# Patient Record
Sex: MALE | Race: WHITE | NOT HISPANIC OR LATINO | Employment: OTHER | URBAN - METROPOLITAN AREA
[De-identification: names, ages, dates, MRNs, and addresses within clinical notes are randomized per-mention and may not be internally consistent; named-entity substitution may affect disease eponyms.]

---

## 2017-01-16 RX ORDER — ZALEPLON 10 MG/1
CAPSULE ORAL
Qty: 45 CAPSULE | Refills: 2 | Status: SHIPPED | OUTPATIENT
Start: 2017-01-16 | End: 2017-03-20 | Stop reason: SDUPTHER

## 2017-01-23 ENCOUNTER — OFFICE VISIT (OUTPATIENT)
Dept: OPTOMETRY | Facility: CLINIC | Age: 67
End: 2017-01-23
Payer: MEDICARE

## 2017-01-23 DIAGNOSIS — H10.503 BLEPHAROCONJUNCTIVITIS OF BOTH EYES, UNSPECIFIED BLEPHAROCONJUNCTIVITIS TYPE: Primary | ICD-10-CM

## 2017-01-23 PROCEDURE — 99213 OFFICE O/P EST LOW 20 MIN: CPT | Mod: PBBFAC,PO | Performed by: OPTOMETRIST

## 2017-01-23 PROCEDURE — 92012 INTRM OPH EXAM EST PATIENT: CPT | Mod: S$PBB,,, | Performed by: OPTOMETRIST

## 2017-01-23 PROCEDURE — 99999 PR PBB SHADOW E&M-EST. PATIENT-LVL III: CPT | Mod: PBBFAC,,, | Performed by: OPTOMETRIST

## 2017-01-23 RX ORDER — NEOMYCIN SULFATE, POLYMYXIN B SULFATE AND DEXAMETHASONE 3.5; 10000; 1 MG/ML; [USP'U]/ML; MG/ML
1 SUSPENSION/ DROPS OPHTHALMIC 3 TIMES DAILY
Qty: 5 ML | Refills: 1 | Status: SHIPPED | OUTPATIENT
Start: 2017-01-23 | End: 2017-01-30

## 2017-01-23 RX ORDER — DOXYCYCLINE 50 MG/1
50 CAPSULE ORAL DAILY
Qty: 30 CAPSULE | Refills: 1 | Status: SHIPPED | OUTPATIENT
Start: 2017-01-23 | End: 2017-03-09

## 2017-01-23 NOTE — PROGRESS NOTES
"HPI     Conjunctivitis    Additional comments: reoccuring conjunctivitis symptoms --- OU red,   irritated, burning, tearing // symptoms improve when on Maxitrol but come   back after d/c gtt --- 3 episodes in 2 months           Blurred Vision    Additional comments: only due to tearing, discharge           Comments   ELIER  11/16  Used topical and oral abx in November with relief  Has had transient episodes of bleph, but "never fully resolved"  Currently better than last week  Last used maxitrol gtts 3 days ago         Last edited by JUAN Becker, OD on 1/23/2017 11:25 AM. (History)            Assessment /Plan     For exam results, see Encounter Report.    Blepharoconjunctivitis of both eyes, unspecified blepharoconjunctivitis type  -     doxycycline (MONODOX) 50 MG Cap; Take 1 capsule (50 mg total) by mouth once daily.  Dispense: 30 capsule; Refill: 1  -     neomycin-polymyxin-dexamethasone (MAXITROL) 3.5mg/mL-10,000 unit/mL-0.1 % DrpS; Place 1 drop into both eyes 3 (three) times daily.  Dispense: 5 mL; Refill: 1      1. Continue lid hygiene, gave sheet  Scrubs and warm compress qhs  ATs daily tid+ canelo with near work  Oral doxy 4-6 weeks, then trial off meds  Discussed chronic nature of condition and possible need for continue oral doxy    Call/ message in 4 weeks with report, then will arrange f/u                 "

## 2017-01-23 NOTE — MR AVS SNAPSHOT
[facility]  [address] [facility] [address] [place] [address]  Phone: [phone]  Fax: [phone]                  Vince Chadwick III   2017 11:00 AM   Office Visit    Description:  Male : 1950   Provider:  JUAN Becker, OD   Department:  [facility]           Reason for Visit     Conjunctivitis     Itchy Eye     Blurred Vision           Diagnoses this Visit        Comments    Blepharoconjunctivitis of both eyes, unspecified blepharoconjunctivitis type    -  Primary            To Do List           Future Appointments        Provider Department Dept Phone    2/3/2017 8:15 AM LAB, [place] [facility] [phone]      Goals (5 Years of Data)     None      Follow-Up and Disposition     Return if symptoms worsen or fail to improve.       These Medications        Disp Refills Start End    doxycycline (MONODOX) 50 MG Cap 30 capsule 1 2017 3/9/2017    Take 1 capsule (50 mg total) by mouth once daily. - Oral    Ph[facility]: [id number] [place] - [address] [facility] Ph #: [phone]       neomycin-polymyxin-dexamethasone (MAXITROL) 3.5mg/mL-10,000 unit/mL-0.1 % DrpS 5 mL 1 2017    Place 1 drop into both eyes 3 (three) times daily. - Both Eyes    Ph[facility]: [id number] - [place] - [address] [facility] Ph #: [phone]         [facility]     [facility] -  Assistance  Registered nurses in the [facility] provide clinical advisement, health education, appointment booking, and other advisory services.  Call for this free service at [phone].             Medications           Message regarding Medications     Verify the changes and/or additions to your medication regime listed below are the same as discussed with your clinician today.  If any of these changes or additions are incorrect, please notify your healthcare provider.        START taking these NEW medications         Refills    doxycycline (MONODOX) 50 MG Cap 1    Sig: Take 1 capsule (50 mg total) by mouth once daily.    Class: Normal    Route: Oral    neomycin-polymyxin-dexamethasone (MAXITROL) 3.5mg/mL-10,000 unit/mL-0.1 % DrpS 1    Sig: Place 1 drop into both eyes 3 (three) times daily.    Class: Normal    Route: Both Eyes           Verify that the below list of medications is an accurate representation of the medications you are currently taking.  If none reported, the list may be blank. If incorrect, please contact your healthcare provider. Carry this list with you in case of emergency.           Current Medications     atorvastatin (LIPITOR) 10 MG tablet TAKE ONE TABLET BY MOUTH IN THE EVENING    azelastine (ASTELIN) 137 mcg (0.1 %) nasal spray USE 2 SPRAYS IN EACH NOSTRIL EACH NOSTRIL TWICE DAILY    benazepril (LOTENSIN) 20 MG tablet TAKE ONE TABLET BY MOUTH DAILY    dicyclomine (BENTYL) 20 mg tablet TAKE 1 TABLET BEFORE MEALS AND AT BEDTIME OR UP TO EVERY 4 TO 6 HOURS AS NEEDED FOR BLOATING/COLON SPASMS    doxycycline (MONODOX) 50 MG Cap Take 1 capsule (50 mg total) by mouth once daily.    escitalopram oxalate (LEXAPRO) 20 MG tablet Take 1 tablet (20 mg total) by mouth once daily.    levocetirizine (XYZAL) 5 MG tablet Take 1 tablet (5 mg total) by mouth every evening.    neomycin-polymyxin-dexamethasone (MAXITROL) 3.5mg/mL-10,000 unit/mL-0.1 % DrpS Place 1 drop into both eyes 3 (three) times daily.    tamsulosin (FLOMAX) 0.4 mg Cp24 Take 2 capsules (0.8 mg total) by mouth once daily.    zaleplon (SONATA) 10 MG capsule TAKE 1 TO 2 CAPSULES BY MOUTH AT BEDTIME AS NEEDED           Clinical Reference Information           Allergies as of 1/23/2017     No Known Drug Allergies      Immunizations Administered on Date of Encounter - 1/23/2017     None      Instructions    BLEPHARITIS & TREATMENT   Definition  Blepharitis is an inflammation of the eyelid margin, which causes itchy, irritated, and often red eyes. One common  cause is staphylococcus, skin bacteria, which can thrive in these conditions and can possibly cause eye damage such as corneal scarring.   Occurrence  Blepharitis is a very common problem seen particularly in people with a tendency toward oily skin, dandruff, or dry eyes. Though most frequently seen later in life, blepharitis can begin in childhood.  It can also be associated with hormonal changes (puberty, menopause), or changes in medications.  It is also common in patients with Rosacea.  Symptoms  Lid Crusting - The lids are often reddened, somewhat swollen and scaly appearing at the base of the lashes. These scales, as they become coarser, form crusts that cause the lids to stick together. This is especially prominent upon waking.   Itching - The inflammation of the lids will cause itching and irritation.   Tearing/ Light Sensitivity -The eyes may tear more frequently and may also be sensitive to bright light.  Treatment  Treatment is aimed at lowering the number of bacteria along the eyelid margin and decreasing the scales by lid hygiene and in some cases antibiotic/steroid medications. The goal is to control this problem and prevent it from becoming a more serious condition. Treatment is focused on keeping the condition under control by routine daily lid hygiene. Unfortunately, if the treatment is stopped the symptoms often recur.    1. Upon waking, place a clean, warm, wet, washcloth over your closed eyelids (upper and lower) for several minutes.  This may be accomplished by allowing warm shower water to run with eyes closed.  2. Place a small amount of diluted (1/4 strength) Baby Shampoo or a commercial lid cleaning solution on a cotton swab, washcloth, or your clean fingertip. Gently pull down on your lower lid and use a horizontal back and forth motion to scrub the edge of your lid at the base of the eyelashes. Do not scrub the inside of the lid or the skin on the outside. Close the eye and use the cotton  "swab to scrub along the base of the upper lid lashes. Rinse the shampoo away with warm water.   3. Additionally your doctor may ask that you use an antibiotic/steroid drop or ointment for a few weeks. Use as directed.   Perform this procedure 2 times a day for the first 7 days and then cut back to once a day. (Or per your doctors recommendation.) You may need to continue lid scrubs on a daily basis, though some find they can successfully control their blepharitis symptoms with scrubs done 2 to 3 times a week.    Medication--use as directed if medication is prescribed    ________________________________________________________________________    DRY EYES:  Use Over The Counter artificial tears as needed for dry eye symptoms.  Some common brands include:  Systane, Optive, and Refresh.  These drops can be used as frequently as desired, but may be most helpful use during long periods of concentrated work.  For example, reading / working at the computer.  Avoid drops that "get redness out", as these contain medication that may further irritate the eyes.    ALLERGY EYES / SYMPTOMS:    Over the counter medications include--Zaditor and Alaway  Use as directed 1-2 drops daily for symptoms of itching / watering eyes.  These drops will not help for dry eye or exposure symptoms.           "

## 2017-01-23 NOTE — PATIENT INSTRUCTIONS
BLEPHARITIS & TREATMENT   Definition  Blepharitis is an inflammation of the eyelid margin, which causes itchy, irritated, and often red eyes. One common cause is staphylococcus, skin bacteria, which can thrive in these conditions and can possibly cause eye damage such as corneal scarring.   Occurrence  Blepharitis is a very common problem seen particularly in people with a tendency toward oily skin, dandruff, or dry eyes. Though most frequently seen later in life, blepharitis can begin in childhood.  It can also be associated with hormonal changes (puberty, menopause), or changes in medications.  It is also common in patients with Rosacea.  Symptoms  Lid Crusting - The lids are often reddened, somewhat swollen and scaly appearing at the base of the lashes. These scales, as they become coarser, form crusts that cause the lids to stick together. This is especially prominent upon waking.   Itching - The inflammation of the lids will cause itching and irritation.   Tearing/ Light Sensitivity -The eyes may tear more frequently and may also be sensitive to bright light.  Treatment  Treatment is aimed at lowering the number of bacteria along the eyelid margin and decreasing the scales by lid hygiene and in some cases antibiotic/steroid medications. The goal is to control this problem and prevent it from becoming a more serious condition. Treatment is focused on keeping the condition under control by routine daily lid hygiene. Unfortunately, if the treatment is stopped the symptoms often recur.    1. Upon waking, place a clean, warm, wet, washcloth over your closed eyelids (upper and lower) for several minutes.  This may be accomplished by allowing warm shower water to run with eyes closed.  2. Place a small amount of diluted (1/4 strength) Baby Shampoo or a commercial lid cleaning solution on a cotton swab, washcloth, or your clean fingertip. Gently pull down on your lower lid and use a horizontal back and forth motion to  "scrub the edge of your lid at the base of the eyelashes. Do not scrub the inside of the lid or the skin on the outside. Close the eye and use the cotton swab to scrub along the base of the upper lid lashes. Rinse the shampoo away with warm water.   3. Additionally your doctor may ask that you use an antibiotic/steroid drop or ointment for a few weeks. Use as directed.   Perform this procedure 2 times a day for the first 7 days and then cut back to once a day. (Or per your doctors recommendation.) You may need to continue lid scrubs on a daily basis, though some find they can successfully control their blepharitis symptoms with scrubs done 2 to 3 times a week.    Medication--use as directed if medication is prescribed    ________________________________________________________________________    DRY EYES:  Use Over The Counter artificial tears as needed for dry eye symptoms.  Some common brands include:  Systane, Optive, and Refresh.  These drops can be used as frequently as desired, but may be most helpful use during long periods of concentrated work.  For example, reading / working at the computer.  Avoid drops that "get redness out", as these contain medication that may further irritate the eyes.    ALLERGY EYES / SYMPTOMS:    Over the counter medications include--Zaditor and Alaway  Use as directed 1-2 drops daily for symptoms of itching / watering eyes.  These drops will not help for dry eye or exposure symptoms.      "

## 2017-02-03 ENCOUNTER — LAB VISIT (OUTPATIENT)
Dept: LAB | Facility: HOSPITAL | Age: 67
End: 2017-02-03
Attending: FAMILY MEDICINE
Payer: MEDICARE

## 2017-02-03 DIAGNOSIS — I10 ESSENTIAL HYPERTENSION: ICD-10-CM

## 2017-02-03 DIAGNOSIS — E78.5 HYPERLIPIDEMIA, UNSPECIFIED HYPERLIPIDEMIA TYPE: ICD-10-CM

## 2017-02-03 LAB
ALBUMIN SERPL BCP-MCNC: 4 G/DL
ALP SERPL-CCNC: 68 U/L
ALT SERPL W/O P-5'-P-CCNC: 11 U/L
ANION GAP SERPL CALC-SCNC: 6 MMOL/L
AST SERPL-CCNC: 25 U/L
BILIRUB SERPL-MCNC: 0.9 MG/DL
BUN SERPL-MCNC: 20 MG/DL
CALCIUM SERPL-MCNC: 9.3 MG/DL
CHLORIDE SERPL-SCNC: 104 MMOL/L
CHOLEST/HDLC SERPL: 2.7 {RATIO}
CO2 SERPL-SCNC: 29 MMOL/L
CREAT SERPL-MCNC: 1.1 MG/DL
EST. GFR  (AFRICAN AMERICAN): >60 ML/MIN/1.73 M^2
EST. GFR  (NON AFRICAN AMERICAN): >60 ML/MIN/1.73 M^2
GLUCOSE SERPL-MCNC: 98 MG/DL
HDL/CHOLESTEROL RATIO: 36.8 %
HDLC SERPL-MCNC: 125 MG/DL
HDLC SERPL-MCNC: 46 MG/DL
LDLC SERPL CALC-MCNC: 62.6 MG/DL
NONHDLC SERPL-MCNC: 79 MG/DL
POTASSIUM SERPL-SCNC: 4.5 MMOL/L
PROT SERPL-MCNC: 7 G/DL
SODIUM SERPL-SCNC: 139 MMOL/L
TRIGL SERPL-MCNC: 82 MG/DL
TSH SERPL DL<=0.005 MIU/L-ACNC: 1.58 UIU/ML

## 2017-02-03 PROCEDURE — 36415 COLL VENOUS BLD VENIPUNCTURE: CPT | Mod: PO

## 2017-02-03 PROCEDURE — 80053 COMPREHEN METABOLIC PANEL: CPT

## 2017-02-03 PROCEDURE — 80061 LIPID PANEL: CPT

## 2017-02-03 PROCEDURE — 84443 ASSAY THYROID STIM HORMONE: CPT

## 2017-02-08 ENCOUNTER — PATIENT MESSAGE (OUTPATIENT)
Dept: FAMILY MEDICINE | Facility: CLINIC | Age: 67
End: 2017-02-08

## 2017-02-27 ENCOUNTER — PATIENT MESSAGE (OUTPATIENT)
Dept: FAMILY MEDICINE | Facility: CLINIC | Age: 67
End: 2017-02-27

## 2017-03-13 ENCOUNTER — PATIENT MESSAGE (OUTPATIENT)
Dept: FAMILY MEDICINE | Facility: CLINIC | Age: 67
End: 2017-03-13

## 2017-03-14 ENCOUNTER — TELEPHONE (OUTPATIENT)
Dept: FAMILY MEDICINE | Facility: CLINIC | Age: 67
End: 2017-03-14

## 2017-03-21 RX ORDER — ZALEPLON 10 MG/1
CAPSULE ORAL
Qty: 45 CAPSULE | Refills: 1 | Status: SHIPPED | OUTPATIENT
Start: 2017-03-21 | End: 2017-04-30 | Stop reason: SDUPTHER

## 2017-04-07 ENCOUNTER — TELEPHONE (OUTPATIENT)
Dept: FAMILY MEDICINE | Facility: CLINIC | Age: 67
End: 2017-04-07

## 2017-04-07 NOTE — TELEPHONE ENCOUNTER
----- Message from Reyes Rosen sent at 4/7/2017  9:58 AM CDT -----  Contact: nannette  Needs prior authorization for . [facility] sent information to office on March 16 and did not hear back from office.   Rx Zaleplon (SONATA) 10 MG capsule 45      [id number] - [place] [place] - [address]  [address] [place] [address]  Phone: [phone] Fax: [phone]    Please call back [phone]  Thank you!

## 2017-04-13 ENCOUNTER — TELEPHONE (OUTPATIENT)
Dept: GASTROENTEROLOGY | Facility: CLINIC | Age: 67
End: 2017-04-13

## 2017-04-13 NOTE — TELEPHONE ENCOUNTER
"Spoke to pt   States left lower abdominal pain and tenderness.  "Typical diverticulitis pain"  No diarrhea or fever yet   An 8 on pain scale  "

## 2017-04-13 NOTE — TELEPHONE ENCOUNTER
----- Message from Hue Thorpe sent at 4/13/2017  8:12 AM CDT -----  Patient is requesting a call back, regarding his divertulitis, contact patient at [phone] or [phone].      Thank you

## 2017-04-17 ENCOUNTER — TELEPHONE (OUTPATIENT)
Dept: GASTROENTEROLOGY | Facility: CLINIC | Age: 67
End: 2017-04-17

## 2017-04-17 NOTE — TELEPHONE ENCOUNTER
----- Message from Betsy Hahn sent at 4/13/2017  2:19 PM CDT -----  Contact: self  Patient [phone] is calling to check the status of have a prescription called to his pharmacy today/please advise as he will not have a car at all later today/

## 2017-04-24 RX ORDER — AZELASTINE 1 MG/ML
SPRAY, METERED NASAL
Qty: 30 ML | Refills: 2 | Status: SHIPPED | OUTPATIENT
Start: 2017-04-24 | End: 2018-05-15 | Stop reason: SDUPTHER

## 2017-05-02 ENCOUNTER — PATIENT MESSAGE (OUTPATIENT)
Dept: FAMILY MEDICINE | Facility: CLINIC | Age: 67
End: 2017-05-02

## 2017-05-03 RX ORDER — ZALEPLON 10 MG/1
CAPSULE ORAL
Qty: 45 CAPSULE | Refills: 0 | Status: SHIPPED | OUTPATIENT
Start: 2017-05-03 | End: 2017-05-05 | Stop reason: SDUPTHER

## 2017-05-03 NOTE — TELEPHONE ENCOUNTER
The medication was filled on March 21 for 45 tablets and an additional refill. He should not need a refill now and he should check with his pharmacy see below    zaleplon (SONATA) 10 MG capsule 45 capsule 1 3/21/2017  No   Sig: TAKE 1 TO 2 CAPSULES BY MOUTH AT BEDTIME AS NEEDED   Class: Normal   Order: [id number]   Date/Time Signed: 3/21/2017  2:52 PM       E-Prescribing Status: Receipt confirmed by pharmacy (3/21/2017  2:52 PM CDT)

## 2017-05-05 RX ORDER — ZALEPLON 10 MG/1
CAPSULE ORAL
Qty: 45 CAPSULE | Refills: 0 | Status: SHIPPED | OUTPATIENT
Start: 2017-05-05 | End: 2017-05-25 | Stop reason: SDUPTHER

## 2017-05-05 RX ORDER — ESCITALOPRAM OXALATE 20 MG/1
TABLET ORAL
Qty: 90 TABLET | Refills: 0 | Status: SHIPPED | OUTPATIENT
Start: 2017-05-05 | End: 2017-08-10 | Stop reason: SDUPTHER

## 2017-05-20 RX ORDER — BENAZEPRIL HYDROCHLORIDE 20 MG/1
TABLET ORAL
Qty: 30 TABLET | Refills: 3 | Status: SHIPPED | OUTPATIENT
Start: 2017-05-20 | End: 2017-09-17 | Stop reason: SDUPTHER

## 2017-05-20 RX ORDER — ATORVASTATIN CALCIUM 10 MG/1
TABLET, FILM COATED ORAL
Qty: 30 TABLET | Refills: 3 | Status: SHIPPED | OUTPATIENT
Start: 2017-05-20 | End: 2017-09-17 | Stop reason: SDUPTHER

## 2017-05-25 RX ORDER — ZALEPLON 10 MG/1
CAPSULE ORAL
Qty: 45 CAPSULE | Refills: 0 | Status: SHIPPED | OUTPATIENT
Start: 2017-05-25 | End: 2017-06-15 | Stop reason: SDUPTHER

## 2017-06-15 RX ORDER — ZALEPLON 10 MG/1
CAPSULE ORAL
Qty: 45 CAPSULE | Refills: 0 | Status: SHIPPED | OUTPATIENT
Start: 2017-06-15 | End: 2017-07-10 | Stop reason: SDUPTHER

## 2017-06-20 ENCOUNTER — OFFICE VISIT (OUTPATIENT)
Dept: FAMILY MEDICINE | Facility: CLINIC | Age: 67
End: 2017-06-20
Payer: MEDICARE

## 2017-06-20 VITALS
HEART RATE: 89 BPM | WEIGHT: 190.94 LBS | RESPIRATION RATE: 16 BRPM | BODY MASS INDEX: 28.94 KG/M2 | SYSTOLIC BLOOD PRESSURE: 112 MMHG | DIASTOLIC BLOOD PRESSURE: 80 MMHG | HEIGHT: 68 IN

## 2017-06-20 DIAGNOSIS — I10 ESSENTIAL HYPERTENSION: Primary | ICD-10-CM

## 2017-06-20 DIAGNOSIS — F51.04 CHRONIC INSOMNIA: ICD-10-CM

## 2017-06-20 DIAGNOSIS — E78.5 HYPERLIPIDEMIA, UNSPECIFIED HYPERLIPIDEMIA TYPE: ICD-10-CM

## 2017-06-20 PROCEDURE — 1126F AMNT PAIN NOTED NONE PRSNT: CPT | Mod: ,,, | Performed by: FAMILY MEDICINE

## 2017-06-20 PROCEDURE — 1159F MED LIST DOCD IN RCRD: CPT | Mod: ,,, | Performed by: FAMILY MEDICINE

## 2017-06-20 PROCEDURE — 99999 PR PBB SHADOW E&M-EST. PATIENT-LVL III: CPT | Mod: PBBFAC,,, | Performed by: FAMILY MEDICINE

## 2017-06-20 PROCEDURE — 99213 OFFICE O/P EST LOW 20 MIN: CPT | Mod: PBBFAC,PO | Performed by: FAMILY MEDICINE

## 2017-06-20 PROCEDURE — 99213 OFFICE O/P EST LOW 20 MIN: CPT | Mod: S$PBB,,, | Performed by: FAMILY MEDICINE

## 2017-06-20 NOTE — PROGRESS NOTES
Subjective:     THIS DOCUMENT WAS MADE IN PART WITH [employer] DICTATION SOFTWARE.  OCCASIONALLY THIS SOFTWARE WILL MISINTERPRET WORDS OR PHRASES.     Patient ID: Vince Chadwick III is a 67 y.o. male.    Chief Complaint: ADD (follow up )    HPI       Essential hypertension  Chronic condition, stable and very well controlled    Hyperlipidemia, unspecified hyperlipidemia type  Stable and very well controlled on Lipitor, no side effects    Chronic insomnia  Persistent, recent stress has affected, Sonota generally works well, occasionally needs ten gm  Recent episode of diverticulitis, was using more sonata during this episode  Was treated by GI    Active Ambulatory Problems     Diagnosis Date Noted    BPH (benign prostatic hypertrophy) 09/05/2012    Anxiety 09/05/2012    Hypertension     Hyperlipidemia     Lumbar disc disease     ADD (attention deficit disorder) 05/03/2013    Insomnia 04/16/2015    Lumbar stenosis 05/31/2016    DDD (degenerative disc disease), lumbar 05/31/2016    Lumbar radicular pain 05/31/2016    Lumbar facet arthropathy 05/31/2016    Lumbar radiculopathy 06/07/2016     Resolved Ambulatory Problems     Diagnosis Date Noted    No Resolved Ambulatory Problems     Past Medical History:   Diagnosis Date    ALLERGIC RHINITIS     Arthritis     Cataract     Diverticulitis     Hyperlipidemia     Hypertension     Lumbar disc disease          Review of Systems   Constitutional: Positive for unexpected weight change (after diverticulitis).   Respiratory: Negative for shortness of breath.    Cardiovascular: Negative for chest pain and leg swelling.   Endocrine: Negative.    Genitourinary: Negative.    Psychiatric/Behavioral: Positive for sleep disturbance. Negative for suicidal ideas.        Stress, no depression of SI       Objective:      Physical Exam   Constitutional: He is oriented to person, place, and time. He appears well-developed and well-nourished. No distress.   HENT:   Head:  Normocephalic and atraumatic.   Eyes: No scleral icterus.   Neck: Normal range of motion. Neck supple.   Cardiovascular: Normal rate, regular rhythm and normal heart sounds.    No murmur heard.  Pulmonary/Chest: Effort normal and breath sounds normal. No respiratory distress.   Neurological: He is alert and oriented to person, place, and time.   Skin: Skin is warm and dry. He is not diaphoretic.   Psychiatric: He has a normal mood and affect. His behavior is normal.   Vitals reviewed.      Assessment:       1. Essential hypertension    2. Hyperlipidemia, unspecified hyperlipidemia type    3. Chronic insomnia        Plan:       Vince was seen today for add.    Diagnoses and all orders for this visit:    Essential hypertension  Stable and well controlled, continue lotension  Hyperlipidemia, unspecified hyperlipidemia type  Stable continue   Chronic insomnia  Up and down, continue sonata QHS, occasionally repeat if waking in middle of night and not going anywhere in am.

## 2017-07-05 ENCOUNTER — TELEPHONE (OUTPATIENT)
Dept: GASTROENTEROLOGY | Facility: CLINIC | Age: 67
End: 2017-07-05

## 2017-07-05 DIAGNOSIS — R10.9 ABDOMINAL CRAMPING: Primary | ICD-10-CM

## 2017-07-05 RX ORDER — CIPROFLOXACIN 500 MG/1
500 TABLET ORAL 2 TIMES DAILY
Qty: 20 TABLET | Refills: 0 | Status: SHIPPED | OUTPATIENT
Start: 2017-07-05 | End: 2017-07-19 | Stop reason: ALTCHOICE

## 2017-07-05 RX ORDER — METRONIDAZOLE 500 MG/1
500 TABLET ORAL 3 TIMES DAILY
Qty: 30 TABLET | Refills: 0 | Status: SHIPPED | OUTPATIENT
Start: 2017-07-05 | End: 2017-07-19 | Stop reason: ALTCHOICE

## 2017-07-05 NOTE — TELEPHONE ENCOUNTER
----- Message from Jesus Givens sent at 7/5/2017  4:51 PM CDT -----  Contact: pt  Pt is requesting a refill on [facility]   Call Back#[phone]  Thanks      [id number] - [place] - [address]  [address]  [place] [address]  Phone: [phone] Fax: [phone]

## 2017-07-05 NOTE — TELEPHONE ENCOUNTER
----- Message from Reyes Rosen sent at 7/5/2017  4:10 PM CDT -----  Contact: Vince  Patient states he feels his diverticulitis is coming back. Please call back [phone] [phone] (home)   Thanks!

## 2017-07-06 RX ORDER — DICYCLOMINE HYDROCHLORIDE 20 MG/1
20 TABLET ORAL EVERY 6 HOURS PRN
Qty: 100 TABLET | Refills: 0 | Status: SHIPPED | OUTPATIENT
Start: 2017-07-06 | End: 2017-08-15 | Stop reason: SDUPTHER

## 2017-07-10 RX ORDER — ZALEPLON 10 MG/1
CAPSULE ORAL
Qty: 45 CAPSULE | Refills: 2 | Status: SHIPPED | OUTPATIENT
Start: 2017-07-10 | End: 2017-10-16 | Stop reason: SDUPTHER

## 2017-07-10 NOTE — TELEPHONE ENCOUNTER
----- Message from Olga Lidia Flores sent at 7/10/2017  9:36 AM CDT -----  zaleplon (SONATA) 10 MG capsule refill    [phone]    [id number] - [place] - [address]  [address]  [place] [address]  Phone: [phone] Fax: [phone]

## 2017-07-14 ENCOUNTER — TELEPHONE (OUTPATIENT)
Dept: GASTROENTEROLOGY | Facility: CLINIC | Age: 67
End: 2017-07-14

## 2017-07-14 NOTE — TELEPHONE ENCOUNTER
Pt does have an appt with NP on Wed July 19 for symptoms. Pt is requesting rx for nausea. Are you ok with sending in zofran or should pt wait for office visit on Wednesday. Please advise. Thank you.

## 2017-07-14 NOTE — TELEPHONE ENCOUNTER
----- Message from Jeremy Connell sent at 7/14/2017 12:47 PM CDT -----  Contact: same  Patient called in and requested a message be sent regarding getting a Rx called in for nausea.        [id number] - [place] - [address] [address] [facility]  [address]  [place] [address]  Phone: [phone] Fax: [phone]    Patient call back number is [phone]

## 2017-07-16 RX ORDER — ONDANSETRON 4 MG/1
4 TABLET, FILM COATED ORAL EVERY 6 HOURS PRN
Qty: 15 TABLET | Refills: 2 | Status: SHIPPED | OUTPATIENT
Start: 2017-07-16 | End: 2018-10-16

## 2017-07-19 ENCOUNTER — OFFICE VISIT (OUTPATIENT)
Dept: GASTROENTEROLOGY | Facility: CLINIC | Age: 67
End: 2017-07-19
Payer: MEDICARE

## 2017-07-19 ENCOUNTER — LAB VISIT (OUTPATIENT)
Dept: LAB | Facility: HOSPITAL | Age: 67
End: 2017-07-19
Attending: NURSE PRACTITIONER
Payer: MEDICARE

## 2017-07-19 VITALS
RESPIRATION RATE: 18 BRPM | HEIGHT: 68 IN | HEART RATE: 96 BPM | SYSTOLIC BLOOD PRESSURE: 118 MMHG | DIASTOLIC BLOOD PRESSURE: 78 MMHG | BODY MASS INDEX: 29.52 KG/M2 | WEIGHT: 194.81 LBS

## 2017-07-19 DIAGNOSIS — Z87.19 HISTORY OF DIVERTICULOSIS: ICD-10-CM

## 2017-07-19 DIAGNOSIS — R10.32 LLQ ABDOMINAL PAIN: ICD-10-CM

## 2017-07-19 DIAGNOSIS — R10.32 LLQ ABDOMINAL PAIN: Primary | ICD-10-CM

## 2017-07-19 LAB
ALBUMIN SERPL BCP-MCNC: 3.8 G/DL
ALP SERPL-CCNC: 71 U/L
ALT SERPL W/O P-5'-P-CCNC: 19 U/L
ANION GAP SERPL CALC-SCNC: 5 MMOL/L
AST SERPL-CCNC: 32 U/L
BASOPHILS # BLD AUTO: 0.04 K/UL
BASOPHILS NFR BLD: 0.6 %
BILIRUB SERPL-MCNC: 0.6 MG/DL
BUN SERPL-MCNC: 13 MG/DL
CALCIUM SERPL-MCNC: 9.5 MG/DL
CHLORIDE SERPL-SCNC: 107 MMOL/L
CO2 SERPL-SCNC: 30 MMOL/L
CREAT SERPL-MCNC: 1.2 MG/DL
DIFFERENTIAL METHOD: NORMAL
EOSINOPHIL # BLD AUTO: 0.2 K/UL
EOSINOPHIL NFR BLD: 2.1 %
ERYTHROCYTE [DISTWIDTH] IN BLOOD BY AUTOMATED COUNT: 14.2 %
EST. GFR  (AFRICAN AMERICAN): >60 ML/MIN/1.73 M^2
EST. GFR  (NON AFRICAN AMERICAN): >60 ML/MIN/1.73 M^2
GLUCOSE SERPL-MCNC: 90 MG/DL
HCT VFR BLD AUTO: 46.8 %
HGB BLD-MCNC: 15.3 G/DL
LYMPHOCYTES # BLD AUTO: 1.5 K/UL
LYMPHOCYTES NFR BLD: 20.7 %
MCH RBC QN AUTO: 28.2 PG
MCHC RBC AUTO-ENTMCNC: 32.7 G/DL
MCV RBC AUTO: 86 FL
MONOCYTES # BLD AUTO: 0.6 K/UL
MONOCYTES NFR BLD: 8.6 %
NEUTROPHILS # BLD AUTO: 4.8 K/UL
NEUTROPHILS NFR BLD: 67.6 %
PLATELET # BLD AUTO: 240 K/UL
PMV BLD AUTO: 11.5 FL
POTASSIUM SERPL-SCNC: 3.8 MMOL/L
PROT SERPL-MCNC: 7.1 G/DL
RBC # BLD AUTO: 5.42 M/UL
SODIUM SERPL-SCNC: 142 MMOL/L
WBC # BLD AUTO: 7.11 K/UL

## 2017-07-19 PROCEDURE — 80053 COMPREHEN METABOLIC PANEL: CPT

## 2017-07-19 PROCEDURE — 99999 PR PBB SHADOW E&M-EST. PATIENT-LVL IV: CPT | Mod: PBBFAC,,, | Performed by: NURSE PRACTITIONER

## 2017-07-19 PROCEDURE — 1159F MED LIST DOCD IN RCRD: CPT | Mod: ,,, | Performed by: NURSE PRACTITIONER

## 2017-07-19 PROCEDURE — 1126F AMNT PAIN NOTED NONE PRSNT: CPT | Mod: ,,, | Performed by: NURSE PRACTITIONER

## 2017-07-19 PROCEDURE — 99214 OFFICE O/P EST MOD 30 MIN: CPT | Mod: S$PBB,,, | Performed by: NURSE PRACTITIONER

## 2017-07-19 PROCEDURE — 36415 COLL VENOUS BLD VENIPUNCTURE: CPT | Mod: PO

## 2017-07-19 PROCEDURE — 85025 COMPLETE CBC W/AUTO DIFF WBC: CPT

## 2017-07-19 NOTE — PATIENT INSTRUCTIONS
Understanding Diverticulosis and Diverticulitis     Pouches or diverticula usually occur in the lower part of the colon called the sigmoid.     The colon (large intestine) is the last part of the digestive tract. It absorbs water from stool and changes it from a liquid to a solid. In certain cases, small pouches called diverticula can form in the colon wall. This condition is called diverticulosis. The pouches can become infected. If this happens, it becomes a more serious problem called diverticulitis. These problems can be painful. But they can be managed.  Managing your condition  Diet changes or medicines may be prescribed.   If you have diverticulosis  Recommendations include:  · Diet changes are often enough to control symptoms. The main changes are adding fiber (roughage) and drinking more water. Fiber absorbs water as it travels through your colon. This helps your stool stay soft and move smoothly. Water helps this process.  · If needed, you may be told to take over-the-counter stool softeners.  · To help relieve pain, antispasmodic medicines may be prescribed.  · Watch for changes in your bowel movements. Tell the healthcare provider if you notice any changes.  · Begin an exercise program. Ask your healthcare provider how to get started.  · Get plenty of rest and sleep.   If you have diverticulitis  Treatment depends on how bad your symptoms are.  · For mild symptoms. You may be put on a liquid diet for a short time. Antibiotics are usually prescribed. If these two steps relieve your symptoms, you may then be prescribed a high-fiber diet. If you still have symptoms, your healthcare provider will discuss more treatment choices with you.  · For severe symptoms. You may need to be admitted to the hospital. There, you can be given IV antibiotics and fluids. You will also be put on a low-fiber or liquid diet. Although not common, surgery is needed in some people with severe symptoms.  [place] to colon health      Diverticulitis occurs when the pouches become infected or inflamed.     Help keep your colon healthy with a diet that includes plenty of high-fiber fruits, vegetables, and whole grains. Drink plenty of liquids like water and juice. Maintain a healthy lifestyle including regular exercise, stress management, and adequate rest and sleep.   Date Last Reviewed: 7/1/2016  © 6079-0626 [employer], [employer]. [address] [place] [address], [place] [address]. All rights reserved. This information is not intended as a substitute for professional medical care. Always follow your healthcare professional's instructions.

## 2017-07-19 NOTE — PROGRESS NOTES
Subjective:       Patient ID: Vince Chadwick III is a 67 y.o. male Body mass index is 29.63 kg/m².    Chief Complaint: Follow-up (diverticulitis)    This patient is new to me.  Established patient of Dr. Novak (last in 2011).    Abdominal Pain   This is a recurrent problem. Episode onset: history of diverticulitis; pain recurred 7/5/17, prior episode was 6 months ago and before that was a couple of years ago. The onset quality is sudden. The problem occurs 2 to 4 times per day. Duration: lasts for a few minutes. The problem has been rapidly improving. The pain is located in the LLQ. The pain is at a severity of 0/10 (currently). The abdominal pain does not radiate. Associated symptoms include belching, flatus and nausea (relates to antibiotic; has not picked up zofran yet). Pertinent negatives include no anorexia, constipation, diarrhea, dysuria, fever, frequency, hematochezia, melena, vomiting or weight loss. Associated symptoms comments: Bowel movements of soft pasty stool 3-4 times- denies change in bowel movements; had heartburn 3 times since pain present- relieved with zantac OTC. Nothing aggravates the pain. He has tried antibiotics (cipro 500 mg bid and flagyl 500 mg tid x 14 days- completed 8 days of it and stopped due to nausea; avoids nuts/seeds) for the symptoms. The treatment provided significant relief. His past medical history is significant for abdominal surgery and irritable bowel syndrome. There is no history of Crohn's disease, GERD, pancreatitis, PUD or ulcerative colitis.     Review of Systems   Constitutional: Negative for appetite change, chills, fatigue, fever, unexpected weight change and weight loss.   HENT: Negative for sore throat and trouble swallowing.    Respiratory: Negative for cough, choking and shortness of breath.    Cardiovascular: Negative for chest pain.   Gastrointestinal: Positive for abdominal pain, flatus and nausea (relates to antibiotic; has not picked up zofran yet).  Negative for anal bleeding, anorexia, blood in stool, constipation, diarrhea, hematochezia, melena, rectal pain and vomiting.   Genitourinary: Negative for difficulty urinating, dysuria, flank pain, frequency and urgency.   Neurological: Negative for weakness.       Past Medical History:   Diagnosis Date    ALLERGIC RHINITIS     Arthritis     Cataract     OU    Colon polyp     Diverticulitis     Diverticulosis     Hyperlipidemia     Hypertension     Irritable bowel syndrome     Lumbar disc disease      Past Surgical History:   Procedure Laterality Date    APPENDECTOMY      COLONOSCOPY  06/02/2011    [facility].    One 1 mm polyp in the sigmoid colon.  FRAGMENT OF NONNEOPLASTIC COLONIC MUCOSA.  Sigmoid diverticulosis.  Spasms c/w IBS.  repeat in 7-8 years    COLONOSCOPY  09/12/2006    Dr. Novak, in legacy    FOOT SURGERY      x 2    LUMBAR EPIDURAL INJECTION      PILONIDAL CYST DRAINAGE      s/p chalazion removal      OD    TONSILLECTOMY       Family History   Problem Relation Age of Onset    Glaucoma Father     Cataracts Father     Macular degeneration Father     Colon polyps Father     Glaucoma Paternal Uncle     Glaucoma Paternal Uncle     Cataracts Mother     Colon cancer Neg Hx     Crohn's disease Neg Hx     Ulcerative colitis Neg Hx      Wt Readings from Last 10 Encounters:   07/19/17 88.4 kg (194 lb 13.5 oz)   06/20/17 86.6 kg (190 lb 14.7 oz)   12/02/16 90.7 kg (199 lb 15.3 oz)   11/16/16 91 kg (200 lb 9.9 oz)   10/21/16 90.8 kg (200 lb 2.8 oz)   10/05/16 89.9 kg (198 lb 3.1 oz)   08/25/16 88.5 kg (195 lb)   08/11/16 91.2 kg (201 lb 1 oz)   08/05/16 88.5 kg (195 lb 1.7 oz)   07/19/16 90.9 kg (200 lb 6.4 oz)     Lab Results   Component Value Date    WBC 11.13 06/23/2016    HGB 15.5 06/23/2016    HCT 45.8 06/23/2016    MCV 85 06/23/2016     06/23/2016     CMP  Sodium   Date Value Ref Range Status   02/03/2017 139 136 - 145 mmol/L Final     Potassium   Date Value Ref Range Status    02/03/2017 4.5 3.5 - 5.1 mmol/L Final     Chloride   Date Value Ref Range Status   02/03/2017 104 95 - 110 mmol/L Final     CO2   Date Value Ref Range Status   02/03/2017 29 23 - 29 mmol/L Final     Glucose   Date Value Ref Range Status   02/03/2017 98 70 - 110 mg/dL Final     BUN, Bld   Date Value Ref Range Status   02/03/2017 20 8 - 23 mg/dL Final     Creatinine   Date Value Ref Range Status   02/03/2017 1.1 0.5 - 1.4 mg/dL Final     Calcium   Date Value Ref Range Status   02/03/2017 9.3 8.7 - 10.5 mg/dL Final     Total Protein   Date Value Ref Range Status   02/03/2017 7.0 6.0 - 8.4 g/dL Final     Albumin   Date Value Ref Range Status   02/03/2017 4.0 3.5 - 5.2 g/dL Final     Total Bilirubin   Date Value Ref Range Status   02/03/2017 0.9 0.1 - 1.0 mg/dL Final     Comment:     For infants and newborns, interpretation of results should be based  on gestational age, weight and in agreement with clinical  observations.  Premature Infant recommended reference ranges:  Up to 24 hours.............<8.0 mg/dL  Up to 48 hours............<12.0 mg/dL  3-5 days..................<15.0 mg/dL  6-29 days.................<15.0 mg/dL       Alkaline Phosphatase   Date Value Ref Range Status   02/03/2017 68 55 - 135 U/L Final     AST   Date Value Ref Range Status   02/03/2017 25 10 - 40 U/L Final     ALT   Date Value Ref Range Status   02/03/2017 11 10 - 44 U/L Final     Anion Gap   Date Value Ref Range Status   02/03/2017 6 (L) 8 - 16 mmol/L Final     eGFR if    Date Value Ref Range Status   02/03/2017 >60.0 >60 mL/min/1.73 m^2 Final     eGFR if non    Date Value Ref Range Status   02/03/2017 >60.0 >60 mL/min/1.73 m^2 Final     Comment:     Calculation used to obtain the estimated glomerular filtration  rate (eGFR) is the CKD-EPI equation. Since race is unknown   in our information system, the eGFR values for   -American and Non--American patients are given   for each creatinine  result.       Lab Results   Component Value Date    TSH 1.578 02/03/2017     Reviewed prior medical records including endoscopy history (see surgical history).    Objective:      Physical Exam   Constitutional: He is oriented to person, place, and time. He appears well-developed and well-nourished. No distress.   HENT:   Mouth/Throat: Oropharynx is clear and moist and mucous membranes are normal. No oral lesions. No oropharyngeal exudate.   Eyes: Conjunctivae are normal. Pupils are equal, round, and reactive to light. No scleral icterus.   Neck: Neck supple. No tracheal deviation present.   Cardiovascular: Normal rate.    Pulmonary/Chest: Effort normal and breath sounds normal. No respiratory distress. He has no wheezes.   Abdominal: Soft. Normal appearance and bowel sounds are normal. He exhibits no distension, no abdominal bruit and no mass. There is no hepatosplenomegaly. There is tenderness (mild) in the left lower quadrant. There is no rigidity, no rebound, no guarding, no tenderness at McBurney's point and negative Cherry's sign. No hernia.   Lymphadenopathy:     He has no cervical adenopathy.   Neurological: He is alert and oriented to person, place, and time.   Skin: Skin is warm and dry. No rash noted. He is not diaphoretic. No erythema. No pallor.   Non-jaundiced   Psychiatric: He has a normal mood and affect. His behavior is normal.   Nursing note and vitals reviewed.      Assessment:       1. LLQ abdominal pain    2. History of diverticulosis        Plan:       LLQ abdominal pain  -     CBC auto differential; Future; Expected date: 07/19/2017  -     Comprehensive metabolic panel; Future; Expected date: 07/19/2017  -     CT Abdomen Pelvis With Contrast; Future; Expected date: 07/19/2017  - informed patient that zofran was sent into his pharmacy; recommend to complete antibiotics as directed; if unable to tolerate it we can switch to a different antibiotic(s); patient verbalized understanding and agreed  with management plan    History of diverticulosis  -     CBC auto differential; Future; Expected date: 07/19/2017  -     Comprehensive metabolic panel; Future; Expected date: 07/19/2017  -     CT Abdomen Pelvis With Contrast; Future; Expected date: 07/19/2017  - discussed about possible colonoscopy pending results of testing and if symptoms persist  - discussed the diagnosis of diverticulosis and diverticulitis, advised to continue a low fiber diet for the next 4 weeks and then slowly increase fiber in diet. Advised a low fiber diet is recommended for diverticulitis (for about 4 weeks), but to prevent diverticulitis, high fiber diet is recommended.  -Recommended high fiber diet after episode has completely resolved. Recommended daily exercise, adequate water intake (six 8-oz glasses of water daily), and high fiber diet. OTC fiber supplements are recommended if diet does not reach daily fiber goal (25 grams daily), such as Metamucil, Citrucel, or FiberCon (take as directed, separate from other oral medications by >2 hours).  - Advised to avoid/minimize popcorn, corn, seeds, and nuts.  - discussed with patient that if episodes of diverticulitis recur frequently, may need to consult general surgery    Return in about 1 month (around 8/19/2017), or if symptoms worsen or fail to improve.      If no improvement in symptoms or symptoms worsen, call/follow-up at clinic or go to ER.

## 2017-07-20 ENCOUNTER — OFFICE VISIT (OUTPATIENT)
Dept: URGENT CARE | Facility: CLINIC | Age: 67
End: 2017-07-20
Payer: MEDICARE

## 2017-07-20 VITALS
SYSTOLIC BLOOD PRESSURE: 135 MMHG | HEART RATE: 82 BPM | RESPIRATION RATE: 17 BRPM | WEIGHT: 189 LBS | DIASTOLIC BLOOD PRESSURE: 80 MMHG | OXYGEN SATURATION: 97 % | HEIGHT: 68 IN | BODY MASS INDEX: 28.64 KG/M2 | TEMPERATURE: 99 F

## 2017-07-20 DIAGNOSIS — S01.01XA LACERATION OF SCALP WITHOUT FOREIGN BODY, INITIAL ENCOUNTER: Primary | ICD-10-CM

## 2017-07-20 PROCEDURE — 99214 OFFICE O/P EST MOD 30 MIN: CPT | Mod: S$GLB,,, | Performed by: FAMILY MEDICINE

## 2017-07-20 PROCEDURE — 1159F MED LIST DOCD IN RCRD: CPT | Mod: S$GLB,,, | Performed by: FAMILY MEDICINE

## 2017-07-20 NOTE — PROGRESS NOTES
"Subjective:       Patient ID: Vince Chadwick III is a 67 y.o. male.    Vitals:  height is 5' 8" (1.727 m) and weight is 85.7 kg (189 lb). His oral temperature is 98.5 °F (36.9 °C). His blood pressure is 135/80 and his pulse is 82. His respiration is 17 and oxygen saturation is 97%.     Chief Complaint: Laceration (30 minutes ago)    Laceration    The incident occurred less than 1 hour ago. The laceration is located on the face. The pain is mild. He reports no foreign bodies present. His tetanus status is UTD.     Review of Systems   Constitution: Negative for weakness and malaise/fatigue.   HENT: Negative for nosebleeds.    Cardiovascular: Negative for chest pain and syncope.   Respiratory: Negative for shortness of breath.    Musculoskeletal: Negative for back pain, joint pain and neck pain.   Gastrointestinal: Negative for abdominal pain.   Genitourinary: Negative for hematuria.   Neurological: Negative for dizziness and numbness.       Objective:      Physical Exam   Constitutional: Vital signs are normal. He appears well-developed.   Neurological: He is alert. He has normal strength. No cranial nerve deficit.   Skin:            Assessment:       1. Laceration of scalp without foreign body, initial encounter        Plan:         Laceration of scalp without foreign body, initial encounter  -     LACERATION REPAIR      Discussed wound care with uriel calero was utd   "

## 2017-07-21 NOTE — PROCEDURES
Laceration Repair  Date/Time: 7/21/2017 10:02 AM  Performed by: SHEY VALDEZ  Authorized by: SHEY VALDEZ   Body area: head/neck  Location details: forehead  Laceration length: 1 cm  Foreign bodies: no foreign bodies  Tendon involvement: none  Nerve involvement: none  Vascular damage: no  Patient sedated: no  Preparation: Patient was prepped and draped in the usual sterile fashion.  Amount of cleaning: standard  Debridement: none  Skin closure: Steri-Strips  Approximation: close  Approximation difficulty: simple  Dressing: 4x4 sterile gauze  Patient tolerance: Patient tolerated the procedure well with no immediate complications

## 2017-07-26 ENCOUNTER — TELEPHONE (OUTPATIENT)
Dept: FAMILY MEDICINE | Facility: CLINIC | Age: 67
End: 2017-07-26

## 2017-07-26 ENCOUNTER — HOSPITAL ENCOUNTER (OUTPATIENT)
Dept: RADIOLOGY | Facility: HOSPITAL | Age: 67
Discharge: HOME | End: 2017-07-26
Attending: NURSE PRACTITIONER
Payer: MEDICARE

## 2017-07-26 DIAGNOSIS — Z87.19 HISTORY OF DIVERTICULOSIS: ICD-10-CM

## 2017-07-26 DIAGNOSIS — Z12.5 SPECIAL SCREENING EXAMINATION FOR NEOPLASM OF PROSTATE: Primary | ICD-10-CM

## 2017-07-26 DIAGNOSIS — R10.32 LLQ ABDOMINAL PAIN: ICD-10-CM

## 2017-07-26 PROCEDURE — 74177 CT ABD & PELVIS W/CONTRAST: CPT | Mod: TC,PO

## 2017-07-26 PROCEDURE — 74177 CT ABD & PELVIS W/CONTRAST: CPT | Mod: 26,,, | Performed by: RADIOLOGY

## 2017-07-26 PROCEDURE — 25500020 PHARM REV CODE 255: Mod: PO | Performed by: NURSE PRACTITIONER

## 2017-07-26 RX ADMIN — IOHEXOL 30 ML: 350 INJECTION, SOLUTION INTRAVENOUS at 09:07

## 2017-07-26 RX ADMIN — IOHEXOL 100 ML: 350 INJECTION, SOLUTION INTRAVENOUS at 09:07

## 2017-07-26 NOTE — TELEPHONE ENCOUNTER
----- Message from Gisel Romero sent at 7/26/2017  7:31 AM CDT -----  Patient states PSA LAB test need had lab on 7/26 need order in.

## 2017-07-27 ENCOUNTER — LAB VISIT (OUTPATIENT)
Dept: LAB | Facility: HOSPITAL | Age: 67
End: 2017-07-27
Attending: FAMILY MEDICINE
Payer: MEDICARE

## 2017-07-27 ENCOUNTER — TELEPHONE (OUTPATIENT)
Dept: GASTROENTEROLOGY | Facility: CLINIC | Age: 67
End: 2017-07-27

## 2017-07-27 ENCOUNTER — PATIENT MESSAGE (OUTPATIENT)
Dept: FAMILY MEDICINE | Facility: CLINIC | Age: 67
End: 2017-07-27

## 2017-07-27 DIAGNOSIS — Z12.5 SPECIAL SCREENING EXAMINATION FOR NEOPLASM OF PROSTATE: ICD-10-CM

## 2017-07-27 LAB — COMPLEXED PSA SERPL-MCNC: 2.2 NG/ML

## 2017-07-27 PROCEDURE — 36415 COLL VENOUS BLD VENIPUNCTURE: CPT | Mod: PO

## 2017-07-27 PROCEDURE — 84153 ASSAY OF PSA TOTAL: CPT

## 2017-07-27 NOTE — TELEPHONE ENCOUNTER
Please call to inform & review the results with the patient- ct scan showed diverticulosis without signs of diverticulitis (no signs of infection/inflammation). Continue with previous recommendations.  Large amount of stool in colon which can suggest constipation. Recommend high fiber diet/OTC fiber supplement daily and increase water intake to 3-4 16 oz bottles of water daily.  Other findings showed left lung with a nodule which is smaller than previous imaging from 2011 which likely indicates benign etiology; calcified granuloma in liver noted (seen on previous imaging from 2011)- recommend follow-up with PCP for continued evaluation and management of these findings.  Enlarged prostate noted and possible kidney cyst (cyst are typically benign) noted. Recommend follow-up with PCP/urology for continued evaluation and management of these findings.  Small umbilical hernia noted- typically we can monitor these; if it becomes painful or if it does not reduce patient needs to see a general surgeon or go to the ER.  Other findings are unremarkable    Thanks,  Martha TAYLOR-C

## 2017-07-31 NOTE — TELEPHONE ENCOUNTER
Pt informed of results and recommendations. Verbalized understanding. Pt will speak with urologist concerning kidney cyst.

## 2017-08-10 RX ORDER — ESCITALOPRAM OXALATE 20 MG/1
TABLET ORAL
Qty: 90 TABLET | Refills: 0 | Status: SHIPPED | OUTPATIENT
Start: 2017-08-10 | End: 2017-11-06 | Stop reason: SDUPTHER

## 2017-08-15 DIAGNOSIS — R10.9 ABDOMINAL CRAMPING: ICD-10-CM

## 2017-08-15 RX ORDER — DICYCLOMINE HYDROCHLORIDE 20 MG/1
TABLET ORAL
Qty: 100 TABLET | Refills: 0 | Status: SHIPPED | OUTPATIENT
Start: 2017-08-15 | End: 2017-09-25 | Stop reason: SDUPTHER

## 2017-08-19 DIAGNOSIS — N40.1 BENIGN NON-NODULAR PROSTATIC HYPERPLASIA WITH LOWER URINARY TRACT SYMPTOMS: ICD-10-CM

## 2017-08-21 RX ORDER — TAMSULOSIN HYDROCHLORIDE 0.4 MG/1
CAPSULE ORAL
Qty: 60 CAPSULE | Refills: 10 | Status: SHIPPED | OUTPATIENT
Start: 2017-08-21 | End: 2018-07-16 | Stop reason: SDUPTHER

## 2017-09-18 RX ORDER — BENAZEPRIL HYDROCHLORIDE 20 MG/1
TABLET ORAL
Qty: 30 TABLET | Refills: 5 | Status: SHIPPED | OUTPATIENT
Start: 2017-09-18 | End: 2018-03-19 | Stop reason: SDUPTHER

## 2017-09-18 RX ORDER — ATORVASTATIN CALCIUM 10 MG/1
TABLET, FILM COATED ORAL
Qty: 30 TABLET | Refills: 5 | Status: SHIPPED | OUTPATIENT
Start: 2017-09-18 | End: 2018-03-19 | Stop reason: SDUPTHER

## 2017-09-18 NOTE — PROGRESS NOTES
Refill Authorization Note     is requesting a refill authorization.    Brief assessment and rational for refill: APPROVE; prr  Name of medication: BENAZEPRIL 20 MG TAB / atorvastatin 10 mg   How patient will take medication: t1t po daily   Amount/Quantity of medication ordered: 90d           Refills Authorized: Yes  If authorized number of refills: 1        Medication Therapy Plan: BP controlled.  Will approve for 6 mo  Comments:   Lab Results   Component Value Date    CREATININE 1.2 07/19/2017    BUN 13 07/19/2017     07/19/2017    K 3.8 07/19/2017     07/19/2017    CO2 30 (H) 07/19/2017      BP Readings from Last 3 Encounters:   07/20/17 135/80   07/19/17 118/78   06/20/17 112/80      Lab Results   Component Value Date    CHOL 125 02/03/2017    CHOL 134 04/10/2015    CHOL 146 05/16/2014     Lab Results   Component Value Date    HDL 46 02/03/2017    HDL 54 04/10/2015    HDL 55 05/16/2014     Lab Results   Component Value Date    LDLCALC 62.6 (L) 02/03/2017    LDLCALC 67.4 04/10/2015    LDLCALC 78.4 05/16/2014     Lab Results   Component Value Date    TRIG 82 02/03/2017    TRIG 63 04/10/2015    TRIG 63 05/16/2014     Lab Results   Component Value Date    CHOLHDL 36.8 02/03/2017    CHOLHDL 40.3 04/10/2015    CHOLHDL 37.7 05/16/2014

## 2017-09-25 DIAGNOSIS — R10.9 ABDOMINAL CRAMPING: ICD-10-CM

## 2017-09-25 RX ORDER — DICYCLOMINE HYDROCHLORIDE 20 MG/1
20 TABLET ORAL
Qty: 100 TABLET | Refills: 8 | Status: SHIPPED | OUTPATIENT
Start: 2017-09-25 | End: 2018-06-26 | Stop reason: SDUPTHER

## 2017-10-06 ENCOUNTER — PATIENT OUTREACH (OUTPATIENT)
Dept: ADMINISTRATIVE | Facility: HOSPITAL | Age: 67
End: 2017-10-06

## 2017-10-16 RX ORDER — ZALEPLON 10 MG/1
CAPSULE ORAL
Qty: 45 CAPSULE | Refills: 1 | Status: SHIPPED | OUTPATIENT
Start: 2017-10-16 | End: 2017-12-26 | Stop reason: SDUPTHER

## 2017-10-20 ENCOUNTER — OFFICE VISIT (OUTPATIENT)
Dept: FAMILY MEDICINE | Facility: CLINIC | Age: 67
End: 2017-10-20
Payer: MEDICARE

## 2017-10-20 VITALS
BODY MASS INDEX: 29.88 KG/M2 | HEART RATE: 81 BPM | SYSTOLIC BLOOD PRESSURE: 126 MMHG | OXYGEN SATURATION: 98 % | HEIGHT: 68 IN | DIASTOLIC BLOOD PRESSURE: 70 MMHG | TEMPERATURE: 99 F | WEIGHT: 197.19 LBS

## 2017-10-20 DIAGNOSIS — F51.04 CHRONIC INSOMNIA: ICD-10-CM

## 2017-10-20 DIAGNOSIS — E78.5 HYPERLIPIDEMIA, UNSPECIFIED HYPERLIPIDEMIA TYPE: ICD-10-CM

## 2017-10-20 DIAGNOSIS — I10 ESSENTIAL HYPERTENSION: Primary | ICD-10-CM

## 2017-10-20 PROCEDURE — G0008 ADMIN INFLUENZA VIRUS VAC: HCPCS | Mod: PBBFAC,PN

## 2017-10-20 PROCEDURE — 99214 OFFICE O/P EST MOD 30 MIN: CPT | Mod: S$PBB,,, | Performed by: FAMILY MEDICINE

## 2017-10-20 PROCEDURE — 99999 PR PBB SHADOW E&M-EST. PATIENT-LVL III: CPT | Mod: PBBFAC,,, | Performed by: FAMILY MEDICINE

## 2017-10-20 PROCEDURE — 99213 OFFICE O/P EST LOW 20 MIN: CPT | Mod: PBBFAC,PN | Performed by: FAMILY MEDICINE

## 2017-10-20 NOTE — PROGRESS NOTES
Subjective:     THIS DOCUMENT WAS MADE IN PART WITH [employer] DICTATION SOFTWARE.  OCCASIONALLY THIS SOFTWARE WILL MISINTERPRET WORDS OR PHRASES.     Patient ID: Vince Chadwick III is a 67 y.o. male.    Chief Complaint: Follow-up    HPI     HTN, chronic stable and well controlled    HL, chronic, remains stable on lipitor, has been well controlled with Lipitor, no side effects    Chronic insomnia, stable on sonata, QHS    Request flu shot    Doing well , would like to reduce lexapro    Active Ambulatory Problems     Diagnosis Date Noted    BPH (benign prostatic hypertrophy) 09/05/2012    Anxiety 09/05/2012    Hypertension     Hyperlipidemia     Lumbar disc disease     ADD (attention deficit disorder) 05/03/2013    Insomnia 04/16/2015    Lumbar stenosis 05/31/2016    DDD (degenerative disc disease), lumbar 05/31/2016    Lumbar radicular pain 05/31/2016    Lumbar facet arthropathy 05/31/2016    Lumbar radiculopathy 06/07/2016     Resolved Ambulatory Problems     Diagnosis Date Noted    No Resolved Ambulatory Problems     Past Medical History:   Diagnosis Date    ALLERGIC RHINITIS     Arthritis     Cataract     Colon polyp     Diverticulitis     Diverticulosis     Hyperlipidemia     Hypertension     Irritable bowel syndrome     Lumbar disc disease          Review of Systems   Constitutional: Negative for fatigue, fever and unexpected weight change.   HENT: Negative for congestion, sinus pressure, trouble swallowing and voice change.    Respiratory: Negative for cough, chest tightness and shortness of breath.    Cardiovascular: Negative for chest pain, palpitations and leg swelling.   Gastrointestinal: Negative for abdominal pain, blood in stool, constipation, diarrhea, nausea and vomiting.   Genitourinary: Negative for dysuria, frequency and hematuria.   Musculoskeletal: Negative for arthralgias, myalgias and neck pain.   Skin: Negative for rash and wound.   Neurological: Negative for dizziness,  syncope and light-headedness.   Psychiatric/Behavioral: Positive for decreased concentration. Negative for dysphoric mood. The patient is nervous/anxious.        Objective:      Physical Exam   Constitutional: He is oriented to person, place, and time. He appears well-developed and well-nourished. No distress.   HENT:   Head: Normocephalic and atraumatic.   Eyes: No scleral icterus.   Neck: Normal range of motion. Neck supple.   Cardiovascular: Normal rate, regular rhythm and normal heart sounds.    No murmur heard.  Pulmonary/Chest: Effort normal and breath sounds normal. No respiratory distress. He has no wheezes. He has no rales.   Abdominal: Soft. Bowel sounds are normal. He exhibits no distension. There is no tenderness. There is no guarding.   Neurological: He is alert and oriented to person, place, and time.   Skin: Skin is warm and dry. He is not diaphoretic.   Psychiatric: He has a normal mood and affect. His behavior is normal.   Vitals reviewed.      Assessment:       1. Essential hypertension    2. Hyperlipidemia, unspecified hyperlipidemia type    3. Chronic insomnia        Plan:       Vince was seen today for follow-up.    Diagnoses and all orders for this visit:    Essential hypertension  -     Comprehensive metabolic panel; Future    Hyperlipidemia, unspecified hyperlipidemia type  -     Lipid panel; Future    Chronic insomnia  Stable on sonata         Alternate 20 mg and 10 mg of Lexapro, call in a few weeks  Rx for Prevnar

## 2017-11-06 NOTE — TELEPHONE ENCOUNTER
Dr. Burleson pt     LOV 10/20/2017    Follow up scheduled for 01/24/2018    Pt sent a message saying he tried cutting back to 15 mg by taking 20mg one day and 10mg the next.  That did not go well. He  would like to stay at 20mg per day for the time being.  Thanks.

## 2017-11-07 RX ORDER — ESCITALOPRAM OXALATE 20 MG/1
20 TABLET ORAL DAILY
Qty: 90 TABLET | Refills: 0 | Status: SHIPPED | OUTPATIENT
Start: 2017-11-07 | End: 2020-01-02

## 2017-11-07 RX ORDER — ESCITALOPRAM OXALATE 20 MG/1
TABLET ORAL
Qty: 90 TABLET | Refills: 1 | Status: SHIPPED | OUTPATIENT
Start: 2017-11-07 | End: 2018-10-08 | Stop reason: SDUPTHER

## 2017-11-07 NOTE — PROGRESS NOTES
Refill Authorization Note     is requesting a refill authorization.    Brief assessment and rational for refill: APPROVE: prr  Name of medication: ESCITALOPRAM 20 MG TAB  How patient will take medication: t1t po daily   Amount/Quantity of medication ordered: 90d   Medication reconciliation completed: No        Refills Authorized: Yes  If authorized number of refills: 1        Medication Therapy Plan: Recently seen.  Commented as improving on lexapro.  Will que for 6 mo   Comments: =

## 2017-12-26 RX ORDER — ZALEPLON 10 MG/1
CAPSULE ORAL
Qty: 45 CAPSULE | Refills: 1 | Status: SHIPPED | OUTPATIENT
Start: 2017-12-26 | End: 2018-03-13 | Stop reason: SDUPTHER

## 2018-01-11 ENCOUNTER — OFFICE VISIT (OUTPATIENT)
Dept: FAMILY MEDICINE | Facility: CLINIC | Age: 68
End: 2018-01-11
Payer: MEDICARE

## 2018-01-11 VITALS
WEIGHT: 203.38 LBS | TEMPERATURE: 99 F | SYSTOLIC BLOOD PRESSURE: 118 MMHG | HEIGHT: 68 IN | OXYGEN SATURATION: 96 % | HEART RATE: 85 BPM | BODY MASS INDEX: 30.82 KG/M2 | DIASTOLIC BLOOD PRESSURE: 74 MMHG

## 2018-01-11 DIAGNOSIS — R68.89 FLU-LIKE SYMPTOMS: ICD-10-CM

## 2018-01-11 DIAGNOSIS — H65.02 ACUTE SEROUS OTITIS MEDIA OF LEFT EAR, RECURRENCE NOT SPECIFIED: Primary | ICD-10-CM

## 2018-01-11 PROCEDURE — 99999 PR PBB SHADOW E&M-EST. PATIENT-LVL III: CPT | Mod: PBBFAC,,, | Performed by: FAMILY MEDICINE

## 2018-01-11 PROCEDURE — 99213 OFFICE O/P EST LOW 20 MIN: CPT | Mod: S$PBB,,, | Performed by: FAMILY MEDICINE

## 2018-01-11 PROCEDURE — 99213 OFFICE O/P EST LOW 20 MIN: CPT | Mod: PBBFAC,PN | Performed by: FAMILY MEDICINE

## 2018-01-11 RX ORDER — BENZONATATE 100 MG/1
200 CAPSULE ORAL 3 TIMES DAILY PRN
Qty: 40 CAPSULE | Refills: 1 | Status: SHIPPED | OUTPATIENT
Start: 2018-01-11 | End: 2018-10-08

## 2018-01-11 NOTE — PROGRESS NOTES
"Subjective:     THIS DOCUMENT WAS MADE IN PART WITH [employer] DICTATION SOFTWARE. OCCASIONALLY THIS SOFTWARE MAY MISINTERPRET WORDS OR PHRASES.     Patient ID: Vince Chadwick III is a 67 y.o. male.    Chief Complaint: Cough (pt states has had a persistent cough since end of December and became worse yesterday, ear infection, sinus issues)    Cough   This is a new problem. The current episode started 1 to 4 weeks ago. The problem has been waxing and waning. The cough is productive of sputum. Associated symptoms include chills, ear congestion, ear pain, a fever (subjective "my temp is usually 97"), headaches, nasal congestion, postnasal drip and a sore throat. Pertinent negatives include no shortness of breath or wheezing. The symptoms are aggravated by lying down and cold air. He has tried OTC cough suppressant (mucinex, excedrin) for the symptoms. The treatment provided mild relief.      Has been on Amoxil since yesterday for tooth infection      Review of Systems   Constitutional: Positive for chills and fever (subjective "my temp is usually 97").   HENT: Positive for ear pain, postnasal drip and sore throat.    Respiratory: Positive for cough. Negative for shortness of breath and wheezing.    Neurological: Positive for headaches.       Objective:      Physical Exam   Constitutional: He is oriented to person, place, and time. He appears well-developed and well-nourished. No distress.   HENT:   Head: Normocephalic and atraumatic.   Right Ear: Tympanic membrane, external ear and ear canal normal.   Left Ear: External ear and ear canal normal.   Nose: Mucosal edema and rhinorrhea present.   Mouth/Throat: No oropharyngeal exudate, posterior oropharyngeal edema, posterior oropharyngeal erythema or tonsillar abscesses.   Left TM abnormal light reflux, not red,   Eyes: Conjunctivae are normal. Pupils are equal, round, and reactive to light. Right eye exhibits no discharge. Left eye exhibits no discharge. No scleral " icterus.   Neck: Normal range of motion. Neck supple. No tracheal deviation present. No thyromegaly present.   Cardiovascular: Normal rate, regular rhythm and normal heart sounds.    No murmur heard.  Pulmonary/Chest: Effort normal and breath sounds normal. No respiratory distress. He has no wheezes. He has no rales.   Lymphadenopathy:     He has no cervical adenopathy.   Neurological: He is alert and oriented to person, place, and time.   Skin: Skin is warm and dry. No rash noted. He is not diaphoretic.   Psychiatric: He has a normal mood and affect. His behavior is normal.   Vitals reviewed.      Assessment:       1. Acute serous otitis media of left ear, recurrence not specified        Plan:       Vince was seen today for cough.    Diagnoses and all orders for this visit:    Acute serous otitis media of left ear, recurrence not specified   probably serous otitis media, though he is already on an antibiotic for the dental infection so he should complete the amoxicillin.    Flu-like symptoms  -     benzonatate (TESSALON) 100 MG capsule; Take 2 capsules (200 mg total) by mouth 3 (three) times daily as needed for Cough.    already on amoxil, continue

## 2018-01-22 ENCOUNTER — LAB VISIT (OUTPATIENT)
Dept: LAB | Facility: HOSPITAL | Age: 68
End: 2018-01-22
Attending: FAMILY MEDICINE
Payer: MEDICARE

## 2018-01-22 DIAGNOSIS — I10 ESSENTIAL HYPERTENSION: ICD-10-CM

## 2018-01-22 DIAGNOSIS — E78.5 HYPERLIPIDEMIA, UNSPECIFIED HYPERLIPIDEMIA TYPE: ICD-10-CM

## 2018-01-22 LAB
ALBUMIN SERPL BCP-MCNC: 3.7 G/DL
ALP SERPL-CCNC: 80 U/L
ALT SERPL W/O P-5'-P-CCNC: 8 U/L
ANION GAP SERPL CALC-SCNC: 7 MMOL/L
AST SERPL-CCNC: 21 U/L
BILIRUB SERPL-MCNC: 0.7 MG/DL
BUN SERPL-MCNC: 18 MG/DL
CALCIUM SERPL-MCNC: 9.4 MG/DL
CHLORIDE SERPL-SCNC: 104 MMOL/L
CHOLEST SERPL-MCNC: 136 MG/DL
CHOLEST/HDLC SERPL: 2.8 {RATIO}
CO2 SERPL-SCNC: 29 MMOL/L
CREAT SERPL-MCNC: 1.1 MG/DL
EST. GFR  (AFRICAN AMERICAN): >60 ML/MIN/1.73 M^2
EST. GFR  (NON AFRICAN AMERICAN): >60 ML/MIN/1.73 M^2
GLUCOSE SERPL-MCNC: 98 MG/DL
HDLC SERPL-MCNC: 49 MG/DL
HDLC SERPL: 36 %
LDLC SERPL CALC-MCNC: 67.8 MG/DL
NONHDLC SERPL-MCNC: 87 MG/DL
POTASSIUM SERPL-SCNC: 4.2 MMOL/L
PROT SERPL-MCNC: 7.3 G/DL
SODIUM SERPL-SCNC: 140 MMOL/L
TRIGL SERPL-MCNC: 96 MG/DL

## 2018-01-22 PROCEDURE — 80053 COMPREHEN METABOLIC PANEL: CPT

## 2018-01-22 PROCEDURE — 80061 LIPID PANEL: CPT

## 2018-01-22 PROCEDURE — 36415 COLL VENOUS BLD VENIPUNCTURE: CPT | Mod: PN

## 2018-01-24 ENCOUNTER — HOSPITAL ENCOUNTER (OUTPATIENT)
Dept: RADIOLOGY | Facility: HOSPITAL | Age: 68
Discharge: HOME | End: 2018-01-24
Attending: FAMILY MEDICINE
Payer: MEDICARE

## 2018-01-24 ENCOUNTER — OFFICE VISIT (OUTPATIENT)
Dept: FAMILY MEDICINE | Facility: CLINIC | Age: 68
End: 2018-01-24
Payer: MEDICARE

## 2018-01-24 VITALS
BODY MASS INDEX: 30.74 KG/M2 | OXYGEN SATURATION: 98 % | WEIGHT: 202.81 LBS | DIASTOLIC BLOOD PRESSURE: 76 MMHG | SYSTOLIC BLOOD PRESSURE: 120 MMHG | TEMPERATURE: 99 F | HEART RATE: 78 BPM | HEIGHT: 68 IN

## 2018-01-24 DIAGNOSIS — R05.9 COUGH: ICD-10-CM

## 2018-01-24 DIAGNOSIS — I10 ESSENTIAL HYPERTENSION: ICD-10-CM

## 2018-01-24 DIAGNOSIS — E78.5 HYPERLIPIDEMIA, UNSPECIFIED HYPERLIPIDEMIA TYPE: ICD-10-CM

## 2018-01-24 DIAGNOSIS — R05.9 COUGH: Primary | ICD-10-CM

## 2018-01-24 PROCEDURE — 99214 OFFICE O/P EST MOD 30 MIN: CPT | Mod: S$PBB,,, | Performed by: FAMILY MEDICINE

## 2018-01-24 PROCEDURE — 71046 X-RAY EXAM CHEST 2 VIEWS: CPT | Mod: TC,PN

## 2018-01-24 PROCEDURE — 71046 X-RAY EXAM CHEST 2 VIEWS: CPT | Mod: 26,,, | Performed by: RADIOLOGY

## 2018-01-24 PROCEDURE — 99999 PR PBB SHADOW E&M-EST. PATIENT-LVL III: CPT | Mod: PBBFAC,,, | Performed by: FAMILY MEDICINE

## 2018-01-24 PROCEDURE — 99213 OFFICE O/P EST LOW 20 MIN: CPT | Mod: PBBFAC,PN | Performed by: FAMILY MEDICINE

## 2018-01-24 RX ORDER — METHYLPREDNISOLONE 4 MG/1
TABLET ORAL
Qty: 1 PACKAGE | Refills: 0 | Status: SHIPPED | OUTPATIENT
Start: 2018-01-24 | End: 2018-10-08 | Stop reason: ALTCHOICE

## 2018-01-24 NOTE — PROGRESS NOTES
Subjective:     THIS DOCUMENT WAS MADE IN PART WITH [employer] SOFTWARE. OCCASIONALLY THIS SOFTWARE MAY MISINTERPRET WORDS OR PHRASES.     Patient ID: Vince Chadwick III is a 67 y.o. male.    Chief Complaint: Follow-up (pt states he has not improved much, still has cough)    HPI    Follow-up chronic conditions but also cough not resolved.     he was seen a few weeks ago, having respiratory symptoms, was already on an antibiotic, placed on test along with us to help with the copy still is a nagging persistent dry cough. If you were to children shortness of breath or wheezing.     Hypertension, this is stable and well-controlled.    Hyperlipidemia mild increase but overall satisfactory with current medications.    Active Ambulatory Problems     Diagnosis Date Noted    BPH (benign prostatic hypertrophy) 09/05/2012    Anxiety 09/05/2012    Hypertension     Hyperlipidemia     Lumbar disc disease     ADD (attention deficit disorder) 05/03/2013    Insomnia 04/16/2015    Lumbar stenosis 05/31/2016    DDD (degenerative disc disease), lumbar 05/31/2016    Lumbar radicular pain 05/31/2016    Lumbar facet arthropathy 05/31/2016    Lumbar radiculopathy 06/07/2016     Resolved Ambulatory Problems     Diagnosis Date Noted    No Resolved Ambulatory Problems     Past Medical History:   Diagnosis Date    ALLERGIC RHINITIS     Arthritis     Cataract     Colon polyp     Diverticulitis     Diverticulosis     Hyperlipidemia     Hypertension     Irritable bowel syndrome     Lumbar disc disease            Review of Systems   Constitutional: Negative for fatigue, fever and unexpected weight change.   HENT: Positive for congestion. Negative for sinus pressure, trouble swallowing and voice change.    Eyes: Negative for pain and visual disturbance.   Respiratory: Positive for cough. Negative for chest tightness and shortness of breath.    Cardiovascular: Negative for chest pain, palpitations and leg swelling.    Gastrointestinal: Negative for abdominal pain, blood in stool, constipation, diarrhea, nausea and vomiting.   Genitourinary: Negative for dysuria, frequency and hematuria.   Musculoskeletal: Negative for arthralgias, myalgias and neck pain.   Skin: Negative for rash and wound.   Neurological: Negative for dizziness, syncope and light-headedness.   Psychiatric/Behavioral: Negative.        Objective:      Physical Exam   Constitutional: He is oriented to person, place, and time. He appears well-developed and well-nourished. No distress.   HENT:   Head: Normocephalic and atraumatic.   Right Ear: Tympanic membrane, external ear and ear canal normal.   Left Ear: Tympanic membrane, external ear and ear canal normal.   Nose: Mucosal edema present.   Mouth/Throat: No oropharyngeal exudate, posterior oropharyngeal edema, posterior oropharyngeal erythema or tonsillar abscesses.   Eyes: Conjunctivae are normal. Pupils are equal, round, and reactive to light. Right eye exhibits no discharge. Left eye exhibits no discharge. No scleral icterus.   Neck: Normal range of motion. Neck supple. No tracheal deviation present. No thyromegaly present.   Cardiovascular: Normal rate, regular rhythm and normal heart sounds.    No murmur heard.  Pulmonary/Chest: Effort normal and breath sounds normal. No respiratory distress. He has no wheezes. He has no rales.   Lymphadenopathy:     He has no cervical adenopathy.   Neurological: He is alert and oriented to person, place, and time.   Skin: Skin is warm and dry. No rash noted. He is not diaphoretic.   Psychiatric: He has a normal mood and affect. His behavior is normal.   Vitals reviewed.      Assessment:       1. Cough    2. Essential hypertension    3. Hyperlipidemia, unspecified hyperlipidemia type        Plan:       Vince was seen today for follow-up.    Diagnoses and all orders for this visit:    Cough  -     X-Ray Chest PA And Lateral; Future   X-ray was normal, no pneumonia. Medrol  Dosepak  Essential hypertension   stable controlled  Hyperlipidemia, unspecified hyperlipidemia type   mild increase but satisfactory. Continue healthy diet and increase exercise  Other orders  -     methylPREDNISolone (MEDROL DOSEPACK) 4 mg tablet; use as directed

## 2018-02-01 ENCOUNTER — PATIENT MESSAGE (OUTPATIENT)
Dept: FAMILY MEDICINE | Facility: CLINIC | Age: 68
End: 2018-02-01

## 2018-02-01 DIAGNOSIS — J01.90 SUBACUTE SINUSITIS, UNSPECIFIED LOCATION: Primary | ICD-10-CM

## 2018-02-01 RX ORDER — AZITHROMYCIN 250 MG/1
TABLET, FILM COATED ORAL
Qty: 6 TABLET | Refills: 0 | Status: SHIPPED | OUTPATIENT
Start: 2018-02-01 | End: 2018-02-06

## 2018-02-01 NOTE — TELEPHONE ENCOUNTER
Pt states he is having symptoms of URI that seem to be worse.  Please see my chart message and advise.     LOV:1/24/18

## 2018-02-02 ENCOUNTER — TELEPHONE (OUTPATIENT)
Dept: FAMILY MEDICINE | Facility: CLINIC | Age: 68
End: 2018-02-02

## 2018-02-02 NOTE — TELEPHONE ENCOUNTER
Spoke with [facility] and they stated they have received Rx and it is ready for pt to .     Spoke with pt and advised that Rx is ready for  at [facility].

## 2018-02-26 ENCOUNTER — PATIENT MESSAGE (OUTPATIENT)
Dept: FAMILY MEDICINE | Facility: CLINIC | Age: 68
End: 2018-02-26

## 2018-03-13 RX ORDER — ZALEPLON 10 MG/1
CAPSULE ORAL
Qty: 45 CAPSULE | Refills: 0 | Status: SHIPPED | OUTPATIENT
Start: 2018-03-13 | End: 2018-04-19 | Stop reason: SDUPTHER

## 2018-03-19 RX ORDER — BENAZEPRIL HYDROCHLORIDE 20 MG/1
20 TABLET ORAL DAILY
Qty: 30 TABLET | Refills: 5 | Status: SHIPPED | OUTPATIENT
Start: 2018-03-19 | End: 2018-08-10 | Stop reason: SDUPTHER

## 2018-03-19 RX ORDER — ATORVASTATIN CALCIUM 10 MG/1
TABLET, FILM COATED ORAL
Qty: 30 TABLET | Refills: 5 | Status: SHIPPED | OUTPATIENT
Start: 2018-03-19 | End: 2018-09-12 | Stop reason: SDUPTHER

## 2018-04-19 ENCOUNTER — PATIENT MESSAGE (OUTPATIENT)
Dept: FAMILY MEDICINE | Facility: CLINIC | Age: 68
End: 2018-04-19

## 2018-04-19 RX ORDER — ZALEPLON 10 MG/1
CAPSULE ORAL
Qty: 45 CAPSULE | Refills: 0 | Status: SHIPPED | OUTPATIENT
Start: 2018-04-19 | End: 2018-05-29 | Stop reason: SDUPTHER

## 2018-05-15 RX ORDER — AZELASTINE 1 MG/ML
SPRAY, METERED NASAL
Qty: 30 ML | Refills: 2 | Status: SHIPPED | OUTPATIENT
Start: 2018-05-15 | End: 2018-11-19 | Stop reason: SDUPTHER

## 2018-05-29 ENCOUNTER — PATIENT MESSAGE (OUTPATIENT)
Dept: FAMILY MEDICINE | Facility: CLINIC | Age: 68
End: 2018-05-29

## 2018-05-29 RX ORDER — ZALEPLON 10 MG/1
CAPSULE ORAL
Qty: 45 CAPSULE | Refills: 0 | Status: SHIPPED | OUTPATIENT
Start: 2018-05-29 | End: 2018-07-06 | Stop reason: SDUPTHER

## 2018-05-30 ENCOUNTER — PATIENT MESSAGE (OUTPATIENT)
Dept: FAMILY MEDICINE | Facility: CLINIC | Age: 68
End: 2018-05-30

## 2018-05-30 RX ORDER — ZALEPLON 10 MG/1
CAPSULE ORAL
Qty: 45 CAPSULE | Refills: 0 | Status: CANCELLED | OUTPATIENT
Start: 2018-05-30

## 2018-05-30 NOTE — TELEPHONE ENCOUNTER
I filled this yesterday:    zaleplon (SONATA) 10 MG capsule 45 capsule 0 5/29/2018  No   Sig: TAKE 1 TO 2 CAPSULES BY MOUTH AT BEDTIME AS NEEDED   Class: Normal   Order: [id number]   Date/Time Signed: 5/29/2018 16:31       E-Prescribing Status: Receipt confirmed by pharmacy (5/29/2018  4:31 PM CDT)

## 2018-06-26 DIAGNOSIS — R10.9 ABDOMINAL CRAMPING: ICD-10-CM

## 2018-06-26 RX ORDER — DICYCLOMINE HYDROCHLORIDE 20 MG/1
20 TABLET ORAL
Qty: 100 TABLET | Refills: 8 | Status: SHIPPED | OUTPATIENT
Start: 2018-06-26 | End: 2019-01-04

## 2018-07-06 RX ORDER — ZALEPLON 10 MG/1
CAPSULE ORAL
Qty: 45 CAPSULE | Refills: 0 | Status: SHIPPED | OUTPATIENT
Start: 2018-07-06 | End: 2018-08-14 | Stop reason: SDUPTHER

## 2018-07-06 RX ORDER — ZALEPLON 10 MG/1
CAPSULE ORAL
Qty: 45 CAPSULE | Refills: 0 | Status: SHIPPED | OUTPATIENT
Start: 2018-07-06 | End: 2018-11-02 | Stop reason: SDUPTHER

## 2018-07-16 DIAGNOSIS — N40.1 BENIGN NON-NODULAR PROSTATIC HYPERPLASIA WITH LOWER URINARY TRACT SYMPTOMS: ICD-10-CM

## 2018-07-16 RX ORDER — TAMSULOSIN HYDROCHLORIDE 0.4 MG/1
0.4 CAPSULE ORAL 2 TIMES DAILY
Qty: 60 CAPSULE | Refills: 10 | Status: SHIPPED | OUTPATIENT
Start: 2018-07-16 | End: 2019-06-14 | Stop reason: SDUPTHER

## 2018-07-16 NOTE — TELEPHONE ENCOUNTER
Patient is asking for a refill on Tamsulosin 0.4 mg # 60 with refills please. He only has 2 pills left.

## 2018-08-06 RX ORDER — ESCITALOPRAM OXALATE 20 MG/1
TABLET ORAL
Qty: 90 TABLET | Refills: 1 | Status: SHIPPED | OUTPATIENT
Start: 2018-08-06 | End: 2019-01-27 | Stop reason: SDUPTHER

## 2018-08-10 RX ORDER — BENAZEPRIL HYDROCHLORIDE 20 MG/1
20 TABLET ORAL DAILY
Qty: 30 TABLET | Refills: 2 | Status: SHIPPED | OUTPATIENT
Start: 2018-08-10 | End: 2018-11-07 | Stop reason: SDUPTHER

## 2018-08-10 NOTE — PROGRESS NOTES
Refill Authorization Note     is requesting a refill authorization.    Brief assessment and rationale for refill: APPROVE; prr  Amount/Quantity of medication ordered: 90d        Refills Authorized: Yes  If authorized number of refills: 1        Medication-related problems identified: Therapeutic duplication  Medication Therapy Plan: Labs WNL; BP controlled; duplicate meds on chart (remove both of Dr. Weston morocho/ leta without pharmacy note attached); approve 6 more to get to next annual  Name and strength of medication: BENAZEPRIL HCL 20 MG TABLET  How patient will take medication: t1t qd  Medication reconciliation completed: No  Comments:     BP Readings from Last 3 Encounters:   01/24/18 120/76   01/11/18 118/74   10/20/17 126/70     Lab Results   Component Value Date    CREATININE 1.1 01/22/2018    BUN 18 01/22/2018     01/22/2018    K 4.2 01/22/2018     01/22/2018    CO2 29 01/22/2018

## 2018-08-14 RX ORDER — ZALEPLON 10 MG/1
CAPSULE ORAL
Qty: 45 CAPSULE | Refills: 0 | Status: SHIPPED | OUTPATIENT
Start: 2018-08-14 | End: 2018-09-25 | Stop reason: SDUPTHER

## 2018-09-12 RX ORDER — ATORVASTATIN CALCIUM 10 MG/1
TABLET, FILM COATED ORAL
Qty: 30 TABLET | Refills: 5 | Status: SHIPPED | OUTPATIENT
Start: 2018-09-12 | End: 2019-03-08 | Stop reason: SDUPTHER

## 2018-09-25 ENCOUNTER — PATIENT MESSAGE (OUTPATIENT)
Dept: FAMILY MEDICINE | Facility: CLINIC | Age: 68
End: 2018-09-25

## 2018-09-25 RX ORDER — ZALEPLON 10 MG/1
CAPSULE ORAL
Qty: 45 CAPSULE | Refills: 0 | Status: SHIPPED | OUTPATIENT
Start: 2018-09-25 | End: 2018-10-08 | Stop reason: SDUPTHER

## 2018-10-08 ENCOUNTER — TELEPHONE (OUTPATIENT)
Dept: FAMILY MEDICINE | Facility: CLINIC | Age: 68
End: 2018-10-08

## 2018-10-08 ENCOUNTER — OFFICE VISIT (OUTPATIENT)
Dept: FAMILY MEDICINE | Facility: CLINIC | Age: 68
End: 2018-10-08
Payer: MEDICARE

## 2018-10-08 ENCOUNTER — LAB VISIT (OUTPATIENT)
Dept: LAB | Facility: HOSPITAL | Age: 68
End: 2018-10-08
Attending: FAMILY MEDICINE
Payer: MEDICARE

## 2018-10-08 VITALS
SYSTOLIC BLOOD PRESSURE: 110 MMHG | HEART RATE: 88 BPM | TEMPERATURE: 99 F | BODY MASS INDEX: 30.64 KG/M2 | WEIGHT: 202.19 LBS | HEIGHT: 68 IN | DIASTOLIC BLOOD PRESSURE: 68 MMHG

## 2018-10-08 DIAGNOSIS — K57.92 ACUTE DIVERTICULITIS: Primary | ICD-10-CM

## 2018-10-08 DIAGNOSIS — R10.32 LLQ PAIN: ICD-10-CM

## 2018-10-08 DIAGNOSIS — K57.92 ACUTE DIVERTICULITIS: ICD-10-CM

## 2018-10-08 LAB
ANION GAP SERPL CALC-SCNC: 9 MMOL/L
BASOPHILS # BLD AUTO: 0.08 K/UL
BASOPHILS NFR BLD: 0.8 %
BUN SERPL-MCNC: 16 MG/DL
CALCIUM SERPL-MCNC: 9.6 MG/DL
CHLORIDE SERPL-SCNC: 105 MMOL/L
CO2 SERPL-SCNC: 25 MMOL/L
CREAT SERPL-MCNC: 1 MG/DL
DIFFERENTIAL METHOD: ABNORMAL
EOSINOPHIL # BLD AUTO: 0.1 K/UL
EOSINOPHIL NFR BLD: 1.2 %
ERYTHROCYTE [DISTWIDTH] IN BLOOD BY AUTOMATED COUNT: 13.7 %
EST. GFR  (AFRICAN AMERICAN): >60 ML/MIN/1.73 M^2
EST. GFR  (NON AFRICAN AMERICAN): >60 ML/MIN/1.73 M^2
GLUCOSE SERPL-MCNC: 103 MG/DL
HCT VFR BLD AUTO: 47.1 %
HGB BLD-MCNC: 14.8 G/DL
IMM GRANULOCYTES # BLD AUTO: 0.04 K/UL
IMM GRANULOCYTES NFR BLD AUTO: 0.4 %
LYMPHOCYTES # BLD AUTO: 1.6 K/UL
LYMPHOCYTES NFR BLD: 16.3 %
MCH RBC QN AUTO: 28.1 PG
MCHC RBC AUTO-ENTMCNC: 31.4 G/DL
MCV RBC AUTO: 89 FL
MONOCYTES # BLD AUTO: 0.9 K/UL
MONOCYTES NFR BLD: 8.5 %
NEUTROPHILS # BLD AUTO: 7.3 K/UL
NEUTROPHILS NFR BLD: 72.8 %
NRBC BLD-RTO: 0 /100 WBC
PLATELET # BLD AUTO: 246 K/UL
PMV BLD AUTO: 12.2 FL
POTASSIUM SERPL-SCNC: 4.2 MMOL/L
RBC # BLD AUTO: 5.27 M/UL
SODIUM SERPL-SCNC: 139 MMOL/L
WBC # BLD AUTO: 9.98 K/UL

## 2018-10-08 PROCEDURE — 80048 BASIC METABOLIC PNL TOTAL CA: CPT

## 2018-10-08 PROCEDURE — 99999 PR PBB SHADOW E&M-EST. PATIENT-LVL III: CPT | Mod: PBBFAC,,, | Performed by: FAMILY MEDICINE

## 2018-10-08 PROCEDURE — 99214 OFFICE O/P EST MOD 30 MIN: CPT | Mod: S$PBB,,, | Performed by: FAMILY MEDICINE

## 2018-10-08 PROCEDURE — 99213 OFFICE O/P EST LOW 20 MIN: CPT | Mod: PBBFAC,PN | Performed by: FAMILY MEDICINE

## 2018-10-08 PROCEDURE — 36415 COLL VENOUS BLD VENIPUNCTURE: CPT | Mod: PN

## 2018-10-08 PROCEDURE — 85025 COMPLETE CBC W/AUTO DIFF WBC: CPT

## 2018-10-08 RX ORDER — METRONIDAZOLE 500 MG/1
500 TABLET ORAL 3 TIMES DAILY
Qty: 30 TABLET | Refills: 0 | Status: SHIPPED | OUTPATIENT
Start: 2018-10-08 | End: 2018-10-31

## 2018-10-08 RX ORDER — CIPROFLOXACIN 500 MG/1
500 TABLET ORAL 2 TIMES DAILY
Qty: 20 TABLET | Refills: 0 | Status: SHIPPED | OUTPATIENT
Start: 2018-10-08 | End: 2018-10-31

## 2018-10-08 NOTE — TELEPHONE ENCOUNTER
----- Message from Betsy Hahn sent at 10/8/2018  7:26 AM CDT -----  Contact: self  Patient [phone] (home) or [phone] (cell) is having a flareup with his diverticulitis and is asking to see Dr Burleson today 10 08 18/please call

## 2018-10-08 NOTE — PROGRESS NOTES
THIS DOCUMENT WAS MADE IN PART WITH VOICE RECOGNITION SOFTWARE.  OCCASIONALLY THIS SOFTWARE WILL MISINTERPRET WORDS OR PHRASES.      Vince Chadwick III  1950    Vince was seen today for abdominal pain and nausea.    Diagnoses and all orders for this visit:    Acute diverticulitis  -     ciprofloxacin HCl (CIPRO) 500 MG tablet; Take 1 tablet (500 mg total) by mouth 2 (two) times daily.  -     metroNIDAZOLE (FLAGYL) 500 MG tablet; Take 1 tablet (500 mg total) by mouth 3 (three) times daily.  -     CT Abdomen Pelvis W Wo Contrast; Future  -     CBC auto differential; Future  -     Basic metabolic panel; Future   if acute diverticulitis confirmed will need to follow back with gastroenterology. Multiple episodes may warrant surgical evaluation. Patient was advised that if symptoms worsen despite oral antibiotics intravenous antibiotics may be necessary. So if worsening symptoms he must go to the hospital.   Begin oral antibiotics immediately pending CT scan should be scheduled within the next day or two. Again if symptoms worsening then he needs immediate emergency evaluation and emergency to scan which can only be done in the ER. Clinic is not an emergency room. I could not provide a stat CT at the time of the visit.    LLQ pain  -     CT Abdomen Pelvis W Wo Contrast; Future        Subjective     Chief Complaint   Patient presents with    Abdominal Pain     sharp LLQ abd pain since last night    Nausea       Abdominal Pain   This is a recurrent problem. The current episode started yesterday. The onset quality is gradual. The problem occurs constantly. The most recent episode lasted 2 days. The problem has been gradually worsening. The pain is located in the LLQ. The pain is at a severity of 8/10. The pain is severe. The quality of the pain is sharp. The abdominal pain does not radiate. Associated symptoms include constipation and nausea. Pertinent negatives include no anorexia, arthralgias, belching, diarrhea,  dysuria, fever, flatus, frequency, headaches, hematochezia, hematuria, melena, myalgias, vomiting or weight loss. The pain is aggravated by movement. The pain is relieved by nothing. He has tried acetaminophen for the symptoms. The treatment provided mild relief. His past medical history is significant for abdominal surgery and irritable bowel syndrome.   Nausea   Associated symptoms include abdominal pain and nausea. Pertinent negatives include no anorexia, arthralgias, fever, headaches, myalgias or vomiting.         HPI elements addressed above in the assessment and plan including problems, diagnosis, stability/instability,  improving/worsening, and chronicity will not be duplicated in this section. Any important additional HPI topics will be discussed here if needed.    Active Ambulatory Problems     Diagnosis Date Noted    BPH (benign prostatic hypertrophy) 09/05/2012    Anxiety 09/05/2012    Hypertension     Hyperlipidemia     Lumbar disc disease     ADD (attention deficit disorder) 05/03/2013    Insomnia 04/16/2015    Lumbar stenosis 05/31/2016    DDD (degenerative disc disease), lumbar 05/31/2016    Lumbar radicular pain 05/31/2016    Lumbar facet arthropathy 05/31/2016    Lumbar radiculopathy 06/07/2016     Resolved Ambulatory Problems     Diagnosis Date Noted    No Resolved Ambulatory Problems     Past Medical History:   Diagnosis Date    ALLERGIC RHINITIS     Arthritis     Cataract     Colon polyp     Diverticulitis     Diverticulosis     Hyperlipidemia     Hypertension     Irritable bowel syndrome     Lumbar disc disease          Review of Systems   Constitutional: Negative for fever and weight loss.   Gastrointestinal: Positive for abdominal pain, constipation and nausea. Negative for anorexia, diarrhea, flatus, hematochezia, melena and vomiting.   Genitourinary: Negative for dysuria, frequency and hematuria.   Musculoskeletal: Negative for arthralgias and myalgias.   Neurological:  "Negative for headaches.       Objective     Physical Exam   Constitutional: He is oriented to person, place, and time. He appears well-developed and well-nourished. No distress.   HENT:   Head: Normocephalic and atraumatic.   Right Ear: Tympanic membrane, external ear and ear canal normal.   Left Ear: Tympanic membrane, external ear and ear canal normal.   Nose: Nose normal.   Mouth/Throat: Oropharynx is clear and moist.   Eyes: Conjunctivae and EOM are normal. Pupils are equal, round, and reactive to light. Right eye exhibits no discharge. Left eye exhibits no discharge. No scleral icterus.   Neck: Normal range of motion. Neck supple. No thyromegaly present.   Cardiovascular: Normal rate, regular rhythm, normal heart sounds and intact distal pulses. Exam reveals no gallop and no friction rub.   No murmur heard.  Pulmonary/Chest: Effort normal and breath sounds normal. No stridor. No respiratory distress. He has no wheezes. He has no rales. He exhibits no tenderness.   Abdominal:   Abdomen soft and flat. Bowel sounds present but somewhat hypoactive. Tenderness in the left lower quadrant. There is some guarding but no rebound.   Lymphadenopathy:     He has no cervical adenopathy.   Neurological: He is alert and oriented to person, place, and time. He has normal reflexes.   Skin: No rash noted. He is not diaphoretic.   Psychiatric: He has a normal mood and affect. His behavior is normal. Judgment and thought content normal.   Vitals reviewed.    Vitals:    10/08/18 1325   BP: 110/68   Pulse: 88   Temp: 99.3 °F (37.4 °C)   Weight: 91.7 kg (202 lb 2.6 oz)   Height: 5' 8" (1.727 m)       MOST RECENT LABS IN OUR ELECTRONIC MEDICAL RECORD:     Results for orders placed or performed in visit on 01/22/18   Comprehensive metabolic panel   Result Value Ref Range    Sodium 140 136 - 145 mmol/L    Potassium 4.2 3.5 - 5.1 mmol/L    Chloride 104 95 - 110 mmol/L    CO2 29 23 - 29 mmol/L    Glucose 98 70 - 110 mg/dL    BUN, Bld 18 8 " - 23 mg/dL    Creatinine 1.1 0.5 - 1.4 mg/dL    Calcium 9.4 8.7 - 10.5 mg/dL    Total Protein 7.3 6.0 - 8.4 g/dL    Albumin 3.7 3.5 - 5.2 g/dL    Total Bilirubin 0.7 0.1 - 1.0 mg/dL    Alkaline Phosphatase 80 55 - 135 U/L    AST 21 10 - 40 U/L    ALT 8 (L) 10 - 44 U/L    Anion Gap 7 (L) 8 - 16 mmol/L    eGFR if African American >60.0 >60 mL/min/1.73 m^2    eGFR if non African American >60.0 >60 mL/min/1.73 m^2   Lipid panel   Result Value Ref Range    Cholesterol 136 120 - 199 mg/dL    Triglycerides 96 30 - 150 mg/dL    HDL 49 40 - 75 mg/dL    LDL Cholesterol 67.8 63.0 - 159.0 mg/dL    HDL/Chol Ratio 36.0 20.0 - 50.0 %    Total Cholesterol/HDL Ratio 2.8 2.0 - 5.0    Non-HDL Cholesterol 87 mg/dL

## 2018-10-09 ENCOUNTER — TELEPHONE (OUTPATIENT)
Dept: FAMILY MEDICINE | Facility: CLINIC | Age: 68
End: 2018-10-09

## 2018-10-09 RX ORDER — PROMETHAZINE HYDROCHLORIDE 25 MG/1
25 TABLET ORAL EVERY 6 HOURS PRN
Qty: 30 TABLET | Refills: 1 | Status: SHIPPED | OUTPATIENT
Start: 2018-10-09 | End: 2019-01-04

## 2018-10-09 NOTE — TELEPHONE ENCOUNTER
----- Message from Nancy Kauffman sent at 10/9/2018 11:23 AM CDT -----  Type: Needs Medical Advice    Who Called:  Self   Symptoms (please be specific):  NA How long has patient had these symptoms:  NA Pharmacy name and phone #:  [facility] [address] Best Call Back Number: [phone]  Additional Information: Patient states recent prescribed medication causing the patient to be nauseated.Patient asking for new rx for nausea.

## 2018-10-09 NOTE — TELEPHONE ENCOUNTER
Pt started taking rx's yesterday and has felt nauseas and the same thing today. He is taking them with food and stated it didn't help.     Pt requesting phenergan to be sent to [facility]     Please advise

## 2018-10-09 NOTE — TELEPHONE ENCOUNTER
----- Message from Brittnee Portillo sent at 10/9/2018  3:23 PM CDT -----  [facility]   [facility] about medication metroNIDAZOLE (FLAGYL) 500 MG tablet for patient       Please call back [phone]

## 2018-10-09 NOTE — TELEPHONE ENCOUNTER
Just DELORES  Spoke with [facility] Pharmacist. Flagyl 500 mg is on backorder. Pharmacy will fill Flagyl 250 mg 2 tablets  By mouth tid.

## 2018-10-10 ENCOUNTER — HOSPITAL ENCOUNTER (OUTPATIENT)
Dept: RADIOLOGY | Facility: HOSPITAL | Age: 68
Discharge: HOME | End: 2018-10-10
Attending: FAMILY MEDICINE
Payer: MEDICARE

## 2018-10-10 DIAGNOSIS — R10.32 LLQ PAIN: ICD-10-CM

## 2018-10-10 DIAGNOSIS — K57.92 ACUTE DIVERTICULITIS: ICD-10-CM

## 2018-10-10 PROCEDURE — 74178 CT ABD&PLV WO CNTR FLWD CNTR: CPT | Mod: TC,PO

## 2018-10-10 PROCEDURE — 25500020 PHARM REV CODE 255: Mod: PO | Performed by: FAMILY MEDICINE

## 2018-10-10 PROCEDURE — 74178 CT ABD&PLV WO CNTR FLWD CNTR: CPT | Mod: 26,,, | Performed by: RADIOLOGY

## 2018-10-10 RX ADMIN — IOHEXOL 30 ML: 350 INJECTION, SOLUTION INTRAVENOUS at 10:10

## 2018-10-10 RX ADMIN — IOHEXOL 100 ML: 350 INJECTION, SOLUTION INTRAVENOUS at 10:10

## 2018-10-11 ENCOUNTER — TELEPHONE (OUTPATIENT)
Dept: FAMILY MEDICINE | Facility: CLINIC | Age: 68
End: 2018-10-11

## 2018-10-16 ENCOUNTER — TELEPHONE (OUTPATIENT)
Dept: FAMILY MEDICINE | Facility: CLINIC | Age: 68
End: 2018-10-16

## 2018-10-16 ENCOUNTER — OFFICE VISIT (OUTPATIENT)
Dept: FAMILY MEDICINE | Facility: CLINIC | Age: 68
End: 2018-10-16
Payer: MEDICARE

## 2018-10-16 VITALS
BODY MASS INDEX: 30.87 KG/M2 | SYSTOLIC BLOOD PRESSURE: 110 MMHG | HEIGHT: 68 IN | HEART RATE: 94 BPM | DIASTOLIC BLOOD PRESSURE: 72 MMHG | RESPIRATION RATE: 16 BRPM | TEMPERATURE: 98 F | WEIGHT: 203.69 LBS

## 2018-10-16 DIAGNOSIS — K57.92 ACUTE DIVERTICULITIS: Primary | ICD-10-CM

## 2018-10-16 PROCEDURE — 99214 OFFICE O/P EST MOD 30 MIN: CPT | Mod: S$PBB,,, | Performed by: FAMILY MEDICINE

## 2018-10-16 PROCEDURE — 99999 PR PBB SHADOW E&M-EST. PATIENT-LVL IV: CPT | Mod: PBBFAC,,, | Performed by: FAMILY MEDICINE

## 2018-10-16 PROCEDURE — 99214 OFFICE O/P EST MOD 30 MIN: CPT | Mod: PBBFAC,PN | Performed by: FAMILY MEDICINE

## 2018-10-16 NOTE — PROGRESS NOTES
THIS DOCUMENT WAS MADE IN PART WITH [employer]ICE RECOGNITION SOFTWARE.  OCCASIONALLY THIS SOFTWARE WILL MISINTERPRET WORDS OR PHRASES.      Vince Chadwick III  1950    Vince was seen today for acute diverticulitis.    Diagnoses and all orders for this visit:    Acute diverticulitis  -     Ambulatory referral to Gastroenterology  Multiple episodes.  We discussed the possibility of considering a partial colon resection.  Of course this is a significant decision and he was not prepared to consider this just yet.  So will place a referral back to Gastroenterology for re-evaluation and discuss options.  He will complete current antibiotics and let me know if symptoms worsen or return.  Appointment duration greater than 30 min., more than 50% face-to-face counseling  Most is today's visit was spent counseling regarding this condition.  We also individually reviewed his CT scan and images as well as previous episodes.    Subjective     Chief Complaint   Patient presents with    acute diverticulitis     follow up        HPI  A follow-up acute diverticulitis.  68-year-old male with multiple sigmoid colon diverticulitis.  At least 2-3 confirmed episodes but multiple other suspected episodes that were treated empirically by his gastroenterologist.  Seen about a week ago by me for sudden onset left lower quadrant pain.  Patient was placed on Cipro Flagyl and CT scan confirmed diverticulitis.  Patient is improving.  Patient has minimal left lower quadrant pain.  He is having some irregularity and nausea from the antibiotics but tolerating them better now.  He has had no fever chills no vomiting.  No blood in the stool.    HPI elements addressed above in the assessment and plan including problems, diagnosis, stability/instability,  improving/worsening, and chronicity will not be duplicated in this section. Any important additional HPI topics will be discussed here if needed.    Active Ambulatory Problems     Diagnosis Date  Noted    BPH (benign prostatic hypertrophy) 09/05/2012    Anxiety 09/05/2012    Hypertension     Hyperlipidemia     Lumbar disc disease     ADD (attention deficit disorder) 05/03/2013    Insomnia 04/16/2015    Lumbar stenosis 05/31/2016    DDD (degenerative disc disease), lumbar 05/31/2016    Lumbar radicular pain 05/31/2016    Lumbar facet arthropathy 05/31/2016    Lumbar radiculopathy 06/07/2016     Resolved Ambulatory Problems     Diagnosis Date Noted    No Resolved Ambulatory Problems     Past Medical History:   Diagnosis Date    ALLERGIC RHINITIS     Arthritis     Cataract     Colon polyp     Diverticulitis     Diverticulosis     Hyperlipidemia     Hypertension     Irritable bowel syndrome     Lumbar disc disease          Review of Systems   Respiratory: Negative for shortness of breath.    Cardiovascular: Negative for chest pain.   Gastrointestinal: Positive for abdominal pain, diarrhea and nausea. Negative for abdominal distention, blood in stool, constipation and vomiting.   Genitourinary: Negative.        Objective     Physical Exam   Constitutional: He is oriented to person, place, and time. He appears well-developed and well-nourished. No distress.   HENT:   Head: Normocephalic and atraumatic.   Eyes: Conjunctivae are normal. No scleral icterus.   Pulmonary/Chest: Effort normal. No respiratory distress.   Abdominal:   Very mild left lower quadrant abdominal pain.  Improved.  No guarding no rebound.  Bowel sounds present, slightly hypoactive.  Reducible umbilical hernia.  Mild diastasis recti   Neurological: He is alert and oriented to person, place, and time. Coordination normal.   Skin: He is not diaphoretic.   Psychiatric: He has a normal mood and affect. His behavior is normal.     Vitals:    10/16/18 0906   BP: 110/72   BP Location: Left arm   Patient Position: Sitting   BP Method: Medium (Manual)   Pulse: 94   Resp: 16   Temp: 98.1 °F (36.7 °C)   TempSrc: Oral   Weight: 92.4 kg  "(203 lb 11.3 oz)   Height: 5' 8" (1.727 m)       MOST RECENT LABS IN OUR ELECTRONIC MEDICAL RECORD:     Results for orders placed or performed in visit on 10/08/18   CBC auto differential   Result Value Ref Range    WBC 9.98 3.90 - 12.70 K/uL    RBC 5.27 4.60 - 6.20 M/uL    Hemoglobin 14.8 14.0 - 18.0 g/dL    Hematocrit 47.1 40.0 - 54.0 %    MCV 89 82 - 98 fL    MCH 28.1 27.0 - 31.0 pg    MCHC 31.4 (L) 32.0 - 36.0 g/dL    RDW 13.7 11.5 - 14.5 %    Platelets 246 150 - 350 K/uL    MPV 12.2 9.2 - 12.9 fL    Immature Granulocytes 0.4 0.0 - 0.5 %    Gran # (ANC) 7.3 1.8 - 7.7 K/uL    Immature Grans (Abs) 0.04 0.00 - 0.04 K/uL    Lymph # 1.6 1.0 - 4.8 K/uL    Mono # 0.9 0.3 - 1.0 K/uL    Eos # 0.1 0.0 - 0.5 K/uL    Baso # 0.08 0.00 - 0.20 K/uL    nRBC 0 0 /100 WBC    Gran% 72.8 38.0 - 73.0 %    Lymph% 16.3 (L) 18.0 - 48.0 %    Mono% 8.5 4.0 - 15.0 %    Eosinophil% 1.2 0.0 - 8.0 %    Basophil% 0.8 0.0 - 1.9 %    Differential Method Automated    Basic metabolic panel   Result Value Ref Range    Sodium 139 136 - 145 mmol/L    Potassium 4.2 3.5 - 5.1 mmol/L    Chloride 105 95 - 110 mmol/L    CO2 25 23 - 29 mmol/L    Glucose 103 70 - 110 mg/dL    BUN, Bld 16 8 - 23 mg/dL    Creatinine 1.0 0.5 - 1.4 mg/dL    Calcium 9.6 8.7 - 10.5 mg/dL    Anion Gap 9 8 - 16 mmol/L    eGFR if African American >60.0 >60 mL/min/1.73 m^2    eGFR if non African American >60.0 >60 mL/min/1.73 m^2         "

## 2018-10-16 NOTE — PROGRESS NOTES
" THIS DOCUMENT WAS MADE IN PART WITH VOICE RECOGNITION SOFTWARE.  OCCASIONALLY THIS SOFTWARE WILL MISINTERPRET WORDS OR PHRASES.      Vince Chadwick III  1950    Vince was seen today for acute diverticulitis.    Diagnoses and all orders for this visit:    Acute diverticulitis  -     Ambulatory referral to Gastroenterology        Subjective     Chief Complaint   Patient presents with    acute diverticulitis     follow up        HPI      HPI elements addressed above in the assessment and plan including problems, diagnosis, stability/instability,  improving/worsening, and chronicity will not be duplicated in this section. Any important additional HPI topics will be discussed here if needed.    Active Ambulatory Problems     Diagnosis Date Noted    BPH (benign prostatic hypertrophy) 09/05/2012    Anxiety 09/05/2012    Hypertension     Hyperlipidemia     Lumbar disc disease     ADD (attention deficit disorder) 05/03/2013    Insomnia 04/16/2015    Lumbar stenosis 05/31/2016    DDD (degenerative disc disease), lumbar 05/31/2016    Lumbar radicular pain 05/31/2016    Lumbar facet arthropathy 05/31/2016    Lumbar radiculopathy 06/07/2016     Resolved Ambulatory Problems     Diagnosis Date Noted    No Resolved Ambulatory Problems     Past Medical History:   Diagnosis Date    ALLERGIC RHINITIS     Arthritis     Cataract     Colon polyp     Diverticulitis     Diverticulosis     Hyperlipidemia     Hypertension     Irritable bowel syndrome     Lumbar disc disease          Review of Systems    Objective     Physical Exam  Vitals:    10/16/18 0906   BP: 110/72   BP Location: Left arm   Patient Position: Sitting   BP Method: Medium (Manual)   Pulse: 94   Resp: 16   Temp: 98.1 °F (36.7 °C)   TempSrc: Oral   Weight: 92.4 kg (203 lb 11.3 oz)   Height: 5' 8" (1.727 m)       MOST RECENT LABS IN OUR ELECTRONIC MEDICAL RECORD:     Results for orders placed or performed in visit on 10/08/18   CBC auto " differential   Result Value Ref Range    WBC 9.98 3.90 - 12.70 K/uL    RBC 5.27 4.60 - 6.20 M/uL    Hemoglobin 14.8 14.0 - 18.0 g/dL    Hematocrit 47.1 40.0 - 54.0 %    MCV 89 82 - 98 fL    MCH 28.1 27.0 - 31.0 pg    MCHC 31.4 (L) 32.0 - 36.0 g/dL    RDW 13.7 11.5 - 14.5 %    Platelets 246 150 - 350 K/uL    MPV 12.2 9.2 - 12.9 fL    Immature Granulocytes 0.4 0.0 - 0.5 %    Gran # (ANC) 7.3 1.8 - 7.7 K/uL    Immature Grans (Abs) 0.04 0.00 - 0.04 K/uL    Lymph # 1.6 1.0 - 4.8 K/uL    Mono # 0.9 0.3 - 1.0 K/uL    Eos # 0.1 0.0 - 0.5 K/uL    Baso # 0.08 0.00 - 0.20 K/uL    nRBC 0 0 /100 WBC    Gran% 72.8 38.0 - 73.0 %    Lymph% 16.3 (L) 18.0 - 48.0 %    Mono% 8.5 4.0 - 15.0 %    Eosinophil% 1.2 0.0 - 8.0 %    Basophil% 0.8 0.0 - 1.9 %    Differential Method Automated    Basic metabolic panel   Result Value Ref Range    Sodium 139 136 - 145 mmol/L    Potassium 4.2 3.5 - 5.1 mmol/L    Chloride 105 95 - 110 mmol/L    CO2 25 23 - 29 mmol/L    Glucose 103 70 - 110 mg/dL    BUN, Bld 16 8 - 23 mg/dL    Creatinine 1.0 0.5 - 1.4 mg/dL    Calcium 9.6 8.7 - 10.5 mg/dL    Anion Gap 9 8 - 16 mmol/L    eGFR if African American >60.0 >60 mL/min/1.73 m^2    eGFR if non African American >60.0 >60 mL/min/1.73 m^2

## 2018-10-18 ENCOUNTER — TELEPHONE (OUTPATIENT)
Dept: FAMILY MEDICINE | Facility: CLINIC | Age: 68
End: 2018-10-18

## 2018-10-18 NOTE — TELEPHONE ENCOUNTER
----- Message from Samuel Bettencourt sent at 10/18/2018  3:39 PM CDT -----  Contact: Pt  Name of Who is Calling:       What is the request in detail: Patient is calling requesting to speak with the nurse in regards to a bloated stomach, abdominal pains, and diarrhea       Can the clinic reply by [facility]:       What Number to Call Back if not in [facility]: [phone] or [phone] ([place])

## 2018-10-18 NOTE — TELEPHONE ENCOUNTER
Spoke to patient and advised to go to Urgent Care Clinic, if patient persists or worsens, as patient has recently been on antibiotics for diverticulitis. Patient verbalized understanding.

## 2018-10-31 ENCOUNTER — OFFICE VISIT (OUTPATIENT)
Dept: GASTROENTEROLOGY | Facility: CLINIC | Age: 68
End: 2018-10-31
Payer: MEDICARE

## 2018-10-31 VITALS
DIASTOLIC BLOOD PRESSURE: 76 MMHG | WEIGHT: 207.88 LBS | BODY MASS INDEX: 31.5 KG/M2 | HEIGHT: 68 IN | HEART RATE: 91 BPM | SYSTOLIC BLOOD PRESSURE: 123 MMHG

## 2018-10-31 DIAGNOSIS — Z87.19 HISTORY OF DIVERTICULITIS: Primary | ICD-10-CM

## 2018-10-31 DIAGNOSIS — Z12.11 SCREENING FOR COLON CANCER: ICD-10-CM

## 2018-10-31 DIAGNOSIS — R12 HEARTBURN: ICD-10-CM

## 2018-10-31 PROCEDURE — 99999 PR PBB SHADOW E&M-EST. PATIENT-LVL IV: CPT | Mod: PBBFAC,,, | Performed by: NURSE PRACTITIONER

## 2018-10-31 PROCEDURE — 99214 OFFICE O/P EST MOD 30 MIN: CPT | Mod: PBBFAC,PO | Performed by: NURSE PRACTITIONER

## 2018-10-31 PROCEDURE — 99214 OFFICE O/P EST MOD 30 MIN: CPT | Mod: S$PBB,,, | Performed by: NURSE PRACTITIONER

## 2018-10-31 NOTE — PATIENT INSTRUCTIONS
Diverticulitis    Some people get pouches along the wall of the colon as they get older. The pouches, called diverticuli, usually cause no symptoms. If the pouches become blocked, you can get an infection. This infection is called diverticulitis. It causes pain in your lower abdomen and fever. If not treated, it can become a serious condition, causing an abscess to form inside the pouch. The abscess may block the intestinal tract even or rupture, spreading infection throughout the abdomen.  When treatment is started early, oral antibiotics alone may be enough to cure diverticulitis. This method is tried first. But, if you don't improve or if your condition gets worse while using oral antibiotics, you may need to be admitted to the hospital for IV antibiotics. Severe cases may require surgery.  Home care  The following guidelines will help you care for yourself at home:  · During the acute illness, rest and follow your healthcare provider's instructions about diet. Sometimes you will need to follow a clear liquid diet to rest your bowel. Once your symptoms are better, you may be told to follow a low-fiber diet for some time. Include foods like:  ¨ Flake cereal, mashed potatoes, pancakes, waffles, pasta, white bread, rice, applesauce, bananas, eggs, fish, poultry, tofu, and cooked soft vegetables  · Take antibiotics exactly as instructed. Don't miss any doses or stop taking the medication, even if you feel better.  · Monitor your temperature and tell your healthcare provider if you have rising temperatures.  Preventing future attacks  Once you have an episode of diverticulitis, you are at risk for having it again. After you have recovered from this episode, you may be able to lower your risk by eating a high-fiber diet (20 gm/day to 35 gm/day of fiber). This cleans out the colon pouches that already exist and may prevent new ones from forming. Foods high in fiber include fresh fruits and edible peelings, raw or  lightly cooked vegetables, whole grain cereals and breads, dried beans and peas, and bran.  Other steps that can help prevent future attacks include:  · Take your medicines, such as antibiotics, as your healthcare provider says.  · Drink 6 to 8 glasses of water every day, unless told otherwise.  · Use a heating pad or hot water bottle to help abdominal cramping or pain.  · Begin an exercise program. Ask your healthcare provider how to get started. You can benefit from simple activities such as walking or gardening.  · Treat diarrhea with a bland diet. Start with liquids only; then slowly add fiber over time.  · Watch for changes in your bowel movements (constipation to diarrhea). Avoid constipation by eating a high fiber diet and taking a stool softener if needed.  · Get plenty of rest and sleep.  Follow-up care  Follow up with your healthcare provider as advised or sooner if you are not getting better in the next 2 days.  When to seek medical advice  Call your healthcare provider right away if any of these occur:  · Fever of 100.4°F (38°C) or higher, or as directed by your healthcare provider  · Repeated vomiting or swelling of the abdomen  · Weakness, dizziness, light-headedness  · Pain in your abdomen that gets worse, severe, or spreads to your back  · Pain that moves to the right lower abdomen  · Rectal bleeding (stools that are red, black or maroon color)  · Unexpected vaginal bleeding  Date Last Reviewed: 9/1/2016  © 0322-4721 [employer]. [address] [place] [address], [place] [address]. All rights reserved. This information is not intended as a substitute for professional medical care. Always follow your healthcare professional's instructions.

## 2018-10-31 NOTE — LETTER
November 5, 2018      Karan Burleson MD  [address] [facility] [address] [place] [address]           [facility]  [address] [facility] [address] [place] [address]  Phone: [phone]          Patient: Vince Chadwick III   MR Number: [id number]   YOB: 1950   Date of Visit: 10/31/2018       Dear Dr. Karan Burleson:    Thank you for referring Vince Chadwick to me for evaluation. Attached you will find relevant portions of my assessment and plan of care.    If you have questions, please do not hesitate to call me. I look forward to following Vince Chadwick along with you.    Sincerely,    Martha Plunkett, [facility]    Enclosure  CC:  No Recipients    If you would like to receive this communication electronically, please contact [email or web address] or [phone] to request more information on [employer] access.    For providers and/or their staff who would like to refer a patient to [facility], please contact us through our one-stop-shop provider referral line, [facility], at [phone].    If you feel you have received this communication in error or would no longer like to receive these types of communications, please e-mail [email or web address]

## 2018-10-31 NOTE — PROGRESS NOTES
Subjective:       Patient ID: Vince Chadwick III is a 68 y.o. male Body mass index is 31.61 kg/m².    Chief Complaint: Follow-up (diverticulitis)    Established patient of Dr. Novak & myself.    Abdominal Pain   This is a recurrent problem. Episode onset: history of diverticulitis; pain recurred ~10/5/18, prior episode was 7/2017, 1/2017, and before that was a couple of years ago. The onset quality is sudden. The problem occurs intermittently. The problem has been rapidly improving. The pain is located in the LLQ. The pain is at a severity of 0/10 (currently). Quality: tender to touch currently. The abdominal pain does not radiate. Associated symptoms include belching, flatus and nausea (mild; typically relates to antibiotic; phenergan PRN). Pertinent negatives include no anorexia, constipation, diarrhea (had some when on antibiotic; has resolved), dysuria, fever, frequency, hematochezia, melena, vomiting or weight loss. Associated symptoms comments: Bowel movements once a day; reports change in bowel movements- typically had 2-3 times a day; had heartburn a few times over the past few days; typically occurs with flare-up of diverticulitis- relieved with zantac OTC. Nothing aggravates the pain. He has tried antibiotics (minimizes nuts & seeds (avoids popcorn but eats strawberries; bentyl prn;PAST: cipro 500 mg bid & flagyl 500 mg, had taken recently augmentin for a few days for dental work, switched to doxycycline since he had problems w/diarrhea & then started w/LLQ pain) for the symptoms. The treatment provided significant relief. His past medical history is significant for abdominal surgery and irritable bowel syndrome. There is no history of Crohn's disease, GERD, pancreatitis, PUD or ulcerative colitis.     Review of Systems   Constitutional: Negative for appetite change, chills, fatigue, fever, unexpected weight change and weight loss.   HENT: Negative for sore throat and trouble swallowing.     Respiratory: Negative for cough, choking and shortness of breath.    Cardiovascular: Negative for chest pain.   Gastrointestinal: Positive for abdominal pain, flatus and nausea (mild; typically relates to antibiotic; phenergan PRN). Negative for anal bleeding, anorexia, blood in stool, constipation, diarrhea (had some when on antibiotic; has resolved), hematochezia, melena, rectal pain and vomiting.   Genitourinary: Negative for difficulty urinating, dysuria, flank pain, frequency and urgency.   Neurological: Negative for weakness.       Past Medical History:   Diagnosis Date    ALLERGIC RHINITIS     Arthritis     Cataract     OU    Colon polyp     Diverticulitis     Diverticulosis     Hyperlipidemia     Hypertension     Irritable bowel syndrome     Lumbar disc disease      Past Surgical History:   Procedure Laterality Date    APPENDECTOMY      COLONOSCOPY  06/02/2011    [facility].    One 1 mm polyp in the sigmoid colon.  FRAGMENT OF NONNEOPLASTIC COLONIC MUCOSA.  Sigmoid diverticulosis.  Spasms c/w IBS.  repeat in 7-8 years    COLONOSCOPY  09/12/2006    Dr. Novak, in legacy    SHEILA-TRANSFORAMINAL L3 and L4 Left 9/7/2016    [facility] at [facility]    FOOT SURGERY      x 2    INJECTION-STEROID-EPIDURAL-LUMBAR N/A 6/7/2016    [facility] at [facility]    INJECTION-STEROID-EPIDURAL-LUMBAR L3/4 to Left N/A 7/14/2016    [facility] at [facility] OR    LUMBAR EPIDURAL INJECTION      PILONIDAL CYST DRAINAGE      s/p chalazion removal      OD    TONSILLECTOMY       Family History   Problem Relation Age of Onset    Glaucoma Father     Cataracts Father     Macular degeneration Father     Colon polyps Father     Glaucoma Paternal Uncle     Glaucoma Paternal Uncle     Cataracts Mother     Colon cancer Neg Hx     Crohn's disease Neg Hx     Ulcerative colitis Neg Hx      Wt Readings from Last 10 Encounters:   10/31/18 94.3 kg (207 lb 14.3  oz)   10/16/18 92.4 kg (203 lb 11.3 oz)   10/08/18 91.7 kg (202 lb 2.6 oz)   01/24/18 92 kg (202 lb 13.2 oz)   01/11/18 92.3 kg (203 lb 6 oz)   10/20/17 89.4 kg (197 lb 3.2 oz)   07/20/17 85.7 kg (189 lb)   07/19/17 88.4 kg (194 lb 13.5 oz)   06/20/17 86.6 kg (190 lb 14.7 oz)   12/02/16 90.7 kg (199 lb 15.3 oz)     Lab Results   Component Value Date    WBC 9.98 10/08/2018    HGB 14.8 10/08/2018    HCT 47.1 10/08/2018    MCV 89 10/08/2018     10/08/2018     CMP  Sodium   Date Value Ref Range Status   10/08/2018 139 136 - 145 mmol/L Final     Potassium   Date Value Ref Range Status   10/08/2018 4.2 3.5 - 5.1 mmol/L Final     Chloride   Date Value Ref Range Status   10/08/2018 105 95 - 110 mmol/L Final     CO2   Date Value Ref Range Status   10/08/2018 25 23 - 29 mmol/L Final     Glucose   Date Value Ref Range Status   10/08/2018 103 70 - 110 mg/dL Final     BUN, Bld   Date Value Ref Range Status   10/08/2018 16 8 - 23 mg/dL Final     Creatinine   Date Value Ref Range Status   10/08/2018 1.0 0.5 - 1.4 mg/dL Final     Calcium   Date Value Ref Range Status   10/08/2018 9.6 8.7 - 10.5 mg/dL Final     Total Protein   Date Value Ref Range Status   01/22/2018 7.3 6.0 - 8.4 g/dL Final     Albumin   Date Value Ref Range Status   01/22/2018 3.7 3.5 - 5.2 g/dL Final     Total Bilirubin   Date Value Ref Range Status   01/22/2018 0.7 0.1 - 1.0 mg/dL Final     Comment:     For infants and newborns, interpretation of results should be based  on gestational age, weight and in agreement with clinical  observations.  Premature Infant recommended reference ranges:  Up to 24 hours.............<8.0 mg/dL  Up to 48 hours............<12.0 mg/dL  3-5 days..................<15.0 mg/dL  6-29 days.................<15.0 mg/dL       Alkaline Phosphatase   Date Value Ref Range Status   01/22/2018 80 55 - 135 U/L Final     AST   Date Value Ref Range Status   01/22/2018 21 10 - 40 U/L Final     ALT   Date Value Ref Range Status   01/22/2018 8  (L) 10 - 44 U/L Final     Anion Gap   Date Value Ref Range Status   10/08/2018 9 8 - 16 mmol/L Final     eGFR if    Date Value Ref Range Status   10/08/2018 >60.0 >60 mL/min/1.73 m^2 Final     eGFR if non    Date Value Ref Range Status   10/08/2018 >60.0 >60 mL/min/1.73 m^2 Final     Comment:     Calculation used to obtain the estimated glomerular filtration  rate (eGFR) is the CKD-EPI equation.        Lab Results   Component Value Date    TSH 1.578 02/03/2017     Reviewed prior medical records including 10/10/18 and 7/26/17 ct abdomen pelvis & endoscopy history (see surgical history).    Objective:      Physical Exam   Constitutional: He is oriented to person, place, and time. He appears well-developed and well-nourished. No distress.   HENT:   Mouth/Throat: Oropharynx is clear and moist and mucous membranes are normal. No oral lesions. No oropharyngeal exudate.   Eyes: Conjunctivae are normal. Pupils are equal, round, and reactive to light. No scleral icterus.   Cardiovascular: Normal rate.   Pulmonary/Chest: Effort normal and breath sounds normal. No respiratory distress. He has no wheezes.   Abdominal: Soft. Normal appearance and bowel sounds are normal. He exhibits no distension, no abdominal bruit and no mass. There is no hepatosplenomegaly. There is no tenderness. There is no rigidity, no rebound, no guarding, no tenderness at McBurney's point and negative Cherry's sign. No hernia.   Neurological: He is alert and oriented to person, place, and time.   Skin: Skin is warm and dry. No rash noted. He is not diaphoretic. No erythema. No pallor.   Non-jaundiced   Psychiatric: He has a normal mood and affect. His behavior is normal.   Nursing note and vitals reviewed.      Assessment:       1. History of diverticulitis    2. Screening for colon cancer    3. Heartburn        Plan:       History of diverticulitis  - schedule Colonoscopy TO BE DONE ON/AFTER 11/7/18, discussed procedure  with the patient, patient verbalized understanding  -     Ambulatory referral to General Surgery  - discussed the diagnosis of diverticulosis and diverticulitis, advised to continue a low fiber diet for the next 4 weeks and then slowly increase fiber in diet. Advised a low fiber diet is recommended for diverticulitis (for about 4 weeks), but to prevent diverticulitis, high fiber diet is recommended.  -Recommended high fiber diet after episode has completely resolved. Recommended daily exercise, adequate water intake (six 8-oz glasses of water daily), and high fiber diet. OTC fiber supplements are recommended if diet does not reach daily fiber goal (25 grams daily), such as Metamucil, Citrucel, or FiberCon (take as directed, separate from other oral medications by >2 hours).  - Advised to avoid/minimize popcorn, corn, seeds, and nuts.    Screening for colon cancer  - schedule Colonoscopy TO BE DONE ON/AFTER 11/7/18, discussed procedure with the patient, patient verbalized understanding    Heartburn  - CONTINUE ZANTAC OTC AS DIRECTED PRN  -discussed about the different types of medications used to treat reflux and how to use them, antacids can be used PRN for breakthrough heartburn symptoms by reducing stomach acid that is already produced, H2 blockers (zantac) work by limiting the amount acid production, & PPI's work to block acid production and are taken daily, patient verbalized understanding but patient declined starting a daily acid-reducing medication  -Educated patient on lifestyle modifications to help control/reduce reflux/abdominal pain including: avoid large meals, avoid eating within 2-3 hours of bedtime (avoid late night eating & lying down soon after eating), elevate head of bed if nocturnal symptoms are present, smoking cessation (if current smoker), & weight loss (if overweight).   -Educated to avoid known foods which trigger reflux symptoms & to minimize/avoid high-fat foods, chocolate, caffeine,  citrus, alcohol, & tomato products.  -Advised to avoid/limit use of NSAID's, since they can cause GI upset, bleeding, and/or ulcers. If needed, take with food.     Follow-up in about 1 month (around 11/30/2018), or if symptoms worsen or fail to improve.      If no improvement in symptoms or symptoms worsen, call/follow-up at clinic or go to ER.

## 2018-11-02 ENCOUNTER — TELEPHONE (OUTPATIENT)
Dept: GASTROENTEROLOGY | Facility: CLINIC | Age: 68
End: 2018-11-02

## 2018-11-02 RX ORDER — ZALEPLON 10 MG/1
10 CAPSULE ORAL NIGHTLY
Qty: 45 CAPSULE | Refills: 1 | Status: SHIPPED | OUTPATIENT
Start: 2018-11-02 | End: 2019-01-28 | Stop reason: SDUPTHER

## 2018-11-02 NOTE — TELEPHONE ENCOUNTER
----- Message from Jean Leon sent at 11/2/2018  1:21 PM CDT -----  Type: Needs Medical Advice    Who Called:  patient  Best Call Back Number: [phone] (home)   Additional Information: Patient needs to talk to office about rescheduling procedure

## 2018-11-07 RX ORDER — BENAZEPRIL HYDROCHLORIDE 20 MG/1
TABLET ORAL
Qty: 30 TABLET | Refills: 5 | Status: SHIPPED | OUTPATIENT
Start: 2018-11-07 | End: 2019-05-05 | Stop reason: SDUPTHER

## 2018-11-19 RX ORDER — AZELASTINE 1 MG/ML
SPRAY, METERED NASAL
Qty: 30 ML | Refills: 2 | Status: SHIPPED | OUTPATIENT
Start: 2018-11-19 | End: 2019-10-12 | Stop reason: SDUPTHER

## 2018-12-14 ENCOUNTER — OFFICE VISIT (OUTPATIENT)
Dept: FAMILY MEDICINE | Facility: CLINIC | Age: 68
End: 2018-12-14
Payer: MEDICARE

## 2018-12-14 VITALS
OXYGEN SATURATION: 96 % | WEIGHT: 209.31 LBS | HEIGHT: 68 IN | HEART RATE: 97 BPM | TEMPERATURE: 98 F | DIASTOLIC BLOOD PRESSURE: 64 MMHG | SYSTOLIC BLOOD PRESSURE: 102 MMHG | BODY MASS INDEX: 31.72 KG/M2

## 2018-12-14 DIAGNOSIS — B96.89 ACUTE BACTERIAL BRONCHITIS: Primary | ICD-10-CM

## 2018-12-14 DIAGNOSIS — J98.01 ACUTE BRONCHOSPASM: ICD-10-CM

## 2018-12-14 DIAGNOSIS — I10 ESSENTIAL HYPERTENSION: ICD-10-CM

## 2018-12-14 DIAGNOSIS — J01.90 ACUTE BACTERIAL SINUSITIS: ICD-10-CM

## 2018-12-14 DIAGNOSIS — J02.8 ACUTE BACTERIAL PHARYNGITIS: ICD-10-CM

## 2018-12-14 DIAGNOSIS — J20.8 ACUTE BACTERIAL BRONCHITIS: Primary | ICD-10-CM

## 2018-12-14 DIAGNOSIS — B96.89 ACUTE BACTERIAL SINUSITIS: ICD-10-CM

## 2018-12-14 DIAGNOSIS — B96.89 ACUTE BACTERIAL PHARYNGITIS: ICD-10-CM

## 2018-12-14 LAB
CTP QC/QA: YES
S PYO RRNA THROAT QL PROBE: NEGATIVE

## 2018-12-14 PROCEDURE — 99214 OFFICE O/P EST MOD 30 MIN: CPT | Mod: PBBFAC,PN,25 | Performed by: INTERNAL MEDICINE

## 2018-12-14 PROCEDURE — 87880 STREP A ASSAY W/OPTIC: CPT | Mod: PBBFAC,PN | Performed by: INTERNAL MEDICINE

## 2018-12-14 PROCEDURE — 99214 OFFICE O/P EST MOD 30 MIN: CPT | Mod: S$PBB,,, | Performed by: INTERNAL MEDICINE

## 2018-12-14 PROCEDURE — 94640 AIRWAY INHALATION TREATMENT: CPT | Mod: PBBFAC,PN

## 2018-12-14 PROCEDURE — 99999 PR PBB SHADOW E&M-EST. PATIENT-LVL IV: CPT | Mod: PBBFAC,,, | Performed by: INTERNAL MEDICINE

## 2018-12-14 RX ORDER — DOXYCYCLINE 100 MG/1
100 CAPSULE ORAL EVERY 12 HOURS
Qty: 20 CAPSULE | Refills: 0 | Status: SHIPPED | OUTPATIENT
Start: 2018-12-14 | End: 2019-01-04

## 2018-12-14 RX ORDER — ALBUTEROL SULFATE 0.83 MG/ML
2.5 SOLUTION RESPIRATORY (INHALATION)
Status: COMPLETED | OUTPATIENT
Start: 2018-12-14 | End: 2018-12-14

## 2018-12-14 RX ORDER — ALBUTEROL SULFATE 90 UG/1
2 AEROSOL, METERED RESPIRATORY (INHALATION) EVERY 4 HOURS PRN
Qty: 8 G | Refills: 1 | Status: SHIPPED | OUTPATIENT
Start: 2018-12-14 | End: 2019-08-27

## 2018-12-14 RX ORDER — METHYLPREDNISOLONE 4 MG/1
TABLET ORAL
Qty: 1 PACKAGE | Refills: 0 | Status: SHIPPED | OUTPATIENT
Start: 2018-12-14 | End: 2019-01-04

## 2018-12-14 RX ORDER — HYDROCODONE POLISTIREX AND CHLORPHENIRAMINE POLISTIREX 10; 8 MG/5ML; MG/5ML
5 SUSPENSION, EXTENDED RELEASE ORAL EVERY 12 HOURS PRN
Qty: 115 ML | Refills: 0 | Status: SHIPPED | OUTPATIENT
Start: 2018-12-14 | End: 2019-01-04

## 2018-12-14 RX ADMIN — ALBUTEROL SULFATE 2.5 MG: 2.5 SOLUTION RESPIRATORY (INHALATION) at 01:12

## 2018-12-14 NOTE — PATIENT INSTRUCTIONS
Acute bacterial bronchitis; use mucinex D for congestion; delsym for cough along w his tessalon perles as needed.               tussionex 5 cc every 12 hrs prn severe cough.  -     albuterol nebulizer solution 2.5 mg x1 in office.  -     POCT Rapid Strep A  -     albuterol (PROAIR HFA) 90 mcg/actuation inhaler; Inhale 2 puffs into the lungs every 4 (four) hours as needed for Wheezing or Shortness of Breath. Rescue  Dispense: 8 g; Refill: 1  -     methylPREDNISolone (MEDROL DOSEPACK) 4 mg tablet; Wean as directed; use over 6 day period.  Dispense: 1 Package; Refill: 0    Acute bronchospasm  -     albuterol nebulizer solution 2.5 mg x1 in office.  -     albuterol (PROAIR HFA) 90 mcg/actuation inhaler; Inhale 2 puffs into the lungs every 4 (four) hours as needed for Wheezing or Shortness of Breath. Rescue  Dispense: 8 g; Refill: 1  -     methylPREDNISolone (MEDROL DOSEPACK) 4 mg tablet; Wean as directed; use over 6 day period.  Dispense: 1 Package; Refill: 0    Acute bacterial sinusitis; claritin and flonase for congestion. Simply saline for irrigation as well.   -     POCT Rapid Strep A  -     methylPREDNISolone (MEDROL DOSEPACK) 4 mg tablet; Wean as directed; use over 6 day period.  Dispense: 1 Package; Refill: 0    Acute bacterial pharyngitis; chloraseptic spray for sore throat as needed. Warm salt water gargle as needed.   -     POCT Rapid Strep A  -     methylPREDNISolone (MEDROL DOSEPACK) 4 mg tablet; Wean as directed; use over 6 day period.  Dispense: 1 Package; Refill: 0    Essential hypertension. Maintain < 2 Gm Na a day diet, and monitor BP at home; keep a log. Cont benazepril for BP; has been on for 6-8 years.    Other orders  -     doxycycline (VIBRAMYCIN) 100 MG Cap; Take 1 capsule (100 mg total) by mouth every 12 (twelve) hours.  Dispense: 20 capsule; Refill: 0  -     hydrocodone-chlorpheniramine (TUSSIONEX) 10-8 mg/5 mL suspension; Take 5 mLs by mouth every 12 (twelve) hours as needed for Cough. May  sedate.  Dispense: 115 mL; Refill: 0

## 2018-12-14 NOTE — PROGRESS NOTES
Subjective:       Patient ID: Vince Chadwick III is a 68 y.o. male.    Chief Complaint: Cough (Excessive coughing onset 2 weeks) and Nasal Congestion    HPI  Seeing pt today for Dr Burleson his PCP.  The patient has reportedly been having excessive coughing the last 2 weeks.  He is on an ACE-inhibitor/benazepril but has been on this for years and has not given him a problem in the past with regards to coughing.  He is a nonsmoker.  Clear phlegm is been for 2 weeks of note with the cough.  He has no associated fever but has had chills intermittently for the last 2 weeks along with facial congestion and postnasal drip.  He has no sore throat except that which is produced from the cough.  HA's are bilateral frontal in location, and intermittent for 2 weeks.  He has no myalgias but reports has been fatigued also but he reportedly has been doing a lot.  His daughter works with autistic kids of note. And a granddaughter in the 6th grade.  Patient has also been having audible wheezing at night at times. He has no complaints of chest pain but does get some dyspnea on exertion with strenuous exertion.  However he notes that he has gained 20 lb weight over the last year though.   Albuterol nebulizer treatment given in the office allow him to take a deeper breath and markedly reduced his coughing spells as well; a fairly marked improvement in his breathing was noted.   Of note 10/8 patient was treated with Cipro and Flagyl for diverticulitis with good results.    Review of Systems   Constitutional: Positive for chills and fatigue. Negative for appetite change, fever and unexpected weight change.   HENT: Positive for congestion, postnasal drip, rhinorrhea and sinus pressure. Negative for sore throat.         Hx of  seasonal allergies,    Eyes: Negative for discharge and itching.   Respiratory: Positive for cough and wheezing. Negative for chest tightness and shortness of breath.    Cardiovascular: Negative for chest  "pain, palpitations and leg swelling.   Gastrointestinal: Negative for abdominal pain, blood in stool, constipation, diarrhea, nausea and vomiting.        Heartburn earlier has cleared w zantac x2.   Endocrine: Negative for polydipsia, polyphagia and polyuria.   Genitourinary: Negative for dysuria and hematuria.   Musculoskeletal: Negative for arthralgias and myalgias.   Skin: Negative for rash.   Allergic/Immunologic: Negative for environmental allergies and food allergies.   Neurological: Positive for headaches. Negative for tremors and seizures.   Hematological: Negative for adenopathy. Does not bruise/bleed easily.   Psychiatric/Behavioral:        Denies anxiety or depression.       Objective:      Vitals:    12/14/18 1300   BP: 102/64   Pulse: 97   Temp: 98.4 °F (36.9 °C)   SpO2: 96%   Weight: 95 kg (209 lb 5.2 oz)   Height: 5' 8" (1.727 m)  Comment: Per Pt     Body mass index is 31.83 kg/m².    Physical Exam   Constitutional: He is oriented to person, place, and time. He appears well-developed and well-nourished.   HENT:   Head: Normocephalic and atraumatic.   TM's pink. NM swollen, inflamed w clear to yel-white mucus; no sinus tenderness to palp. Throat inflamed and post pharynx.    Eyes: EOM are normal.   Neck: Normal range of motion. Neck supple. No thyromegaly present.   Cardiovascular: Normal rate, regular rhythm and normal heart sounds. Exam reveals no gallop.   No murmur heard.  Pulmonary/Chest: Effort normal and breath sounds normal. No respiratory distress. He has no wheezes. He has no rales.   Decreased lizbeth BS's; post-tussive wheeze noted.    Abdominal: Soft. Bowel sounds are normal. He exhibits no distension. There is no tenderness. There is no rebound and no guarding.   Musculoskeletal: Normal range of motion. He exhibits no edema.   Lymphadenopathy:     He has no cervical adenopathy.   Neurological: He is alert and oriented to person, place, and time.   Moves all 4 extremities fine.   Skin: No rash " noted.   Psychiatric: He has a normal mood and affect. His behavior is normal. Thought content normal.   Vitals reviewed.      Assessment:       1. Acute bacterial bronchitis    2. Acute bronchospasm    3. Acute bacterial sinusitis    4. Acute bacterial pharyngitis    5. Essential hypertension        Plan:       Acute bacterial bronchitis; use mucinex D for congestion; delsym for cough along w his tessalon perles as needed.  Will check a chest x-ray AP and lateral to rule out pneumonia               tussionex 5 cc every 12 hrs prn severe cough.  -     albuterol nebulizer solution 2.5 mg x1 in office.  -     POCT Rapid Strep A  -     albuterol (PROAIR HFA) 90 mcg/actuation inhaler; Inhale 2 puffs into the lungs every 4 (four) hours as needed for Wheezing or Shortness of Breath. Rescue  Dispense: 8 g; Refill: 1  -     methylPREDNISolone (MEDROL DOSEPACK) 4 mg tablet; Wean as directed; use over 6 day period.  Dispense: 1 Package; Refill: 0  -     doxycycline (VIBRAMYCIN) 100 MG Cap; Take 1 capsule (100 mg total) by mouth every 12 (twelve) hours.  Dispense: 20 capsule; Refill: 0  -     hydrocodone-chlorpheniramine (TUSSIONEX) 10-8 mg/5 mL suspension; Take 5 mLs by mouth every 12 (twelve) hours as needed for Cough. May sedate.  Dispense: 115 mL; Refill: 0    Acute bronchospasm  -     albuterol nebulizer solution 2.5 mg x1 in office.  -     albuterol (PROAIR HFA) 90 mcg/actuation inhaler; Inhale 2 puffs into the lungs every 4 (four) hours as needed for Wheezing or Shortness of Breath. Rescue  Dispense: 8 g; Refill: 1  -     methylPREDNISolone (MEDROL DOSEPACK) 4 mg tablet; Wean as directed; use over 6 day period.  Dispense: 1 Package; Refill: 0    Acute bacterial sinusitis; claritin and flonase for congestion as needed. Simply saline for irrigation as well.   -     POCT Rapid Strep A  -     methylPREDNISolone (MEDROL DOSEPACK) 4 mg tablet; Wean as directed; use over 6 day period.  Dispense: 1 Package; Refill: 0    Acute  bacterial pharyngitis; chloraseptic spray for sore throat as needed. Warm salt water gargle as needed.   -     POCT Rapid Strep A  -     methylPREDNISolone (MEDROL DOSEPACK) 4 mg tablet; Wean as directed; use over 6 day period.  Dispense: 1 Package; Refill: 0    Essential hypertension. Maintain < 2 Gm Na a day diet, and monitor BP at home; keep a log. Cont benazepril for BP; has been on for 6-8 years.    Other orders  -     doxycycline (VIBRAMYCIN) 100 MG Cap; Take 1 capsule (100 mg total) by mouth every 12 (twelve) hours.  Dispense: 20 capsule; Refill: 0  -     hydrocodone-chlorpheniramine (TUSSIONEX) 10-8 mg/5 mL suspension; Take 5 mLs by mouth every 12 (twelve) hours as needed for Cough. May sedate.  Dispense: 115 mL; Refill: 0

## 2019-01-04 ENCOUNTER — OFFICE VISIT (OUTPATIENT)
Dept: FAMILY MEDICINE | Facility: CLINIC | Age: 69
End: 2019-01-04
Payer: MEDICARE

## 2019-01-04 VITALS
WEIGHT: 205.38 LBS | RESPIRATION RATE: 16 BRPM | HEIGHT: 68 IN | SYSTOLIC BLOOD PRESSURE: 122 MMHG | HEART RATE: 76 BPM | BODY MASS INDEX: 31.13 KG/M2 | DIASTOLIC BLOOD PRESSURE: 82 MMHG

## 2019-01-04 DIAGNOSIS — B96.89 ACUTE BACTERIAL SINUSITIS: ICD-10-CM

## 2019-01-04 DIAGNOSIS — B96.89 ACUTE BACTERIAL BRONCHITIS: Primary | ICD-10-CM

## 2019-01-04 DIAGNOSIS — E78.5 HYPERLIPIDEMIA, UNSPECIFIED HYPERLIPIDEMIA TYPE: ICD-10-CM

## 2019-01-04 DIAGNOSIS — J98.01 ACUTE BRONCHOSPASM: ICD-10-CM

## 2019-01-04 DIAGNOSIS — F41.9 ANXIETY: ICD-10-CM

## 2019-01-04 DIAGNOSIS — I10 ESSENTIAL HYPERTENSION: ICD-10-CM

## 2019-01-04 DIAGNOSIS — J02.8 ACUTE BACTERIAL PHARYNGITIS: ICD-10-CM

## 2019-01-04 DIAGNOSIS — J20.8 ACUTE BACTERIAL BRONCHITIS: Primary | ICD-10-CM

## 2019-01-04 DIAGNOSIS — J01.90 ACUTE BACTERIAL SINUSITIS: ICD-10-CM

## 2019-01-04 DIAGNOSIS — B96.89 ACUTE BACTERIAL PHARYNGITIS: ICD-10-CM

## 2019-01-04 PROCEDURE — 99213 OFFICE O/P EST LOW 20 MIN: CPT | Mod: PBBFAC,PN | Performed by: INTERNAL MEDICINE

## 2019-01-04 PROCEDURE — 99999 PR PBB SHADOW E&M-EST. PATIENT-LVL III: CPT | Mod: PBBFAC,,, | Performed by: INTERNAL MEDICINE

## 2019-01-04 PROCEDURE — 99999 PR PBB SHADOW E&M-EST. PATIENT-LVL III: ICD-10-PCS | Mod: PBBFAC,,, | Performed by: INTERNAL MEDICINE

## 2019-01-04 PROCEDURE — 99213 OFFICE O/P EST LOW 20 MIN: CPT | Mod: S$PBB,,, | Performed by: INTERNAL MEDICINE

## 2019-01-04 PROCEDURE — 99213 PR OFFICE/OUTPT VISIT, EST, LEVL III, 20-29 MIN: ICD-10-PCS | Mod: S$PBB,,, | Performed by: INTERNAL MEDICINE

## 2019-01-04 NOTE — PATIENT INSTRUCTIONS
Acute bacterial bronchitis; resolved w doxycycline course and steroid 6 day pack; mucinex DM and tussionex as well.    Acute bronchospasm; no longer needing his rescue albuterol for some time now. Breathing a lot better.    Acute bacterial sinusitis; cleared up fine w earlier management;     Acute bacterial pharyngitis; has resolved as well.    Essential hypertension. Maintain < 2 Gm Na a day diet, and monitor BP at home; keep a log. On benazepril  -     Lipid panel; Future; Expected date: 01/04/2019  -     Comprehensive metabolic panel; Future; Expected date: 01/04/2019  -     CBC auto differential; Future; Expected date: 01/04/2019  -     TSH; Future; Expected date: 01/04/2019  -     Magnesium; Future; Expected date: 01/04/2019  -     Urinalysis; Future; Expected date: 01/04/2019    Hyperlipidemia, unspecified hyperlipidemia type. Maintain low fat high fiber diet, exercise regularly. Tolerates atorvastatin fine.   -     Lipid panel; Future; Expected date: 01/04/2019  -     Comprehensive metabolic panel; Future; Expected date: 01/04/2019  -     TSH; Future; Expected date: 01/04/2019    Anxiety; exercise w stress reduction; cont lexapro; really helping him. Stress reduction

## 2019-01-04 NOTE — PROGRESS NOTES
"Subjective:       Patient ID: Vince Chadwick III is a 68 y.o. male.    Chief Complaint: Follow-up (Acute bacterial bronchitis )    HPI  Cleared up nicely from his acute bronchitis; no more colored phlegm or cough remaining. No sore throat or sinus drip. No fever or chills. Needed albuterol puffer for about 3 days. Steroid dose pack really helped as well. Mucinex DM and tussionex helped as well..   Anxiety; lexapro really helps. Limits his caffeine.  HTN: BP runs 110's-120's low/70's; here 122/82 manually.   Seasoanl allergies; xyzal and astelin w flareups helps him a lot.  HLP: on low fat high fiber diet; takes atrovastatin fine.    Review of Systems   Constitutional: Negative for appetite change and unexpected weight change.   HENT: Negative for congestion, postnasal drip, rhinorrhea and sinus pressure.          seasonal allergies,    Eyes: Negative for discharge and itching.   Respiratory: Negative for cough, chest tightness, shortness of breath and wheezing.    Cardiovascular: Negative for chest pain, palpitations and leg swelling.   Gastrointestinal: Negative for abdominal pain, blood in stool, constipation, diarrhea, nausea and vomiting.   Endocrine: Negative for polydipsia, polyphagia and polyuria.   Genitourinary: Negative for dysuria and hematuria.   Musculoskeletal: Negative for arthralgias and myalgias.   Skin: Negative for rash.   Allergic/Immunologic: Negative for environmental allergies and food allergies.   Neurological: Negative for tremors, seizures and headaches.   Hematological: Negative for adenopathy. Does not bruise/bleed easily.   Psychiatric/Behavioral:        Denies anxiety or depression.       Objective:      Vitals:    01/04/19 1418   BP: 122/82   BP Location: Right arm   Patient Position: Sitting   BP Method: X-Large (Manual)   Pulse: 76   Resp: 16   Weight: 93.2 kg (205 lb 5.7 oz)   Height: 5' 8" (1.727 m)     Body mass index is 31.22 kg/m².    Physical Exam   Constitutional: He is " oriented to person, place, and time. He appears well-developed and well-nourished.   HENT:   Head: Normocephalic and atraumatic.   TM's pink; Nm swollen, slightly inflamed; w clear mucus. Throat pink.   Eyes: EOM are normal.   Neck: Normal range of motion. Neck supple. No thyromegaly present.   Cardiovascular: Normal rate, regular rhythm and normal heart sounds. Exam reveals no gallop.   No murmur heard.  Pulmonary/Chest: Effort normal and breath sounds normal. No respiratory distress. He has no wheezes. He has no rales.   Abdominal: Soft. Bowel sounds are normal. He exhibits no distension. There is no tenderness. There is no rebound and no guarding.   Musculoskeletal: Normal range of motion. He exhibits no edema.   Lymphadenopathy:     He has no cervical adenopathy.   Neurological: He is alert and oriented to person, place, and time.   Moves all 4 extremities fine.   Skin: No rash noted.   Psychiatric: He has a normal mood and affect. His behavior is normal. Thought content normal.   Vitals reviewed.      Assessment:       1. Acute bacterial bronchitis    2. Acute bronchospasm    3. Acute bacterial sinusitis    4. Acute bacterial pharyngitis    5. Essential hypertension    6. Hyperlipidemia, unspecified hyperlipidemia type    7. Anxiety        Plan:       Acute bacterial bronchitis; resolved w doxycycline course and steroid 6 day pack; mucinex DM and tussionex as well.    Acute bronchospasm; no longer needing his rescue albuterol for some time now. Breathing a lot better.    Acute bacterial sinusitis; cleared up fine w earlier management;     Acute bacterial pharyngitis; has resolved as well.    Essential hypertension. Maintain < 2 Gm Na a day diet, and monitor BP at home; keep a log. On benazepril  -     Lipid panel; Future; Expected date: 01/04/2019  -     Comprehensive metabolic panel; Future; Expected date: 01/04/2019  -     CBC auto differential; Future; Expected date: 01/04/2019  -     TSH; Future; Expected  date: 01/04/2019  -     Magnesium; Future; Expected date: 01/04/2019  -     Urinalysis; Future; Expected date: 01/04/2019    Hyperlipidemia, unspecified hyperlipidemia type. Maintain low fat high fiber diet, exercise regularly. Tolerates atorvastatin fine.   -     Lipid panel; Future; Expected date: 01/04/2019  -     Comprehensive metabolic panel; Future; Expected date: 01/04/2019  -     TSH; Future; Expected date: 01/04/2019    Anxiety; exercise w stress reduction; cont lexapro; really helping him. Stress reduction

## 2019-01-27 RX ORDER — ESCITALOPRAM OXALATE 20 MG/1
TABLET ORAL
Qty: 90 TABLET | Refills: 1 | Status: SHIPPED | OUTPATIENT
Start: 2019-01-27 | End: 2019-07-25 | Stop reason: SDUPTHER

## 2019-01-28 RX ORDER — ZALEPLON 10 MG/1
10 CAPSULE ORAL NIGHTLY
Qty: 45 CAPSULE | Refills: 0 | Status: SHIPPED | OUTPATIENT
Start: 2019-01-28 | End: 2019-03-12 | Stop reason: SDUPTHER

## 2019-03-08 RX ORDER — ATORVASTATIN CALCIUM 10 MG/1
TABLET, FILM COATED ORAL
Qty: 30 TABLET | Refills: 5 | Status: SHIPPED | OUTPATIENT
Start: 2019-03-08 | End: 2019-07-04 | Stop reason: SDUPTHER

## 2019-03-12 ENCOUNTER — PATIENT MESSAGE (OUTPATIENT)
Dept: FAMILY MEDICINE | Facility: CLINIC | Age: 69
End: 2019-03-12

## 2019-03-12 RX ORDER — ZALEPLON 10 MG/1
10 CAPSULE ORAL NIGHTLY
Qty: 45 CAPSULE | Refills: 0 | Status: SHIPPED | OUTPATIENT
Start: 2019-03-12 | End: 2019-04-25 | Stop reason: SDUPTHER

## 2019-03-12 NOTE — TELEPHONE ENCOUNTER
Pt Of Karan Burleson MD    Last seen on: 10/16/18 (Sarah Beth)    1/4/19    (Shadi)  Next appt: none  Last refill: 1/28/2019    Allergies:   Review of patient's allergies indicates:   Allergen Reactions    No known drug allergies      Pharmacy:   [facility]/pharm[facility] #[id number] - [place] - [address]  [place] [address]  Phone: [phone] Fax: [phone]    Please review! Thank you!

## 2019-03-13 ENCOUNTER — PATIENT MESSAGE (OUTPATIENT)
Dept: FAMILY MEDICINE | Facility: CLINIC | Age: 69
End: 2019-03-13

## 2019-03-13 ENCOUNTER — TELEPHONE (OUTPATIENT)
Dept: FAMILY MEDICINE | Facility: CLINIC | Age: 69
End: 2019-03-13

## 2019-03-13 NOTE — TELEPHONE ENCOUNTER
Approvedtoday  Your PA request has been approved. Additional information will be provided in the approval communication.

## 2019-04-25 RX ORDER — ZALEPLON 10 MG/1
10 CAPSULE ORAL NIGHTLY
Qty: 45 CAPSULE | Refills: 0 | Status: SHIPPED | OUTPATIENT
Start: 2019-04-25 | End: 2019-06-06 | Stop reason: SDUPTHER

## 2019-05-06 RX ORDER — BENAZEPRIL HYDROCHLORIDE 20 MG/1
TABLET ORAL
Qty: 30 TABLET | Refills: 1 | Status: SHIPPED | OUTPATIENT
Start: 2019-05-06 | End: 2019-07-02 | Stop reason: SDUPTHER

## 2019-05-09 RX ORDER — DICYCLOMINE HYDROCHLORIDE 20 MG/1
TABLET ORAL
Qty: 240 TABLET | Refills: 5 | Status: SHIPPED | OUTPATIENT
Start: 2019-05-09 | End: 2020-06-30

## 2019-06-01 DIAGNOSIS — N40.1 BENIGN NON-NODULAR PROSTATIC HYPERPLASIA WITH LOWER URINARY TRACT SYMPTOMS: ICD-10-CM

## 2019-06-06 RX ORDER — ZALEPLON 10 MG/1
10 CAPSULE ORAL NIGHTLY
Qty: 45 CAPSULE | Refills: 0 | Status: SHIPPED | OUTPATIENT
Start: 2019-06-06 | End: 2019-07-17 | Stop reason: SDUPTHER

## 2019-06-13 NOTE — TELEPHONE ENCOUNTER
Spoke to pt.  LLQ pain started 1 hour ago.  But he has been feeling feverish for a few days.  Abdomin tender to touch today.  No blood or mucus in stools.  Please advise   Surgical Defect Width In Cm (Optional): 1.8

## 2019-06-14 DIAGNOSIS — N40.1 BENIGN NON-NODULAR PROSTATIC HYPERPLASIA WITH LOWER URINARY TRACT SYMPTOMS: ICD-10-CM

## 2019-06-14 RX ORDER — HYDROCODONE BITARTRATE AND ACETAMINOPHEN 5; 325 MG/1; MG/1
TABLET ORAL
Refills: 0 | COMMUNITY
Start: 2019-06-11 | End: 2019-08-27

## 2019-06-14 RX ORDER — AMOXICILLIN 500 MG/1
CAPSULE ORAL
Refills: 0 | COMMUNITY
Start: 2019-06-11 | End: 2019-08-27

## 2019-06-17 RX ORDER — TAMSULOSIN HYDROCHLORIDE 0.4 MG/1
0.4 CAPSULE ORAL 2 TIMES DAILY
Qty: 60 CAPSULE | Refills: 10 | Status: SHIPPED | OUTPATIENT
Start: 2019-06-17 | End: 2020-06-15

## 2019-06-25 RX ORDER — TAMSULOSIN HYDROCHLORIDE 0.4 MG/1
CAPSULE ORAL
Qty: 60 CAPSULE | Refills: 10 | Status: SHIPPED | OUTPATIENT
Start: 2019-06-25 | End: 2019-08-27 | Stop reason: SDUPTHER

## 2019-07-02 RX ORDER — BENAZEPRIL HYDROCHLORIDE 20 MG/1
TABLET ORAL
Qty: 30 TABLET | Refills: 1 | Status: SHIPPED | OUTPATIENT
Start: 2019-07-02 | End: 2019-08-27 | Stop reason: SDUPTHER

## 2019-07-04 RX ORDER — ATORVASTATIN CALCIUM 10 MG/1
TABLET, FILM COATED ORAL
Qty: 30 TABLET | Refills: 5 | Status: SHIPPED | OUTPATIENT
Start: 2019-07-04 | End: 2019-12-17 | Stop reason: SDUPTHER

## 2019-07-18 RX ORDER — ZALEPLON 10 MG/1
10 CAPSULE ORAL NIGHTLY
Qty: 45 CAPSULE | Refills: 0 | Status: SHIPPED | OUTPATIENT
Start: 2019-07-18 | End: 2019-08-27 | Stop reason: SDUPTHER

## 2019-07-25 RX ORDER — ESCITALOPRAM OXALATE 20 MG/1
TABLET ORAL
Qty: 90 TABLET | Refills: 0 | Status: SHIPPED | OUTPATIENT
Start: 2019-07-25 | End: 2019-08-27 | Stop reason: SDUPTHER

## 2019-08-13 ENCOUNTER — PATIENT OUTREACH (OUTPATIENT)
Dept: ADMINISTRATIVE | Facility: HOSPITAL | Age: 69
End: 2019-08-13

## 2019-08-14 ENCOUNTER — PATIENT MESSAGE (OUTPATIENT)
Dept: ADMINISTRATIVE | Facility: HOSPITAL | Age: 69
End: 2019-08-14

## 2019-08-19 ENCOUNTER — PATIENT MESSAGE (OUTPATIENT)
Dept: FAMILY MEDICINE | Facility: CLINIC | Age: 69
End: 2019-08-19

## 2019-08-19 DIAGNOSIS — Z12.5 PROSTATE CANCER SCREENING: Primary | ICD-10-CM

## 2019-08-19 DIAGNOSIS — Z12.5 SPECIAL SCREENING EXAMINATION FOR NEOPLASM OF PROSTATE: ICD-10-CM

## 2019-08-20 ENCOUNTER — LAB VISIT (OUTPATIENT)
Dept: LAB | Facility: HOSPITAL | Age: 69
End: 2019-08-20
Attending: INTERNAL MEDICINE
Payer: MEDICARE

## 2019-08-20 DIAGNOSIS — Z12.5 SPECIAL SCREENING EXAMINATION FOR NEOPLASM OF PROSTATE: ICD-10-CM

## 2019-08-20 DIAGNOSIS — Z12.5 PROSTATE CANCER SCREENING: ICD-10-CM

## 2019-08-20 DIAGNOSIS — I10 ESSENTIAL HYPERTENSION: ICD-10-CM

## 2019-08-20 DIAGNOSIS — E78.5 HYPERLIPIDEMIA, UNSPECIFIED HYPERLIPIDEMIA TYPE: ICD-10-CM

## 2019-08-20 LAB
BASOPHILS # BLD AUTO: 0.09 K/UL (ref 0–0.2)
BASOPHILS NFR BLD: 1.3 % (ref 0–1.9)
DIFFERENTIAL METHOD: ABNORMAL
EOSINOPHIL # BLD AUTO: 0.2 K/UL (ref 0–0.5)
EOSINOPHIL NFR BLD: 2.7 % (ref 0–8)
ERYTHROCYTE [DISTWIDTH] IN BLOOD BY AUTOMATED COUNT: 13.7 % (ref 11.5–14.5)
HCT VFR BLD AUTO: 47.2 % (ref 40–54)
HGB BLD-MCNC: 15 G/DL (ref 14–18)
IMM GRANULOCYTES # BLD AUTO: 0.02 K/UL (ref 0–0.04)
IMM GRANULOCYTES NFR BLD AUTO: 0.3 % (ref 0–0.5)
LYMPHOCYTES # BLD AUTO: 1.9 K/UL (ref 1–4.8)
LYMPHOCYTES NFR BLD: 29 % (ref 18–48)
MCH RBC QN AUTO: 28.4 PG (ref 27–31)
MCHC RBC AUTO-ENTMCNC: 31.8 G/DL (ref 32–36)
MCV RBC AUTO: 89 FL (ref 82–98)
MONOCYTES # BLD AUTO: 0.6 K/UL (ref 0.3–1)
MONOCYTES NFR BLD: 9 % (ref 4–15)
NEUTROPHILS # BLD AUTO: 3.9 K/UL (ref 1.8–7.7)
NEUTROPHILS NFR BLD: 57.7 % (ref 38–73)
NRBC BLD-RTO: 0 /100 WBC
PLATELET # BLD AUTO: 227 K/UL (ref 150–350)
PMV BLD AUTO: 12.5 FL (ref 9.2–12.9)
RBC # BLD AUTO: 5.29 M/UL (ref 4.6–6.2)
WBC # BLD AUTO: 6.68 K/UL (ref 3.9–12.7)

## 2019-08-20 PROCEDURE — 84443 ASSAY THYROID STIM HORMONE: CPT

## 2019-08-20 PROCEDURE — 83735 ASSAY OF MAGNESIUM: CPT

## 2019-08-20 PROCEDURE — 80053 COMPREHEN METABOLIC PANEL: CPT

## 2019-08-20 PROCEDURE — 36415 COLL VENOUS BLD VENIPUNCTURE: CPT | Mod: PN

## 2019-08-20 PROCEDURE — 80061 LIPID PANEL: CPT

## 2019-08-20 PROCEDURE — 84153 ASSAY OF PSA TOTAL: CPT

## 2019-08-20 PROCEDURE — 85025 COMPLETE CBC W/AUTO DIFF WBC: CPT

## 2019-08-21 LAB
ALBUMIN SERPL BCP-MCNC: 4 G/DL (ref 3.5–5.2)
ALP SERPL-CCNC: 94 U/L (ref 55–135)
ALT SERPL W/O P-5'-P-CCNC: 7 U/L (ref 10–44)
ANION GAP SERPL CALC-SCNC: 13 MMOL/L (ref 8–16)
AST SERPL-CCNC: 18 U/L (ref 10–40)
BILIRUB SERPL-MCNC: 0.5 MG/DL (ref 0.1–1)
BUN SERPL-MCNC: 17 MG/DL (ref 8–23)
CALCIUM SERPL-MCNC: 9.6 MG/DL (ref 8.7–10.5)
CHLORIDE SERPL-SCNC: 103 MMOL/L (ref 95–110)
CHOLEST SERPL-MCNC: 152 MG/DL (ref 120–199)
CHOLEST/HDLC SERPL: 2.8 {RATIO} (ref 2–5)
CO2 SERPL-SCNC: 24 MMOL/L (ref 23–29)
COMPLEXED PSA SERPL-MCNC: 1.9 NG/ML (ref 0–4)
CREAT SERPL-MCNC: 1.1 MG/DL (ref 0.5–1.4)
EST. GFR  (AFRICAN AMERICAN): >60 ML/MIN/1.73 M^2
EST. GFR  (NON AFRICAN AMERICAN): >60 ML/MIN/1.73 M^2
GLUCOSE SERPL-MCNC: 90 MG/DL (ref 70–110)
HDLC SERPL-MCNC: 54 MG/DL (ref 40–75)
HDLC SERPL: 35.5 % (ref 20–50)
LDLC SERPL CALC-MCNC: 76.2 MG/DL (ref 63–159)
MAGNESIUM SERPL-MCNC: 2.1 MG/DL (ref 1.6–2.6)
NONHDLC SERPL-MCNC: 98 MG/DL
POTASSIUM SERPL-SCNC: 4.2 MMOL/L (ref 3.5–5.1)
PROT SERPL-MCNC: 7.1 G/DL (ref 6–8.4)
SODIUM SERPL-SCNC: 140 MMOL/L (ref 136–145)
TRIGL SERPL-MCNC: 109 MG/DL (ref 30–150)
TSH SERPL DL<=0.005 MIU/L-ACNC: 2.8 UIU/ML (ref 0.4–4)

## 2019-08-27 ENCOUNTER — OFFICE VISIT (OUTPATIENT)
Dept: FAMILY MEDICINE | Facility: CLINIC | Age: 69
End: 2019-08-27
Payer: MEDICARE

## 2019-08-27 VITALS
OXYGEN SATURATION: 98 % | SYSTOLIC BLOOD PRESSURE: 110 MMHG | TEMPERATURE: 98 F | HEIGHT: 68 IN | DIASTOLIC BLOOD PRESSURE: 62 MMHG | HEART RATE: 89 BPM | WEIGHT: 205 LBS | BODY MASS INDEX: 31.07 KG/M2

## 2019-08-27 DIAGNOSIS — I10 ESSENTIAL HYPERTENSION: Primary | ICD-10-CM

## 2019-08-27 DIAGNOSIS — G47.00 INSOMNIA, UNSPECIFIED TYPE: ICD-10-CM

## 2019-08-27 DIAGNOSIS — Z12.11 COLON CANCER SCREENING: ICD-10-CM

## 2019-08-27 DIAGNOSIS — E78.5 HYPERLIPIDEMIA, UNSPECIFIED HYPERLIPIDEMIA TYPE: ICD-10-CM

## 2019-08-27 PROCEDURE — 99214 OFFICE O/P EST MOD 30 MIN: CPT | Mod: S$PBB,,, | Performed by: FAMILY MEDICINE

## 2019-08-27 PROCEDURE — 99999 PR PBB SHADOW E&M-EST. PATIENT-LVL III: ICD-10-PCS | Mod: PBBFAC,,, | Performed by: FAMILY MEDICINE

## 2019-08-27 PROCEDURE — 99214 PR OFFICE/OUTPT VISIT, EST, LEVL IV, 30-39 MIN: ICD-10-PCS | Mod: S$PBB,,, | Performed by: FAMILY MEDICINE

## 2019-08-27 PROCEDURE — 99999 PR PBB SHADOW E&M-EST. PATIENT-LVL III: CPT | Mod: PBBFAC,,, | Performed by: FAMILY MEDICINE

## 2019-08-27 PROCEDURE — 99213 OFFICE O/P EST LOW 20 MIN: CPT | Mod: PBBFAC,PN | Performed by: FAMILY MEDICINE

## 2019-08-27 RX ORDER — BENAZEPRIL HYDROCHLORIDE 20 MG/1
TABLET ORAL
Qty: 30 TABLET | Refills: 1 | Status: SHIPPED | OUTPATIENT
Start: 2019-08-27 | End: 2019-10-31 | Stop reason: SDUPTHER

## 2019-08-27 RX ORDER — ZALEPLON 10 MG/1
10 CAPSULE ORAL NIGHTLY
Qty: 90 CAPSULE | Refills: 1 | Status: SHIPPED | OUTPATIENT
Start: 2019-08-27 | End: 2020-02-03

## 2019-08-27 NOTE — PROGRESS NOTES
THIS DOCUMENT WAS MADE IN PART WITH VOICE RECOGNITION SOFTWARE.  OCCASIONALLY THIS SOFTWARE WILL MISINTERPRET WORDS OR PHRASES.      Vince Chadwick III  1950    Vince was seen today for follow-up.    Diagnoses and all orders for this visit:    Essential hypertension  This chronic condition, condition has been reviewed and discussed, it is currently stable.    Hyperlipidemia, unspecified hyperlipidemia type  Mild worsening, getting back on track now with wife doing better    Insomnia, unspecified type  Worsening as wife has been ill, but improving    Colon cancer screening  -     Case request GI: COLONOSCOPY        Subjective     Chief Complaint   Patient presents with    Follow-up       HPI     all pertinent HPI elements addressed above.    HPI elements addressed above in the assessment and plan including problems, diagnosis, stability/instability,  improving/worsening, and chronicity will not be duplicated in this section. Any important additional HPI topics will be discussed here if needed.    Active Ambulatory Problems     Diagnosis Date Noted    BPH (benign prostatic hypertrophy) 09/05/2012    Anxiety 09/05/2012    Hypertension     Hyperlipidemia     Lumbar disc disease     ADD (attention deficit disorder) 05/03/2013    Insomnia 04/16/2015    Lumbar stenosis 05/31/2016    DDD (degenerative disc disease), lumbar 05/31/2016    Lumbar radicular pain 05/31/2016    Lumbar facet arthropathy 05/31/2016    Lumbar radiculopathy 06/07/2016     Resolved Ambulatory Problems     Diagnosis Date Noted    No Resolved Ambulatory Problems     Past Medical History:   Diagnosis Date    ALLERGIC RHINITIS     Arthritis     Cataract     Colon polyp     Diverticulitis     Diverticulosis     Hyperlipidemia     Hypertension     Irritable bowel syndrome     Lumbar disc disease          Review of Systems   HENT: Negative.    Respiratory: Negative.    Cardiovascular: Negative.    Gastrointestinal: Negative.   "  Musculoskeletal: Negative.    Psychiatric/Behavioral:        Recent exacerbation of insomnia, but improving now that his wife is doing better, the sonata is still working       Objective     Physical Exam   Constitutional: He is oriented to person, place, and time. He appears well-developed and well-nourished. No distress.   HENT:   Head: Normocephalic and atraumatic.   Eyes: Conjunctivae are normal. No scleral icterus.   Pulmonary/Chest: Effort normal. No respiratory distress.   Neurological: He is alert and oriented to person, place, and time. Coordination normal.   Skin: He is not diaphoretic.   Psychiatric: He has a normal mood and affect. His behavior is normal.     Vitals:    08/27/19 1433   BP: 110/62   BP Location: Right arm   Patient Position: Sitting   BP Method: Medium (Manual)   Pulse: 89   Temp: 98.2 °F (36.8 °C)   TempSrc: Oral   SpO2: 98%   Weight: 93 kg (205 lb 0.4 oz)   Height: 5' 8" (1.727 m)       MOST RECENT LABS IN OUR ELECTRONIC MEDICAL RECORD:     Results for orders placed or performed in visit on 08/20/19   Urinalysis   Result Value Ref Range    Specimen UA Urine, Unspecified     Color, UA Yellow Yellow, Straw, Cori    Appearance, UA Clear Clear    pH, UA 5.0 5.0 - 8.0    Specific Gravity, UA 1.015 1.005 - 1.030    Protein, UA Negative Negative    Glucose, UA Negative Negative    Ketones, UA Negative Negative    Bilirubin (UA) Negative Negative    Occult Blood UA Negative Negative    Nitrite, UA Negative Negative    Leukocytes, UA Negative Negative         "

## 2019-08-28 RX ORDER — ZALEPLON 5 MG/1
10 CAPSULE ORAL NIGHTLY PRN
Qty: 60 CAPSULE | Refills: 0 | Status: SHIPPED | OUTPATIENT
Start: 2019-08-28 | End: 2019-09-27

## 2019-09-06 ENCOUNTER — IMMUNIZATION (OUTPATIENT)
Dept: PHARMACY | Facility: CLINIC | Age: 69
End: 2019-09-06

## 2019-09-06 ENCOUNTER — IMMUNIZATION (OUTPATIENT)
Dept: PHARMACY | Facility: CLINIC | Age: 69
End: 2019-09-06
Payer: MEDICARE

## 2019-09-18 ENCOUNTER — OFFICE VISIT (OUTPATIENT)
Dept: OPTOMETRY | Facility: CLINIC | Age: 69
End: 2019-09-18
Payer: MEDICARE

## 2019-09-18 DIAGNOSIS — H40.013 OAG (OPEN ANGLE GLAUCOMA) SUSPECT, LOW RISK, BILATERAL: ICD-10-CM

## 2019-09-18 DIAGNOSIS — H52.03 HYPEROPIA WITH ASTIGMATISM AND PRESBYOPIA, BILATERAL: ICD-10-CM

## 2019-09-18 DIAGNOSIS — H52.4 HYPEROPIA WITH ASTIGMATISM AND PRESBYOPIA, BILATERAL: ICD-10-CM

## 2019-09-18 DIAGNOSIS — H25.13 NUCLEAR SCLEROSIS, BILATERAL: Primary | ICD-10-CM

## 2019-09-18 DIAGNOSIS — H40.033 ANATOMICAL NARROW ANGLE OF BOTH EYES: ICD-10-CM

## 2019-09-18 DIAGNOSIS — H43.393 VITREOUS FLOATERS, BILATERAL: ICD-10-CM

## 2019-09-18 DIAGNOSIS — Z83.511 FAMILY HISTORY OF GLAUCOMA IN SISTER: ICD-10-CM

## 2019-09-18 DIAGNOSIS — H52.203 HYPEROPIA WITH ASTIGMATISM AND PRESBYOPIA, BILATERAL: ICD-10-CM

## 2019-09-18 PROCEDURE — 92014 PR EYE EXAM, EST PATIENT,COMPREHESV: ICD-10-PCS | Mod: S$PBB,,, | Performed by: OPTOMETRIST

## 2019-09-18 PROCEDURE — 99213 OFFICE O/P EST LOW 20 MIN: CPT | Mod: PBBFAC,PO | Performed by: OPTOMETRIST

## 2019-09-18 PROCEDURE — 92015 DETERMINE REFRACTIVE STATE: CPT | Mod: ,,, | Performed by: OPTOMETRIST

## 2019-09-18 PROCEDURE — 92015 PR REFRACTION: ICD-10-PCS | Mod: ,,, | Performed by: OPTOMETRIST

## 2019-09-18 PROCEDURE — 92014 COMPRE OPH EXAM EST PT 1/>: CPT | Mod: S$PBB,,, | Performed by: OPTOMETRIST

## 2019-09-18 PROCEDURE — 99999 PR PBB SHADOW E&M-EST. PATIENT-LVL III: ICD-10-PCS | Mod: PBBFAC,,, | Performed by: OPTOMETRIST

## 2019-09-18 PROCEDURE — 99999 PR PBB SHADOW E&M-EST. PATIENT-LVL III: CPT | Mod: PBBFAC,,, | Performed by: OPTOMETRIST

## 2019-09-18 NOTE — PROGRESS NOTES
HPI     Routine eye exam-dle-2017    Pt complains of blurred vision at near. Trouble reading. Needing updated   glasses rx. Family hx of glaucoma. Denies any eye pain. Complains of   floaters OU and some occasional flashes.     Last edited by Christine Wolfe on 9/18/2019 10:50 AM. (History)        ROS     Positive for: Eyes    Negative for: Constitutional, Gastrointestinal, Neurological, Skin,   Genitourinary, Musculoskeletal, HENT, Endocrine, Cardiovascular,   Respiratory, Psychiatric, Allergic/Imm, Heme/Lymph    Last edited by JUAN Becker, OD on 9/18/2019 11:01 AM. (History)        Assessment /Plan     For exam results, see Encounter Report.    Nuclear sclerosis, bilateral    Vitreous floaters, bilateral    OAG (open angle glaucoma) suspect, low risk, bilateral  -     OCT, Optic Nerve - OU - Both Eyes; Future  -     Wheat Visual Field - OU - Extended - Both Eyes; Future    Anatomical narrow angle of both eyes    Hyperopia with astigmatism and presbyopia, bilateral    Family history of glaucoma in sister      1. Vis sig OU, not ready for consult   2. RD precautions given  3,4.  Moderate / large c/d  IOP mid / upper teens without tx  Angles shallow 2+  Previous fields 2010 (last treated) were without progression  Reschedule for new baseline fields / OCT  Recheck gonio and CCT at next visit    5. Updated specs rx, gave copy, fill prn  6. Noted w/ recent dx and treatment    Discussed and educated patient on current findings /plan.  RTC glaucoma w/u, then pending results 1 year, prn if any changes / issues    Zapien reviewed from 10/11/19  PSD  1.88 <5% wnl few missed nasally  1.95 <5% onl with few missed inf    G 68 onl w def G/NS/TI  G 81 bord w thin T  Will repeat in year    High suspect for OAG  RTC to recheck IOP / gonio and CCT in March 2020

## 2019-10-11 ENCOUNTER — CLINICAL SUPPORT (OUTPATIENT)
Dept: OPHTHALMOLOGY | Facility: CLINIC | Age: 69
End: 2019-10-11
Payer: MEDICARE

## 2019-10-11 DIAGNOSIS — H40.013 OAG (OPEN ANGLE GLAUCOMA) SUSPECT, LOW RISK, BILATERAL: ICD-10-CM

## 2019-10-11 PROCEDURE — 92083 HUMPHREY VISUAL FIELD - OU - BOTH EYES: ICD-10-PCS | Mod: 26,S$PBB,, | Performed by: OPTOMETRIST

## 2019-10-11 PROCEDURE — 92133 POSTERIOR SEGMENT OCT OPTIC NERVE(OCULAR COHERENCE TOMOGRAPHY) - OU - BOTH EYES: ICD-10-PCS | Mod: 26,S$PBB,, | Performed by: OPTOMETRIST

## 2019-10-11 PROCEDURE — 92133 CPTRZD OPH DX IMG PST SGM ON: CPT | Mod: 26,S$PBB,, | Performed by: OPTOMETRIST

## 2019-10-11 PROCEDURE — 92083 EXTENDED VISUAL FIELD XM: CPT | Mod: 26,S$PBB,, | Performed by: OPTOMETRIST

## 2019-10-11 PROCEDURE — 92133 CPTRZD OPH DX IMG PST SGM ON: CPT | Mod: PBBFAC,PO

## 2019-10-11 PROCEDURE — 92083 EXTENDED VISUAL FIELD XM: CPT | Mod: PBBFAC,PO

## 2019-10-12 RX ORDER — AZELASTINE 1 MG/ML
SPRAY, METERED NASAL
Qty: 30 ML | Refills: 2 | Status: SHIPPED | OUTPATIENT
Start: 2019-10-12 | End: 2020-02-18

## 2019-10-23 RX ORDER — ESCITALOPRAM OXALATE 20 MG/1
TABLET ORAL
Qty: 90 TABLET | Refills: 0 | Status: SHIPPED | OUTPATIENT
Start: 2019-10-23 | End: 2020-02-10 | Stop reason: SDUPTHER

## 2019-10-31 RX ORDER — BENAZEPRIL HYDROCHLORIDE 20 MG/1
TABLET ORAL
Qty: 30 TABLET | Refills: 1 | Status: SHIPPED | OUTPATIENT
Start: 2019-10-31 | End: 2019-12-12 | Stop reason: SDUPTHER

## 2019-11-11 ENCOUNTER — TELEPHONE (OUTPATIENT)
Dept: GASTROENTEROLOGY | Facility: CLINIC | Age: 69
End: 2019-11-11

## 2019-11-11 NOTE — TELEPHONE ENCOUNTER
----- Message from Mayela Blackwell sent at 11/11/2019  9:41 AM CST -----    Pt is calling to  Book  A  Colonoscopy // please call  [phone]

## 2019-11-11 NOTE — TELEPHONE ENCOUNTER
Scheduled colonoscopy, mailed confirmation and instructions to address on file. Pt verbalized understanding.

## 2019-12-12 RX ORDER — BENAZEPRIL HYDROCHLORIDE 20 MG/1
TABLET ORAL
Qty: 30 TABLET | Refills: 1 | Status: SHIPPED | OUTPATIENT
Start: 2019-12-12 | End: 2020-02-15

## 2019-12-17 RX ORDER — ATORVASTATIN CALCIUM 10 MG/1
TABLET, FILM COATED ORAL
Qty: 90 TABLET | Refills: 1 | Status: SHIPPED | OUTPATIENT
Start: 2019-12-17 | End: 2020-06-17

## 2020-01-02 ENCOUNTER — LAB VISIT (OUTPATIENT)
Dept: LAB | Facility: HOSPITAL | Age: 70
End: 2020-01-02
Attending: NURSE PRACTITIONER
Payer: MEDICARE

## 2020-01-02 ENCOUNTER — OFFICE VISIT (OUTPATIENT)
Dept: FAMILY MEDICINE | Facility: CLINIC | Age: 70
End: 2020-01-02
Payer: MEDICARE

## 2020-01-02 VITALS
HEART RATE: 86 BPM | OXYGEN SATURATION: 97 % | DIASTOLIC BLOOD PRESSURE: 60 MMHG | TEMPERATURE: 98 F | SYSTOLIC BLOOD PRESSURE: 98 MMHG | WEIGHT: 196.88 LBS | HEIGHT: 68 IN | BODY MASS INDEX: 29.84 KG/M2

## 2020-01-02 DIAGNOSIS — E86.0 DEHYDRATION: ICD-10-CM

## 2020-01-02 DIAGNOSIS — R19.5 DARK STOOLS: ICD-10-CM

## 2020-01-02 DIAGNOSIS — K52.9 CHRONIC DIARRHEA: ICD-10-CM

## 2020-01-02 DIAGNOSIS — K52.9 CHRONIC DIARRHEA: Primary | ICD-10-CM

## 2020-01-02 LAB
ANION GAP SERPL CALC-SCNC: 8 MMOL/L (ref 8–16)
BASOPHILS # BLD AUTO: 0.08 K/UL (ref 0–0.2)
BASOPHILS NFR BLD: 0.9 % (ref 0–1.9)
BUN SERPL-MCNC: 28 MG/DL (ref 8–23)
CALCIUM SERPL-MCNC: 9.4 MG/DL (ref 8.7–10.5)
CHLORIDE SERPL-SCNC: 105 MMOL/L (ref 95–110)
CO2 SERPL-SCNC: 27 MMOL/L (ref 23–29)
CREAT SERPL-MCNC: 1.1 MG/DL (ref 0.5–1.4)
DIFFERENTIAL METHOD: ABNORMAL
EOSINOPHIL # BLD AUTO: 0.2 K/UL (ref 0–0.5)
EOSINOPHIL NFR BLD: 1.8 % (ref 0–8)
ERYTHROCYTE [DISTWIDTH] IN BLOOD BY AUTOMATED COUNT: 13.9 % (ref 11.5–14.5)
EST. GFR  (AFRICAN AMERICAN): >60 ML/MIN/1.73 M^2
EST. GFR  (NON AFRICAN AMERICAN): >60 ML/MIN/1.73 M^2
GLUCOSE SERPL-MCNC: 91 MG/DL (ref 70–110)
HCT VFR BLD AUTO: 41 % (ref 40–54)
HGB BLD-MCNC: 12.8 G/DL (ref 14–18)
IMM GRANULOCYTES # BLD AUTO: 0.02 K/UL (ref 0–0.04)
IMM GRANULOCYTES NFR BLD AUTO: 0.2 % (ref 0–0.5)
LYMPHOCYTES # BLD AUTO: 2 K/UL (ref 1–4.8)
LYMPHOCYTES NFR BLD: 24.1 % (ref 18–48)
MCH RBC QN AUTO: 28.6 PG (ref 27–31)
MCHC RBC AUTO-ENTMCNC: 31.2 G/DL (ref 32–36)
MCV RBC AUTO: 92 FL (ref 82–98)
MONOCYTES # BLD AUTO: 0.6 K/UL (ref 0.3–1)
MONOCYTES NFR BLD: 7.6 % (ref 4–15)
NEUTROPHILS # BLD AUTO: 5.5 K/UL (ref 1.8–7.7)
NEUTROPHILS NFR BLD: 65.4 % (ref 38–73)
NRBC BLD-RTO: 0 /100 WBC
PLATELET # BLD AUTO: 245 K/UL (ref 150–350)
PMV BLD AUTO: 12.5 FL (ref 9.2–12.9)
POTASSIUM SERPL-SCNC: 4.1 MMOL/L (ref 3.5–5.1)
RBC # BLD AUTO: 4.47 M/UL (ref 4.6–6.2)
SODIUM SERPL-SCNC: 140 MMOL/L (ref 136–145)
WBC # BLD AUTO: 8.47 K/UL (ref 3.9–12.7)

## 2020-01-02 PROCEDURE — 85025 COMPLETE CBC W/AUTO DIFF WBC: CPT

## 2020-01-02 PROCEDURE — 99999 PR PBB SHADOW E&M-EST. PATIENT-LVL V: ICD-10-PCS | Mod: PBBFAC,,, | Performed by: NURSE PRACTITIONER

## 2020-01-02 PROCEDURE — 80048 BASIC METABOLIC PNL TOTAL CA: CPT

## 2020-01-02 PROCEDURE — 99214 PR OFFICE/OUTPT VISIT, EST, LEVL IV, 30-39 MIN: ICD-10-PCS | Mod: S$PBB,,, | Performed by: NURSE PRACTITIONER

## 2020-01-02 PROCEDURE — 1159F MED LIST DOCD IN RCRD: CPT | Mod: ,,, | Performed by: NURSE PRACTITIONER

## 2020-01-02 PROCEDURE — 1126F PR PAIN SEVERITY QUANTIFIED, NO PAIN PRESENT: ICD-10-PCS | Mod: ,,, | Performed by: NURSE PRACTITIONER

## 2020-01-02 PROCEDURE — 36415 COLL VENOUS BLD VENIPUNCTURE: CPT | Mod: PN

## 2020-01-02 PROCEDURE — 99214 OFFICE O/P EST MOD 30 MIN: CPT | Mod: S$PBB,,, | Performed by: NURSE PRACTITIONER

## 2020-01-02 PROCEDURE — 99215 OFFICE O/P EST HI 40 MIN: CPT | Mod: PBBFAC,PN | Performed by: NURSE PRACTITIONER

## 2020-01-02 PROCEDURE — 99999 PR PBB SHADOW E&M-EST. PATIENT-LVL V: CPT | Mod: PBBFAC,,, | Performed by: NURSE PRACTITIONER

## 2020-01-02 PROCEDURE — 1159F PR MEDICATION LIST DOCUMENTED IN MEDICAL RECORD: ICD-10-PCS | Mod: ,,, | Performed by: NURSE PRACTITIONER

## 2020-01-02 PROCEDURE — 1126F AMNT PAIN NOTED NONE PRSNT: CPT | Mod: ,,, | Performed by: NURSE PRACTITIONER

## 2020-01-02 NOTE — PROGRESS NOTES
This dictation has been generated using Modal Fluency Dictation some phonetic errors may occur. Please contact author for clarification if needed.     Problem List Items Addressed This Visit     None      Visit Diagnoses     Chronic diarrhea    -  Primary    Relevant Orders    CBC auto differential    Basic metabolic panel    Ambulatory referral to Gastroenterology    Dehydration        Relevant Orders    CBC auto differential    Basic metabolic panel    Dark stools        Relevant Orders    CBC auto differential    Basic metabolic panel    Ambulatory referral to Gastroenterology          Orders Placed This Encounter    CBC auto differential    Basic metabolic panel    Ambulatory referral to Gastroenterology     Chronic diarrhea ongoing over the last year needs to follow up with Gastroenterology.  With recent stool color change may need EGD plus colonoscopy.  Currently dehydrated.  Blood pressure low hold blood pressure until systolic reading above 110 then resume.  Check labs CBC BMP.  I will review and address accordingly.  Dark stools check CBC rule out anemia.    Follow up if symptoms worsen or fail to improve.    ________________________________________________________________  ________________________________________________________________      Chief Complaint   Patient presents with    Dizziness    Abdominal Cramping    Nausea    Diarrhea     History of present illness  This 69 y.o. presents today for complaint of diarrhea not feeling well and dizzy.  Patient notes he started feeling bad on Sunday.  He notes cramping and diarrhea.  Symptoms persisted until Wednesday.  The diarrhea issue has been going on since last year.  He had been delaying evaluation as his wife had back surgery in required assistance.  Patient notes that with this episode of diarrhea he became dizzy.  He is unsure about any spinning sensation and felt more off balance.  He does note some dark stools but no true melena.  He has  been using Imodium over the last year to control diarrhea symptoms.  Does have history of IBS and diverticulitis.  Currently denies any fever chills.  He does note fatigue.  Has seen GI in the past Dr. Novak years ago and more recently in November 2018 Martha Plunkett NP.   He has a colonoscopy scheduled for later in the month and may need EGD also.  Review of systems  No fever chills malaise body aches  Increased thirst noted.  Denies classic spinning sensation of vertigo.  Without abdominal pain. No blood or mucus in stool.  Past medical and social history reviewed.  Patient new to me.  Wife is known to me.  Patient follows in the clinic.    Past Medical History:   Diagnosis Date    ALLERGIC RHINITIS     Arthritis     Cataract     OU    Colon polyp     Diverticulitis     Diverticulosis     Hyperlipidemia     Hypertension     Irritable bowel syndrome     Lumbar disc disease        Past Surgical History:   Procedure Laterality Date    APPENDECTOMY      COLONOSCOPY  06/02/2011    [facility].    One 1 mm polyp in the sigmoid colon.  FRAGMENT OF NONNEOPLASTIC COLONIC MUCOSA.  Sigmoid diverticulosis.  Spasms c/w IBS.  repeat in 7-8 years    COLONOSCOPY  09/12/2006    Dr. Novak, in [place]    FOOT SURGERY      x 2    LUMBAR EPIDURAL INJECTION      PILONIDAL CYST DRAINAGE      s/p chalazion removal      OD    TONSILLECTOMY         Family History   Problem Relation Age of Onset    Glaucoma Father     Cataracts Father     Macular degeneration Father     Colon polyps Father     Glaucoma Paternal Uncle     Glaucoma Paternal Uncle     Cataracts Mother     Glaucoma Sister     Colon cancer Neg Hx     Crohn's disease Neg Hx     Ulcerative colitis Neg Hx        Social History     Socioeconomic History    Marital status:      Spouse name: Not on file    Number of children: Not on file    Years of education: Not on file    Highest education level: Not on file   Occupational History    Not on file    Social Needs    Financial resource strain: Not on file    Food insecurity:     Worry: Not on file     Inability: Not on file    Transportation needs:     Medical: Not on file     Non-medical: Not on file   Tobacco Use    Smoking status: Never Smoker    Smokeless tobacco: Never Used   Substance and Sexual Activity    Alcohol use: No     Alcohol/week: 0.0 standard drinks    Drug use: No    Sexual activity: Yes   Lifestyle    Physical activity:     Days per week: Not on file     Minutes per session: Not on file    Stress: Not on file   Relationships    Social connections:     Talks on phone: Not on file     Gets together: Not on file     Attends [employer] service: Not on file     Active member of club or organization: Not on file     Attends meetings of clubs or organizations: Not on file     Relationship status: Not on file   Other Topics Concern    Not on file   Social History Narrative    Not on file       Current Outpatient Medications   Medication Sig Dispense Refill    atorvastatin (LIPITOR) 10 MG tablet TAKE 1 TABLET BY MOUTH EVERY DAY IN THE EVENING 90 tablet 1    azelastine (ASTELIN) 137 mcg (0.1 %) nasal spray USE 2 SPRAYS IN EACH NOSTRIL TWICE A DAY 30 mL 2    benazepril (LOTENSIN) 20 MG tablet TAKE 1 TABLET BY MOUTH EVERY DAY 30 tablet 1    dicyclomine (BENTYL) 20 mg tablet TAKE 1 TABLET BY MOUTH BEFORE MEALS & AT BEDTIME AS NEEDED 240 tablet 5    escitalopram oxalate (LEXAPRO) 20 MG tablet TAKE 1 TABLET BY MOUTH EVERY DAY 90 tablet 0    tamsulosin (FLOMAX) 0.4 mg Cap Take 1 capsule (0.4 mg total) by mouth 2 (two) times daily. 60 capsule 10    zaleplon (SONATA) 10 MG capsule Take 1 capsule (10 mg total) by mouth nightly. 90 capsule 1     No current facility-administered medications for this visit.        Review of patient's allergies indicates:   Allergen Reactions    No known drug allergies        Physical examination  Vitals Reviewed  Gen. Well-dressed well-nourished   Skin warm dry  and intact.  No rashes noted.  HEENT.     Nares patent bilateral.  Pharynx is unremarkable.  No maxillary or frontal sinus tenderness when percussed.    Neck is supple without adenopathy  Chest.  Respirations are even unlabored.  Lungs are clear to auscultation.  Cardiac regular rate and rhythm.  No chest wall adenopathy noted.  Abdomen is soft and not distended.  Bowel sounds are present.  No tenderness during palpation of the abdomen.  No Hepatosplenomegaly noted with percussion.  No hernia noted.    Neuro. Awake alert oriented x4.  Normal judgment and cognition noted.  Extremities no clubbing cyanosis or edema noted.     Call or return to clinic prn if these symptoms worsen or fail to improve as anticipated.

## 2020-01-02 NOTE — PATIENT INSTRUCTIONS
Follow up GI Martha Plunkett NP for GI issues and discuss EGD.   Hold Blood pressure med a couple days if necessary. Resume when at 110 systolic(top Number).

## 2020-01-07 ENCOUNTER — OFFICE VISIT (OUTPATIENT)
Dept: GASTROENTEROLOGY | Facility: CLINIC | Age: 70
End: 2020-01-07
Payer: MEDICARE

## 2020-01-07 VITALS
HEIGHT: 68 IN | HEART RATE: 79 BPM | BODY MASS INDEX: 31.34 KG/M2 | SYSTOLIC BLOOD PRESSURE: 117 MMHG | DIASTOLIC BLOOD PRESSURE: 68 MMHG | WEIGHT: 206.81 LBS

## 2020-01-07 DIAGNOSIS — Z87.898 HISTORY OF SHORTNESS OF BREATH: ICD-10-CM

## 2020-01-07 DIAGNOSIS — R10.9 ABDOMINAL CRAMPING: Primary | ICD-10-CM

## 2020-01-07 DIAGNOSIS — R11.0 NAUSEA: ICD-10-CM

## 2020-01-07 DIAGNOSIS — R19.7 INTERMITTENT DIARRHEA: ICD-10-CM

## 2020-01-07 DIAGNOSIS — Z87.19 HISTORY OF DIVERTICULOSIS: ICD-10-CM

## 2020-01-07 DIAGNOSIS — K92.1 BLACK STOOL: ICD-10-CM

## 2020-01-07 DIAGNOSIS — R10.30 LOWER ABDOMINAL PAIN: ICD-10-CM

## 2020-01-07 DIAGNOSIS — R42 DIZZINESS: ICD-10-CM

## 2020-01-07 DIAGNOSIS — R53.83 FATIGUE, UNSPECIFIED TYPE: ICD-10-CM

## 2020-01-07 PROCEDURE — 1125F AMNT PAIN NOTED PAIN PRSNT: CPT | Mod: ,,, | Performed by: NURSE PRACTITIONER

## 2020-01-07 PROCEDURE — 1159F PR MEDICATION LIST DOCUMENTED IN MEDICAL RECORD: ICD-10-PCS | Mod: ,,, | Performed by: NURSE PRACTITIONER

## 2020-01-07 PROCEDURE — 99214 PR OFFICE/OUTPT VISIT, EST, LEVL IV, 30-39 MIN: ICD-10-PCS | Mod: S$PBB,,, | Performed by: NURSE PRACTITIONER

## 2020-01-07 PROCEDURE — 99214 OFFICE O/P EST MOD 30 MIN: CPT | Mod: S$PBB,,, | Performed by: NURSE PRACTITIONER

## 2020-01-07 PROCEDURE — 99999 PR PBB SHADOW E&M-EST. PATIENT-LVL IV: ICD-10-PCS | Mod: PBBFAC,,, | Performed by: NURSE PRACTITIONER

## 2020-01-07 PROCEDURE — 99214 OFFICE O/P EST MOD 30 MIN: CPT | Mod: PBBFAC,PO | Performed by: NURSE PRACTITIONER

## 2020-01-07 PROCEDURE — 99999 PR PBB SHADOW E&M-EST. PATIENT-LVL IV: CPT | Mod: PBBFAC,,, | Performed by: NURSE PRACTITIONER

## 2020-01-07 PROCEDURE — 1159F MED LIST DOCD IN RCRD: CPT | Mod: ,,, | Performed by: NURSE PRACTITIONER

## 2020-01-07 PROCEDURE — 1125F PR PAIN SEVERITY QUANTIFIED, PAIN PRESENT: ICD-10-PCS | Mod: ,,, | Performed by: NURSE PRACTITIONER

## 2020-01-07 RX ORDER — LOPERAMIDE HYDROCHLORIDE 2 MG/1
2 CAPSULE ORAL 4 TIMES DAILY PRN
COMMUNITY
End: 2020-07-24

## 2020-01-07 NOTE — PROGRESS NOTES
Subjective:       Patient ID: Vince Chadwick III is a 69 y.o. male Body mass index is 31.44 kg/m².    Chief Complaint: Abdominal Pain    Established patient of Dr. Novak & myself.    Abdominal Pain   This is a recurrent problem. Episode onset: history of diverticulitis- prior episode was 7/2017, 1/2017, and 10/5/18; abdominal cramping, nausea, and diarrhea recurred ~12/2019 (reports different pain than diverticulitis) The problem occurs intermittently (about once a week). Duration: 1-2 days. The problem has been waxing and waning. Pain location: lower abdomen. The pain is at a severity of 2/10 (currently). The quality of the pain is cramping. The abdominal pain does not radiate. Associated symptoms include belching, diarrhea (intermittent, occurs with abdominal pain, lasts about 8 hours; resolves with imodium; then no bowel movement for 4-5 days, then diarrhea recurs), flatus, melena (black stool recently; denies pepto or iron use) and nausea (occurs when abdominal pain occurs; PAST TREATMENT: phenergan). Pertinent negatives include no anorexia, constipation, dysuria, fever, frequency, hematochezia, vomiting or weight loss. Associated symptoms comments: Recent antibiotic for dental work done in 12/2019; wife had one episode of diarrhea; denies recent hospitalization, foreign travel, or suspect food intake. Nothing aggravates the pain. Treatments tried: bentyl 20 mg qid prn- taking 1-2/day, imodium prn helps; PAST: cipro & flagyl, had taken recently augmentin for a few days for dental work, switched to doxycycline since he had problems w/diarrhea, metamucil- stopped a year ago. Prior diagnostic workup includes CT scan. His past medical history is significant for abdominal surgery, GERD (occasional; once over the past month) and irritable bowel syndrome. There is no history of Crohn's disease, pancreatitis, PUD or ulcerative colitis.     Review of Systems   Constitutional: Positive for fatigue. Negative for  appetite change, chills, fever and weight loss.        Reports he takes Excedrin 1-2 times a day   HENT: Negative for sore throat and trouble swallowing.    Respiratory: Positive for shortness of breath (occasional; denies currently). Negative for cough and choking.    Cardiovascular: Negative for chest pain.   Gastrointestinal: Positive for abdominal pain, diarrhea (intermittent, occurs with abdominal pain, lasts about 8 hours; resolves with imodium; then no bowel movement for 4-5 days, then diarrhea recurs), flatus, melena (black stool recently; denies pepto or iron use) and nausea (occurs when abdominal pain occurs; PAST TREATMENT: phenergan). Negative for anal bleeding, anorexia, blood in stool, constipation, hematochezia, rectal pain and vomiting.   Genitourinary: Negative for difficulty urinating, dysuria, flank pain, frequency and urgency.   Neurological: Positive for dizziness (occasional with leg spasms; reports seeing PCP team for this; reports lower blood pressues with DBP in the 60's; PCP team told him to hold BP medication at this time). Negative for weakness.       Past Medical History:   Diagnosis Date    ALLERGIC RHINITIS     Arthritis     Cataract     OU    Colon polyp     Diverticulitis     Diverticulosis     Hyperlipidemia     Hypertension     Irritable bowel syndrome     Lumbar disc disease      Past Surgical History:   Procedure Laterality Date    APPENDECTOMY      COLONOSCOPY  06/02/2011    [facility].    One 1 mm polyp in the sigmoid colon.  FRAGMENT OF NONNEOPLASTIC COLONIC MUCOSA.  Sigmoid diverticulosis.  Spasms c/w IBS.  repeat in 7-8 years    COLONOSCOPY  09/12/2006    Dr. Novak, in [place]    FOOT SURGERY      x 2    LUMBAR EPIDURAL INJECTION      PILONIDAL CYST DRAINAGE      s/p chalazion removal      OD    TONSILLECTOMY       Family History   Problem Relation Age of Onset    Glaucoma Father     Cataracts Father     Macular degeneration Father     Colon polyps Father      Glaucoma Paternal Uncle     Glaucoma Paternal Uncle     Cataracts Mother     Glaucoma Sister     Colon cancer Neg Hx     Crohn's disease Neg Hx     Ulcerative colitis Neg Hx      Wt Readings from Last 10 Encounters:   01/07/20 93.8 kg (206 lb 12.7 oz)   01/02/20 89.3 kg (196 lb 13.9 oz)   08/27/19 93 kg (205 lb 0.4 oz)   01/04/19 93.2 kg (205 lb 5.7 oz)   12/14/18 95 kg (209 lb 5.2 oz)   10/31/18 94.3 kg (207 lb 14.3 oz)   10/16/18 92.4 kg (203 lb 11.3 oz)   10/08/18 91.7 kg (202 lb 2.6 oz)   01/24/18 92 kg (202 lb 13.2 oz)   01/11/18 92.3 kg (203 lb 6 oz)     Lab Results   Component Value Date    WBC 8.47 01/02/2020    HGB 12.8 (L) 01/02/2020    HCT 41.0 01/02/2020    MCV 92 01/02/2020     01/02/2020     CMP  Sodium   Date Value Ref Range Status   01/02/2020 140 136 - 145 mmol/L Final     Potassium   Date Value Ref Range Status   01/02/2020 4.1 3.5 - 5.1 mmol/L Final     Chloride   Date Value Ref Range Status   01/02/2020 105 95 - 110 mmol/L Final     CO2   Date Value Ref Range Status   01/02/2020 27 23 - 29 mmol/L Final     Glucose   Date Value Ref Range Status   01/02/2020 91 70 - 110 mg/dL Final     BUN, Bld   Date Value Ref Range Status   01/02/2020 28 (H) 8 - 23 mg/dL Final     Creatinine   Date Value Ref Range Status   01/02/2020 1.1 0.5 - 1.4 mg/dL Final     Calcium   Date Value Ref Range Status   01/02/2020 9.4 8.7 - 10.5 mg/dL Final     Total Protein   Date Value Ref Range Status   08/20/2019 7.1 6.0 - 8.4 g/dL Final     Albumin   Date Value Ref Range Status   08/20/2019 4.0 3.5 - 5.2 g/dL Final     Total Bilirubin   Date Value Ref Range Status   08/20/2019 0.5 0.1 - 1.0 mg/dL Final     Comment:     For infants and newborns, interpretation of results should be based  on gestational age, weight and in agreement with clinical  observations.  Premature Infant recommended reference ranges:  Up to 24 hours.............<8.0 mg/dL  Up to 48 hours............<12.0 mg/dL  3-5  days..................<15.0 mg/dL  6-29 days.................<15.0 mg/dL       Alkaline Phosphatase   Date Value Ref Range Status   08/20/2019 94 55 - 135 U/L Final     AST   Date Value Ref Range Status   08/20/2019 18 10 - 40 U/L Final     ALT   Date Value Ref Range Status   08/20/2019 7 (L) 10 - 44 U/L Final     Anion Gap   Date Value Ref Range Status   01/02/2020 8 8 - 16 mmol/L Final     eGFR if    Date Value Ref Range Status   01/02/2020 >60.0 >60 mL/min/1.73 m^2 Final     eGFR if non    Date Value Ref Range Status   01/02/2020 >60.0 >60 mL/min/1.73 m^2 Final     Comment:     Calculation used to obtain the estimated glomerular filtration  rate (eGFR) is the CKD-EPI equation.        Lab Results   Component Value Date    TSH 2.797 08/20/2019     Reviewed prior medical records including 10/10/18 ct abdomen pelvis & endoscopy history (see surgical history).    Objective:      Physical Exam   Constitutional: He is oriented to person, place, and time. He appears well-developed and well-nourished. No distress.   HENT:   Mouth/Throat: Oropharynx is clear and moist and mucous membranes are normal. No oral lesions. No oropharyngeal exudate.   Eyes: Pupils are equal, round, and reactive to light. Conjunctivae are normal. No scleral icterus.   Pulmonary/Chest: Effort normal and breath sounds normal. No respiratory distress. He has no wheezes.   Abdominal: Soft. Normal appearance and bowel sounds are normal. He exhibits no distension, no abdominal bruit and no mass. There is no tenderness. There is no rigidity, no rebound, no guarding, no tenderness at McBurney's point and negative Cherry's sign.   Neurological: He is alert and oriented to person, place, and time.   Skin: Skin is warm and dry. No rash noted. He is not diaphoretic. No erythema. No pallor.   Non-jaundiced   Psychiatric: He has a normal mood and affect. His behavior is normal. Judgment and thought content normal.   Nursing note  and vitals reviewed.      Assessment:       1. Abdominal cramping    2. History of diverticulosis    3. Lower abdominal pain    4. Nausea    5. Intermittent diarrhea    6. Black stool    7. Dizziness    8. Fatigue, unspecified type    9. History of shortness of breath        Plan:       Abdominal cramping & Lower abdominal pain  -     CT Abdomen Pelvis With Contrast; Future; Expected date: 01/07/2020  - CONTINUE BENTYL 20 MG QID PRN AS DIRECTED  - continue with colonoscopy as scheduled for 1/24/2020    History of diverticulosis  -     CT Abdomen Pelvis With Contrast; Future; Expected date: 01/07/2020  - continue with colonoscopy as scheduled for 1/24/2020  - discussed the diagnosis of diverticulosis and diverticulitis, & to prevent diverticulitis, high fiber diet is recommended.  - Recommended daily exercise, adequate water intake (six 8-oz glasses of water daily), and high fiber diet. OTC fiber supplements are recommended if diet does not reach daily fiber goal (25 grams daily), such as Metamucil, Citrucel, or FiberCon (take as directed, separate from other oral medications by >2 hours).    Nausea  -     CT Abdomen Pelvis With Contrast; Future; Expected date: 01/07/2020  - Possible EGD pending results of testing and if symptoms persist    Intermittent diarrhea  -     CT Abdomen Pelvis With Contrast; Future; Expected date: 01/07/2020  -     Stool Exam-Ova,Cysts,Parasites; Future; Expected date: 01/07/2020  -     Fecal fat, qualitative; Future; Expected date: 01/07/2020  -     Giardia / Cryptosporidum, EIA; Future; Expected date: 01/07/2020  -     Occult blood x 1, stool; Future; Expected date: 01/07/2020  -     pH, stool; Future; Expected date: 01/07/2020  -     Rotavirus antigen, stool; Future; Expected date: 01/07/2020  -     WBC, Stool; Future; Expected date: 01/07/2020  -     Stool culture; Future; Expected date: 01/07/2020  -     Clostridium difficile EIA; Future; Expected date: 01/07/2020  -     Adenovirus  Molecular Detection, PCR, Non-Blood Stool; Future; Expected date: 01/07/2020  - recommended OTC probiotic, such as Florastor or Culturelle, as directed  - avoid lactose  - continue with colonoscopy as scheduled for 1/24/2020  - informed patient can take imodium as directed PRN but advised to take only sparingly, patient verbalized understanding    Black stool  -     Occult blood x 1, stool; Future; Expected date: 01/07/2020  - continue with colonoscopy as scheduled for 1/24/2020  - avoid/minimize use of NSAIDs- since they can cause GI upset, bleeding and/or ulcers. If NSAID must be taken, recommend take with food.  - Possible EGD pending results of testing and if symptoms persist    Dizziness, Fatigue, unspecified type, & History of shortness of breath  Recommend follow-up with Primary Care Provider for continued evaluation and management.    Follow up in about 2 weeks (around 1/21/2020), or if symptoms worsen or fail to improve.      If no improvement in symptoms or symptoms worsen, call/follow-up at clinic or go to ER.

## 2020-01-07 NOTE — PATIENT INSTRUCTIONS
Abdominal Pain    Abdominal pain is pain in the stomach or belly area. Everyone has this pain from time to time. In many cases it goes away on its own. But abdominal pain can sometimes be due to a serious problem, such as appendicitis. So its important to know when to seek help.  Causes of abdominal pain  There are many possible causes of abdominal pain. Common causes in adults include:  · Constipation, diarrhea, or gas  · Stomach acid flowing back up into the esophagus (acid reflux or heartburn)  · Severe acid reflux, called GERD (gastroesophageal reflux disease)  · A sore in the lining of the stomach or small intestine (peptic ulcer)  · Inflammation of the gallbladder, liver, or pancreas  · Gallstones or kidney stones  · Appendicitis   · Intestinal blockage   · An internal organ pushing through a muscle or other tissue (hernia)  · Urinary tract infections  · In women, menstrual cramps, fibroids, or endometriosis  · Inflammation or infection of the intestines  Diagnosing the cause of abdominal pain  Your healthcare provider will do a physical exam help find the cause of your pain. If needed, tests will be ordered. Belly pain has many possible causes. So it can be hard to find the reason for your pain. Giving details about your pain can help. Tell your provider where and when you feel the pain, and what makes it better or worse. Also let your provider know if you have other symptoms such as:  · Fever  · Tiredness  · Upset stomach (nausea)  · Vomiting  · Changes in bathroom habits  Treating abdominal pain  Some causes of pain need emergency medical treatment right away. These include appendicitis or a bowel blockage. Other problems can be treated with rest, fluids, or medicines. Your healthcare provider can give you specific instructions for treatment or self-care based on what is causing your pain.  If you have vomiting or diarrhea, sip water or other clear fluids. When you are ready to eat solid foods again,  start with small amounts of easy-to-digest, low-fat foods. These include apple sauce, toast, or crackers.   When to seek medical care  Call 911 or go to the hospital right away if you:  · Cant pass stool and are vomiting  · Are vomiting blood or have bloody diarrhea or black, tarry diarrhea  · Have chest, neck, or shoulder pain  · Feel like you might pass out  · Have pain in your shoulder blades with nausea  · Have sudden, severe belly pain  · Have new, severe pain unlike any you have felt before  · Have a belly that is rigid, hard, and tender to touch  Call your healthcare provider if you have:  · Pain for more than 5 days  · Bloating for more than 2 days  · Diarrhea for more than 5 days  · A fever of 100.4°F (38.0°C) or higher, or as directed by your provider  · Pain that gets worse  · Weight loss for no reason  · Continued lack of appetite  · Blood in your stool  How to prevent abdominal pain  Here are some tips to help prevent abdominal pain:  · Eat smaller amounts of food at one time.  · Avoid greasy, fried, or other high-fat foods.  · Avoid foods that give you gas.  · Exercise regularly.  · Drink plenty of fluids.  To help prevent GERD symptoms:  · Quit smoking.  · Reduce alcohol and certain foods that increase stomach acid.  · Avoid aspirin and over-the-counter pain and fever medicines (NSAIDS or nonsteroidal anti-inflammatory drugs), if possible  · Lose extra weight.  · Finish eating at least 2 hours before you go to bed or lie down.  · Raise the head of your bed.  Date Last Reviewed: 7/1/2016  © 7348-7098 [employer]. [address] [place] [address], [place] [address]. All rights reserved. This information is not intended as a substitute for professional medical care. Always follow your healthcare professional's instructions.          Uncertain Causes of Diarrhea (Adult)    Diarrhea is when stools are loose and watery. This can be caused by:  · Viral infections  · Bacterial infections  · Food  poisoning  · Parasites  · Irritable bowel syndrome (IBS)  · Inflammatory bowel diseases such as ulcerative colitis, Crohn's disease, and celiac disease  · Food intolerance, such as to lactose, the sugar found in milk and milk products  · Reaction to medicines like antibiotics, laxatives, cancer drugs, and antacids  Along with diarrhea, you may also have:  · Abdominal pain and cramping  · Nausea and vomiting  · Loss of bowel control  · Fever and chills  · Bloody stools  In some cases, antibiotics may help to treat diarrhea. You may have a stool sample test. This is done to see what is causing your diarrhea, and if antibiotics will help treat it. The results of a stool sample test may take up to 2 days. The healthcare provider may not give you antibiotics until he or she has the stool test results.  Diarrhea can cause dehydration. This is the loss of too much water and other fluids from the body. When this occurs, body fluid must be replaced. This can be done with oral rehydration solutions. Oral rehydration solutions are available at drugstores and grocery stores without a prescription.  Home care  Follow all instructions given by your healthcare provider. Rest at home for the next 24 hours, or until you feel better. Avoid caffeine, tobacco, and alcohol. These can make diarrhea, cramping, and pain worse.  If taking medicines:  · Dont take over-the-counter diarrhea or nausea medicines unless your healthcare provider tells you to.  · You may use acetaminophen or NSAID medicines like ibuprofen or naproxen to reduce pain and fever. Dont use these if you have chronic liver or kidney disease, or ever had a stomach ulcer or gastrointestinal bleeding. Don't use NSAID medicines if you are already taking one for another condition (like arthritis) or are on daily aspirin therapy (such as for heart disease or after a stroke). Talk with your healthcare provider first.  · If antibiotics were prescribed, be sure you take them  until they are finished. Dont stop taking them even when you feel better. Antibiotics must be taken as a full course.  To prevent the spread of illness:  · Remember that washing with soap and water and using alcohol-based  is the best way to prevent the spread of infection.  · Clean the toilet after each use.  · Wash your hands before eating.  · Wash your hands before and after preparing food. Keep in mind that people with diarrhea or vomiting should not prepare food for others.  · Wash your hands after using cutting boards, countertops, and knives that have been in contact with raw foods.  · Wash and then peel fruits and vegetables.  · Keep uncooked meats away from cooked and ready-to-eat foods.  · Use a food thermometer when cooking. Cook poultry to at least 165°F (74°C). Cook ground meat (beef, veal, pork, lamb) to at least 160°F (71°C). Cook fresh beef, veal, lamb, and pork to at least 145°F (63°C).  · Dont eat raw or undercooked eggs (poached or lyly side up), poultry, meat, or unpasteurized milk and juices.  Food and drinks  The main goal while treating vomiting or diarrhea is to prevent dehydration. This is done by taking small amounts of liquids often.  · Keep in mind that liquids are more important than food right now.  · Drink only small amounts of liquids at a time.  · Dont force yourself to eat, especially if you are having cramping, vomiting, or diarrhea. Dont eat large amounts at a time, even if you are hungry.  · If you eat, avoid fatty, greasy, spicy, or fried foods.  · Dont eat dairy foods or drink milk if you have diarrhea. These can make diarrhea worse.  During the first 24 hours you can try:  · Oral rehydration solutions. Do not use sports drinks. They have too much sugar and not enough electrolytes.  · Soft drinks without caffeine  · Ginger ale  · Water (plain or flavored)  · Decaf tea or coffee  · Clear broth, consommé, or bouillon  · Gelatin, popsicles, or frozen fruit juice  bars  The second 24 hours, if you are feeling better, you can add:  · Hot cereal, plain toast, bread, rolls, or crackers  · Plain noodles, rice, mashed potatoes, chicken noodle soup, or rice soup  · Unsweetened canned fruit (no pineapple)  · Bananas  As you recover:  · Limit fat intake to less than 15 grams per day. Dont eat margarine, butter, oils, mayonnaise, sauces, gravies, fried foods, peanut butter, meat, poultry, or fish.  · Limit fiber. Dont eat raw or cooked vegetables, fresh fruits except bananas, or bran cereals.  · Limit caffeine and chocolate.  · Limit dairy.  · Dont use spices or seasonings except salt.  · Go back to your normal diet over time, as you feel better and your symptoms improve.  · If the symptoms come back, go back to a simple diet or clear liquids.  Follow-up care  Follow up with your healthcare provider, or as advised. If a stool sample was taken or cultures were done, call the healthcare provider for the results as instructed.  Call 911  Call 911 if you have any of these symptoms:  · Trouble breathing  · Confusion  · Extreme drowsiness or trouble walking  · Loss of consciousness  · Rapid heart rate  · Chest pain  · Stiff neck  · Seizure  When to seek medical advice  Call your healthcare provider right away if any of these occur:  · Abdominal pain that gets worse  · Constant lower right abdominal pain  · Continued vomiting and inability to keep liquids down  · Diarrhea more than 5 times a day  · Blood in vomit or stool  · Dark urine or no urine for 8 hours, dry mouth and tongue, tiredness, weakness, or dizziness  · Drowsiness  · New rash  · You dont get better in 2 to 3 days  · Fever of 100.4°F (38°C) or higher that doesnt get lower with medicine  Date Last Reviewed: 1/3/2016  © 0771-5548 [employer]. [address] [place] [address], [place] [address]. All rights reserved. This information is not intended as a substitute for professional medical care. Always follow your  healthcare professional's instructions.

## 2020-01-09 ENCOUNTER — HOSPITAL ENCOUNTER (OUTPATIENT)
Dept: RADIOLOGY | Facility: HOSPITAL | Age: 70
Discharge: HOME | End: 2020-01-09
Attending: NURSE PRACTITIONER
Payer: MEDICARE

## 2020-01-09 DIAGNOSIS — R10.9 ABDOMINAL CRAMPING: ICD-10-CM

## 2020-01-09 DIAGNOSIS — R11.0 NAUSEA: ICD-10-CM

## 2020-01-09 DIAGNOSIS — R19.7 INTERMITTENT DIARRHEA: ICD-10-CM

## 2020-01-09 DIAGNOSIS — Z87.19 HISTORY OF DIVERTICULOSIS: ICD-10-CM

## 2020-01-09 DIAGNOSIS — R10.30 LOWER ABDOMINAL PAIN: ICD-10-CM

## 2020-01-09 PROCEDURE — 25500020 PHARM REV CODE 255: Mod: PO | Performed by: NURSE PRACTITIONER

## 2020-01-09 PROCEDURE — 74177 CT ABD & PELVIS W/CONTRAST: CPT | Mod: 26,,, | Performed by: RADIOLOGY

## 2020-01-09 PROCEDURE — 74177 CT ABDOMEN PELVIS WITH CONTRAST: ICD-10-PCS | Mod: 26,,, | Performed by: RADIOLOGY

## 2020-01-09 PROCEDURE — 74177 CT ABD & PELVIS W/CONTRAST: CPT | Mod: TC,PO

## 2020-01-09 RX ADMIN — IOHEXOL 100 ML: 350 INJECTION, SOLUTION INTRAVENOUS at 11:01

## 2020-01-09 RX ADMIN — IOHEXOL 1000 ML: 12 SOLUTION ORAL at 11:01

## 2020-01-10 ENCOUNTER — TELEPHONE (OUTPATIENT)
Dept: GASTROENTEROLOGY | Facility: CLINIC | Age: 70
End: 2020-01-10

## 2020-01-10 DIAGNOSIS — N40.0 PROSTATE ENLARGEMENT: ICD-10-CM

## 2020-01-10 DIAGNOSIS — R93.89 ABNORMAL FINDING ON CT SCAN: Primary | ICD-10-CM

## 2020-01-10 DIAGNOSIS — N43.3 HYDROCELE IN ADULT: ICD-10-CM

## 2020-01-10 NOTE — TELEPHONE ENCOUNTER
Please call to inform & review the results with the patient- radiology report of the ct abdomen pelvis showed no acute findings. Prior diverticulitis has resolved- no active diverticulitis noted on current scan. Recommend high fiber diet (20-30 grams of fiber daily)/OTC fiber supplements daily as directed.  It showed a calcified granuloma in the liver (calcified indicates it is old and granuloma is an area of inflammatory cells- typically from prior infection/inflammation).  Otherwise findings are unremarkable and/or unchanged; including Chronic prostate enlargement & Right scrotal hydrocele: Recommend follow-up with urology for continued evaluation and management of these findings.    Continue with previous recommendations, including colonoscopy as scheduled for 1/24/2020. If no improvement in symptoms or symptoms worsen, call/follow-up at clinic or go to ER.  Please release results to patient's mychart once you have discussed results and recommendations with patient.  Thanks,

## 2020-01-13 ENCOUNTER — LAB VISIT (OUTPATIENT)
Dept: LAB | Facility: HOSPITAL | Age: 70
End: 2020-01-13
Attending: NURSE PRACTITIONER
Payer: MEDICARE

## 2020-01-13 DIAGNOSIS — K92.1 BLACK STOOL: ICD-10-CM

## 2020-01-13 DIAGNOSIS — R19.7 INTERMITTENT DIARRHEA: ICD-10-CM

## 2020-01-13 LAB
C DIFF GDH STL QL: NEGATIVE
C DIFF TOX A+B STL QL IA: NEGATIVE
OB PNL STL: POSITIVE
WBC #/AREA STL HPF: NORMAL /[HPF]

## 2020-01-13 PROCEDURE — 89055 LEUKOCYTE ASSESSMENT FECAL: CPT

## 2020-01-13 PROCEDURE — 87324 CLOSTRIDIUM AG IA: CPT

## 2020-01-13 PROCEDURE — 87045 FECES CULTURE AEROBIC BACT: CPT

## 2020-01-13 PROCEDURE — 82272 OCCULT BLD FECES 1-3 TESTS: CPT

## 2020-01-13 PROCEDURE — 87425 ROTAVIRUS AG IA: CPT

## 2020-01-13 PROCEDURE — 87449 NOS EACH ORGANISM AG IA: CPT

## 2020-01-13 PROCEDURE — 87046 STOOL CULTR AEROBIC BACT EA: CPT | Mod: 59

## 2020-01-13 PROCEDURE — 87329 GIARDIA AG IA: CPT

## 2020-01-13 PROCEDURE — 83986 ASSAY PH BODY FLUID NOS: CPT

## 2020-01-13 PROCEDURE — 87209 SMEAR COMPLEX STAIN: CPT

## 2020-01-13 PROCEDURE — 87427 SHIGA-LIKE TOXIN AG IA: CPT

## 2020-01-13 PROCEDURE — 89125 SPECIMEN FAT STAIN: CPT

## 2020-01-14 LAB
CRYPTOSP AG STL QL IA: NEGATIVE
E COLI SXT1 STL QL IA: NEGATIVE
E COLI SXT2 STL QL IA: NEGATIVE
FAT STL SUDAN IV STN: NORMAL
G LAMBLIA AG STL QL IA: NEGATIVE
O+P STL TRI STN: NORMAL
PH STL: NORMAL [PH]
RV AG STL QL IA.RAPID: NEGATIVE

## 2020-01-16 ENCOUNTER — TELEPHONE (OUTPATIENT)
Dept: GASTROENTEROLOGY | Facility: CLINIC | Age: 70
End: 2020-01-16

## 2020-01-16 LAB — BACTERIA STL CULT: NORMAL

## 2020-01-16 NOTE — TELEPHONE ENCOUNTER
Please call to inform & review the results with the patient- stool studies showed acidic stool & occult positive for blood; otherwise, negative/normal results. Acidic stool can indicate difficulty digesting certain dietary items. Recommend to avoid lactose products.  Continue with previous recommendations, including colonoscopy as scheduled and recommend scheduling EGD to further evaluate these findings.    If no improvement in symptoms or symptoms worsen, call/follow-up at clinic or go to ER.  Please release results to patient's mychart once you have discussed results and recommendations with patient.  Thanks,

## 2020-01-20 ENCOUNTER — OFFICE VISIT (OUTPATIENT)
Dept: FAMILY MEDICINE | Facility: CLINIC | Age: 70
End: 2020-01-20
Payer: MEDICARE

## 2020-01-20 VITALS
HEIGHT: 68 IN | SYSTOLIC BLOOD PRESSURE: 112 MMHG | BODY MASS INDEX: 30.29 KG/M2 | TEMPERATURE: 98 F | WEIGHT: 199.88 LBS | HEART RATE: 77 BPM | OXYGEN SATURATION: 98 % | DIASTOLIC BLOOD PRESSURE: 70 MMHG

## 2020-01-20 DIAGNOSIS — J01.90 ACUTE NON-RECURRENT SINUSITIS, UNSPECIFIED LOCATION: Primary | ICD-10-CM

## 2020-01-20 PROCEDURE — 1126F PR PAIN SEVERITY QUANTIFIED, NO PAIN PRESENT: ICD-10-PCS | Mod: ,,, | Performed by: FAMILY MEDICINE

## 2020-01-20 PROCEDURE — 99214 PR OFFICE/OUTPT VISIT, EST, LEVL IV, 30-39 MIN: ICD-10-PCS | Mod: S$PBB,,, | Performed by: FAMILY MEDICINE

## 2020-01-20 PROCEDURE — 99214 OFFICE O/P EST MOD 30 MIN: CPT | Mod: S$PBB,,, | Performed by: FAMILY MEDICINE

## 2020-01-20 PROCEDURE — 99999 PR PBB SHADOW E&M-EST. PATIENT-LVL III: ICD-10-PCS | Mod: PBBFAC,,, | Performed by: FAMILY MEDICINE

## 2020-01-20 PROCEDURE — 1159F PR MEDICATION LIST DOCUMENTED IN MEDICAL RECORD: ICD-10-PCS | Mod: ,,, | Performed by: FAMILY MEDICINE

## 2020-01-20 PROCEDURE — 1126F AMNT PAIN NOTED NONE PRSNT: CPT | Mod: ,,, | Performed by: FAMILY MEDICINE

## 2020-01-20 PROCEDURE — 99213 OFFICE O/P EST LOW 20 MIN: CPT | Mod: PBBFAC,PN | Performed by: FAMILY MEDICINE

## 2020-01-20 PROCEDURE — 1159F MED LIST DOCD IN RCRD: CPT | Mod: ,,, | Performed by: FAMILY MEDICINE

## 2020-01-20 PROCEDURE — 99999 PR PBB SHADOW E&M-EST. PATIENT-LVL III: CPT | Mod: PBBFAC,,, | Performed by: FAMILY MEDICINE

## 2020-01-20 RX ORDER — IPRATROPIUM BROMIDE 21 UG/1
2 SPRAY, METERED NASAL 3 TIMES DAILY
Qty: 30 ML | Refills: 1 | Status: SHIPPED | OUTPATIENT
Start: 2020-01-20 | End: 2020-01-28

## 2020-01-20 RX ORDER — BENZONATATE 200 MG/1
200 CAPSULE ORAL 3 TIMES DAILY PRN
Qty: 30 CAPSULE | Refills: 2 | Status: SHIPPED | OUTPATIENT
Start: 2020-01-20 | End: 2020-01-28

## 2020-01-20 RX ORDER — DOXYCYCLINE 100 MG/1
100 CAPSULE ORAL EVERY 12 HOURS
Qty: 14 CAPSULE | Refills: 0 | Status: SHIPPED | OUTPATIENT
Start: 2020-01-20 | End: 2020-02-03

## 2020-01-20 NOTE — PROGRESS NOTES
THIS DOCUMENT WAS MADE IN PART WITH VOICE RECOGNITION SOFTWARE.  OCCASIONALLY THIS SOFTWARE WILL MISINTERPRET WORDS OR PHRASES.    Assessment and Plan:    1. Acute non-recurrent sinusitis, unspecified location  - doxycycline (MONODOX) 100 MG capsule; Take 1 capsule (100 mg total) by mouth every 12 (twelve) hours.  Dispense: 14 capsule; Refill: 0  - benzonatate (TESSALON) 200 MG capsule; Take 1 capsule (200 mg total) by mouth 3 (three) times daily as needed.  Dispense: 30 capsule; Refill: 2  - ipratropium (ATROVENT) 0.03 % nasal spray; 2 sprays by Nasal route 3 (three) times daily.  Dispense: 30 mL; Refill: 1        ______________________________________________________________________  Subjective:    Chief Complaint:  Chief Complaint   Patient presents with    Cough     10 days         HPI:  Vince is a 69 y.o. year old with past medical history of allergic rhinitis    Cough  Duration 10 days  No history of smoking  Denies fever   Had some wheezing and some SOB   Has upcoming appt on Friday for C scope.   Has been using mucinex D and Excedrin         Past Medical History:  Past Medical History:   Diagnosis Date    ALLERGIC RHINITIS     Arthritis     Cataract     OU    Colon polyp     Diverticulitis     Diverticulosis     Hyperlipidemia     Hypertension     Irritable bowel syndrome     Lumbar disc disease        Past Surgical History:  Past Surgical History:   Procedure Laterality Date    APPENDECTOMY      COLONOSCOPY  06/02/2011    [facility].    One 1 mm polyp in the sigmoid colon.  FRAGMENT OF NONNEOPLASTIC COLONIC MUCOSA.  Sigmoid diverticulosis.  Spasms c/w IBS.  repeat in 7-8 years    COLONOSCOPY  09/12/2006    Dr. Novak, [facility]    FOOT SURGERY      x 2    LUMBAR EPIDURAL INJECTION      PILONIDAL CYST DRAINAGE      s/p chalazion removal      OD    TONSILLECTOMY         Family History:  Family History   Problem Relation Age of Onset    Glaucoma Father     Cataracts Father     Macular  degeneration Father     Colon polyps Father     Glaucoma Paternal Uncle     Glaucoma Paternal Uncle     Cataracts Mother     Glaucoma Sister     Colon cancer Neg Hx     Crohn's disease Neg Hx     Ulcerative colitis Neg Hx        Social History:  Social History     Socioeconomic History    Marital status:      Spouse name: Not on file    Number of children: Not on file    Years of education: Not on file    Highest education level: Not on file   Occupational History    Not on file   Social Needs    Financial resource strain: Not on file    Food insecurity:     Worry: Not on file     Inability: Not on file    Transportation needs:     Medical: Not on file     Non-medical: Not on file   Tobacco Use    Smoking status: Never Smoker    Smokeless tobacco: Never Used   Substance and Sexual Activity    Alcohol use: No     Alcohol/week: 0.0 standard drinks    Drug use: No    Sexual activity: Yes   Lifestyle    Physical activity:     Days per week: Not on file     Minutes per session: Not on file    Stress: Not on file   Relationships    Social connections:     Talks on phone: Not on file     Gets together: Not on file     Attends [employer]oration[employer] service: Not on file     Active member of club or organization: Not on file     Attends meetings of clubs or organizations: Not on file     Relationship status: Not on file   Other Topics Concern    Not on file   Social History Narrative    Not on file       Medications:  Current Outpatient Medications on File Prior to Visit   Medication Sig Dispense Refill    aspirin-acetaminophen-caffeine 250-250-65 mg (EXCEDRIN MIGRAINE) 250-250-65 mg per tablet Take 1 tablet by mouth 2 (two) times daily as needed for Pain.      atorvastatin (LIPITOR) 10 MG tablet TAKE 1 TABLET BY MOUTH EVERY DAY IN THE EVENING 90 tablet 1    benazepril (LOTENSIN) 20 MG tablet TAKE 1 TABLET BY MOUTH EVERY DAY 30 tablet 1    dicyclomine (BENTYL) 20 mg tablet TAKE 1 TABLET BY MOUTH  "BEFORE MEALS & AT BEDTIME AS NEEDED 240 tablet 5    escitalopram oxalate (LEXAPRO) 20 MG tablet TAKE 1 TABLET BY MOUTH EVERY DAY 90 tablet 0    tamsulosin (FLOMAX) 0.4 mg Cap Take 1 capsule (0.4 mg total) by mouth 2 (two) times daily. 60 capsule 10    zaleplon (SONATA) 10 MG capsule Take 1 capsule (10 mg total) by mouth nightly. 90 capsule 1    azelastine (ASTELIN) 137 mcg (0.1 %) nasal spray USE 2 SPRAYS IN EACH NOSTRIL TWICE A DAY (Patient not taking: Reported on 1/7/2020) 30 mL 2    loperamide (IMODIUM) 2 mg capsule Take 2 mg by mouth 4 (four) times daily as needed for Diarrhea.       No current facility-administered medications on file prior to visit.        Allergies:  No known drug allergies    Immunizations:  Immunization History   Administered Date(s) Administered    Influenza - High Dose - PF (65 years and older) 11/30/2015, 10/05/2016, 10/20/2017, 09/06/2019    Pneumococcal Conjugate - 13 Valent 09/06/2019    Pneumococcal Polysaccharide - 23 Valent 11/30/2015    Tdap 09/22/2015       Review of Systems:  Review of Systems   Constitutional: Positive for fatigue. Negative for fever.   HENT: Positive for congestion, postnasal drip and rhinorrhea.    Respiratory: Positive for cough and wheezing.    All other systems reviewed and are negative.      Objective:    Vitals:  Vitals:    01/20/20 0703   Weight: 90.6 kg (199 lb 13.6 oz)   Height: 5' 8" (1.727 m)   PainSc: 0-No pain       Physical Exam   Constitutional: No distress.   HENT:   Head: Normocephalic and atraumatic.   Nose: Mucosal edema and rhinorrhea present.   Mouth/Throat:       Eyes: Pupils are equal, round, and reactive to light. EOM are normal.   Neck: Neck supple.   Cardiovascular: Normal rate and regular rhythm. Exam reveals no friction rub.   No murmur heard.  Pulmonary/Chest: Effort normal and breath sounds normal.   Abdominal: Soft. Bowel sounds are normal. He exhibits no distension. There is no tenderness.   Skin: Skin is warm and dry. " No rash noted.   Psychiatric: He has a normal mood and affect. His behavior is normal.       Data:  No previous labs, imaging, or notes available.        Jose Carroll MD  Family Medicine

## 2020-01-22 ENCOUNTER — TELEPHONE (OUTPATIENT)
Dept: GASTROENTEROLOGY | Facility: CLINIC | Age: 70
End: 2020-01-22

## 2020-01-22 NOTE — TELEPHONE ENCOUNTER
R/s colonoscopy, mailed confirmation and instructions to address on file. Pt verbalized understanding.

## 2020-01-22 NOTE — TELEPHONE ENCOUNTER
----- Message from Coty Shukla sent at 1/22/2020  8:57 AM CST -----  Contact: Patient  Type: Needs Medical Advice    Who Called:  Patient  Best Call Back Number:[phone]  Additional Information: Patient is needing to speak with someone in regards to him being sick and if it's ok to still have his procedure 1/24/20.Please call back and advise.

## 2020-01-28 ENCOUNTER — OFFICE VISIT (OUTPATIENT)
Dept: FAMILY MEDICINE | Facility: CLINIC | Age: 70
End: 2020-01-28
Payer: MEDICARE

## 2020-01-28 ENCOUNTER — HOSPITAL ENCOUNTER (OUTPATIENT)
Dept: RADIOLOGY | Facility: HOSPITAL | Age: 70
Discharge: HOME | End: 2020-01-28
Attending: FAMILY MEDICINE
Payer: MEDICARE

## 2020-01-28 VITALS
SYSTOLIC BLOOD PRESSURE: 124 MMHG | BODY MASS INDEX: 30.54 KG/M2 | WEIGHT: 201.5 LBS | TEMPERATURE: 98 F | HEART RATE: 82 BPM | OXYGEN SATURATION: 98 % | DIASTOLIC BLOOD PRESSURE: 68 MMHG | HEIGHT: 68 IN

## 2020-01-28 DIAGNOSIS — R05.9 COUGH: ICD-10-CM

## 2020-01-28 DIAGNOSIS — R05.9 COUGH: Primary | ICD-10-CM

## 2020-01-28 PROCEDURE — 1159F MED LIST DOCD IN RCRD: CPT | Mod: ,,, | Performed by: FAMILY MEDICINE

## 2020-01-28 PROCEDURE — 94640 AIRWAY INHALATION TREATMENT: CPT | Mod: PBBFAC,PN

## 2020-01-28 PROCEDURE — 99999 PR PBB SHADOW E&M-EST. PATIENT-LVL III: ICD-10-PCS | Mod: PBBFAC,,, | Performed by: FAMILY MEDICINE

## 2020-01-28 PROCEDURE — 99999 PR PBB SHADOW E&M-EST. PATIENT-LVL III: CPT | Mod: PBBFAC,,, | Performed by: FAMILY MEDICINE

## 2020-01-28 PROCEDURE — 1126F PR PAIN SEVERITY QUANTIFIED, NO PAIN PRESENT: ICD-10-PCS | Mod: ,,, | Performed by: FAMILY MEDICINE

## 2020-01-28 PROCEDURE — 71046 X-RAY EXAM CHEST 2 VIEWS: CPT | Mod: TC,PN

## 2020-01-28 PROCEDURE — 1159F PR MEDICATION LIST DOCUMENTED IN MEDICAL RECORD: ICD-10-PCS | Mod: ,,, | Performed by: FAMILY MEDICINE

## 2020-01-28 PROCEDURE — 99214 OFFICE O/P EST MOD 30 MIN: CPT | Mod: S$PBB,,, | Performed by: FAMILY MEDICINE

## 2020-01-28 PROCEDURE — 71046 XR CHEST PA AND LATERAL: ICD-10-PCS | Mod: 26,,, | Performed by: RADIOLOGY

## 2020-01-28 PROCEDURE — 71046 X-RAY EXAM CHEST 2 VIEWS: CPT | Mod: 26,,, | Performed by: RADIOLOGY

## 2020-01-28 PROCEDURE — 99213 OFFICE O/P EST LOW 20 MIN: CPT | Mod: PBBFAC,PN,25 | Performed by: FAMILY MEDICINE

## 2020-01-28 PROCEDURE — 99214 PR OFFICE/OUTPT VISIT, EST, LEVL IV, 30-39 MIN: ICD-10-PCS | Mod: S$PBB,,, | Performed by: FAMILY MEDICINE

## 2020-01-28 PROCEDURE — 1126F AMNT PAIN NOTED NONE PRSNT: CPT | Mod: ,,, | Performed by: FAMILY MEDICINE

## 2020-01-28 RX ORDER — ALBUTEROL SULFATE 90 UG/1
2 AEROSOL, METERED RESPIRATORY (INHALATION) EVERY 6 HOURS PRN
Qty: 18 G | Refills: 2 | Status: SHIPPED | OUTPATIENT
Start: 2020-01-28 | End: 2020-05-12

## 2020-01-28 RX ORDER — IPRATROPIUM BROMIDE AND ALBUTEROL SULFATE 2.5; .5 MG/3ML; MG/3ML
3 SOLUTION RESPIRATORY (INHALATION)
Status: COMPLETED | OUTPATIENT
Start: 2020-01-28 | End: 2020-01-28

## 2020-01-28 RX ORDER — PROMETHAZINE HYDROCHLORIDE AND CODEINE PHOSPHATE 6.25; 1 MG/5ML; MG/5ML
5 SOLUTION ORAL EVERY 8 HOURS PRN
Qty: 240 ML | Refills: 0 | Status: SHIPPED | OUTPATIENT
Start: 2020-01-28 | End: 2020-02-03 | Stop reason: SDUPTHER

## 2020-01-28 RX ORDER — METHYLPREDNISOLONE 4 MG/1
TABLET ORAL
Qty: 1 PACKAGE | Refills: 0 | Status: SHIPPED | OUTPATIENT
Start: 2020-01-28 | End: 2020-02-03 | Stop reason: ALTCHOICE

## 2020-01-28 RX ADMIN — IPRATROPIUM BROMIDE AND ALBUTEROL SULFATE 3 ML: .5; 2.5 SOLUTION RESPIRATORY (INHALATION) at 09:01

## 2020-01-28 NOTE — PROGRESS NOTES
THIS DOCUMENT WAS MADE IN PART WITH VOICE RECOGNITION SOFTWARE.  OCCASIONALLY THIS SOFTWARE WILL MISINTERPRET WORDS OR PHRASES.    Assessment and Plan:    1. Cough  - X-Ray Chest PA And Lateral; Future  - methylPREDNISolone (MEDROL DOSEPACK) 4 mg tablet; use as directed  Dispense: 1 Package; Refill: 0  - albuterol-ipratropium 2.5 mg-0.5 mg/3 mL nebulizer solution 3 mL  - promethazine-codeine 6.25-10 mg/5 ml (PHENERGAN WITH CODEINE) 6.25-10 mg/5 mL syrup; Take 5 mLs by mouth every 8 (eight) hours as needed for Cough.  Dispense: 240 mL; Refill: 0        ______________________________________________________________________  Subjective:    Chief Complaint:  Chief Complaint   Patient presents with    Cough        HPI:  Vince is a 69 y.o. year old     Upper respiratory complaint/cough  Treated with doxycycline, benzonatate, atrovent  Symptoms improved initially.   Had some SOB.       Past Medical History:  Past Medical History:   Diagnosis Date    ALLERGIC RHINITIS     Arthritis     Cataract     OU    Colon polyp     Diverticulitis     Diverticulosis     Hyperlipidemia     Hypertension     Irritable bowel syndrome     Lumbar disc disease        Past Surgical History:  Past Surgical History:   Procedure Laterality Date    APPENDECTOMY      COLONOSCOPY  06/02/2011    [facility].    One 1 mm polyp in the sigmoid colon.  FRAGMENT OF NONNEOPLASTIC COLONIC MUCOSA.  Sigmoid diverticulosis.  Spasms c/w IBS.  repeat in 7-8 years    COLONOSCOPY  09/12/2006    Dr. [place]ak[place]    FOOT SURGERY      x 2    LUMBAR EPIDURAL INJECTION      PILONIDAL CYST DRAINAGE      s/p chalazion removal      OD    TONSILLECTOMY         Family History:  Family History   Problem Relation Age of Onset    Glaucoma Father     Cataracts Father     Macular degeneration Father     Colon polyps Father     Glaucoma Paternal Uncle     Glaucoma Paternal Uncle     Cataracts Mother     Glaucoma Sister     Colon cancer Neg Hx     Crohn's  disease Neg Hx     Ulcerative colitis Neg Hx        Social History:  Social History     Socioeconomic History    Marital status:      Spouse name: Not on file    Number of children: Not on file    Years of education: Not on file    Highest education level: Not on file   Occupational History    Not on file   Social Needs    Financial resource strain: Not on file    Food insecurity:     Worry: Not on file     Inability: Not on file    Transportation needs:     Medical: Not on file     Non-medical: Not on file   Tobacco Use    Smoking status: Never Smoker    Smokeless tobacco: Never Used   Substance and Sexual Activity    Alcohol use: No     Alcohol/week: 0.0 standard drinks    Drug use: No    Sexual activity: Yes   Lifestyle    Physical activity:     Days per week: Not on file     Minutes per session: Not on file    Stress: Not on file   Relationships    Social connections:     Talks on phone: Not on file     Gets together: Not on file     Attends Yazidi service: Not on file     Active member of club or organization: Not on file     Attends meetings of clubs or organizations: Not on file     Relationship status: Not on file   Other Topics Concern    Not on file   Social History Narrative    Not on file       Medications:  Current Outpatient Medications on File Prior to Visit   Medication Sig Dispense Refill    aspirin-acetaminophen-caffeine 250-250-65 mg (EXCEDRIN MIGRAINE) 250-250-65 mg per tablet Take 1 tablet by mouth 2 (two) times daily as needed for Pain.      atorvastatin (LIPITOR) 10 MG tablet TAKE 1 TABLET BY MOUTH EVERY DAY IN THE EVENING 90 tablet 1    azelastine (ASTELIN) 137 mcg (0.1 %) nasal spray USE 2 SPRAYS IN EACH NOSTRIL TWICE A DAY 30 mL 2    benazepril (LOTENSIN) 20 MG tablet TAKE 1 TABLET BY MOUTH EVERY DAY 30 tablet 1    dicyclomine (BENTYL) 20 mg tablet TAKE 1 TABLET BY MOUTH BEFORE MEALS & AT BEDTIME AS NEEDED 240 tablet 5    doxycycline (MONODOX) 100 MG  "capsule Take 1 capsule (100 mg total) by mouth every 12 (twelve) hours. 14 capsule 0    escitalopram oxalate (LEXAPRO) 20 MG tablet TAKE 1 TABLET BY MOUTH EVERY DAY 90 tablet 0    loperamide (IMODIUM) 2 mg capsule Take 2 mg by mouth 4 (four) times daily as needed for Diarrhea.      tamsulosin (FLOMAX) 0.4 mg Cap Take 1 capsule (0.4 mg total) by mouth 2 (two) times daily. 60 capsule 10    zaleplon (SONATA) 10 MG capsule Take 1 capsule (10 mg total) by mouth nightly. 90 capsule 1    [DISCONTINUED] benzonatate (TESSALON) 200 MG capsule Take 1 capsule (200 mg total) by mouth 3 (three) times daily as needed. 30 capsule 2    [DISCONTINUED] ipratropium (ATROVENT) 0.03 % nasal spray 2 sprays by Nasal route 3 (three) times daily. 30 mL 1     No current facility-administered medications on file prior to visit.        Allergies:  No known drug allergies    Immunizations:  Immunization History   Administered Date(s) Administered    Influenza - High Dose - PF (65 years and older) 11/30/2015, 10/05/2016, 10/20/2017, 09/06/2019    Pneumococcal Conjugate - 13 Valent 09/06/2019    Pneumococcal Polysaccharide - 23 Valent 11/30/2015    Tdap 09/22/2015       Review of Systems:  Review of Systems   Respiratory: Positive for cough.    All other systems reviewed and are negative.      Objective:    Vitals:  Vitals:    01/28/20 0857   BP: 124/68   Pulse: 82   Temp: 97.6 °F (36.4 °C)   TempSrc: Oral   SpO2: 98%   Weight: 91.4 kg (201 lb 8 oz)   Height: 5' 8" (1.727 m)   PainSc: 0-No pain       Physical Exam   Constitutional: He appears well-developed.   HENT:   Head: Normocephalic.   Nose: Mucosal edema and rhinorrhea present.   Mouth/Throat: Posterior oropharyngeal erythema present. No oropharyngeal exudate.   Eyes: EOM are normal.   Neck: Normal range of motion. Neck supple.   Cardiovascular: Normal rate and regular rhythm.   Pulmonary/Chest: Effort normal and breath sounds normal.   Skin: Skin is warm. No rash noted. "       Data:  No previous labs, imaging, or notes available.        Jose Carroll MD  [facility]

## 2020-01-31 ENCOUNTER — TELEPHONE (OUTPATIENT)
Dept: FAMILY MEDICINE | Facility: CLINIC | Age: 70
End: 2020-01-31

## 2020-01-31 NOTE — TELEPHONE ENCOUNTER
----- Message from Clay Teresa sent at 1/31/2020  1:53 PM CST -----  Contact: self   Placed call to pod, patient miss call from your office please call back at [phone] ([email or web address])     Case number [id number]

## 2020-02-03 ENCOUNTER — OFFICE VISIT (OUTPATIENT)
Dept: FAMILY MEDICINE | Facility: CLINIC | Age: 70
End: 2020-02-03
Payer: MEDICARE

## 2020-02-03 VITALS
BODY MASS INDEX: 30.54 KG/M2 | TEMPERATURE: 98 F | WEIGHT: 201.5 LBS | DIASTOLIC BLOOD PRESSURE: 56 MMHG | SYSTOLIC BLOOD PRESSURE: 102 MMHG | HEIGHT: 68 IN | HEART RATE: 96 BPM | RESPIRATION RATE: 18 BRPM

## 2020-02-03 DIAGNOSIS — R05.9 COUGH: ICD-10-CM

## 2020-02-03 DIAGNOSIS — J40 BRONCHITIS: Primary | ICD-10-CM

## 2020-02-03 PROCEDURE — 99214 OFFICE O/P EST MOD 30 MIN: CPT | Mod: S$PBB,,, | Performed by: FAMILY MEDICINE

## 2020-02-03 PROCEDURE — 99213 OFFICE O/P EST LOW 20 MIN: CPT | Mod: PBBFAC,PN,25 | Performed by: FAMILY MEDICINE

## 2020-02-03 PROCEDURE — 94640 AIRWAY INHALATION TREATMENT: CPT | Mod: PBBFAC,PN

## 2020-02-03 PROCEDURE — 99999 PR PBB SHADOW E&M-EST. PATIENT-LVL III: ICD-10-PCS | Mod: PBBFAC,,, | Performed by: FAMILY MEDICINE

## 2020-02-03 PROCEDURE — 99214 PR OFFICE/OUTPT VISIT, EST, LEVL IV, 30-39 MIN: ICD-10-PCS | Mod: S$PBB,,, | Performed by: FAMILY MEDICINE

## 2020-02-03 PROCEDURE — 96372 THER/PROPH/DIAG INJ SC/IM: CPT | Mod: PBBFAC,59,PN

## 2020-02-03 PROCEDURE — 99999 PR PBB SHADOW E&M-EST. PATIENT-LVL III: CPT | Mod: PBBFAC,,, | Performed by: FAMILY MEDICINE

## 2020-02-03 RX ORDER — BETAMETHASONE SODIUM PHOSPHATE AND BETAMETHASONE ACETATE 3; 3 MG/ML; MG/ML
6 INJECTION, SUSPENSION INTRA-ARTICULAR; INTRALESIONAL; INTRAMUSCULAR; SOFT TISSUE
Status: COMPLETED | OUTPATIENT
Start: 2020-02-03 | End: 2020-02-03

## 2020-02-03 RX ORDER — ALBUTEROL SULFATE 1.25 MG/3ML
1.25 SOLUTION RESPIRATORY (INHALATION)
Status: DISCONTINUED | OUTPATIENT
Start: 2020-02-03 | End: 2020-02-03

## 2020-02-03 RX ORDER — IPRATROPIUM BROMIDE AND ALBUTEROL SULFATE 2.5; .5 MG/3ML; MG/3ML
3 SOLUTION RESPIRATORY (INHALATION)
Status: COMPLETED | OUTPATIENT
Start: 2020-02-03 | End: 2020-02-03

## 2020-02-03 RX ORDER — PROMETHAZINE HYDROCHLORIDE AND CODEINE PHOSPHATE 6.25; 1 MG/5ML; MG/5ML
5 SOLUTION ORAL EVERY 8 HOURS PRN
Qty: 240 ML | Refills: 0 | Status: SHIPPED | OUTPATIENT
Start: 2020-02-03 | End: 2020-02-07 | Stop reason: SDUPTHER

## 2020-02-03 RX ORDER — ZALEPLON 10 MG/1
10 CAPSULE ORAL NIGHTLY
Qty: 90 CAPSULE | Refills: 1 | Status: SHIPPED | OUTPATIENT
Start: 2020-02-03 | End: 2020-07-24 | Stop reason: SDUPTHER

## 2020-02-03 RX ADMIN — IPRATROPIUM BROMIDE AND ALBUTEROL SULFATE 3 ML: 2.5; .5 SOLUTION RESPIRATORY (INHALATION) at 11:02

## 2020-02-03 RX ADMIN — BETAMETHASONE ACETATE AND BETAMETHASONE SODIUM PHOSPHATE 6 MG: 3; 3 INJECTION, SUSPENSION INTRA-ARTICULAR; INTRALESIONAL; INTRAMUSCULAR; SOFT TISSUE at 11:02

## 2020-02-03 NOTE — PROGRESS NOTES
THIS DOCUMENT WAS MADE IN PART WITH VOICE RECOGNITION SOFTWARE.  OCCASIONALLY THIS SOFTWARE WILL MISINTERPRET WORDS OR PHRASES.      Vince Chadwick [facility]  1950    Vince was seen today for follow-up.    Diagnoses and all orders for this visit:    Bronchitis  -     Discontinue: albuterol nebulizer solution 1.25 mg  -     betamethasone acetate-betamethasone sodium phosphate injection 6 mg  -     albuterol-ipratropium 2.5 mg-0.5 mg/3 mL nebulizer solution 3 mL  Prescription for a nebulizer and DuoNeb to use at home for the next 1-2 weeks.  No additional antibiotics at this time.  I did review the x-ray personally.  Lungs are clear although there is some flattening of the diaphragms suggesting obstructive signs.    Cough  -     promethazine-codeine 6.25-10 mg/5 ml (PHENERGAN WITH CODEINE) 6.25-10 mg/5 mL syrup; Take 5 mLs by mouth every 8 (eight) hours as needed for Cough.    Other orders  -     zaleplon (SONATA) 10 MG capsule; Take 1 capsule (10 mg total) by mouth nightly.     He will not resume the some not until the cough is better and he is off the cough syrup.  He will let me know if he is not improving later this week, and if any worsening will notify us right away.    Subjective     Chief Complaint   Patient presents with    Follow-up     hypertension        Cough   This is a recurrent problem. The current episode started 1 to 4 weeks ago. The problem has been gradually worsening. The problem occurs every few minutes. The cough is non-productive and productive of sputum. Associated symptoms include nasal congestion, postnasal drip, shortness of breath and wheezing. Nothing aggravates the symptoms. He has tried a beta-agonist inhaler (codeine cough syrup, Medrol pack and doxycycline perhaps mild help) for the symptoms. His past medical history is significant for pneumonia. There is no history of asthma, bronchiectasis, bronchitis, COPD, emphysema or environmental allergies.         HPI elements addressed  above in the assessment and plan including problems, diagnosis, stability/instability,  improving/worsening, and chronicity will not be duplicated in this section. Any important additional HPI topics will be discussed here if needed.    Active Ambulatory Problems     Diagnosis Date Noted    BPH (benign prostatic hypertrophy) 09/05/2012    Anxiety 09/05/2012    Hypertension     Hyperlipidemia     Lumbar disc disease     ADD (attention deficit disorder) 05/03/2013    Insomnia 04/16/2015    Lumbar stenosis 05/31/2016    DDD (degenerative disc disease), lumbar 05/31/2016    Lumbar radicular pain 05/31/2016    Lumbar facet arthropathy 05/31/2016    Lumbar radiculopathy 06/07/2016     Resolved Ambulatory Problems     Diagnosis Date Noted    No Resolved Ambulatory Problems     Past Medical History:   Diagnosis Date    ALLERGIC RHINITIS     Arthritis     Cataract     Colon polyp     Diverticulitis     Diverticulosis     Irritable bowel syndrome          Review of Systems   Constitutional: Positive for activity change. Negative for unexpected weight change.   HENT: Positive for congestion and postnasal drip.    Respiratory: Positive for cough, chest tightness, shortness of breath and wheezing.    Gastrointestinal:        Recent GI symptoms have improved, had to postpone colonoscopy because of respiratory infection this has been rescheduled for March   Genitourinary: Negative.    Allergic/Immunologic: Negative for environmental allergies.   Psychiatric/Behavioral: Positive for sleep disturbance.       Objective     Physical Exam   Constitutional: He is oriented to person, place, and time. He appears well-developed and well-nourished. No distress.   HENT:   Head: Normocephalic and atraumatic. Head is without right periorbital erythema and without left periorbital erythema.   Right Ear: Tympanic membrane, external ear and ear canal normal.   Left Ear: Tympanic membrane, external ear and ear canal normal.  "  Nose: Mucosal edema present. No epistaxis.   Mouth/Throat: Oropharynx is clear and moist. No oropharyngeal exudate, posterior oropharyngeal edema, posterior oropharyngeal erythema or tonsillar abscesses.   Eyes: Pupils are equal, round, and reactive to light. Conjunctivae and EOM are normal. Right eye exhibits no discharge. Left eye exhibits no discharge. No scleral icterus.   Neck: Normal range of motion. Neck supple. No thyromegaly present.   Cardiovascular: Normal rate, regular rhythm and normal heart sounds.   No murmur heard.  Pulmonary/Chest: Effort normal. No respiratory distress.   There is mild wheezing and diminished breath sounds bilaterally, still sounds very tight   Lymphadenopathy:     He has no cervical adenopathy.   Neurological: He is alert and oriented to person, place, and time.   Skin: No rash noted. He is not diaphoretic.   Psychiatric: He has a normal mood and affect. His behavior is normal.   Vitals reviewed.    Vitals:    02/03/20 1111   BP: (!) 102/56   BP Location: Left arm   Patient Position: Sitting   BP Method: Medium (Manual)   Pulse: 96   Resp: 18   Temp: 97.8 °F (36.6 °C)   TempSrc: Oral   Weight: 91.4 kg (201 lb 8 oz)   Height: 5' 8" (1.727 m)       MOST RECENT LABS IN OUR ELECTRONIC MEDICAL RECORD:     Results for orders placed or performed in visit on 01/13/20   Stool culture   Result Value Ref Range    Stool Culture       No Salmonella,Shigella,Vibrio,Campylobacter,Yersinia isolated.   Clostridium difficile EIA   Result Value Ref Range    C. diff Antigen Negative Negative    C difficile Toxins A+B, EIA Negative Negative   E. coli 0157 antigen   Result Value Ref Range    Shiga Toxin 1 E.coli Negative     Shiga Toxin 2 E.coli Negative    Stool Exam-Ova,Cysts,Parasites   Result Value Ref Range    Stool Exam-Ova,Cysts,Parasites No ova or parasites seen    Fecal fat, qualitative   Result Value Ref Range    Fat Stain, Stool Normal Fats    Giardia / Cryptosporidum, EIA   Result Value " Ref Range    Giardia Antigen - EIA Negative Negative    Cryptosporidium Antigen Negative Negative   Occult blood x 1, stool   Result Value Ref Range    Occult Blood Positive (A) Negative   pH, stool   Result Value Ref Range    pH, Stool Acidic    Rotavirus antigen, stool   Result Value Ref Range    Rotavirus Negative Negative   WBC, Stool   Result Value Ref Range    Stool WBC No neutrophils seen No neutrophils seen

## 2020-02-06 ENCOUNTER — TELEPHONE (OUTPATIENT)
Dept: FAMILY MEDICINE | Facility: CLINIC | Age: 70
End: 2020-02-06

## 2020-02-06 NOTE — TELEPHONE ENCOUNTER
If the nebulized medication is causing dizziness, he could try doing just half dose, not completing the full thing and do this every 6 hr or so.    If he feels that he is doing worse than I am concerned I would like to check him again.  My morning tomorrow is full but you can schedule him in the late afternoon and he can try to come in shortly after lunch.

## 2020-02-06 NOTE — TELEPHONE ENCOUNTER
LOV 2/3/20. Patient states that nebulizer and medication 2x daily because it is making him dizzy. States that he is feeling worse. C/O cough, congestion and denies fever. Please advise. Asking if he needs to be rechecked or something called in.

## 2020-02-06 NOTE — TELEPHONE ENCOUNTER
----- Message from Lore Kirkland sent at 2/6/2020  1:49 PM CST -----  Type: Needs Medical Advice    Who Called:  Patient  Symptoms (please be specific):  Bronchitis is getting worse, nebulizer med is making him feel worse  How long has patient had these symptoms:  Over a week  Pharmacy name and phone #:    [id number] - [place] - [address]  [place] [address]  Phone: [phone] Fax: [phone]    Best Call Back Number: [phone]  Additional Information: Please call to advise

## 2020-02-07 ENCOUNTER — HOSPITAL ENCOUNTER (OUTPATIENT)
Dept: RADIOLOGY | Facility: HOSPITAL | Age: 70
Discharge: HOME | End: 2020-02-07
Attending: FAMILY MEDICINE
Payer: MEDICARE

## 2020-02-07 ENCOUNTER — OFFICE VISIT (OUTPATIENT)
Dept: FAMILY MEDICINE | Facility: CLINIC | Age: 70
End: 2020-02-07
Payer: MEDICARE

## 2020-02-07 VITALS
OXYGEN SATURATION: 98 % | RESPIRATION RATE: 18 BRPM | TEMPERATURE: 99 F | WEIGHT: 200.38 LBS | HEIGHT: 68 IN | SYSTOLIC BLOOD PRESSURE: 120 MMHG | DIASTOLIC BLOOD PRESSURE: 68 MMHG | HEART RATE: 102 BPM | BODY MASS INDEX: 30.37 KG/M2

## 2020-02-07 DIAGNOSIS — R05.9 COUGH: Primary | ICD-10-CM

## 2020-02-07 DIAGNOSIS — J32.9 SINUSITIS, UNSPECIFIED CHRONICITY, UNSPECIFIED LOCATION: ICD-10-CM

## 2020-02-07 DIAGNOSIS — R05.9 COUGH: ICD-10-CM

## 2020-02-07 PROCEDURE — 71046 X-RAY EXAM CHEST 2 VIEWS: CPT | Mod: TC,PN

## 2020-02-07 PROCEDURE — 99999 PR PBB SHADOW E&M-EST. PATIENT-LVL III: CPT | Mod: PBBFAC,,, | Performed by: FAMILY MEDICINE

## 2020-02-07 PROCEDURE — 71046 XR CHEST PA AND LATERAL: ICD-10-PCS | Mod: 26,,, | Performed by: RADIOLOGY

## 2020-02-07 PROCEDURE — 99213 OFFICE O/P EST LOW 20 MIN: CPT | Mod: PBBFAC,25,PN | Performed by: FAMILY MEDICINE

## 2020-02-07 PROCEDURE — 99214 PR OFFICE/OUTPT VISIT, EST, LEVL IV, 30-39 MIN: ICD-10-PCS | Mod: S$PBB,,, | Performed by: FAMILY MEDICINE

## 2020-02-07 PROCEDURE — 99999 PR PBB SHADOW E&M-EST. PATIENT-LVL III: ICD-10-PCS | Mod: PBBFAC,,, | Performed by: FAMILY MEDICINE

## 2020-02-07 PROCEDURE — 99214 OFFICE O/P EST MOD 30 MIN: CPT | Mod: S$PBB,,, | Performed by: FAMILY MEDICINE

## 2020-02-07 PROCEDURE — 71046 X-RAY EXAM CHEST 2 VIEWS: CPT | Mod: 26,,, | Performed by: RADIOLOGY

## 2020-02-07 RX ORDER — CEFDINIR 300 MG/1
300 CAPSULE ORAL 2 TIMES DAILY
Qty: 20 CAPSULE | Refills: 0 | Status: SHIPPED | OUTPATIENT
Start: 2020-02-07 | End: 2020-02-17

## 2020-02-07 RX ORDER — PROMETHAZINE HYDROCHLORIDE AND CODEINE PHOSPHATE 6.25; 1 MG/5ML; MG/5ML
5 SOLUTION ORAL EVERY 6 HOURS PRN
Qty: 240 ML | Refills: 0 | Status: SHIPPED | OUTPATIENT
Start: 2020-02-07 | End: 2020-05-12

## 2020-02-07 RX ORDER — IPRATROPIUM BROMIDE AND ALBUTEROL SULFATE 2.5; .5 MG/3ML; MG/3ML
SOLUTION RESPIRATORY (INHALATION)
COMMUNITY
Start: 2020-02-04 | End: 2020-05-12

## 2020-02-07 NOTE — PROGRESS NOTES
THIS DOCUMENT WAS MADE IN PART WITH VOICE RECOGNITION SOFTWARE.  OCCASIONALLY THIS SOFTWARE WILL MISINTERPRET WORDS OR PHRASES.      Vince Chadwick III  1950    Vince was seen today for cough.    Diagnoses and all orders for this visit:    Cough  -     X-Ray Chest PA And Lateral; Future  -     promethazine-codeine 6.25-10 mg/5 ml (PHENERGAN WITH CODEINE) 6.25-10 mg/5 mL syrup; Take 5 mLs by mouth every 6 (six) hours as needed for Cough.    Sinusitis, unspecified chronicity, unspecified location  -     cefdinir (OMNICEF) 300 MG capsule; Take 1 capsule (300 mg total) by mouth 2 (two) times daily. for 10 days     prolong coughing illness. Repeat chest x-ray reviewed, no consolidation. His lungs were fairly clear and I suspect now he is having persistent call from sinus and postnasal drainage. Will change antibiotic choice will refill the cough syrup. Continue close observation, notify me of worsening. Otherwise send me an update next week    Subjective     Chief Complaint   Patient presents with    Cough     Patient reported worsening cough        HPI    Follow up cough  States worsening cough  Now left facial and sinus pressure  More dyspnea with exertion  Wheezing  Yellow sputum  Coughing spasm   he has completed doxycycline without apparent help. The codeine cough syrup helps temporarily      Active Ambulatory Problems     Diagnosis Date Noted    BPH (benign prostatic hypertrophy) 09/05/2012    Anxiety 09/05/2012    Hypertension     Hyperlipidemia     Lumbar disc disease     ADD (attention deficit disorder) 05/03/2013    Insomnia 04/16/2015    Lumbar stenosis 05/31/2016    DDD (degenerative disc disease), lumbar 05/31/2016    Lumbar radicular pain 05/31/2016    Lumbar facet arthropathy 05/31/2016    Lumbar radiculopathy 06/07/2016     Resolved Ambulatory Problems     Diagnosis Date Noted    No Resolved Ambulatory Problems     Past Medical History:   Diagnosis Date    ALLERGIC RHINITIS      Arthritis     Cataract     Colon polyp     Diverticulitis     Diverticulosis     Irritable bowel syndrome          Review of Systems   Constitutional: Positive for activity change and fatigue. Negative for fever.   HENT: Positive for congestion, postnasal drip, sinus pressure and sinus pain.    Respiratory: Positive for cough, shortness of breath and wheezing.    Cardiovascular: Negative for chest pain.   Gastrointestinal: Negative.    Musculoskeletal: Negative.        Objective     Physical Exam   Constitutional: He is oriented to person, place, and time. He appears well-developed and well-nourished. No distress.   HENT:   Head: Normocephalic and atraumatic. Head is without right periorbital erythema and without left periorbital erythema.   Right Ear: Tympanic membrane, external ear and ear canal normal.   Left Ear: Tympanic membrane, external ear and ear canal normal.   Nose: Mucosal edema present. No epistaxis. Left sinus exhibits maxillary sinus tenderness and frontal sinus tenderness.   Mouth/Throat: Oropharynx is clear and moist. No oropharyngeal exudate, posterior oropharyngeal edema, posterior oropharyngeal erythema or tonsillar abscesses.   Eyes: Pupils are equal, round, and reactive to light. Conjunctivae and EOM are normal. Right eye exhibits no discharge. Left eye exhibits no discharge. No scleral icterus.   Neck: Normal range of motion. Neck supple. No thyromegaly present.   Cardiovascular: Normal rate, regular rhythm and normal heart sounds.   No murmur heard.  Pulmonary/Chest: Effort normal. No respiratory distress.   Mildly diminished breath sounds bilaterally no active wheezing   Lymphadenopathy:     He has no cervical adenopathy.   Neurological: He is alert and oriented to person, place, and time.   Skin: No rash noted. He is not diaphoretic.   Psychiatric: He has a normal mood and affect. His behavior is normal.   Vitals reviewed.    Vitals:    02/07/20 1612   BP: 120/68   BP Location: Left  "arm   Patient Position: Sitting   BP Method: Medium (Manual)   Pulse: 102   Resp: 18   Temp: 98.9 °F (37.2 °C)   TempSrc: Oral   SpO2: 98%   Weight: 90.9 kg (200 lb 6.4 oz)   Height: 5' 8" (1.727 m)       MOST RECENT LABS IN OUR ELECTRONIC MEDICAL RECORD:     Results for orders placed or performed in visit on 01/13/20   Stool culture   Result Value Ref Range    Stool Culture       No Salmonella,Shigella,Vibrio,Campylobacter,Yersinia isolated.   Clostridium difficile EIA   Result Value Ref Range    C. diff Antigen Negative Negative    C difficile Toxins A+B, EIA Negative Negative   E. coli 0157 antigen   Result Value Ref Range    Shiga Toxin 1 E.coli Negative     Shiga Toxin 2 E.coli Negative    Stool Exam-Ova,Cysts,Parasites   Result Value Ref Range    Stool Exam-Ova,Cysts,Parasites No ova or parasites seen    Fecal fat, qualitative   Result Value Ref Range    Fat Stain, Stool Normal Fats    Giardia / Cryptosporidum, EIA   Result Value Ref Range    Giardia Antigen - EIA Negative Negative    Cryptosporidium Antigen Negative Negative   Occult blood x 1, stool   Result Value Ref Range    Occult Blood Positive (A) Negative   pH, stool   Result Value Ref Range    pH, Stool Acidic    Rotavirus antigen, stool   Result Value Ref Range    Rotavirus Negative Negative   WBC, Stool   Result Value Ref Range    Stool WBC No neutrophils seen No neutrophils seen         "

## 2020-02-10 RX ORDER — ESCITALOPRAM OXALATE 20 MG/1
20 TABLET ORAL DAILY
Qty: 90 TABLET | Refills: 1 | Status: SHIPPED | OUTPATIENT
Start: 2020-02-10 | End: 2020-07-25

## 2020-02-15 RX ORDER — BENAZEPRIL HYDROCHLORIDE 20 MG/1
TABLET ORAL
Qty: 30 TABLET | Refills: 3 | Status: SHIPPED | OUTPATIENT
Start: 2020-02-15 | End: 2020-06-23

## 2020-02-18 RX ORDER — AZELASTINE 1 MG/ML
SPRAY, METERED NASAL
Qty: 10 ML | Refills: 2 | Status: SHIPPED | OUTPATIENT
Start: 2020-02-18 | End: 2020-05-20 | Stop reason: SDUPTHER

## 2020-03-16 ENCOUNTER — TELEPHONE (OUTPATIENT)
Dept: GASTROENTEROLOGY | Facility: CLINIC | Age: 70
End: 2020-03-16

## 2020-03-16 NOTE — TELEPHONE ENCOUNTER
----- Message from Radha Lora sent at 3/16/2020 10:55 AM CDT -----  Contact: self [phone]  Patient called to cancel procedure that is scheduled for 3/19/2020.  Please call patient back to reschedule procedure upon the patient's request.    Patient may be reached at [phone].

## 2020-04-06 ENCOUNTER — PATIENT MESSAGE (OUTPATIENT)
Dept: FAMILY MEDICINE | Facility: CLINIC | Age: 70
End: 2020-04-06

## 2020-04-29 ENCOUNTER — TELEPHONE (OUTPATIENT)
Dept: FAMILY MEDICINE | Facility: CLINIC | Age: 70
End: 2020-04-29

## 2020-04-29 NOTE — TELEPHONE ENCOUNTER
Your PA request  for Sonata 10 Mg  has been approved. The authorization is valid from 03/30/2020 through 04/29/2021.  Key: [id number] - PA Case ID: [id number]

## 2020-04-29 NOTE — TELEPHONE ENCOUNTER
Your PA request  for Sonata 10 Mg  has been approved. The authorization is valid from 03/30/2020 through 04/29/2021.  Key: [id number] - PA Case ID: [id number]

## 2020-05-06 ENCOUNTER — PATIENT MESSAGE (OUTPATIENT)
Dept: ADMINISTRATIVE | Facility: HOSPITAL | Age: 70
End: 2020-05-06

## 2020-05-07 DIAGNOSIS — Z01.812 PRE-PROCEDURE LAB EXAM: Primary | ICD-10-CM

## 2020-05-11 ENCOUNTER — LAB VISIT (OUTPATIENT)
Dept: FAMILY MEDICINE | Facility: CLINIC | Age: 70
End: 2020-05-11
Attending: INTERNAL MEDICINE
Payer: MEDICARE

## 2020-05-11 DIAGNOSIS — Z01.812 PRE-PROCEDURE LAB EXAM: ICD-10-CM

## 2020-05-11 PROCEDURE — U0002 COVID-19 LAB TEST NON-CDC: HCPCS

## 2020-05-12 LAB — SARS-COV-2 RNA RESP QL NAA+PROBE: NOT DETECTED

## 2020-05-12 NOTE — H&P
History & Physical - Short Stay  Gastroenterology      SUBJECTIVE:     Procedure: Gastroscopy and Colonoscopy    Chief Complaint/Indication for Procedure:  Abdo pains (cramping).  Anemia.  Heme pos stool.    History of Present Illness:  Office Visit     1/7/2020  [place] - Gastroenterology      Martha Plunkett, [facility]   Gastroenterology   Abdominal cramping +8 more   Dx   Abdominal Pain   Reason for Visit    Progress Notes        Subjective:       Patient ID: Vince Chadwick III is a 69 y.o. male Body mass index is 31.44 kg/m².     Chief Complaint: Abdominal Pain     Established patient of Dr. Novak & myself.     Abdominal Pain   This is a recurrent problem. Episode onset: history of diverticulitis- prior episode was 7/2017, 1/2017, and 10/5/18; abdominal cramping, nausea, and diarrhea recurred ~12/2019 (reports different pain than diverticulitis) The problem occurs intermittently (about once a week). Duration: 1-2 days. The problem has been waxing and waning. Pain location: lower abdomen. The pain is at a severity of 2/10 (currently). The quality of the pain is cramping. The abdominal pain does not radiate. Associated symptoms include belching, diarrhea (intermittent, occurs with abdominal pain, lasts about 8 hours; resolves with imodium; then no bowel movement for 4-5 days, then diarrhea recurs), flatus, melena (black stool recently; denies pepto or iron use) and nausea (occurs when abdominal pain occurs; PAST TREATMENT: phenergan). Pertinent negatives include no anorexia, constipation, dysuria, fever, frequency, hematochezia, vomiting or weight loss. Associated symptoms comments: Recent antibiotic for dental work done in 12/2019; wife had one episode of diarrhea; denies recent hospitalization, foreign travel, or suspect food intake. Nothing aggravates the pain. Treatments tried: bentyl 20 mg qid prn- taking 1-2/day, imodium prn helps; PAST: cipro & flagyl, had taken recently augmentin for a few days for  dental work, switched to doxycycline since he had problems w/diarrhea, metamucil- stopped a year ago. Prior diagnostic workup includes CT scan. His past medical history is significant for abdominal surgery, GERD (occasional; once over the past month) and irritable bowel syndrome. There is no history of Crohn's disease, pancreatitis, PUD or ulcerative colitis.      Review of Systems   Constitutional: Positive for fatigue. Negative for appetite change, chills, fever and weight loss.        Reports he takes Excedrin 1-2 times a day   HENT: Negative for sore throat and trouble swallowing.    Respiratory: Positive for shortness of breath (occasional; denies currently). Negative for cough and choking.    Cardiovascular: Negative for chest pain.   Gastrointestinal: Positive for abdominal pain, diarrhea (intermittent, occurs with abdominal pain, lasts about 8 hours; resolves with imodium; then no bowel movement for 4-5 days, then diarrhea recurs), flatus, melena (black stool recently; denies pepto or iron use) and nausea (occurs when abdominal pain occurs; PAST TREATMENT: phenergan). Negative for anal bleeding, anorexia, blood in stool, constipation, hematochezia, rectal pain and vomiting.    Assessment:       1. Abdominal cramping    2. History of diverticulosis    3. Lower abdominal pain    4. Nausea    5. Intermittent diarrhea    6. Black stool    7. Dizziness    8. Fatigue, unspecified type    9. History of shortness of breath        Plan:       Abdominal cramping & Lower abdominal pain  -     CT Abdomen Pelvis With Contrast; Future; Expected date: 01/07/2020  - CONTINUE BENTYL 20 MG QID PRN AS DIRECTED  - continue with colonoscopy as scheduled for 1/24/2020     History of diverticulosis  -     CT Abdomen Pelvis With Contrast; Future; Expected date: 01/07/2020  - continue with colonoscopy as scheduled for 1/24/2020  - discussed the diagnosis of diverticulosis and diverticulitis, & to prevent diverticulitis, high fiber  diet is recommended.  - Recommended daily exercise, adequate water intake (six 8-oz glasses of water daily), and high fiber diet. OTC fiber supplements are recommended if diet does not reach daily fiber goal (25 grams daily), such as Metamucil, Citrucel, or FiberCon (take as directed, separate from other oral medications by >2 hours).     Nausea  -     CT Abdomen Pelvis With Contrast; Future; Expected date: 01/07/2020  - Possible EGD pending results of testing and if symptoms persist     Intermittent diarrhea  -     CT Abdomen Pelvis With Contrast; Future; Expected date: 01/07/2020  -     Stool Exam-Ova,Cysts,Parasites; Future; Expected date: 01/07/2020  -     Fecal fat, qualitative; Future; Expected date: 01/07/2020  -     Giardia / Cryptosporidum, EIA; Future; Expected date: 01/07/2020  -     Occult blood x 1, stool; Future; Expected date: 01/07/2020  -     pH, stool; Future; Expected date: 01/07/2020  -     Rotavirus antigen, stool; Future; Expected date: 01/07/2020  -     WBC, Stool; Future; Expected date: 01/07/2020  -     Stool culture; Future; Expected date: 01/07/2020  -     Clostridium difficile EIA; Future; Expected date: 01/07/2020  -     Adenovirus Molecular Detection, PCR, Non-Blood Stool; Future; Expected date: 01/07/2020  - recommended OTC probiotic, such as Florastor or Culturelle, as directed  - avoid lactose  - continue with colonoscopy as scheduled for 1/24/2020  - informed patient can take imodium as directed PRN but advised to take only sparingly, patient verbalized understanding     Black stool  -     Occult blood x 1, stool; Future; Expected date: 01/07/2020  - continue with colonoscopy as scheduled for 1/24/2020  - avoid/minimize use of NSAIDs- since they can cause GI upset, bleeding and/or ulcers. If NSAID must be taken, recommend take with food.  - Possible EGD pending results of testing and if symptoms persist     Dizziness, Fatigue, unspecified type, & History of shortness of  breath  Recommend follow-up with Primary Care Provider for continued evaluation and management.     Follow up in about 2 weeks (around 1/21/2020), or if symptoms worsen or fail to improve.            BRANDON Mcfarlane  1/10/2020        2:41 PM   Note      Please call to inform & review the results with the patient- radiology report of the ct abdomen pelvis showed no acute findings. Prior diverticulitis has resolved- no active diverticulitis noted on current scan. Recommend high fiber diet (20-30 grams of fiber daily)/OTC fiber supplements daily as directed.  It showed a calcified granuloma in the liver (calcified indicates it is old and granuloma is an area of inflammatory cells- typically from prior infection/inflammation).  Otherwise findings are unremarkable and/or unchanged; including Chronic prostate enlargement & Right scrotal hydrocele: Recommend follow-up with urology for continued evaluation and management of these findings.                 BRANDON Mcfarlane  1/16/2020 1/16/20 4:16 PM   Note      Please call to inform & review the results with the patient- stool studies showed acidic stool & occult positive for blood; otherwise, negative/normal results. Acidic stool can indicate difficulty digesting certain dietary items. Recommend to avoid lactose products.  Continue with previous recommendations, including colonoscopy as scheduled and recommend scheduling EGD to further evaluate these findings.            Results for LALITA MAE III (MRN [id number]) as of 5/12/2020 17:41   Ref. Range 10/8/2018 14:00 8/20/2019 07:30 1/2/2020 09:28   Hemoglobin Latest Ref Range: 14.0 - 18.0 g/dL 14.8 15.0 12.8 (L)   Hematocrit Latest Ref Range: 40.0 - 54.0 % 47.1 47.2 41.0   MCV Latest Ref Range: 82 - 98 fL 89 89 92       Results for LALITA MAE III (MRN [id number]) as of 5/12/2020 17:41   Ref. Range 1/13/2020 12:06   Occult Blood Latest Ref Range: Negative  Positive (A)       PTA Medications    Medication Sig    atorvastatin (LIPITOR) 10 MG tablet TAKE 1 TABLET BY MOUTH EVERY DAY IN THE EVENING    benazepriL (LOTENSIN) 20 MG tablet TAKE 1 TABLET BY MOUTH EVERY DAY    escitalopram oxalate (LEXAPRO) 20 MG tablet Take 1 tablet (20 mg total) by mouth once daily.    tamsulosin (FLOMAX) 0.4 mg Cap Take 1 capsule (0.4 mg total) by mouth 2 (two) times daily.    zaleplon (SONATA) 10 MG capsule Take 1 capsule (10 mg total) by mouth nightly.    azelastine (ASTELIN) 137 mcg (0.1 %) nasal spray INSTILL 2 SPRAYS IN EACH NOSTRIL TWICE A DAY (Patient not taking: Reported on 5/12/2020)    dicyclomine (BENTYL) 20 mg tablet TAKE 1 TABLET BY MOUTH BEFORE MEALS & AT BEDTIME AS NEEDED (Patient not taking: Reported on 5/12/2020)    loperamide (IMODIUM) 2 mg capsule Take 2 mg by mouth 4 (four) times daily as needed for Diarrhea.       Review of patient's allergies indicates:   Allergen Reactions    No known drug allergies         Past Medical History:   Diagnosis Date    ALLERGIC RHINITIS     Arthritis     Cataract     OU    Colon polyp     Diverticulitis     Diverticulosis     Hyperlipidemia     resolved    Hypertension     Irritable bowel syndrome     Lumbar disc disease      Past Surgical History:   Procedure Laterality Date    APPENDECTOMY      COLONOSCOPY  06/02/2011    [facility].    One 1 mm polyp in the sigmoid colon.  FRAGMENT OF NONNEOPLASTIC COLONIC MUCOSA.  Sigmoid diverticulosis.  Spasms c/w IBS.  repeat in 7-8 years    COLONOSCOPY  09/12/2006    Dr. Novak[place]    FOOT SURGERY      x 2    LUMBAR EPIDURAL INJECTION      PILONIDAL CYST DRAINAGE      s/p chalazion removal      OD    TONSILLECTOMY       Family History   Problem Relation Age of Onset    Glaucoma Father     Cataracts Father     Macular degeneration Father     Colon polyps Father     Glaucoma Paternal Uncle     Glaucoma Paternal Uncle     Cataracts Mother     Glaucoma Sister     Colon cancer Neg Hx     Crohn's disease  "Neg Hx     Ulcerative colitis Neg Hx      Social History     Tobacco Use    Smoking status: Never Smoker    Smokeless tobacco: Never Used   Substance Use Topics    Alcohol use: No     Alcohol/week: 0.0 standard drinks     Frequency: Never     Binge frequency: Never    Drug use: No         OBJECTIVE:     Vital Signs (Most Recent)  Temp: 97.9 °F (36.6 °C) (05/13/20 0743)  Pulse: 84 (05/13/20 0743)  Resp: 16 (05/13/20 0743)  BP: 132/68 (05/13/20 0743)  SpO2: 97 % (05/13/20 0743)    Physical Exam:  :Ht 5' 8" (1.727 m)   Wt 90.9 kg (200 lb 6.4 oz)   BMI 30.47 kg/m²                                                        GENERAL:  Comfortable, in no acute distress.                                 HEENT EXAM:  Nonicteric.  No adenopathy.  Oropharynx is clear.               NECK:  Supple.                                                               LUNGS:  Clear.                                                               CARDIAC:  Regular rate and rhythm.  S1, S2.  No murmur.                      ABDOMEN:  Soft, positive bowel sounds, nontender.  No hepatosplenomegaly or masses.  No rebound or guarding.                                             EXTREMITIES:  No edema.     MENTAL STATUS:  Alert and oriented.    ASSESSMENT/PLAN:     Assessment: Abdo pains (cramping).  Anemia.  Heme pos stool.    Plan: Gastroscopy and Colonoscopy    Anesthesia Plan:   MAC / General Anaesthesia    ASA Grade: ASA 2 - Patient with mild systemic disease with no functional limitations    MALLAMPATI SCORE: II (hard and soft palate, upper portion of tonsils anduvula visible)    "

## 2020-05-13 ENCOUNTER — ANESTHESIA EVENT (OUTPATIENT)
Dept: ENDOSCOPY | Facility: HOSPITAL | Age: 70
End: 2020-05-13
Payer: MEDICARE

## 2020-05-13 ENCOUNTER — ANESTHESIA (OUTPATIENT)
Dept: ENDOSCOPY | Facility: HOSPITAL | Age: 70
End: 2020-05-13
Payer: MEDICARE

## 2020-05-13 ENCOUNTER — HOSPITAL ENCOUNTER (OUTPATIENT)
Facility: HOSPITAL | Age: 70
Discharge: HOME | End: 2020-05-13
Attending: INTERNAL MEDICINE | Admitting: INTERNAL MEDICINE
Payer: MEDICARE

## 2020-05-13 DIAGNOSIS — D64.9 ANEMIA: ICD-10-CM

## 2020-05-13 LAB
BASOPHILS # BLD AUTO: 0.06 K/UL (ref 0–0.2)
BASOPHILS NFR BLD: 1.1 % (ref 0–1.9)
C DIFF GDH STL QL: NEGATIVE
C DIFF TOX A+B STL QL IA: NEGATIVE
DIFFERENTIAL METHOD: ABNORMAL
EOSINOPHIL # BLD AUTO: 0.1 K/UL (ref 0–0.5)
EOSINOPHIL NFR BLD: 2.1 % (ref 0–8)
ERYTHROCYTE [DISTWIDTH] IN BLOOD BY AUTOMATED COUNT: 14.4 % (ref 11.5–14.5)
FERRITIN SERPL-MCNC: 32 NG/ML (ref 20–300)
H PYLORI INDEX VALUE: NEGATIVE
HCT VFR BLD AUTO: 46.8 % (ref 40–54)
HGB BLD-MCNC: 14.3 G/DL (ref 14–18)
IMM GRANULOCYTES # BLD AUTO: 0.01 K/UL (ref 0–0.04)
IMM GRANULOCYTES NFR BLD AUTO: 0.2 % (ref 0–0.5)
IRON SERPL-MCNC: 106 UG/DL (ref 45–160)
LYMPHOCYTES # BLD AUTO: 1.3 K/UL (ref 1–4.8)
LYMPHOCYTES NFR BLD: 22.8 % (ref 18–48)
MCH RBC QN AUTO: 27.6 PG (ref 27–31)
MCHC RBC AUTO-ENTMCNC: 30.6 G/DL (ref 32–36)
MCV RBC AUTO: 90 FL (ref 82–98)
MONOCYTES # BLD AUTO: 0.5 K/UL (ref 0.3–1)
MONOCYTES NFR BLD: 9 % (ref 4–15)
NEUTROPHILS # BLD AUTO: 3.7 K/UL (ref 1.8–7.7)
NEUTROPHILS NFR BLD: 64.8 % (ref 38–73)
NRBC BLD-RTO: 0 /100 WBC
PLATELET # BLD AUTO: 202 K/UL (ref 150–350)
PMV BLD AUTO: 12.8 FL (ref 9.2–12.9)
RBC # BLD AUTO: 5.19 M/UL (ref 4.6–6.2)
SATURATED IRON: 32 % (ref 20–50)
TOTAL IRON BINDING CAPACITY: 333 UG/DL (ref 250–450)
TRANSFERRIN SERPL-MCNC: 225 MG/DL (ref 200–375)
WBC # BLD AUTO: 5.66 K/UL (ref 3.9–12.7)

## 2020-05-13 PROCEDURE — 82728 ASSAY OF FERRITIN: CPT

## 2020-05-13 PROCEDURE — 88305 TISSUE EXAM BY PATHOLOGIST: CPT | Mod: 26,,, | Performed by: PATHOLOGY

## 2020-05-13 PROCEDURE — 25000003 PHARM REV CODE 250: Mod: PO | Performed by: ANESTHESIOLOGY

## 2020-05-13 PROCEDURE — 27201012 HC FORCEPS, HOT/COLD, DISP: Mod: PO | Performed by: INTERNAL MEDICINE

## 2020-05-13 PROCEDURE — 45380 PR COLONOSCOPY,BIOPSY: ICD-10-PCS | Mod: PT,,, | Performed by: INTERNAL MEDICINE

## 2020-05-13 PROCEDURE — 87045 FECES CULTURE AEROBIC BACT: CPT

## 2020-05-13 PROCEDURE — 87449 NOS EACH ORGANISM AG IA: CPT

## 2020-05-13 PROCEDURE — 88305 TISSUE EXAM BY PATHOLOGIST: CPT | Performed by: PATHOLOGY

## 2020-05-13 PROCEDURE — 87449 NOS EACH ORGANISM AG IA: CPT | Mod: PO | Performed by: INTERNAL MEDICINE

## 2020-05-13 PROCEDURE — 37000008 HC ANESTHESIA 1ST 15 MINUTES: Mod: PO | Performed by: INTERNAL MEDICINE

## 2020-05-13 PROCEDURE — 87046 STOOL CULTR AEROBIC BACT EA: CPT | Mod: 59

## 2020-05-13 PROCEDURE — 37000009 HC ANESTHESIA EA ADD 15 MINS: Mod: PO | Performed by: INTERNAL MEDICINE

## 2020-05-13 PROCEDURE — 25000003 PHARM REV CODE 250: Mod: PO | Performed by: NURSE ANESTHETIST, CERTIFIED REGISTERED

## 2020-05-13 PROCEDURE — 63600175 PHARM REV CODE 636 W HCPCS: Mod: PO | Performed by: NURSE ANESTHETIST, CERTIFIED REGISTERED

## 2020-05-13 PROCEDURE — 88305 TISSUE EXAM BY PATHOLOGIST: ICD-10-PCS | Mod: 26,,, | Performed by: PATHOLOGY

## 2020-05-13 PROCEDURE — 43239 EGD BIOPSY SINGLE/MULTIPLE: CPT | Mod: 51,,, | Performed by: INTERNAL MEDICINE

## 2020-05-13 PROCEDURE — D9220A PRA ANESTHESIA: ICD-10-PCS | Mod: ANES,,, | Performed by: ANESTHESIOLOGY

## 2020-05-13 PROCEDURE — 45380 COLONOSCOPY AND BIOPSY: CPT | Mod: PT,,, | Performed by: INTERNAL MEDICINE

## 2020-05-13 PROCEDURE — 43239 EGD BIOPSY SINGLE/MULTIPLE: CPT | Mod: PO | Performed by: INTERNAL MEDICINE

## 2020-05-13 PROCEDURE — D9220A PRA ANESTHESIA: ICD-10-PCS | Mod: CRNA,,, | Performed by: NURSE ANESTHETIST, CERTIFIED REGISTERED

## 2020-05-13 PROCEDURE — 43239 PR EGD, FLEX, W/BIOPSY, SGL/MULTI: ICD-10-PCS | Mod: 51,,, | Performed by: INTERNAL MEDICINE

## 2020-05-13 PROCEDURE — 87324 CLOSTRIDIUM AG IA: CPT

## 2020-05-13 PROCEDURE — 63600175 PHARM REV CODE 636 W HCPCS: Mod: PO | Performed by: INTERNAL MEDICINE

## 2020-05-13 PROCEDURE — 45380 COLONOSCOPY AND BIOPSY: CPT | Mod: PO | Performed by: INTERNAL MEDICINE

## 2020-05-13 PROCEDURE — 85025 COMPLETE CBC W/AUTO DIFF WBC: CPT

## 2020-05-13 PROCEDURE — 83540 ASSAY OF IRON: CPT

## 2020-05-13 PROCEDURE — 87427 SHIGA-LIKE TOXIN AG IA: CPT | Mod: 59

## 2020-05-13 PROCEDURE — D9220A PRA ANESTHESIA: Mod: ANES,,, | Performed by: ANESTHESIOLOGY

## 2020-05-13 PROCEDURE — 87209 SMEAR COMPLEX STAIN: CPT

## 2020-05-13 PROCEDURE — D9220A PRA ANESTHESIA: Mod: CRNA,,, | Performed by: NURSE ANESTHETIST, CERTIFIED REGISTERED

## 2020-05-13 RX ORDER — GLYCOPYRROLATE 0.2 MG/ML
0.2 INJECTION INTRAMUSCULAR; INTRAVENOUS ONCE
Status: COMPLETED | OUTPATIENT
Start: 2020-05-13 | End: 2020-05-13

## 2020-05-13 RX ORDER — PROPOFOL 10 MG/ML
VIAL (ML) INTRAVENOUS
Status: DISCONTINUED | OUTPATIENT
Start: 2020-05-13 | End: 2020-05-13

## 2020-05-13 RX ORDER — LIDOCAINE HCL/PF 100 MG/5ML
SYRINGE (ML) INTRAVENOUS
Status: DISCONTINUED | OUTPATIENT
Start: 2020-05-13 | End: 2020-05-13

## 2020-05-13 RX ORDER — SODIUM CHLORIDE 0.9 % (FLUSH) 0.9 %
10 SYRINGE (ML) INJECTION
Status: DISCONTINUED | OUTPATIENT
Start: 2020-05-13 | End: 2020-05-13 | Stop reason: HOSPADM

## 2020-05-13 RX ORDER — SODIUM CHLORIDE, SODIUM LACTATE, POTASSIUM CHLORIDE, CALCIUM CHLORIDE 600; 310; 30; 20 MG/100ML; MG/100ML; MG/100ML; MG/100ML
INJECTION, SOLUTION INTRAVENOUS CONTINUOUS
Status: DISCONTINUED | OUTPATIENT
Start: 2020-05-13 | End: 2020-05-13 | Stop reason: HOSPADM

## 2020-05-13 RX ORDER — PROPOFOL 10 MG/ML
VIAL (ML) INTRAVENOUS CONTINUOUS PRN
Status: DISCONTINUED | OUTPATIENT
Start: 2020-05-13 | End: 2020-05-13

## 2020-05-13 RX ORDER — FENTANYL CITRATE 50 UG/ML
INJECTION, SOLUTION INTRAMUSCULAR; INTRAVENOUS
Status: DISCONTINUED | OUTPATIENT
Start: 2020-05-13 | End: 2020-05-13

## 2020-05-13 RX ORDER — GLYCOPYRROLATE 0.2 MG/ML
INJECTION INTRAMUSCULAR; INTRAVENOUS
Status: DISCONTINUED | OUTPATIENT
Start: 2020-05-13 | End: 2020-05-13

## 2020-05-13 RX ORDER — OMEPRAZOLE 40 MG/1
40 CAPSULE, DELAYED RELEASE ORAL
Qty: 60 CAPSULE | Refills: 5 | Status: SHIPPED | OUTPATIENT
Start: 2020-05-13 | End: 2021-04-18

## 2020-05-13 RX ADMIN — LIDOCAINE HYDROCHLORIDE 100 MG: 20 INJECTION, SOLUTION INTRAVENOUS at 08:05

## 2020-05-13 RX ADMIN — FENTANYL CITRATE 25 MCG: 50 INJECTION, SOLUTION INTRAMUSCULAR; INTRAVENOUS at 08:05

## 2020-05-13 RX ADMIN — SODIUM CHLORIDE, SODIUM LACTATE, POTASSIUM CHLORIDE, AND CALCIUM CHLORIDE: .6; .31; .03; .02 INJECTION, SOLUTION INTRAVENOUS at 07:05

## 2020-05-13 RX ADMIN — PROPOFOL 50 MG: 10 INJECTION, EMULSION INTRAVENOUS at 08:05

## 2020-05-13 RX ADMIN — PROPOFOL 100 MG: 10 INJECTION, EMULSION INTRAVENOUS at 08:05

## 2020-05-13 RX ADMIN — PROPOFOL 100 MCG/KG/MIN: 10 INJECTION, EMULSION INTRAVENOUS at 08:05

## 2020-05-13 RX ADMIN — SODIUM CHLORIDE, SODIUM LACTATE, POTASSIUM CHLORIDE, AND CALCIUM CHLORIDE: .6; .31; .03; .02 INJECTION, SOLUTION INTRAVENOUS at 09:05

## 2020-05-13 RX ADMIN — GLYCOPYRROLATE 0.2 MG: 0.2 INJECTION, SOLUTION INTRAMUSCULAR; INTRAVENOUS at 08:05

## 2020-05-13 RX ADMIN — GLYCOPYRROLATE 2 MG: 0.2 INJECTION, SOLUTION INTRAMUSCULAR; INTRAVENOUS at 08:05

## 2020-05-13 NOTE — [employer] PATIENT INSTRUCTIONS
Discharge Summary/Instructions after an Endoscopic Procedure  Patient Name: Vince Chadwick  Patient MRN: [id number]  Patient YOB: 1950     Wednesday, May 13, 2020  Davy Novak MD  RESTRICTIONS:  During your procedure today, you received medications for sedation.  These   medications may affect your judgment, balance and coordination.  Therefore,   for 24 hours, you have the following restrictions:   - DO NOT drive a car, operate machinery, make legal/financial decisions,   sign important papers or drink alcohol.    ACTIVITY:  Today: no heavy lifting, straining or running due to procedural   sedation/anesthesia.  The following day: return to full activity including work.  DIET:  Eat and drink normally unless instructed otherwise.     TREATMENT FOR COMMON SIDE EFFECTS:  - Mild abdominal pain, nausea, belching, bloating or excessive gas:  rest,   eat lightly and use a heating pad.  - Sore Throat: treat with throat lozenges and/or gargle with warm salt   water.  - Because air was used during the procedure, expelling large amounts of air   from your rectum or belching is normal.  - If a bowel prep was taken, you may not have a bowel movement for 1-3 days.    This is normal.  SYMPTOMS TO WATCH FOR AND REPORT TO YOUR PHYSICIAN:  1. Abdominal pain or bloating, other than gas cramps.  2. Chest pain.  3. Back pain.  4. Signs of infection such as: chills or fever occurring within 24 hours   after the procedure.  5. Rectal bleeding, which would show as bright red, maroon, or black stools.   (A tablespoon of blood from the rectum is not serious, especially if   hemorrhoids are present.)  6. Vomiting.  7. Weakness or dizziness.  GO DIRECTLY TO THE NEAREST EMERGENCY ROOM IF YOU HAVE ANY OF THE FOLLOWING:      Difficulty breathing              Chills and/or fever over 101 F   Persistent vomiting and/or vomiting blood   Severe abdominal pain   Severe chest pain   Black, tarry stools   Bleeding- more than one  tablespoon   Any other symptom or condition that you feel may need urgent attention  Your doctor recommends these additional instructions:  If any biopsies were taken, your doctors clinic will contact you in 1 to 2   weeks with any results.  Follow an antireflux regimen.  This includes:       - Do not lie down for at least 3 to 4 hours after meals.        - Raise the head of the bed 4 to 6 inches.        - Decrease excess weight.        - Avoid citrus juices and other acidic foods, alcohol, chocolate, mints,   coffee and other caffeinated beverages, carbonated beverages, fatty and   fried foods.        - Avoid tight-fitting clothing.        - Avoid cigarettes and other tobacco products.   Take Protonix (pantoprazole) 40 mg by mouth once a day.   Return to GI clinic in three months.  For questions, problems or results please call your physician - Davy Novak MD at Work:  [phone].  EMERGENCY PHONE NUMBER: [phone], LAB RESULTS: [phone]  IF A COMPLICATION OR EMERGENCY SITUATION ARISES AND YOU ARE UNABLE TO REACH   YOUR PHYSICIAN - GO DIRECTLY TO THE EMERGENCY ROOM.  ___________________________________________  Nurse Signature  ___________________________________________  Patient/Designated Responsible Party Signature  Davy Novak MD  5/13/2020 9:53:11 AM  This report has been verified and signed electronically.  [employer]

## 2020-05-13 NOTE — TRANSFER OF CARE
"Anesthesia Transfer of Care Note    Patient: Vince Chadwick III    Procedure(s) Performed: Procedure(s) (LRB):  EGD (ESOPHAGOGASTRODUODENOSCOPY) (N/A)  COLONOSCOPY (N/A)    Patient location: PACU    Anesthesia Type: general    Transport from OR: Transported from OR on room air with adequate spontaneous ventilation    Post pain: adequate analgesia    Post assessment: no apparent anesthetic complications and tolerated procedure well    Post vital signs: stable    Level of consciousness: awake and sedated    Nausea/Vomiting: no nausea/vomiting    Complications: none    Transfer of care protocol was followed      Last vitals:   Visit Vitals  /68 (BP Location: Right arm, Patient Position: Lying)   Pulse 84   Temp 36.6 °C (97.9 °F) (Skin)   Resp 16   Ht 5' 8" (1.727 m)   Wt 88.5 kg (195 lb)   SpO2 97%   BMI 29.65 kg/m²     "

## 2020-05-13 NOTE — BRIEF OP NOTE
Discharge Note  Short Stay      SUMMARY     Admit Date: 5/13/2020    Attending Physician: Davy Novak Jr., MD     Discharge Physician: Davy Novak Jr., MD    Discharge Date: 5/13/2020 10:13 AM    Final Diagnosis: Screening [Z13.9]  Impression:          - Normal oropharynx.                       - Normal upper third of esophagus and middle third                        of esophagus.                       - LA Grade A reflux esophagitis. Biopsied.                       - Z-line regular, 40 cm from the incisors.                       - Normal cardia.                       - Gastritis/antritis. Biopsied.                       - Normal stomach otherwise.                       - Normal pylorus.                       - Erythematous duodenopathy. Biopsied.                       - Normal examined duodenum otherwise.  Recommendation:      - Perform a colonoscopy as previously scheduled.                       - Discharge patient to home after that.                       - Await pathology and CLOtest results.                       - Follow an antireflux regimen.                       - Use Protonix (pantoprazole) 40 mg PO daily.                       - Call the G.I. clinic in 2 weeks for reports (if                        you haven't heard from us sooner) [phone].                       - Return to GI clinic in 3 months, with repeat CBC                        then.    Impression:          - Diverticulosis in the sigmoid colon.                       - Significant colonic spasm consistent with                        irritable bowel syndrome.                       - Erythematous mucosa in the sigmoid colon. Biopsied.                       - Non-bleeding internal hemorrhoids.                       - Redundant colon.                       - The examination was otherwise normal.                       - Fluid aspiration performed.                       - Biopsied.  Recommendation:      - Discharge patient to home.                        - Await pathology and culture results.                       - High fiber diet.                       - Use fiber, for example Citrucel, Fibercon, Konsyl                        or Metamucil.                       - Take a PROBIOTIC, such as a carton of GREEK YOGURT                        (Chobani or Oikos, or Activia or Dannon); or tablets                        of ALIGN or CULTURELLE or OLIVER-Q (all                        non-prescription), every day for a month.                       - Continue present medications.                       - Use Bentyl (dicyclomine) 20 mg PO QID 30 min AC.                       - Imodium 2 tablets PO PRN after loose bowel                        movement.                       - Call the G.I. clinic in 2 weeks for reports (if                        you haven't heard from us sooner) [phone].                       - Repeat colonoscopy in 7 years for screening                        purposes.    Davy Novak MD  5/13/2020      Disposition: HOME OR SELF CARE    Patient Instructions:   Current Discharge Medication List      START taking these medications    Details   omeprazole (PRILOSEC) 40 MG capsule Take 1 capsule (40 mg total) by mouth before breakfast.  Qty: 60 capsule, Refills: 5         CONTINUE these medications which have NOT CHANGED    Details   atorvastatin (LIPITOR) 10 MG tablet TAKE 1 TABLET BY MOUTH EVERY DAY IN THE EVENING  Qty: 90 tablet, Refills: 1      benazepriL (LOTENSIN) 20 MG tablet TAKE 1 TABLET BY MOUTH EVERY DAY  Qty: 30 tablet, Refills: 3      escitalopram oxalate (LEXAPRO) 20 MG tablet Take 1 tablet (20 mg total) by mouth once daily.  Qty: 90 tablet, Refills: 1      tamsulosin (FLOMAX) 0.4 mg Cap Take 1 capsule (0.4 mg total) by mouth 2 (two) times daily.  Qty: 60 capsule, Refills: 10    Associated Diagnoses: Benign non-nodular prostatic hyperplasia with lower urinary tract symptoms      zaleplon (SONATA) 10 MG capsule Take 1 capsule (10 mg  total) by mouth nightly.  Qty: 90 capsule, Refills: 1    Comments: Not to exceed 5 additional fills before 11/25/2018.      azelastine (ASTELIN) 137 mcg (0.1 %) nasal spray INSTILL 2 SPRAYS IN EACH NOSTRIL TWICE A DAY  Qty: 10 mL, Refills: 2      dicyclomine (BENTYL) 20 mg tablet TAKE 1 TABLET BY MOUTH BEFORE MEALS & AT BEDTIME AS NEEDED  Qty: 240 tablet, Refills: 5      loperamide (IMODIUM) 2 mg capsule Take 2 mg by mouth 4 (four) times daily as needed for Diarrhea.             Discharge Procedure Orders (must include Diet, Follow-up, Activity)    Follow Up:  Follow up with PCP as per your routine.  Please follow an anti reflux diet and a high fiber diet.  Activity as tolerated.    No driving day of procedure.    PROBIOTICS:  Now that your colon is so cleaned out, now is a good time for a round of PROBIOTICS.  Eat a container of Greek Yogurt, such as OIKOS or CHOBANI,  Or Activia or Dannon    Greek Yogurt.    Or Take a similar Probiotic product such as Align or Culturelle or Chana-Q, every day for a month.                  (The products listed are non-prescription, but you may need to ask the pharmacist for their location.)  Repeat this at least 5-6 times a year.

## 2020-05-13 NOTE — ANESTHESIA POSTPROCEDURE EVALUATION
Anesthesia Post Evaluation    Patient: Vince Chadwick III    Procedure(s) Performed: Procedure(s) (LRB):  EGD (ESOPHAGOGASTRODUODENOSCOPY) (N/A)  COLONOSCOPY (N/A)    Final Anesthesia Type: general    Patient location during evaluation: PACU  Patient participation: Yes- Able to Participate  Level of consciousness: sedated and awake  Post-procedure vital signs: reviewed and stable  Pain management: adequate  Airway patency: patent    PONV status at discharge: No PONV  Anesthetic complications: no      Cardiovascular status: blood pressure returned to baseline  Respiratory status: spontaneous ventilation  Hydration status: euvolemic  Follow-up not needed.          Vitals Value Taken Time   /57 5/13/2020  9:44 AM   Temp 36.6 °C (97.8 °F) 5/13/2020  9:44 AM   Pulse 87 5/13/2020  9:44 AM   Resp 16 5/13/2020  9:44 AM   SpO2 98 % 5/13/2020  9:44 AM         No case tracking events are documented in the log.      Pain/Cecilia Score: Cecilia Score: 9 (5/13/2020  9:44 AM)

## 2020-05-13 NOTE — DISCHARGE INSTRUCTIONS
Recovery After Procedural Sedation (Adult)  You have been given medicine by vein to make you sleep during your surgery. This may have included both a pain medicine and sleeping medicine. Most of the effects have worn off. But you may still have some drowsiness for the next 6 to 8 hours.  Home care  Follow these guidelines when you get home:  · For the next 8 hours, you should be watched by a responsible adult. This person should make sure your condition is not getting worse.  · Don't drink any alcohol for the next 24 hours.  · Don't drive, operate dangerous machinery, or make important business or personal decisions during the next 24 hours.  Note: Your healthcare provider may tell you not to take any medicine by mouth for pain or sleep in the next 4 hours. These medicines may react with the medicines you were given in the hospital. This could cause a much stronger response than usual.  Follow-up care  Follow up with your healthcare provider if you are not alert and back to your usual level of activity within 12 hours.  When to seek medical advice  Call your healthcare provider right away if any of these occur:  · Drowsiness gets worse  · Weakness or dizziness gets worse  · Repeated vomiting  · You can't be awakened   Date Last Reviewed: 10/18/2016  © 7453-6772 [employer]. [address] [place] [address], [place] [address]. All rights reserved. This information is not intended as a substitute for professional medical care. Always follow your healthcare professional's instructions.      PROBIOTICS:  Now that your colon is so cleaned out, now is a good time for a round of PROBIOTICS.  Eat a container of Greek Yogurt, such as OIKOS or CHOBANI,  Or Activia or Dannon    Greek Yogurt.    Or Take a similar Probiotic product such as Align or Culturelle or Chana-Q, every day for a month.                  (The products listed are non-prescription, but you may need to ask the pharmacist for their location.)  Repeat  this at least 5-6 times a year.      Tips to Control Acid Reflux    To control acid reflux, youll need to make some basic diet and lifestyle changes. The simple steps outlined below may be all youll need to ease discomfort.  Watch what you eat  · Avoid fatty foods and spicy foods.  · Eat fewer acidic foods, such as citrus and tomato-based foods. These can increase symptoms.  · Limit drinking alcohol, caffeine, and fizzy beverages. All increase acid reflux.  · Try limiting chocolate, peppermint, and spearmint. These can worsen acid reflux in some people.  Watch when you eat  · Avoid lying down for 3 hours after eating.  · Do not snack before going to bed.  Raise your head  Raising your head and upper body by 4 to 6 inches helps limit reflux when youre lying down. Put blocks under the head of your bed frame to raise it.  Other changes  · Lose weight, if you need to  · Dont exercise near bedtime  · Avoid tight-fitting clothes  · Limit aspirin and ibuprofen  · Stop smoking   Date Last Reviewed: 7/1/2016  © 0222-2658 [employer]. [address] [place] [address]. All rights reserved. This information is not intended as a substitute for professional medical care. Always follow your healthcare professional's instructions.        High-Fiber Diet  Fiber is in fruits, vegetables, cereals, and grains. Fiber passes through your body undigested. A high-fiber diet helps food move through your intestinal tract. The added bulk is helpful in preventing constipation. In people with diverticulosis, fiber helps clean out the pouches along the colon wall. It also prevents new pouches from forming. A high-fiber diet reduces the risk of colon cancer. It also lowers blood cholesterol and prevents high blood sugar in people with diabetes.    The fiber-rich foods listed below should be part of your diet. If you are not used to high-fiber foods, start with 1 or 2 foods from this list. Every 3 to 4 days add a new one  to your diet. Do this until you are eating 4 high-fiber foods per day. This should give you 20 to 35 grams of fiber a day. It is also important to drink a lot of water when you are on this diet. You should have 6 to 8 glasses of water a day. Water makes the fiber swell and increases the benefit.  Foods high in dietary fiber  The following foods are high in dietary fiber:  · Breads. Breads made with 100% whole-wheat flour; iván, wheat, or rye crackers; whole-grain tortillas, bran muffins.  · Cereals. Whole-grain and bran cereals with bran (shredded wheat, wheat flakes, raisin bran, corn bran); oatmeal, rolled oats, granola, and brown rice.  · Fruits. Fresh fruits and their edible skins (pears, prunes, raisins, berries, apples, and apricots); bananas, citrus fruit, mangoes, pineapple; and prune juice.  · Nuts. Any nuts and seeds.  · Vegetables. Best served raw or lightly cooked. All types, especially: green peas, celery, eggplant, potatoes, spinach, broccoli, Ramey sprouts, winter squash, carrots, cauliflower, soybeans, lentils, and fresh and dried beans of all kinds.  · Other. Popcorn, any spices.  Date Last Reviewed: 8/1/2016  © 0207-0014 [employer]. [address] [place] [address], [place] [address]. All rights reserved. This information is not intended as a substitute for professional medical care. Always follow your healthcare professional's instructions.

## 2020-05-13 NOTE — ANESTHESIA PREPROCEDURE EVALUATION
05/13/2020  Vince Chadwick III is a 70 y.o., male.    Anesthesia Evaluation    I have reviewed the Patient Summary Reports.    I have reviewed the Nursing Notes.   I have reviewed the Medications.     Review of Systems  EENT/Dental:EENT/Dental Normal   Cardiovascular:   Hypertension    Pulmonary:  Pulmonary Normal    Renal/:  Renal/ Normal     Hepatic/GI:  Hepatic/GI Normal    Musculoskeletal:   Arthritis     Neurological:   Neuromuscular Disease,    Endocrine:  Endocrine Normal    Psych:   Psychiatric History anxiety          Physical Exam  General:  Well nourished    Airway/Jaw/Neck:  Airway Findings: Mouth Opening: Normal Tongue: Normal  General Airway Assessment: Adult  Mallampati: III  Improves to II with phonation.      Dental:  Dental Findings: In tactTemporary upper implant   Chest/Lungs:  Chest/Lungs Findings: Clear to auscultation, Normal Respiratory Rate         Mental Status:  Mental Status Findings:  Cooperative, Alert and Oriented         Anesthesia Plan  Type of Anesthesia, risks & benefits discussed:  Anesthesia Type:  general  Patient's Preference:   Intra-op Monitoring Plan: standard ASA monitors  Intra-op Monitoring Plan Comments:   Post Op Pain Control Plan:   Post Op Pain Control Plan Comments:   Induction:   IV  Beta Blocker:  Patient is not currently on a Beta-Blocker (No further documentation required).       Informed Consent: Patient understands risks and agrees with Anesthesia plan.  Questions answered. Anesthesia consent signed with patient.  ASA Score: 2     Day of Surgery Review of History & Physical:            Ready For Surgery From Anesthesia Perspective.

## 2020-05-13 NOTE — [employer] PATIENT INSTRUCTIONS
Discharge Summary/Instructions for after Colonoscopy with   Biopsy/Polypectomy  Vince Chadwick    Wednesday, May 13, 2020  Davy Novak MD  RESTRICTIONS ON ACTIVITY:  - Do not drive a car or operate machinery until the day after the procedure.      - The following day: return to full activity including work.  - For  3 days: No heavy lifting, straining or running.  - Diet: You can have solid foods, but no gassy foods (i.e. beans, broccoli,   cabbage, etc).  TREATMENT FOR COMMON SIDE EFFECTS:  - Mild abdominal pain and bloating or excessive gas: rest, eat lightly and   use a heating pad.  SYMPTOMS TO WATCH FOR AND REPORT TO YOUR PHYSICIAN:  1. Severe abdominal pain.  2. Fever within 24 hours after a procedure.  3. A large amount of rectal bleeding. (A small amount of blood from the   rectum is not serious, especially if hemorrhoids are present.  3.  Because air was put into your colon during the procedure, expelling   large amounts of air from your rectum is normal.  4.  You may not have a bowel movement for 1-3 days because of the   colonoscopy prep.  This is normal.  5.  Call immediately if you notice any of the following:   Chills and/or fever over 101   Persistent vomiting   Severe abdominal pain, other than gas cramps   Severe chest pain   Black, tarry stools   Any bleeding - exceeding one tablespoon  Your doctor recommends these additional instructions:  We are waiting for your pathology results.   Eat a high fiber diet.   Take a fiber supplement, for example Citrucel, Fibercon, Konsyl or   Metamucil.   Take a PROBIOTIC, such as a carton of GREEK YOGURT (Chobani or Oikos,  or   Activia or Dannon);  or tablets of ALIGN or CULTURELLE or OLIVER-Q (all   non-prescription), every day for a month.   And repeat this at least 5-6   times a year.  Continue your present medications.   Take Bentyl (dicyclomine) 20 mg by mouth four times per day 30 minutes   before meals.   Take Imodium 2 tablets by mouth as needed  after each loose bowel movement.      Your physician has recommended a repeat colonoscopy in 7-8 years for   screening purposes.  None  If you have any questions or problems, please call your physician.  EMERGENCY PHONE NUMBER: [phone]  LAB RESULTS: Call in two (2) weeks for lab results, [phone]  ___________________________________________  Nurse Signature  ___________________________________________  Patient/Designated Responsible Party Signature  Davy Novak MD  5/13/2020 10:09:59 AM  This report has been verified and signed electronically.  [employer]

## 2020-05-14 VITALS
BODY MASS INDEX: 29.55 KG/M2 | DIASTOLIC BLOOD PRESSURE: 66 MMHG | HEART RATE: 74 BPM | HEIGHT: 68 IN | SYSTOLIC BLOOD PRESSURE: 106 MMHG | OXYGEN SATURATION: 99 % | TEMPERATURE: 98 F | RESPIRATION RATE: 18 BRPM | WEIGHT: 195 LBS

## 2020-05-15 LAB
E COLI SXT1 STL QL IA: NEGATIVE
E COLI SXT2 STL QL IA: NEGATIVE
FINAL PATHOLOGIC DIAGNOSIS: NORMAL
GROSS: NORMAL

## 2020-05-16 LAB — O+P STL MICRO: NORMAL

## 2020-05-17 LAB — BACTERIA STL CULT: NORMAL

## 2020-05-20 ENCOUNTER — OFFICE VISIT (OUTPATIENT)
Dept: FAMILY MEDICINE | Facility: CLINIC | Age: 70
End: 2020-05-20
Payer: MEDICARE

## 2020-05-20 VITALS
BODY MASS INDEX: 29.55 KG/M2 | DIASTOLIC BLOOD PRESSURE: 70 MMHG | SYSTOLIC BLOOD PRESSURE: 120 MMHG | RESPIRATION RATE: 14 BRPM | WEIGHT: 195 LBS | HEIGHT: 68 IN

## 2020-05-20 DIAGNOSIS — E78.5 HYPERLIPIDEMIA, UNSPECIFIED HYPERLIPIDEMIA TYPE: ICD-10-CM

## 2020-05-20 DIAGNOSIS — G47.00 INSOMNIA, UNSPECIFIED TYPE: ICD-10-CM

## 2020-05-20 DIAGNOSIS — F41.9 ANXIETY: ICD-10-CM

## 2020-05-20 DIAGNOSIS — J30.9 ALLERGIC RHINITIS, UNSPECIFIED SEASONALITY, UNSPECIFIED TRIGGER: ICD-10-CM

## 2020-05-20 DIAGNOSIS — Z12.5 SPECIAL SCREENING EXAMINATION FOR NEOPLASM OF PROSTATE: ICD-10-CM

## 2020-05-20 DIAGNOSIS — I10 ESSENTIAL HYPERTENSION: Primary | ICD-10-CM

## 2020-05-20 PROCEDURE — 99214 OFFICE O/P EST MOD 30 MIN: CPT | Mod: 95,,, | Performed by: FAMILY MEDICINE

## 2020-05-20 PROCEDURE — 99214 PR OFFICE/OUTPT VISIT, EST, LEVL IV, 30-39 MIN: ICD-10-PCS | Mod: 95,,, | Performed by: FAMILY MEDICINE

## 2020-05-20 RX ORDER — AZELASTINE 1 MG/ML
SPRAY, METERED NASAL
Qty: 10 ML | Refills: 2 | Status: SHIPPED | OUTPATIENT
Start: 2020-05-20 | End: 2020-08-13

## 2020-05-20 NOTE — PROGRESS NOTES
THIS DOCUMENT WAS MADE IN PART WITH VOICE RECOGNITION SOFTWARE.  OCCASIONALLY THIS SOFTWARE WILL MISINTERPRET WORDS OR PHRASES.      Vince Chadwick III  1950    Diagnoses and all orders for this visit:    Essential hypertension  -     Comprehensive metabolic panel; Future  -     CBC auto differential; Future    Hyperlipidemia, unspecified hyperlipidemia type  -     Lipid Panel; Future    Anxiety    Insomnia, unspecified type    Allergic rhinitis, unspecified seasonality, unspecified trigger  -     azelastine (ASTELIN) 137 mcg (0.1 %) nasal spray; INSTILL 2 SPRAYS IN EACH NOSTRIL TWICE A DAY    Special screening examination for neoplasm of prostate  -     PSA, Screening; Future     Everything above is stable.  Continue current medications, follow after labs in August or September    Subjective     The patient is being seen by virtual visit because of the COVID19 pandemic and necessity to reduce risk of exposure.  The patient location is:  Patient Home   The chief complaint leading to consultation is:  3 month follow-up HTN and other    Visit type: Virtual visit with synchronous audio and video (if video was not established through [employer] and [employer], then it was done through [employer] video [employer], note that audio only visits are documented differently and specifically state audio only)  Total time spent with patient:  12 min  Each patient to whom he or she provides medical services by telemedicine is:  (1) informed of the relationship between the physician and patient and the respective role of any other health care provider with respect to management of the patient; and (2) notified that he or she may decline to receive medical services by telemedicine and may withdraw from such care at any time.      HPI    Routine follow-up.  Patient reports he has been well.  No recent illnesses or other concerns.  Hypertension, chronic condition.  States home average is 110-120/70s.    Hyperlipidemia, chronic, stable on  Lipitor, due for labs in August.    Intermittent allergic rhinitis, request refill on Astelin.    Anxiety and insomnia, both are doing well continues Lexapro daily and sonata every evening.    Recent EGD and colonoscopy after gastrointestinal symptoms.  Reviewed and discussed the findings.  He is having some improvement in his symptoms.  He is on 40 mg omeprazole.    Active Ambulatory Problems     Diagnosis Date Noted    BPH (benign prostatic hypertrophy) 09/05/2012    Anxiety 09/05/2012    Hypertension     Hyperlipidemia     Lumbar disc disease     ADD (attention deficit disorder) 05/03/2013    Insomnia 04/16/2015    Lumbar stenosis 05/31/2016    DDD (degenerative disc disease), lumbar 05/31/2016    Lumbar radicular pain 05/31/2016    Lumbar facet arthropathy 05/31/2016    Lumbar radiculopathy 06/07/2016    Anemia 05/13/2020     Resolved Ambulatory Problems     Diagnosis Date Noted    No Resolved Ambulatory Problems     Past Medical History:   Diagnosis Date    ALLERGIC RHINITIS     Arthritis     Cataract     Colon polyp     Diverticulitis     Diverticulosis     Irritable bowel syndrome          Review of Systems   Constitutional: Negative for chills and fever.   HENT: Positive for postnasal drip. Negative for ear pain, rhinorrhea and sore throat.    Respiratory: Positive for cough. Negative for shortness of breath and wheezing.    Cardiovascular: Negative for chest pain.   Musculoskeletal: Negative for myalgias.   Skin: Negative for rash.   Allergic/Immunologic: Positive for environmental allergies.   Neurological: Negative for headaches.       Objective     Physical Exam   Constitutional: He is oriented to person, place, and time. He appears well-developed and well-nourished.  Non-toxic appearance. He does not have a sickly appearance. He does not appear ill. No distress.   HENT:   Head: Normocephalic and atraumatic.   Eyes: No scleral icterus.   Pulmonary/Chest: Effort normal. No accessory  "muscle usage. No tachypnea and no bradypnea. No respiratory distress.   Neurological: He is alert and oriented to person, place, and time.   Psychiatric: His behavior is normal. Thought content normal. His mood appears not anxious. His affect is not inappropriate. His speech is not rapid and/or pressured, not delayed, not tangential and not slurred. He is not agitated, not hyperactive and not combative. Thought content is not delusional. He is attentive.     Physical exam limited as this was a virtual visit.  But it is apparent that the individual is in no acute distress, well groomed, well kept.  Speech was normal.  Patient is alert and oriented x3.  Mood and affect appear normal.      Vitals:    05/20/20 1155   BP: 120/70   Resp: 14   Weight: 88.5 kg (195 lb)   Height: 5' 8" (1.727 m)           "

## 2020-07-24 ENCOUNTER — OFFICE VISIT (OUTPATIENT)
Dept: FAMILY MEDICINE | Facility: CLINIC | Age: 70
End: 2020-07-24
Payer: MEDICARE

## 2020-07-24 VITALS
RESPIRATION RATE: 18 BRPM | DIASTOLIC BLOOD PRESSURE: 60 MMHG | HEART RATE: 68 BPM | HEIGHT: 68 IN | SYSTOLIC BLOOD PRESSURE: 108 MMHG | WEIGHT: 198 LBS | BODY MASS INDEX: 30.01 KG/M2 | TEMPERATURE: 97 F

## 2020-07-24 DIAGNOSIS — H53.412 VISUAL FIELD SCOTOMA OF LEFT EYE: ICD-10-CM

## 2020-07-24 DIAGNOSIS — E78.5 HYPERLIPIDEMIA, UNSPECIFIED HYPERLIPIDEMIA TYPE: ICD-10-CM

## 2020-07-24 DIAGNOSIS — I10 ESSENTIAL HYPERTENSION: Primary | ICD-10-CM

## 2020-07-24 DIAGNOSIS — I95.1 ORTHOSTATIC HYPOTENSION: ICD-10-CM

## 2020-07-24 DIAGNOSIS — R09.89 OTHER SPECIFIED SYMPTOMS AND SIGNS INVOLVING THE CIRCULATORY AND RESPIRATORY SYSTEMS: ICD-10-CM

## 2020-07-24 PROCEDURE — 99999 PR PBB SHADOW E&M-EST. PATIENT-LVL V: ICD-10-PCS | Mod: PBBFAC,,, | Performed by: FAMILY MEDICINE

## 2020-07-24 PROCEDURE — 99214 OFFICE O/P EST MOD 30 MIN: CPT | Mod: S$PBB,,, | Performed by: FAMILY MEDICINE

## 2020-07-24 PROCEDURE — 99214 PR OFFICE/OUTPT VISIT, EST, LEVL IV, 30-39 MIN: ICD-10-PCS | Mod: S$PBB,,, | Performed by: FAMILY MEDICINE

## 2020-07-24 PROCEDURE — 99999 PR PBB SHADOW E&M-EST. PATIENT-LVL V: CPT | Mod: PBBFAC,,, | Performed by: FAMILY MEDICINE

## 2020-07-24 PROCEDURE — 99215 OFFICE O/P EST HI 40 MIN: CPT | Mod: PBBFAC,PN | Performed by: FAMILY MEDICINE

## 2020-07-24 RX ORDER — ZALEPLON 10 MG/1
10 CAPSULE ORAL NIGHTLY
Qty: 90 CAPSULE | Refills: 1 | Status: SHIPPED | OUTPATIENT
Start: 2020-07-24 | End: 2021-01-19 | Stop reason: SDUPTHER

## 2020-07-24 NOTE — PROGRESS NOTES
THIS DOCUMENT WAS MADE IN PART WITH VOICE RECOGNITION SOFTWARE.  OCCASIONALLY THIS SOFTWARE WILL MISINTERPRET WORDS OR PHRASES.      Vince Chadwick III  1950    Vince was seen today for dizziness.    Diagnoses and all orders for this visit:    Essential hypertension  -     US Carotid Bilateral; Future    Hyperlipidemia, unspecified hyperlipidemia type  -     US Carotid Bilateral; Future    Orthostatic hypotension  -     US Carotid Bilateral; Future    Visual field scotoma of left eye  -     US Carotid Bilateral; Future    Other specified symptoms and signs involving the circulatory and respiratory systems   -     US Carotid Bilateral; Future    continues on asa daily, taking one excedrin daily for back pain  So I will add an 81 mg aspirin at this time on top of what he is taking but down the line we may want to look at reducing daily aspirin doses because of bleeding risk.    Adequate fluid intake, hold benazepril for now, monitor blood pressure.    If symptoms do not resolve with temporary holding his benazepril and if no major obstruction on the carotid ultrasound may need to consider Neurology consultation, following back with Ophthalmology, and even CNS imaging.  But as of right now symptoms shortly after standing with additional lightheaded symptoms so I suspect this is orthostatic hypotension     Subjective     Chief Complaint   Patient presents with    Dizziness     patient reports dizziness x 6 month with episodes of visual disturbance       HPI    Lightheaded, usually evening, after getting up from chair.  Will feel pulsing in the head but no headache.  Reports the symptoms have been going on for more than six months intermittently.  But recently has noticed two episodes with vision changes.  Describes cold wavy lines in the left by.  Each episode resolved within a few minutes when the lightheadedness resolved.  Usually lasts a few minutes  BP has been normal (120s/70)  Has checked BP after  episode not during  Does walk up to a mile, no trouble walking  No vertigo/spinning  No CP, no SOB  Has had congestion, sinus pressure, no fever, no drainage    benazapril around 8 pm  flomax around 10 pm  Symptoms are most often in the evening not necessarily after taking either of those medications.  The sonata is taken immediately before bedtime    Active Ambulatory Problems     Diagnosis Date Noted    BPH (benign prostatic hypertrophy) 09/05/2012    Anxiety 09/05/2012    Hypertension     Hyperlipidemia     Lumbar disc disease     ADD (attention deficit disorder) 05/03/2013    Insomnia 04/16/2015    Lumbar stenosis 05/31/2016    DDD (degenerative disc disease), lumbar 05/31/2016    Lumbar radicular pain 05/31/2016    Lumbar facet arthropathy 05/31/2016    Lumbar radiculopathy 06/07/2016    Anemia 05/13/2020     Resolved Ambulatory Problems     Diagnosis Date Noted    No Resolved Ambulatory Problems     Past Medical History:   Diagnosis Date    ALLERGIC RHINITIS     Arthritis     Cataract     Colon polyp     Diverticulitis     Diverticulosis     Irritable bowel syndrome          Review of Systems   Constitutional: Negative for activity change.   HENT: Negative for hearing loss, rhinorrhea and trouble swallowing.    Eyes: Positive for visual disturbance. Negative for discharge.   Respiratory: Negative for chest tightness and wheezing.    Cardiovascular: Negative for chest pain and palpitations.   Gastrointestinal: Negative for constipation, diarrhea and vomiting.   Genitourinary: Negative for difficulty urinating and hematuria.   Neurological: Positive for light-headedness. Negative for headaches.   Psychiatric/Behavioral: Negative for dysphoric mood.       Objective     Physical Exam  Vitals signs reviewed.   Constitutional:       General: He is not in acute distress.     Appearance: He is well-developed. He is not diaphoretic.   HENT:      Head: Normocephalic and atraumatic.      Right Ear:  Tympanic membrane, ear canal and external ear normal. There is no impacted cerumen.      Left Ear: Tympanic membrane, ear canal and external ear normal. There is no impacted cerumen.      Nose: Mucosal edema present.      Mouth/Throat:      Lips: Pink.      Mouth: Mucous membranes are moist. No oral lesions.      Pharynx: Oropharynx is clear. Uvula midline.   Eyes:      General: Lids are normal. No scleral icterus.     Extraocular Movements: Extraocular movements intact.      Right eye: Normal extraocular motion and no nystagmus.      Left eye: Normal extraocular motion and no nystagmus.      Conjunctiva/sclera:      Right eye: Right conjunctiva is not injected. No hemorrhage.     Left eye: Left conjunctiva is not injected. No hemorrhage.     Pupils: Pupils are equal, round, and reactive to light. Pupils are equal.      Funduscopic exam:     Right eye: No hemorrhage, exudate or papilledema.         Left eye: No hemorrhage, exudate or papilledema.   Neck:      Musculoskeletal: Normal range of motion and neck supple.   Cardiovascular:      Rate and Rhythm: Normal rate and regular rhythm.      Heart sounds: Normal heart sounds. No murmur. No gallop.    Pulmonary:      Effort: Pulmonary effort is normal. No respiratory distress.   Skin:     General: Skin is warm and dry.   Neurological:      Mental Status: He is alert and oriented to person, place, and time.      Cranial Nerves: No cranial nerve deficit, dysarthria or facial asymmetry.      Motor: Motor function is intact. No weakness, tremor or abnormal muscle tone.      Coordination: Romberg sign negative. Coordination normal. Finger-Nose-Finger Test and Heel to Shin Test normal. Rapid alternating movements normal.      Deep Tendon Reflexes: Reflexes are normal and symmetric.      Reflex Scores:       Tricep reflexes are 2+ on the right side and 2+ on the left side.       Bicep reflexes are 2+ on the right side and 2+ on the left side.       Brachioradialis reflexes  "are 2+ on the right side and 2+ on the left side.       Patellar reflexes are 2+ on the right side and 2+ on the left side.       Achilles reflexes are 2+ on the right side and 2+ on the left side.  Psychiatric:         Behavior: Behavior normal.       Vitals:    20 0808 20 0824   BP: 106/60 108/60   BP Location: Left arm    Patient Position: Sitting    BP Method: Medium (Manual)    Pulse: 68    Resp: 18    Temp: 97.2 °F (36.2 °C)    TempSrc: Temporal    Weight: 89.8 kg (197 lb 15.6 oz)    Height: 5' 8" (1.727 m)    sittin/60 standing 112/64    MOST RECENT LABS IN OUR ELECTRONIC MEDICAL RECORD:     Results for orders placed or performed during the hospital encounter of 20   Stool culture    Specimen: Stool   Result Value Ref Range    Stool Culture       No Salmonella,Shigella,Vibrio,Campylobacter,Yersinia isolated.   Clostridium difficile EIA    Specimen: Stool   Result Value Ref Range    C. diff Antigen Negative Negative    C difficile Toxins A+B, EIA Negative Negative   E. coli 0157 antigen    Specimen: Stool   Result Value Ref Range    Shiga Toxin 1 E.coli Negative     Shiga Toxin 2 E.coli Negative    CBC auto differential   Result Value Ref Range    WBC 5.66 3.90 - 12.70 K/uL    RBC 5.19 4.60 - 6.20 M/uL    Hemoglobin 14.3 14.0 - 18.0 g/dL    Hematocrit 46.8 40.0 - 54.0 %    Mean Corpuscular Volume 90 82 - 98 fL    Mean Corpuscular Hemoglobin 27.6 27.0 - 31.0 pg    Mean Corpuscular Hemoglobin Conc 30.6 (L) 32.0 - 36.0 g/dL    RDW 14.4 11.5 - 14.5 %    Platelets 202 150 - 350 K/uL    MPV 12.8 9.2 - 12.9 fL    Immature Granulocytes 0.2 0.0 - 0.5 %    Gran # (ANC) 3.7 1.8 - 7.7 K/uL    Immature Grans (Abs) 0.01 0.00 - 0.04 K/uL    Lymph # 1.3 1.0 - 4.8 K/uL    Mono # 0.5 0.3 - 1.0 K/uL    Eos # 0.1 0.0 - 0.5 K/uL    Baso # 0.06 0.00 - 0.20 K/uL    nRBC 0 0 /100 WBC    Gran% 64.8 38.0 - 73.0 %    Lymph% 22.8 18.0 - 48.0 %    Mono% 9.0 4.0 - 15.0 %    Eosinophil% 2.1 0.0 - 8.0 %    Basophil% " "1.1 0.0 - 1.9 %    Differential Method Automated    Iron and TIBC   Result Value Ref Range    Iron 106 45 - 160 ug/dL    Transferrin 225 200 - 375 mg/dL    TIBC 333 250 - 450 ug/dL    Saturated Iron 32 20 - 50 %   Ferritin   Result Value Ref Range    Ferritin 32 20.0 - 300.0 ng/mL   Stool Exam-Ova,Cysts,Parasites   Result Value Ref Range    Stool Exam-Ova,Cysts,Parasites FINAL 05/16/2020 1153    POCT H. pylori   Result Value Ref Range    H. pylori Index Value Negative Negative   Specimen to Pathology, Surgery Gastrointestinal tract   Result Value Ref Range    Final Pathologic Diagnosis       1.  FRAGMENTS OF BENIGN SQUAMOUS AND GASTRIC TYPE MUCOSA WITH NO ACTIVE  INFLAMMATION, EVIDENCE OF SPECIALIZED EPITHELIUM OR DYSPLASIA IDENTIFIED.  FRAGMENTS OF SQUAMOUS MUCOSA ARE HYPERPLASTIC AND SHOW APPROXIMATELY 25  EOSINOPHILS PER HIGH-POWER FIELD.  2.  FRAGMENTS OF NONNEOPLASTIC GASTRIC MUCOSA WITH NO ACTIVE INFLAMMATION,  EVIDENCE OF H PYLORI OR DYSPLASIA IDENTIFIED.  3.  FRAGMENTS OF NONNEOPLASTIC GASTRIC MUCOSA WITH NO ACTIVE INFLAMMATION,  EVIDENCE OF H PYLORI OR DYSPLASIA IDENTIFIED.  4.  FRAGMENTS OF NONNEOPLASTIC SMALL INTESTINAL MUCOSA WITH NO ACTIVE  INFLAMMATION, ULCERATION OR DYSPLASIA IDENTIFIED.  5.  FRAGMENTS OF NONNEOPLASTIC SMALL INTESTINAL MUCOSA WITH NO ACTIVE  INFLAMMATION, ULCERATION OR DYSPLASIA IDENTIFIED.  THERE IS PRESERVATION OF  THE VILLOUS ARCHITECTURE.  6-9.  FRAGMENTS OF NONNEOPLASTIC COLONIC MUCOSA WITH NO ACTIVE INFLAMMATION,  EVIDENCE OF MICROSCOPIC COLITIS OR DYSPLASIA IDENTIFIED.  THERE IS FOCAL  VASCULAR CONGESTION PRESENT IN THE LAMINA PROPRIA .      Gross       Patient ID/Pathology ID:  [id number]  Received in 9 parts.  Part 1  Pathology ID [id number]  In formalin, labeled "BG junction in ", are multiple soft pink tissue  fragments that range from 2-3 mm.  Entirely submitted in cassette [id number].  Part 2  Pathology ID [id number]  In formalin, labeled "greater curve gastritis", are 5 soft " "irregular pink  tissue fragments that measure 3 mm each.  Entirely submitted in cassette  [id number] A.  Part 3  Pathology ID [id number]  In formalin, labeled "pre-pyloric gastritis", are multiple soft pink tissue  fragments that range from 2-4 mm.  Entirely submitted in cassette [id number].  Part 4  Pathology ID [id number]  In formalin, labeled "duodenal bulb", are multiple soft pink tissue fragments  that range from 2-3 mm.  Entirely submitted in cassette [id number] A.  Part 5  Pathology ID [id number]  In formalin, labeled "random duodenum and ", are 5 soft pink tissue fragments  that range from 2-3 mm.  Entirely submitted in cassette [id number] A.  Part 6  Pathology ID [id number]  In  rmalin, labeled "random cecum and right colon", are 5 soft pink tissue  fragments that range from 2-3 mm.  Entirely submitted in cassette [id number].  Part 7  Pathology ID  [id number]  In formalin, labeled "random left colon", are multiple soft pink tissue  fragments that range from 2-3 mm.  Entirely submitted in cassette [id number].  Part 8  Pathology ID [id number]  In formalin, labeled "sigmoid petechia ", are multiple soft pink hemorrhagic  tissue fragments that range from 2-3 mm.  Entirely submitted in cassette  [id number].  Part 9  Pathology ID [id number]  In formalin, labeled "random rectum", are 5 soft pink tissue fragments that  measure 2 mm each.  Entirely submitted in cassette [id number].  Dante Mane             "

## 2020-07-29 ENCOUNTER — HOSPITAL ENCOUNTER (OUTPATIENT)
Dept: RADIOLOGY | Facility: HOSPITAL | Age: 70
Discharge: HOME | End: 2020-07-29
Attending: FAMILY MEDICINE
Payer: MEDICARE

## 2020-07-29 DIAGNOSIS — I95.1 ORTHOSTATIC HYPOTENSION: ICD-10-CM

## 2020-07-29 DIAGNOSIS — R09.89 OTHER SPECIFIED SYMPTOMS AND SIGNS INVOLVING THE CIRCULATORY AND RESPIRATORY SYSTEMS: ICD-10-CM

## 2020-07-29 DIAGNOSIS — H53.412 VISUAL FIELD SCOTOMA OF LEFT EYE: ICD-10-CM

## 2020-07-29 DIAGNOSIS — I10 ESSENTIAL HYPERTENSION: ICD-10-CM

## 2020-07-29 DIAGNOSIS — E78.5 HYPERLIPIDEMIA, UNSPECIFIED HYPERLIPIDEMIA TYPE: ICD-10-CM

## 2020-07-29 PROCEDURE — 93880 EXTRACRANIAL BILAT STUDY: CPT | Mod: TC,PO

## 2020-07-29 PROCEDURE — 93880 EXTRACRANIAL BILAT STUDY: CPT | Mod: 26,,, | Performed by: RADIOLOGY

## 2020-07-29 PROCEDURE — 93880 US CAROTID BILATERAL: ICD-10-PCS | Mod: 26,,, | Performed by: RADIOLOGY

## 2020-08-20 ENCOUNTER — LAB VISIT (OUTPATIENT)
Dept: LAB | Facility: HOSPITAL | Age: 70
End: 2020-08-20
Attending: FAMILY MEDICINE
Payer: MEDICARE

## 2020-08-20 DIAGNOSIS — Z12.5 SPECIAL SCREENING EXAMINATION FOR NEOPLASM OF PROSTATE: ICD-10-CM

## 2020-08-20 DIAGNOSIS — E78.5 HYPERLIPIDEMIA, UNSPECIFIED HYPERLIPIDEMIA TYPE: ICD-10-CM

## 2020-08-20 DIAGNOSIS — I10 ESSENTIAL HYPERTENSION: ICD-10-CM

## 2020-08-20 LAB
ALBUMIN SERPL BCP-MCNC: 3.9 G/DL (ref 3.5–5.2)
ALP SERPL-CCNC: 72 U/L (ref 55–135)
ALT SERPL W/O P-5'-P-CCNC: 6 U/L (ref 10–44)
ANION GAP SERPL CALC-SCNC: 7 MMOL/L (ref 8–16)
AST SERPL-CCNC: 19 U/L (ref 10–40)
BASOPHILS # BLD AUTO: 0.07 K/UL (ref 0–0.2)
BASOPHILS NFR BLD: 1.2 % (ref 0–1.9)
BILIRUB SERPL-MCNC: 0.6 MG/DL (ref 0.1–1)
BUN SERPL-MCNC: 13 MG/DL (ref 8–23)
CALCIUM SERPL-MCNC: 9.5 MG/DL (ref 8.7–10.5)
CHLORIDE SERPL-SCNC: 106 MMOL/L (ref 95–110)
CHOLEST SERPL-MCNC: 136 MG/DL (ref 120–199)
CHOLEST/HDLC SERPL: 2.5 {RATIO} (ref 2–5)
CO2 SERPL-SCNC: 28 MMOL/L (ref 23–29)
COMPLEXED PSA SERPL-MCNC: 1.9 NG/ML (ref 0–4)
CREAT SERPL-MCNC: 1.1 MG/DL (ref 0.5–1.4)
DIFFERENTIAL METHOD: ABNORMAL
EOSINOPHIL # BLD AUTO: 0.2 K/UL (ref 0–0.5)
EOSINOPHIL NFR BLD: 3.8 % (ref 0–8)
ERYTHROCYTE [DISTWIDTH] IN BLOOD BY AUTOMATED COUNT: 14.1 % (ref 11.5–14.5)
EST. GFR  (AFRICAN AMERICAN): >60 ML/MIN/1.73 M^2
EST. GFR  (NON AFRICAN AMERICAN): >60 ML/MIN/1.73 M^2
GLUCOSE SERPL-MCNC: 103 MG/DL (ref 70–110)
HCT VFR BLD AUTO: 46.9 % (ref 40–54)
HDLC SERPL-MCNC: 55 MG/DL (ref 40–75)
HDLC SERPL: 40.4 % (ref 20–50)
HGB BLD-MCNC: 14.3 G/DL (ref 14–18)
IMM GRANULOCYTES # BLD AUTO: 0.02 K/UL (ref 0–0.04)
IMM GRANULOCYTES NFR BLD AUTO: 0.3 % (ref 0–0.5)
LDLC SERPL CALC-MCNC: 62.6 MG/DL (ref 63–159)
LYMPHOCYTES # BLD AUTO: 2 K/UL (ref 1–4.8)
LYMPHOCYTES NFR BLD: 32.3 % (ref 18–48)
MCH RBC QN AUTO: 28 PG (ref 27–31)
MCHC RBC AUTO-ENTMCNC: 30.5 G/DL (ref 32–36)
MCV RBC AUTO: 92 FL (ref 82–98)
MONOCYTES # BLD AUTO: 0.5 K/UL (ref 0.3–1)
MONOCYTES NFR BLD: 8.1 % (ref 4–15)
NEUTROPHILS # BLD AUTO: 3.3 K/UL (ref 1.8–7.7)
NEUTROPHILS NFR BLD: 54.3 % (ref 38–73)
NONHDLC SERPL-MCNC: 81 MG/DL
NRBC BLD-RTO: 0 /100 WBC
PLATELET # BLD AUTO: 203 K/UL (ref 150–350)
PMV BLD AUTO: 12.1 FL (ref 9.2–12.9)
POTASSIUM SERPL-SCNC: 4.2 MMOL/L (ref 3.5–5.1)
PROT SERPL-MCNC: 7 G/DL (ref 6–8.4)
RBC # BLD AUTO: 5.11 M/UL (ref 4.6–6.2)
SODIUM SERPL-SCNC: 141 MMOL/L (ref 136–145)
TRIGL SERPL-MCNC: 92 MG/DL (ref 30–150)
WBC # BLD AUTO: 6.07 K/UL (ref 3.9–12.7)

## 2020-08-20 PROCEDURE — 84153 ASSAY OF PSA TOTAL: CPT

## 2020-08-20 PROCEDURE — 36415 COLL VENOUS BLD VENIPUNCTURE: CPT | Mod: PN

## 2020-08-20 PROCEDURE — 85025 COMPLETE CBC W/AUTO DIFF WBC: CPT

## 2020-08-20 PROCEDURE — 80053 COMPREHEN METABOLIC PANEL: CPT

## 2020-08-20 PROCEDURE — 80061 LIPID PANEL: CPT

## 2020-08-25 ENCOUNTER — OFFICE VISIT (OUTPATIENT)
Dept: GASTROENTEROLOGY | Facility: CLINIC | Age: 70
End: 2020-08-25
Payer: MEDICARE

## 2020-08-25 VITALS — WEIGHT: 195.75 LBS | BODY MASS INDEX: 29.67 KG/M2 | HEIGHT: 68 IN

## 2020-08-25 DIAGNOSIS — K57.90 DIVERTICULOSIS: ICD-10-CM

## 2020-08-25 DIAGNOSIS — K58.9 IRRITABLE BOWEL SYNDROME, UNSPECIFIED TYPE: ICD-10-CM

## 2020-08-25 DIAGNOSIS — K21.9 GASTROESOPHAGEAL REFLUX DISEASE, ESOPHAGITIS PRESENCE NOT SPECIFIED: Primary | ICD-10-CM

## 2020-08-25 PROCEDURE — 99213 OFFICE O/P EST LOW 20 MIN: CPT | Mod: S$PBB,,, | Performed by: INTERNAL MEDICINE

## 2020-08-25 PROCEDURE — 99999 PR PBB SHADOW E&M-EST. PATIENT-LVL II: ICD-10-PCS | Mod: PBBFAC,,, | Performed by: INTERNAL MEDICINE

## 2020-08-25 PROCEDURE — 99213 PR OFFICE/OUTPT VISIT, EST, LEVL III, 20-29 MIN: ICD-10-PCS | Mod: S$PBB,,, | Performed by: INTERNAL MEDICINE

## 2020-08-25 PROCEDURE — 99212 OFFICE O/P EST SF 10 MIN: CPT | Mod: PBBFAC,PO | Performed by: INTERNAL MEDICINE

## 2020-08-25 PROCEDURE — 99999 PR PBB SHADOW E&M-EST. PATIENT-LVL II: CPT | Mod: PBBFAC,,, | Performed by: INTERNAL MEDICINE

## 2020-08-25 NOTE — PROGRESS NOTES
Subjective:       Patient ID: Lalita Chadwick III is a 70 y.o. male.    Chief Complaint: Follow-up    Mr. Cahdwick is here for f/u, after recent endoscopies (see below).  His blood count has recovered nicely.  And, overall, he feels well.  No signif reflux episodes.  He does report that occasionally he has some episodes of rather intense nausea and cramping.  And that he takes Imodium (a half of a pill) to help settle it down.  (And he is already taking the Bentyl).  In the past, he used to take Metamucil, and thinks he had fewer of these episodes.  He's not real sure why he stopped the Metamucil.      Results for LALITA CHADWICK III (MRN [id number]) as of 8/25/2020 15:14  1/2/2020 09:28  Hemoglobin: 12.8 (L)  Hematocrit: 41.0  MCV: 92  5/13/2020 09:51  Hemoglobin: 14.3  Hematocrit: 46.8  MCV: 90  8/20/2020 08:33  Hemoglobin: 14.3  Hematocrit: 46.9  MCV: 92      EGD 5/13/2020:  Impression:  - Normal oropharynx.                        - Normal upper third of esophagus and middle third of esophagus.                        - LA Grade A reflux esophagitis. Biopsied.                        - Z-line regular, 40 cm from the incisors.                        - Normal cardia.                        - Gastritis/antritis. Biopsied.                        - Normal stomach otherwise.                        - Normal pylorus.                        - Erythematous duodenopathy. Biopsied.                        - Normal examined duodenum otherwise.   Recommendation:      - Perform a colonoscopy as previously scheduled.                        - Discharge patient to home after that.                        - Await pathology and CLOtest results.                        - Follow an antireflux regimen.                        - Use Protonix (pantoprazole) 40 mg PO daily.                        - Call the G.I. clinic in 2 weeks for reports (if you haven't heard from us sooner) [phone].                        - Return to GI clinic in 3  months, with repeat CBC then.   Davy Novak MD   5/13/2020     Colon 5/13/2020:  Impression:  - Diverticulosis in the sigmoid colon.                        - Significant colonic spasm consistent with irritable bowel syndrome.                        - Erythematous mucosa in the sigmoid colon. Biopsied.                        - Non-bleeding internal hemorrhoids.                        - Redundant colon.                        - The examination was otherwise normal.                        - Fluid aspiration performed.                        - Biopsied.   Recommendation:      - Discharge patient to home.                        - Await pathology and culture results.                        - High fiber diet.                        - Use fiber, for example Citrucel, Fibercon, Konsyl or Metamucil.                        - Take a PROBIOTIC, such as a carton of GREEK YOGURT (Chobani or Oikos, or Activia or Dannon); or tablets                        of ALIGN or CULTURELLE or OLIVER-Q (all non-prescription), every day for a month.                        - Continue present medications.                        - Use Bentyl (dicyclomine) 20 mg PO QID 30 min AC.                        - Imodium 2 tablets PO PRN after loose bowel movement.                        - Call the G.I. clinic in 2 weeks for reports (if you haven't heard from us sooner) [phone].                        - Repeat colonoscopy in 7 years for screening purposes.                        - Return to normal activities tomorrow.   Davy Novak MD   5/13/2020     1.  FRAGMENTS OF BENIGN SQUAMOUS AND GASTRIC TYPE MUCOSA WITH NO ACTIVE   INFLAMMATION, EVIDENCE OF SPECIALIZED EPITHELIUM OR DYSPLASIA IDENTIFIED.   FRAGMENTS OF SQUAMOUS MUCOSA ARE HYPERPLASTIC AND SHOW APPROXIMATELY 25   EOSINOPHILS PER HIGH-POWER FIELD.   2.  FRAGMENTS OF NONNEOPLASTIC GASTRIC MUCOSA WITH NO ACTIVE INFLAMMATION,   EVIDENCE OF H PYLORI OR DYSPLASIA IDENTIFIED.   3.   FRAGMENTS OF NONNEOPLASTIC GASTRIC MUCOSA WITH NO ACTIVE INFLAMMATION,   EVIDENCE OF H PYLORI OR DYSPLASIA IDENTIFIED.   4.  FRAGMENTS OF NONNEOPLASTIC SMALL INTESTINAL MUCOSA WITH NO ACTIVE   INFLAMMATION, ULCERATION OR DYSPLASIA IDENTIFIED.   5.  FRAGMENTS OF NONNEOPLASTIC SMALL INTESTINAL MUCOSA WITH NO ACTIVE   INFLAMMATION, ULCERATION OR DYSPLASIA IDENTIFIED.  THERE IS PRESERVATION OF   THE VILLOUS ARCHITECTURE.   6-9.  FRAGMENTS OF NONNEOPLASTIC COLONIC MUCOSA WITH NO ACTIVE INFLAMMATION,   EVIDENCE OF MICROSCOPIC COLITIS OR DYSPLASIA IDENTIFIED.  THERE IS FOCAL   VASCULAR CONGESTION PRESENT IN THE LAMINA PROPRIA.     Review of Systems   Constitutional: Negative for appetite change, fever and unexpected weight change.   HENT: Negative.  Negative for mouth sores, sore throat and trouble swallowing.    Eyes: Negative.    Respiratory: Negative.  Negative for cough and choking.    Cardiovascular: Negative.  Negative for chest pain and leg swelling.   Gastrointestinal: Positive for abdominal distention (Gas pains and cramps.), abdominal pain (Gas pains and cramps.) and nausea. Negative for anal bleeding, blood in stool, constipation, diarrhea and vomiting.   Genitourinary: Negative.    Musculoskeletal: Negative.    Integumentary:  Negative for color change and rash. Negative.   Neurological: Negative.    Hematological: Negative for adenopathy.   Psychiatric/Behavioral: Negative.          Objective:      Physical Exam  Vitals signs and nursing note reviewed.   Constitutional:       Appearance: He is well-developed.   HENT:      Mouth/Throat:      Pharynx: No oropharyngeal exudate.   Eyes:      General: No scleral icterus.  Neck:      Thyroid: No thyromegaly.      Trachea: No tracheal deviation.   Cardiovascular:      Rate and Rhythm: Normal rate and regular rhythm.      Heart sounds: Normal heart sounds.   Pulmonary:      Effort: Pulmonary effort is normal.      Breath sounds: Normal breath sounds.   Abdominal:       General: Bowel sounds are normal. There is no distension.      Palpations: Abdomen is soft. There is no mass.      Tenderness: There is abdominal tenderness (Minimally tender epig area.). There is no guarding or rebound.   Lymphadenopathy:      Cervical: No cervical adenopathy.   Skin:     General: Skin is warm and dry.      Findings: No rash.   Neurological:      Mental Status: He is alert and oriented to person, place, and time.         Assessment:         Gastroesophageal reflux disease, esophagitis presence not specified    Diverticulosis    Irritable bowel syndrome, unspecified type      Plan:        1. Cont meds the same.   2. Can take a whole Imodium for these episodes.   3. Resume the Metamucil.

## 2020-08-31 ENCOUNTER — OFFICE VISIT (OUTPATIENT)
Dept: FAMILY MEDICINE | Facility: CLINIC | Age: 70
End: 2020-08-31
Payer: MEDICARE

## 2020-08-31 VITALS
HEIGHT: 68 IN | OXYGEN SATURATION: 96 % | WEIGHT: 194.56 LBS | HEART RATE: 89 BPM | TEMPERATURE: 98 F | BODY MASS INDEX: 29.49 KG/M2 | SYSTOLIC BLOOD PRESSURE: 100 MMHG | DIASTOLIC BLOOD PRESSURE: 62 MMHG

## 2020-08-31 DIAGNOSIS — I10 ESSENTIAL HYPERTENSION: Primary | ICD-10-CM

## 2020-08-31 DIAGNOSIS — R42 DIZZINESS: ICD-10-CM

## 2020-08-31 DIAGNOSIS — E78.5 HYPERLIPIDEMIA, UNSPECIFIED HYPERLIPIDEMIA TYPE: ICD-10-CM

## 2020-08-31 PROCEDURE — 99214 OFFICE O/P EST MOD 30 MIN: CPT | Mod: PBBFAC,PN | Performed by: FAMILY MEDICINE

## 2020-08-31 PROCEDURE — 99214 PR OFFICE/OUTPT VISIT, EST, LEVL IV, 30-39 MIN: ICD-10-PCS | Mod: S$PBB,,, | Performed by: FAMILY MEDICINE

## 2020-08-31 PROCEDURE — 99999 PR PBB SHADOW E&M-EST. PATIENT-LVL IV: CPT | Mod: PBBFAC,,, | Performed by: FAMILY MEDICINE

## 2020-08-31 PROCEDURE — 99999 PR PBB SHADOW E&M-EST. PATIENT-LVL IV: ICD-10-PCS | Mod: PBBFAC,,, | Performed by: FAMILY MEDICINE

## 2020-08-31 PROCEDURE — 99214 OFFICE O/P EST MOD 30 MIN: CPT | Mod: S$PBB,,, | Performed by: FAMILY MEDICINE

## 2020-08-31 RX ORDER — BENAZEPRIL HYDROCHLORIDE 20 MG/1
10 TABLET ORAL DAILY
Start: 2020-08-31 | End: 2020-12-28

## 2020-08-31 NOTE — PROGRESS NOTES
THIS DOCUMENT WAS MADE IN PART WITH VOICE RECOGNITION SOFTWARE.  OCCASIONALLY THIS SOFTWARE WILL MISINTERPRET WORDS OR PHRASES.      Vince Chadwick III  1950    Vince was seen today for follow-up.    Diagnoses and all orders for this visit:    Essential hypertension    Dizziness    BP low today, will reduce benazepril to 10 mg (1/2 of the 20 mg)  BP was too high off the benazepril.  I think he is having orthostatic hypotension which may still persist even with a lower dosage so he should take orthostatic precautions, drink adequate fluid cetera.  He will notify me in a few weeks if the average blood pressure increases to high.  Otherwise I will see him back in 3-4 months.    Appointment duration greater than 25 min., more than 50% face-to-face counseling    Subjective     Chief Complaint   Patient presents with    Follow-up     dizziness       HPI    Reported dizziness, mostly lightheaded, orthostatic sx  Held lotensin, maybe mild improvement  But BP increase and HA, back on now        Active Ambulatory Problems     Diagnosis Date Noted    BPH (benign prostatic hypertrophy) 09/05/2012    Anxiety 09/05/2012    Hypertension     Hyperlipidemia     Lumbar disc disease     ADD (attention deficit disorder) 05/03/2013    Insomnia 04/16/2015    Lumbar stenosis 05/31/2016    DDD (degenerative disc disease), lumbar 05/31/2016    Lumbar radicular pain 05/31/2016    Lumbar facet arthropathy 05/31/2016    Lumbar radiculopathy 06/07/2016    Anemia 05/13/2020    Diverticulosis     Irritable bowel syndrome      Resolved Ambulatory Problems     Diagnosis Date Noted    No Resolved Ambulatory Problems     Past Medical History:   Diagnosis Date    ALLERGIC RHINITIS     Arthritis     Cataract     Colon polyp     Diverticulitis          Review of Systems   Respiratory: Negative.    Cardiovascular: Negative.    Gastrointestinal: Negative.    Genitourinary: Negative.    Neurological: Positive for dizziness and  "light-headedness.       Objective     Physical Exam  Vitals signs reviewed.   Constitutional:       General: He is not in acute distress.     Appearance: He is well-developed. He is not diaphoretic.   HENT:      Head: Normocephalic and atraumatic.   Eyes:      General: No scleral icterus.  Neck:      Musculoskeletal: Normal range of motion and neck supple.   Cardiovascular:      Rate and Rhythm: Normal rate and regular rhythm.      Heart sounds: Normal heart sounds. No murmur. No gallop.    Pulmonary:      Effort: Pulmonary effort is normal. No respiratory distress.   Skin:     General: Skin is warm and dry.   Neurological:      Mental Status: He is alert and oriented to person, place, and time.      Deep Tendon Reflexes: Reflexes are normal and symmetric.   Psychiatric:         Behavior: Behavior normal.       Vitals:    08/31/20 1401   BP: 100/70   BP Location: Right arm   Patient Position: Sitting   BP Method: Medium (Manual)   Pulse: 89   Temp: 97.9 °F (36.6 °C)   TempSrc: Skin   SpO2: 96%   Weight: 88.3 kg (194 lb 8.9 oz)   Height: 5' 8" (1.727 m)       MOST RECENT LABS IN OUR ELECTRONIC MEDICAL RECORD:     Results for orders placed or performed in visit on 08/20/20   PSA, Screening   Result Value Ref Range    PSA, SCREEN 1.9 0.00 - 4.00 ng/mL   Comprehensive metabolic panel   Result Value Ref Range    Sodium 141 136 - 145 mmol/L    Potassium 4.2 3.5 - 5.1 mmol/L    Chloride 106 95 - 110 mmol/L    CO2 28 23 - 29 mmol/L    Glucose 103 70 - 110 mg/dL    BUN, Bld 13 8 - 23 mg/dL    Creatinine 1.1 0.5 - 1.4 mg/dL    Calcium 9.5 8.7 - 10.5 mg/dL    Total Protein 7.0 6.0 - 8.4 g/dL    Albumin 3.9 3.5 - 5.2 g/dL    Total Bilirubin 0.6 0.1 - 1.0 mg/dL    Alkaline Phosphatase 72 55 - 135 U/L    AST 19 10 - 40 U/L    ALT 6 (L) 10 - 44 U/L    Anion Gap 7 (L) 8 - 16 mmol/L    eGFR if African American >60.0 >60 mL/min/1.73 m^2    eGFR if non African American >60.0 >60 mL/min/1.73 m^2   Lipid Panel   Result Value Ref Range "    Cholesterol 136 120 - 199 mg/dL    Triglycerides 92 30 - 150 mg/dL    HDL 55 40 - 75 mg/dL    LDL Cholesterol 62.6 (L) 63.0 - 159.0 mg/dL    Hdl/Cholesterol Ratio 40.4 20.0 - 50.0 %    Total Cholesterol/HDL Ratio 2.5 2.0 - 5.0    Non-HDL Cholesterol 81 mg/dL   CBC auto differential   Result Value Ref Range    WBC 6.07 3.90 - 12.70 K/uL    RBC 5.11 4.60 - 6.20 M/uL    Hemoglobin 14.3 14.0 - 18.0 g/dL    Hematocrit 46.9 40.0 - 54.0 %    Mean Corpuscular Volume 92 82 - 98 fL    Mean Corpuscular Hemoglobin 28.0 27.0 - 31.0 pg    Mean Corpuscular Hemoglobin Conc 30.5 (L) 32.0 - 36.0 g/dL    RDW 14.1 11.5 - 14.5 %    Platelets 203 150 - 350 K/uL    MPV 12.1 9.2 - 12.9 fL    Immature Granulocytes 0.3 0.0 - 0.5 %    Gran # (ANC) 3.3 1.8 - 7.7 K/uL    Immature Grans (Abs) 0.02 0.00 - 0.04 K/uL    Lymph # 2.0 1.0 - 4.8 K/uL    Mono # 0.5 0.3 - 1.0 K/uL    Eos # 0.2 0.0 - 0.5 K/uL    Baso # 0.07 0.00 - 0.20 K/uL    nRBC 0 0 /100 WBC    Gran% 54.3 38.0 - 73.0 %    Lymph% 32.3 18.0 - 48.0 %    Mono% 8.1 4.0 - 15.0 %    Eosinophil% 3.8 0.0 - 8.0 %    Basophil% 1.2 0.0 - 1.9 %    Differential Method Automated

## 2020-09-29 ENCOUNTER — PATIENT MESSAGE (OUTPATIENT)
Dept: OTHER | Facility: OTHER | Age: 70
End: 2020-09-29

## 2020-09-30 ENCOUNTER — PATIENT MESSAGE (OUTPATIENT)
Dept: FAMILY MEDICINE | Facility: CLINIC | Age: 70
End: 2020-09-30

## 2020-10-02 ENCOUNTER — IMMUNIZATION (OUTPATIENT)
Dept: FAMILY MEDICINE | Facility: CLINIC | Age: 70
End: 2020-10-02
Payer: MEDICARE

## 2020-10-02 PROCEDURE — 90694 VACC AIIV4 NO PRSRV 0.5ML IM: CPT | Mod: PBBFAC,PN

## 2020-10-02 PROCEDURE — G0008 ADMIN INFLUENZA VIRUS VAC: HCPCS | Mod: PBBFAC,PN

## 2020-12-11 ENCOUNTER — PATIENT MESSAGE (OUTPATIENT)
Dept: OTHER | Facility: OTHER | Age: 70
End: 2020-12-11

## 2021-01-11 ENCOUNTER — IMMUNIZATION (OUTPATIENT)
Dept: FAMILY MEDICINE | Facility: CLINIC | Age: 71
End: 2021-01-11
Payer: MEDICARE

## 2021-01-11 DIAGNOSIS — Z23 NEED FOR VACCINATION: ICD-10-CM

## 2021-01-11 PROCEDURE — 91300 COVID-19, MRNA, LNP-S, PF, 30 MCG/0.3 ML DOSE VACCINE: CPT | Mod: PBBFAC,PO

## 2021-01-20 RX ORDER — ZALEPLON 10 MG/1
10 CAPSULE ORAL NIGHTLY
Qty: 90 CAPSULE | Refills: 0 | Status: SHIPPED | OUTPATIENT
Start: 2021-01-20 | End: 2021-04-14 | Stop reason: SDUPTHER

## 2021-01-27 ENCOUNTER — OFFICE VISIT (OUTPATIENT)
Dept: UROLOGY | Facility: CLINIC | Age: 71
End: 2021-01-27
Payer: MEDICARE

## 2021-01-27 VITALS
HEART RATE: 90 BPM | WEIGHT: 193.31 LBS | SYSTOLIC BLOOD PRESSURE: 125 MMHG | DIASTOLIC BLOOD PRESSURE: 77 MMHG | HEIGHT: 68 IN | BODY MASS INDEX: 29.3 KG/M2

## 2021-01-27 DIAGNOSIS — N40.0 BENIGN PROSTATIC HYPERPLASIA, UNSPECIFIED WHETHER LOWER URINARY TRACT SYMPTOMS PRESENT: Primary | ICD-10-CM

## 2021-01-27 PROCEDURE — 99999 PR PBB SHADOW E&M-EST. PATIENT-LVL III: CPT | Mod: PBBFAC,,, | Performed by: UROLOGY

## 2021-01-27 PROCEDURE — 99204 OFFICE O/P NEW MOD 45 MIN: CPT | Mod: S$PBB,,, | Performed by: UROLOGY

## 2021-01-27 PROCEDURE — 99999 PR PBB SHADOW E&M-EST. PATIENT-LVL III: ICD-10-PCS | Mod: PBBFAC,,, | Performed by: UROLOGY

## 2021-01-27 PROCEDURE — 99213 OFFICE O/P EST LOW 20 MIN: CPT | Mod: PBBFAC,PO | Performed by: UROLOGY

## 2021-01-27 PROCEDURE — 99204 PR OFFICE/OUTPT VISIT, NEW, LEVL IV, 45-59 MIN: ICD-10-PCS | Mod: S$PBB,,, | Performed by: UROLOGY

## 2021-02-01 ENCOUNTER — IMMUNIZATION (OUTPATIENT)
Dept: FAMILY MEDICINE | Facility: CLINIC | Age: 71
End: 2021-02-01
Payer: MEDICARE

## 2021-02-01 ENCOUNTER — OFFICE VISIT (OUTPATIENT)
Dept: PRIMARY CARE CLINIC | Facility: CLINIC | Age: 71
End: 2021-02-01
Payer: MEDICARE

## 2021-02-01 VITALS
BODY MASS INDEX: 29.55 KG/M2 | SYSTOLIC BLOOD PRESSURE: 114 MMHG | OXYGEN SATURATION: 98 % | HEART RATE: 74 BPM | WEIGHT: 195 LBS | DIASTOLIC BLOOD PRESSURE: 62 MMHG | TEMPERATURE: 98 F | RESPIRATION RATE: 18 BRPM | HEIGHT: 68 IN

## 2021-02-01 DIAGNOSIS — F41.9 ANXIETY: ICD-10-CM

## 2021-02-01 DIAGNOSIS — Z23 NEED FOR VACCINATION: Primary | ICD-10-CM

## 2021-02-01 DIAGNOSIS — F51.04 CHRONIC INSOMNIA: ICD-10-CM

## 2021-02-01 DIAGNOSIS — N40.0 BENIGN PROSTATIC HYPERPLASIA, UNSPECIFIED WHETHER LOWER URINARY TRACT SYMPTOMS PRESENT: ICD-10-CM

## 2021-02-01 DIAGNOSIS — I10 ESSENTIAL HYPERTENSION: Primary | ICD-10-CM

## 2021-02-01 DIAGNOSIS — E78.5 HYPERLIPIDEMIA, UNSPECIFIED HYPERLIPIDEMIA TYPE: ICD-10-CM

## 2021-02-01 DIAGNOSIS — M51.9 LUMBAR DISC DISEASE: ICD-10-CM

## 2021-02-01 PROCEDURE — 91300 COVID-19, MRNA, LNP-S, PF, 30 MCG/0.3 ML DOSE VACCINE: CPT | Mod: PBBFAC,PO

## 2021-02-01 PROCEDURE — 99999 PR PBB SHADOW E&M-EST. PATIENT-LVL IV: CPT | Mod: PBBFAC,,, | Performed by: FAMILY MEDICINE

## 2021-02-01 PROCEDURE — 99214 OFFICE O/P EST MOD 30 MIN: CPT | Mod: S$PBB,,, | Performed by: FAMILY MEDICINE

## 2021-02-01 PROCEDURE — 99999 PR PBB SHADOW E&M-EST. PATIENT-LVL IV: ICD-10-PCS | Mod: PBBFAC,,, | Performed by: FAMILY MEDICINE

## 2021-02-01 PROCEDURE — 99214 OFFICE O/P EST MOD 30 MIN: CPT | Mod: PBBFAC,PN,25 | Performed by: FAMILY MEDICINE

## 2021-02-01 PROCEDURE — 0002A COVID-19, MRNA, LNP-S, PF, 30 MCG/0.3 ML DOSE VACCINE: CPT | Mod: PBBFAC,PO

## 2021-02-01 PROCEDURE — 99214 PR OFFICE/OUTPT VISIT, EST, LEVL IV, 30-39 MIN: ICD-10-PCS | Mod: S$PBB,,, | Performed by: FAMILY MEDICINE

## 2021-02-01 RX ORDER — BENAZEPRIL HYDROCHLORIDE 20 MG/1
10 TABLET ORAL DAILY
Start: 2021-02-01 | End: 2021-05-03

## 2021-02-22 ENCOUNTER — LAB VISIT (OUTPATIENT)
Dept: LAB | Facility: HOSPITAL | Age: 71
End: 2021-02-22
Attending: FAMILY MEDICINE
Payer: MEDICARE

## 2021-02-22 DIAGNOSIS — E78.5 HYPERLIPIDEMIA, UNSPECIFIED HYPERLIPIDEMIA TYPE: ICD-10-CM

## 2021-02-22 DIAGNOSIS — I10 ESSENTIAL HYPERTENSION: ICD-10-CM

## 2021-02-22 PROCEDURE — 36415 COLL VENOUS BLD VENIPUNCTURE: CPT | Mod: PN

## 2021-02-22 PROCEDURE — 80053 COMPREHEN METABOLIC PANEL: CPT

## 2021-02-22 PROCEDURE — 80061 LIPID PANEL: CPT

## 2021-02-23 LAB
ALBUMIN SERPL BCP-MCNC: 4 G/DL (ref 3.5–5.2)
ALP SERPL-CCNC: 67 U/L (ref 55–135)
ALT SERPL W/O P-5'-P-CCNC: 6 U/L (ref 10–44)
ANION GAP SERPL CALC-SCNC: 10 MMOL/L (ref 8–16)
AST SERPL-CCNC: 16 U/L (ref 10–40)
BILIRUB SERPL-MCNC: 0.6 MG/DL (ref 0.1–1)
BUN SERPL-MCNC: 15 MG/DL (ref 8–23)
CALCIUM SERPL-MCNC: 9.1 MG/DL (ref 8.7–10.5)
CHLORIDE SERPL-SCNC: 105 MMOL/L (ref 95–110)
CHOLEST SERPL-MCNC: 141 MG/DL (ref 120–199)
CHOLEST/HDLC SERPL: 2.4 {RATIO} (ref 2–5)
CO2 SERPL-SCNC: 27 MMOL/L (ref 23–29)
CREAT SERPL-MCNC: 1.1 MG/DL (ref 0.5–1.4)
EST. GFR  (AFRICAN AMERICAN): >60 ML/MIN/1.73 M^2
EST. GFR  (NON AFRICAN AMERICAN): >60 ML/MIN/1.73 M^2
GLUCOSE SERPL-MCNC: 85 MG/DL (ref 70–110)
HDLC SERPL-MCNC: 59 MG/DL (ref 40–75)
HDLC SERPL: 41.8 % (ref 20–50)
LDLC SERPL CALC-MCNC: 57.8 MG/DL (ref 63–159)
NONHDLC SERPL-MCNC: 82 MG/DL
POTASSIUM SERPL-SCNC: 4.2 MMOL/L (ref 3.5–5.1)
PROT SERPL-MCNC: 6.9 G/DL (ref 6–8.4)
SODIUM SERPL-SCNC: 142 MMOL/L (ref 136–145)
TRIGL SERPL-MCNC: 121 MG/DL (ref 30–150)

## 2021-04-14 RX ORDER — ATORVASTATIN CALCIUM 10 MG/1
10 TABLET, FILM COATED ORAL NIGHTLY
Qty: 90 TABLET | Refills: 1 | Status: SHIPPED | OUTPATIENT
Start: 2021-04-14 | End: 2021-10-04

## 2021-04-14 RX ORDER — ZALEPLON 10 MG/1
10 CAPSULE ORAL NIGHTLY
Qty: 90 CAPSULE | Refills: 0 | Status: SHIPPED | OUTPATIENT
Start: 2021-04-14 | End: 2021-07-13 | Stop reason: SDUPTHER

## 2021-05-03 ENCOUNTER — OFFICE VISIT (OUTPATIENT)
Dept: FAMILY MEDICINE | Facility: CLINIC | Age: 71
End: 2021-05-03
Payer: MEDICARE

## 2021-05-03 VITALS
SYSTOLIC BLOOD PRESSURE: 110 MMHG | WEIGHT: 192 LBS | HEART RATE: 88 BPM | DIASTOLIC BLOOD PRESSURE: 72 MMHG | BODY MASS INDEX: 29.19 KG/M2

## 2021-05-03 DIAGNOSIS — G47.00 INSOMNIA, UNSPECIFIED TYPE: ICD-10-CM

## 2021-05-03 DIAGNOSIS — R42 DIZZINESS: ICD-10-CM

## 2021-05-03 DIAGNOSIS — I10 ESSENTIAL HYPERTENSION: Primary | ICD-10-CM

## 2021-05-03 PROCEDURE — 99213 PR OFFICE/OUTPT VISIT, EST, LEVL III, 20-29 MIN: ICD-10-PCS | Mod: 95,,, | Performed by: FAMILY MEDICINE

## 2021-05-03 PROCEDURE — 99213 OFFICE O/P EST LOW 20 MIN: CPT | Mod: 95,,, | Performed by: FAMILY MEDICINE

## 2021-05-03 RX ORDER — BENAZEPRIL HYDROCHLORIDE 10 MG/1
10 TABLET ORAL DAILY
Start: 2021-05-03 | End: 2021-12-24 | Stop reason: SDUPTHER

## 2021-05-05 ENCOUNTER — TELEPHONE (OUTPATIENT)
Dept: PRIMARY CARE CLINIC | Facility: CLINIC | Age: 71
End: 2021-05-05

## 2021-05-07 ENCOUNTER — TELEPHONE (OUTPATIENT)
Dept: PRIMARY CARE CLINIC | Facility: CLINIC | Age: 71
End: 2021-05-07

## 2021-07-13 ENCOUNTER — PATIENT MESSAGE (OUTPATIENT)
Dept: PRIMARY CARE CLINIC | Facility: CLINIC | Age: 71
End: 2021-07-13

## 2021-07-13 RX ORDER — ZALEPLON 10 MG/1
10 CAPSULE ORAL NIGHTLY
Qty: 90 CAPSULE | Refills: 0 | Status: SHIPPED | OUTPATIENT
Start: 2021-07-13 | End: 2021-10-09 | Stop reason: SDUPTHER

## 2021-07-15 ENCOUNTER — TELEPHONE (OUTPATIENT)
Dept: PRIMARY CARE CLINIC | Facility: CLINIC | Age: 71
End: 2021-07-15

## 2021-07-18 ENCOUNTER — PATIENT MESSAGE (OUTPATIENT)
Dept: PRIMARY CARE CLINIC | Facility: CLINIC | Age: 71
End: 2021-07-18

## 2021-07-19 ENCOUNTER — PATIENT OUTREACH (OUTPATIENT)
Dept: ADMINISTRATIVE | Facility: OTHER | Age: 71
End: 2021-07-19

## 2021-07-19 ENCOUNTER — OFFICE VISIT (OUTPATIENT)
Dept: PRIMARY CARE CLINIC | Facility: CLINIC | Age: 71
End: 2021-07-19
Payer: MEDICARE

## 2021-07-19 VITALS
HEART RATE: 74 BPM | TEMPERATURE: 98 F | OXYGEN SATURATION: 98 % | HEIGHT: 68 IN | BODY MASS INDEX: 28.74 KG/M2 | WEIGHT: 189.63 LBS | DIASTOLIC BLOOD PRESSURE: 74 MMHG | SYSTOLIC BLOOD PRESSURE: 112 MMHG

## 2021-07-19 DIAGNOSIS — M51.9 LUMBAR DISC DISEASE: Primary | ICD-10-CM

## 2021-07-19 DIAGNOSIS — M62.830 LUMBAR PARASPINAL MUSCLE SPASM: ICD-10-CM

## 2021-07-19 DIAGNOSIS — M48.061 SPINAL STENOSIS OF LUMBAR REGION, UNSPECIFIED WHETHER NEUROGENIC CLAUDICATION PRESENT: ICD-10-CM

## 2021-07-19 PROCEDURE — 96372 THER/PROPH/DIAG INJ SC/IM: CPT | Mod: PBBFAC,PN

## 2021-07-19 PROCEDURE — 99214 OFFICE O/P EST MOD 30 MIN: CPT | Mod: PBBFAC,PN,25 | Performed by: FAMILY MEDICINE

## 2021-07-19 PROCEDURE — 99999 PR PBB SHADOW E&M-EST. PATIENT-LVL IV: CPT | Mod: PBBFAC,,, | Performed by: FAMILY MEDICINE

## 2021-07-19 PROCEDURE — 99999 PR PBB SHADOW E&M-EST. PATIENT-LVL IV: ICD-10-PCS | Mod: PBBFAC,,, | Performed by: FAMILY MEDICINE

## 2021-07-19 PROCEDURE — 99214 OFFICE O/P EST MOD 30 MIN: CPT | Mod: S$PBB,,, | Performed by: FAMILY MEDICINE

## 2021-07-19 PROCEDURE — 99214 PR OFFICE/OUTPT VISIT, EST, LEVL IV, 30-39 MIN: ICD-10-PCS | Mod: S$PBB,,, | Performed by: FAMILY MEDICINE

## 2021-07-19 RX ORDER — TIZANIDINE 2 MG/1
TABLET ORAL
Qty: 30 TABLET | Refills: 1 | Status: SHIPPED | OUTPATIENT
Start: 2021-07-19 | End: 2021-08-16 | Stop reason: SDUPTHER

## 2021-07-19 RX ORDER — CELECOXIB 200 MG/1
200 CAPSULE ORAL 2 TIMES DAILY
Qty: 14 CAPSULE | Refills: 1 | Status: SHIPPED | OUTPATIENT
Start: 2021-07-19 | End: 2021-10-05 | Stop reason: SDUPTHER

## 2021-07-19 RX ORDER — KETOROLAC TROMETHAMINE 30 MG/ML
30 INJECTION, SOLUTION INTRAMUSCULAR; INTRAVENOUS EVERY 6 HOURS
Status: COMPLETED | OUTPATIENT
Start: 2021-07-19 | End: 2021-07-19

## 2021-07-19 RX ADMIN — KETOROLAC TROMETHAMINE 30 MG: 30 INJECTION, SOLUTION INTRAMUSCULAR; INTRAVENOUS at 03:07

## 2021-08-16 DIAGNOSIS — M51.9 LUMBAR DISC DISEASE: ICD-10-CM

## 2021-08-16 DIAGNOSIS — M62.830 LUMBAR PARASPINAL MUSCLE SPASM: ICD-10-CM

## 2021-08-16 DIAGNOSIS — M48.061 SPINAL STENOSIS OF LUMBAR REGION, UNSPECIFIED WHETHER NEUROGENIC CLAUDICATION PRESENT: ICD-10-CM

## 2021-08-17 RX ORDER — TIZANIDINE 2 MG/1
TABLET ORAL
Qty: 30 TABLET | Refills: 1 | Status: SHIPPED | OUTPATIENT
Start: 2021-08-17 | End: 2021-10-05 | Stop reason: SDUPTHER

## 2021-10-05 ENCOUNTER — PATIENT MESSAGE (OUTPATIENT)
Dept: PRIMARY CARE CLINIC | Facility: CLINIC | Age: 71
End: 2021-10-05

## 2021-10-05 DIAGNOSIS — M62.830 LUMBAR PARASPINAL MUSCLE SPASM: ICD-10-CM

## 2021-10-05 DIAGNOSIS — M51.9 LUMBAR DISC DISEASE: ICD-10-CM

## 2021-10-05 DIAGNOSIS — M54.9 DORSALGIA, UNSPECIFIED: ICD-10-CM

## 2021-10-05 DIAGNOSIS — M48.061 SPINAL STENOSIS OF LUMBAR REGION, UNSPECIFIED WHETHER NEUROGENIC CLAUDICATION PRESENT: ICD-10-CM

## 2021-10-05 RX ORDER — CELECOXIB 200 MG/1
200 CAPSULE ORAL 2 TIMES DAILY
Qty: 30 CAPSULE | Refills: 1 | Status: SHIPPED | OUTPATIENT
Start: 2021-10-05 | End: 2021-11-16

## 2021-10-05 RX ORDER — TIZANIDINE 2 MG/1
TABLET ORAL
Qty: 30 TABLET | Refills: 1 | Status: SHIPPED | OUTPATIENT
Start: 2021-10-05 | End: 2021-11-18

## 2021-10-11 RX ORDER — ZALEPLON 10 MG/1
10 CAPSULE ORAL NIGHTLY
Qty: 90 CAPSULE | Refills: 0 | Status: SHIPPED | OUTPATIENT
Start: 2021-10-11 | End: 2022-01-05 | Stop reason: SDUPTHER

## 2021-10-12 ENCOUNTER — HOSPITAL ENCOUNTER (OUTPATIENT)
Dept: RADIOLOGY | Facility: HOSPITAL | Age: 71
Discharge: HOME | End: 2021-10-12
Attending: FAMILY MEDICINE
Payer: MEDICARE

## 2021-10-12 DIAGNOSIS — M54.9 DORSALGIA, UNSPECIFIED: ICD-10-CM

## 2021-10-12 PROCEDURE — 72100 XR LUMBAR SPINE AP AND LATERAL: ICD-10-PCS | Mod: 26,,, | Performed by: RADIOLOGY

## 2021-10-12 PROCEDURE — 72100 X-RAY EXAM L-S SPINE 2/3 VWS: CPT | Mod: TC,PN

## 2021-10-12 PROCEDURE — 72100 X-RAY EXAM L-S SPINE 2/3 VWS: CPT | Mod: 26,,, | Performed by: RADIOLOGY

## 2021-10-25 ENCOUNTER — OFFICE VISIT (OUTPATIENT)
Dept: PAIN MEDICINE | Facility: CLINIC | Age: 71
End: 2021-10-25
Payer: MEDICARE

## 2021-10-25 VITALS
OXYGEN SATURATION: 97 % | HEART RATE: 87 BPM | HEIGHT: 67 IN | TEMPERATURE: 98 F | RESPIRATION RATE: 20 BRPM | BODY MASS INDEX: 29.45 KG/M2 | DIASTOLIC BLOOD PRESSURE: 58 MMHG | WEIGHT: 187.63 LBS | SYSTOLIC BLOOD PRESSURE: 99 MMHG

## 2021-10-25 DIAGNOSIS — M54.9 DORSALGIA, UNSPECIFIED: ICD-10-CM

## 2021-10-25 DIAGNOSIS — M54.16 BILATERAL LUMBAR RADICULOPATHY: Primary | ICD-10-CM

## 2021-10-25 DIAGNOSIS — M51.9 LUMBAR DISC DISEASE: ICD-10-CM

## 2021-10-25 DIAGNOSIS — M62.830 LUMBAR PARASPINAL MUSCLE SPASM: ICD-10-CM

## 2021-10-25 DIAGNOSIS — M48.061 SPINAL STENOSIS OF LUMBAR REGION, UNSPECIFIED WHETHER NEUROGENIC CLAUDICATION PRESENT: ICD-10-CM

## 2021-10-25 PROCEDURE — 99204 PR OFFICE/OUTPT VISIT, NEW, LEVL IV, 45-59 MIN: ICD-10-PCS | Mod: S$PBB,,, | Performed by: ANESTHESIOLOGY

## 2021-10-25 PROCEDURE — 99999 PR PBB SHADOW E&M-EST. PATIENT-LVL V: ICD-10-PCS | Mod: PBBFAC,,, | Performed by: ANESTHESIOLOGY

## 2021-10-25 PROCEDURE — 99999 PR PBB SHADOW E&M-EST. PATIENT-LVL V: CPT | Mod: PBBFAC,,, | Performed by: ANESTHESIOLOGY

## 2021-10-25 PROCEDURE — 99215 OFFICE O/P EST HI 40 MIN: CPT | Mod: PBBFAC,PN | Performed by: ANESTHESIOLOGY

## 2021-10-25 PROCEDURE — 99204 OFFICE O/P NEW MOD 45 MIN: CPT | Mod: S$PBB,,, | Performed by: ANESTHESIOLOGY

## 2021-10-26 ENCOUNTER — PATIENT MESSAGE (OUTPATIENT)
Dept: PAIN MEDICINE | Facility: CLINIC | Age: 71
End: 2021-10-26
Payer: MEDICARE

## 2021-10-30 ENCOUNTER — HOSPITAL ENCOUNTER (OUTPATIENT)
Dept: RADIOLOGY | Facility: HOSPITAL | Age: 71
Discharge: HOME | End: 2021-10-30
Attending: ANESTHESIOLOGY
Payer: MEDICARE

## 2021-10-30 DIAGNOSIS — M54.9 DORSALGIA, UNSPECIFIED: ICD-10-CM

## 2021-10-30 PROCEDURE — 72148 MRI LUMBAR SPINE WITHOUT CONTRAST: ICD-10-PCS | Mod: 26,,, | Performed by: RADIOLOGY

## 2021-10-30 PROCEDURE — 72148 MRI LUMBAR SPINE W/O DYE: CPT | Mod: TC,PO

## 2021-10-30 PROCEDURE — 72148 MRI LUMBAR SPINE W/O DYE: CPT | Mod: 26,,, | Performed by: RADIOLOGY

## 2021-11-03 ENCOUNTER — PATIENT MESSAGE (OUTPATIENT)
Dept: PAIN MEDICINE | Facility: CLINIC | Age: 71
End: 2021-11-03
Payer: MEDICARE

## 2021-11-04 ENCOUNTER — PATIENT MESSAGE (OUTPATIENT)
Dept: PAIN MEDICINE | Facility: CLINIC | Age: 71
End: 2021-11-04
Payer: MEDICARE

## 2021-11-04 ENCOUNTER — TELEPHONE (OUTPATIENT)
Dept: PAIN MEDICINE | Facility: CLINIC | Age: 71
End: 2021-11-04
Payer: MEDICARE

## 2021-11-04 ENCOUNTER — TELEPHONE (OUTPATIENT)
Dept: PAIN MEDICINE | Facility: CLINIC | Age: 71
End: 2021-11-04

## 2021-11-04 DIAGNOSIS — M54.16 LUMBAR RADICULOPATHY: Primary | ICD-10-CM

## 2021-11-04 RX ORDER — ALPRAZOLAM 0.5 MG/1
1 TABLET, ORALLY DISINTEGRATING ORAL ONCE AS NEEDED
Status: CANCELLED | OUTPATIENT
Start: 2021-11-15 | End: 2033-04-12

## 2021-11-15 ENCOUNTER — HOSPITAL ENCOUNTER (OUTPATIENT)
Facility: HOSPITAL | Age: 71
Discharge: HOME | End: 2021-11-15
Attending: ANESTHESIOLOGY | Admitting: ANESTHESIOLOGY
Payer: MEDICARE

## 2021-11-15 ENCOUNTER — HOSPITAL ENCOUNTER (OUTPATIENT)
Dept: RADIOLOGY | Facility: HOSPITAL | Age: 71
Discharge: HOME | End: 2021-11-15
Attending: ANESTHESIOLOGY
Payer: MEDICARE

## 2021-11-15 DIAGNOSIS — M54.16 LUMBAR RADICULOPATHY: ICD-10-CM

## 2021-11-15 DIAGNOSIS — M54.16 BILATERAL LUMBAR RADICULOPATHY: ICD-10-CM

## 2021-11-15 PROCEDURE — 62323 NJX INTERLAMINAR LMBR/SAC: CPT | Mod: PO | Performed by: ANESTHESIOLOGY

## 2021-11-15 PROCEDURE — 25000003 PHARM REV CODE 250: Mod: PO | Performed by: ANESTHESIOLOGY

## 2021-11-15 PROCEDURE — 63600175 PHARM REV CODE 636 W HCPCS: Mod: PO | Performed by: ANESTHESIOLOGY

## 2021-11-15 PROCEDURE — 62323 PR INJ LUMBAR/SACRAL, W/IMAGING GUIDANCE: ICD-10-PCS | Mod: ,,, | Performed by: ANESTHESIOLOGY

## 2021-11-15 PROCEDURE — 25500020 PHARM REV CODE 255: Mod: PO | Performed by: ANESTHESIOLOGY

## 2021-11-15 PROCEDURE — A4216 STERILE WATER/SALINE, 10 ML: HCPCS | Mod: PO | Performed by: ANESTHESIOLOGY

## 2021-11-15 PROCEDURE — 76000 FLUOROSCOPY <1 HR PHYS/QHP: CPT | Mod: TC,PO

## 2021-11-15 PROCEDURE — 62323 NJX INTERLAMINAR LMBR/SAC: CPT | Mod: ,,, | Performed by: ANESTHESIOLOGY

## 2021-11-15 RX ORDER — LIDOCAINE HYDROCHLORIDE 10 MG/ML
INJECTION, SOLUTION EPIDURAL; INFILTRATION; INTRACAUDAL; PERINEURAL
Status: DISCONTINUED | OUTPATIENT
Start: 2021-11-15 | End: 2021-11-15 | Stop reason: HOSPADM

## 2021-11-15 RX ORDER — SODIUM CHLORIDE 9 MG/ML
INJECTION, SOLUTION INTRAMUSCULAR; INTRAVENOUS; SUBCUTANEOUS
Status: DISCONTINUED | OUTPATIENT
Start: 2021-11-15 | End: 2021-11-15 | Stop reason: HOSPADM

## 2021-11-15 RX ORDER — ALPRAZOLAM 0.5 MG/1
1 TABLET, ORALLY DISINTEGRATING ORAL ONCE AS NEEDED
Status: COMPLETED | OUTPATIENT
Start: 2021-11-15 | End: 2021-11-15

## 2021-11-15 RX ORDER — METHYLPREDNISOLONE ACETATE 80 MG/ML
INJECTION, SUSPENSION INTRA-ARTICULAR; INTRALESIONAL; INTRAMUSCULAR; SOFT TISSUE
Status: DISCONTINUED | OUTPATIENT
Start: 2021-11-15 | End: 2021-11-15 | Stop reason: HOSPADM

## 2021-11-15 RX ADMIN — ALPRAZOLAM 1 MG: 0.5 TABLET, ORALLY DISINTEGRATING ORAL at 03:11

## 2021-11-16 VITALS
HEIGHT: 68 IN | BODY MASS INDEX: 29.1 KG/M2 | WEIGHT: 192 LBS | SYSTOLIC BLOOD PRESSURE: 118 MMHG | DIASTOLIC BLOOD PRESSURE: 73 MMHG | TEMPERATURE: 98 F | OXYGEN SATURATION: 95 % | RESPIRATION RATE: 16 BRPM | HEART RATE: 76 BPM

## 2021-11-30 ENCOUNTER — OFFICE VISIT (OUTPATIENT)
Dept: PAIN MEDICINE | Facility: CLINIC | Age: 71
End: 2021-11-30
Payer: MEDICARE

## 2021-11-30 VITALS
HEART RATE: 68 BPM | RESPIRATION RATE: 18 BRPM | WEIGHT: 189.94 LBS | SYSTOLIC BLOOD PRESSURE: 101 MMHG | DIASTOLIC BLOOD PRESSURE: 57 MMHG | BODY MASS INDEX: 28.88 KG/M2 | TEMPERATURE: 98 F | OXYGEN SATURATION: 96 %

## 2021-11-30 DIAGNOSIS — M51.36 DDD (DEGENERATIVE DISC DISEASE), LUMBAR: ICD-10-CM

## 2021-11-30 DIAGNOSIS — M54.16 LUMBAR RADICULOPATHY: Primary | ICD-10-CM

## 2021-11-30 PROCEDURE — 99999 PR PBB SHADOW E&M-EST. PATIENT-LVL V: ICD-10-PCS | Mod: PBBFAC,,, | Performed by: PHYSICIAN ASSISTANT

## 2021-11-30 PROCEDURE — 99214 PR OFFICE/OUTPT VISIT, EST, LEVL IV, 30-39 MIN: ICD-10-PCS | Mod: S$PBB,,, | Performed by: PHYSICIAN ASSISTANT

## 2021-11-30 PROCEDURE — 99215 OFFICE O/P EST HI 40 MIN: CPT | Mod: PBBFAC,PN | Performed by: PHYSICIAN ASSISTANT

## 2021-11-30 PROCEDURE — 99214 OFFICE O/P EST MOD 30 MIN: CPT | Mod: S$PBB,,, | Performed by: PHYSICIAN ASSISTANT

## 2021-11-30 PROCEDURE — 99999 PR PBB SHADOW E&M-EST. PATIENT-LVL V: CPT | Mod: PBBFAC,,, | Performed by: PHYSICIAN ASSISTANT

## 2021-11-30 RX ORDER — ALPRAZOLAM 0.5 MG/1
1 TABLET, ORALLY DISINTEGRATING ORAL ONCE AS NEEDED
Status: CANCELLED | OUTPATIENT
Start: 2021-11-30 | End: 2033-04-28

## 2021-11-30 RX ORDER — GABAPENTIN 300 MG/1
300 CAPSULE ORAL 2 TIMES DAILY
Qty: 60 CAPSULE | Refills: 2 | Status: SHIPPED | OUTPATIENT
Start: 2021-11-30 | End: 2022-01-07 | Stop reason: SDUPTHER

## 2021-12-07 ENCOUNTER — IMMUNIZATION (OUTPATIENT)
Dept: FAMILY MEDICINE | Facility: CLINIC | Age: 71
End: 2021-12-07
Payer: MEDICARE

## 2021-12-07 DIAGNOSIS — Z23 NEED FOR VACCINATION: Primary | ICD-10-CM

## 2021-12-07 PROCEDURE — 0004A COVID-19, MRNA, LNP-S, PF, 30 MCG/0.3 ML DOSE VACCINE: CPT | Mod: PBBFAC,PO

## 2021-12-13 ENCOUNTER — HOSPITAL ENCOUNTER (OUTPATIENT)
Dept: RADIOLOGY | Facility: HOSPITAL | Age: 71
Discharge: HOME | End: 2021-12-13
Attending: ANESTHESIOLOGY
Payer: MEDICARE

## 2021-12-13 ENCOUNTER — HOSPITAL ENCOUNTER (OUTPATIENT)
Facility: HOSPITAL | Age: 71
Discharge: HOME | End: 2021-12-13
Attending: ANESTHESIOLOGY | Admitting: ANESTHESIOLOGY
Payer: MEDICARE

## 2021-12-13 VITALS
TEMPERATURE: 98 F | BODY MASS INDEX: 29.1 KG/M2 | HEIGHT: 68 IN | WEIGHT: 192 LBS | RESPIRATION RATE: 18 BRPM | OXYGEN SATURATION: 98 % | HEART RATE: 68 BPM | SYSTOLIC BLOOD PRESSURE: 130 MMHG | DIASTOLIC BLOOD PRESSURE: 74 MMHG

## 2021-12-13 DIAGNOSIS — M54.16 LUMBAR RADICULOPATHY: ICD-10-CM

## 2021-12-13 DIAGNOSIS — M51.36 DDD (DEGENERATIVE DISC DISEASE), LUMBAR: ICD-10-CM

## 2021-12-13 PROCEDURE — 25000003 PHARM REV CODE 250: Mod: PO | Performed by: PHYSICIAN ASSISTANT

## 2021-12-13 PROCEDURE — 62323 NJX INTERLAMINAR LMBR/SAC: CPT | Mod: ,,, | Performed by: ANESTHESIOLOGY

## 2021-12-13 PROCEDURE — 25500020 PHARM REV CODE 255: Mod: PO | Performed by: ANESTHESIOLOGY

## 2021-12-13 PROCEDURE — 76000 FLUOROSCOPY <1 HR PHYS/QHP: CPT | Mod: TC,PO

## 2021-12-13 PROCEDURE — 25000003 PHARM REV CODE 250: Mod: PO | Performed by: ANESTHESIOLOGY

## 2021-12-13 PROCEDURE — 62323 PR INJ LUMBAR/SACRAL, W/IMAGING GUIDANCE: ICD-10-PCS | Mod: ,,, | Performed by: ANESTHESIOLOGY

## 2021-12-13 PROCEDURE — 62323 NJX INTERLAMINAR LMBR/SAC: CPT | Mod: PO | Performed by: ANESTHESIOLOGY

## 2021-12-13 PROCEDURE — 63600175 PHARM REV CODE 636 W HCPCS: Mod: PO | Performed by: ANESTHESIOLOGY

## 2021-12-13 RX ORDER — DEXAMETHASONE SODIUM PHOSPHATE 100 MG/10ML
INJECTION INTRAMUSCULAR; INTRAVENOUS
Status: DISCONTINUED | OUTPATIENT
Start: 2021-12-13 | End: 2021-12-13 | Stop reason: HOSPADM

## 2021-12-13 RX ORDER — LIDOCAINE HYDROCHLORIDE 10 MG/ML
INJECTION, SOLUTION EPIDURAL; INFILTRATION; INTRACAUDAL; PERINEURAL
Status: DISCONTINUED | OUTPATIENT
Start: 2021-12-13 | End: 2021-12-13 | Stop reason: HOSPADM

## 2021-12-13 RX ORDER — ALPRAZOLAM 0.5 MG/1
1 TABLET, ORALLY DISINTEGRATING ORAL ONCE AS NEEDED
Status: COMPLETED | OUTPATIENT
Start: 2021-12-13 | End: 2021-12-13

## 2021-12-13 RX ADMIN — ALPRAZOLAM 1 MG: 0.5 TABLET, ORALLY DISINTEGRATING ORAL at 01:12

## 2021-12-27 DIAGNOSIS — M48.061 SPINAL STENOSIS OF LUMBAR REGION, UNSPECIFIED WHETHER NEUROGENIC CLAUDICATION PRESENT: ICD-10-CM

## 2021-12-27 DIAGNOSIS — M51.9 LUMBAR DISC DISEASE: ICD-10-CM

## 2021-12-27 DIAGNOSIS — M62.830 LUMBAR PARASPINAL MUSCLE SPASM: ICD-10-CM

## 2021-12-27 RX ORDER — BENAZEPRIL HYDROCHLORIDE 10 MG/1
10 TABLET ORAL DAILY
Qty: 90 TABLET | Refills: 1 | OUTPATIENT
Start: 2021-12-27 | End: 2022-01-06 | Stop reason: SDUPTHER

## 2021-12-27 RX ORDER — CELECOXIB 200 MG/1
CAPSULE ORAL
Qty: 30 CAPSULE | Refills: 1 | Status: ON HOLD | OUTPATIENT
Start: 2021-12-27 | End: 2022-01-26 | Stop reason: HOSPADM

## 2022-01-05 RX ORDER — ZALEPLON 10 MG/1
10 CAPSULE ORAL NIGHTLY
Qty: 90 CAPSULE | Refills: 0 | Status: SHIPPED | OUTPATIENT
Start: 2022-01-05 | End: 2022-04-01 | Stop reason: SDUPTHER

## 2022-01-06 RX ORDER — BENAZEPRIL HYDROCHLORIDE 10 MG/1
10 TABLET ORAL DAILY
Qty: 90 TABLET | Refills: 1 | OUTPATIENT
Start: 2022-01-06 | End: 2022-01-07 | Stop reason: SDUPTHER

## 2022-01-06 NOTE — TELEPHONE ENCOUNTER
No new care gaps identified.  Powered by [employer]. Reference number: [id number].   1/06/2022 9:21:15 AM CST

## 2022-01-07 ENCOUNTER — OFFICE VISIT (OUTPATIENT)
Dept: PAIN MEDICINE | Facility: CLINIC | Age: 72
End: 2022-01-07
Payer: MEDICARE

## 2022-01-07 VITALS
SYSTOLIC BLOOD PRESSURE: 115 MMHG | WEIGHT: 192.38 LBS | HEART RATE: 73 BPM | TEMPERATURE: 97 F | HEIGHT: 68 IN | DIASTOLIC BLOOD PRESSURE: 67 MMHG | OXYGEN SATURATION: 98 % | BODY MASS INDEX: 29.16 KG/M2

## 2022-01-07 DIAGNOSIS — M54.16 LUMBAR RADICULOPATHY: ICD-10-CM

## 2022-01-07 DIAGNOSIS — M48.061 SPINAL STENOSIS OF LUMBAR REGION, UNSPECIFIED WHETHER NEUROGENIC CLAUDICATION PRESENT: ICD-10-CM

## 2022-01-07 DIAGNOSIS — M51.36 DDD (DEGENERATIVE DISC DISEASE), LUMBAR: Primary | ICD-10-CM

## 2022-01-07 PROCEDURE — 99214 OFFICE O/P EST MOD 30 MIN: CPT | Mod: PBBFAC,PN | Performed by: PHYSICIAN ASSISTANT

## 2022-01-07 PROCEDURE — 99999 PR PBB SHADOW E&M-EST. PATIENT-LVL IV: CPT | Mod: PBBFAC,,, | Performed by: PHYSICIAN ASSISTANT

## 2022-01-07 PROCEDURE — 99213 OFFICE O/P EST LOW 20 MIN: CPT | Mod: S$PBB,,, | Performed by: PHYSICIAN ASSISTANT

## 2022-01-07 PROCEDURE — 99999 PR PBB SHADOW E&M-EST. PATIENT-LVL IV: ICD-10-PCS | Mod: PBBFAC,,, | Performed by: PHYSICIAN ASSISTANT

## 2022-01-07 PROCEDURE — 99213 PR OFFICE/OUTPT VISIT, EST, LEVL III, 20-29 MIN: ICD-10-PCS | Mod: S$PBB,,, | Performed by: PHYSICIAN ASSISTANT

## 2022-01-07 RX ORDER — BENAZEPRIL HYDROCHLORIDE 10 MG/1
10 TABLET ORAL DAILY
Qty: 90 TABLET | Refills: 1 | Status: SHIPPED | OUTPATIENT
Start: 2022-01-07 | End: 2022-06-28

## 2022-01-07 RX ORDER — GABAPENTIN 300 MG/1
300 CAPSULE ORAL 3 TIMES DAILY
Qty: 90 CAPSULE | Refills: 2 | Status: SHIPPED | OUTPATIENT
Start: 2022-01-07 | End: 2022-08-03

## 2022-01-07 NOTE — TELEPHONE ENCOUNTER
Could you please re-send the patients Benazepril? It keeps defaulting to print for some reason even though I manually change it to normal when I pended it.

## 2022-01-07 NOTE — TELEPHONE ENCOUNTER
----- Message from Aleksandr Pacheco sent at 1/7/2022 11:24 AM CST -----  Contact: pt at [phone]  Type: Needs Medical Advice  Who Called: pt  Pharmacy name and phone #:    [id number] - [place] - [address]  [address]  [place] [address]  Phone: [phone] Fax: [phone]  Best Call Back Number: [phone]  Additional Information: pt is calling the office to have the Rx for benazepriL (LOTENSIN) 10 MG tablet e-scribed and sent over today as the pt is completely out. Please call back and advise.

## 2022-01-10 ENCOUNTER — OFFICE VISIT (OUTPATIENT)
Dept: NEUROSURGERY | Facility: CLINIC | Age: 72
End: 2022-01-10
Payer: MEDICARE

## 2022-01-10 VITALS
HEART RATE: 73 BPM | HEIGHT: 68 IN | DIASTOLIC BLOOD PRESSURE: 75 MMHG | SYSTOLIC BLOOD PRESSURE: 120 MMHG | BODY MASS INDEX: 29.14 KG/M2 | WEIGHT: 192.25 LBS

## 2022-01-10 DIAGNOSIS — M48.062 SPINAL STENOSIS OF LUMBAR REGION WITH NEUROGENIC CLAUDICATION: ICD-10-CM

## 2022-01-10 DIAGNOSIS — M51.36 DDD (DEGENERATIVE DISC DISEASE), LUMBAR: ICD-10-CM

## 2022-01-10 DIAGNOSIS — M71.38 SYNOVIAL CYST OF LUMBAR FACET JOINT: Primary | ICD-10-CM

## 2022-01-10 PROCEDURE — 99205 OFFICE O/P NEW HI 60 MIN: CPT | Mod: S$PBB,,, | Performed by: NEUROLOGICAL SURGERY

## 2022-01-10 PROCEDURE — 99213 OFFICE O/P EST LOW 20 MIN: CPT | Mod: PBBFAC,PN | Performed by: NEUROLOGICAL SURGERY

## 2022-01-10 PROCEDURE — 99205 PR OFFICE/OUTPT VISIT, NEW, LEVL V, 60-74 MIN: ICD-10-PCS | Mod: S$PBB,,, | Performed by: NEUROLOGICAL SURGERY

## 2022-01-10 PROCEDURE — 99999 PR PBB SHADOW E&M-EST. PATIENT-LVL III: CPT | Mod: PBBFAC,,, | Performed by: NEUROLOGICAL SURGERY

## 2022-01-10 PROCEDURE — 99999 PR PBB SHADOW E&M-EST. PATIENT-LVL III: ICD-10-PCS | Mod: PBBFAC,,, | Performed by: NEUROLOGICAL SURGERY

## 2022-01-10 NOTE — PROGRESS NOTES
I have seen the patient, reviewed the Advanced Practice Provider's history and physical, assessment and plan. I have personally interviewed and examined the patient at bedside and interpreted the relevant imaging and lab work and I agree with the findings. I personally performed the documented services. See below for any additional comments.    Patient has a remote history of mostly left-sided sciatica which responded well to the series of injections 5 years ago.  For over 6 months now, he has been experiencing severe, radiating pain to his right lower extremity greater than left as well as severe neurogenic claudication making it impossible to stand for more than 5 minutes.  He also noted floppiness in his right leg in weakness and buckling.  He also endorses constant numbness in his right foot.  He denies any bowel or bladder dysfunction.  He had 2 SHEILA with no significant relief.    MR and x-ray I of the lumbar spine shows degenerative scoliosis from T12-L4 with no associated severe central stenosis but multilevel lateral recess and foraminal stenosis.  The most notable finding is at L4-5 where alignment is normal but there is a large, compressive right synovial cyst resulting in severe central stenosis.  There is also facet and ligamentous hypertrophy involving the left.    The patient has right ankle dorsiflexion and plantar flexion weakness and a positive right straight leg raise.    His severe current symptomatology is most likely secondary to the large compressive synovial cyst at L4-5.  He has failed conservative treatment.  The patient understands that he has other findings which may contribute to his symptoms but I do not feel the need to be surgically addressed at this time.  I offered the patient a right minimally invasive approach with resection of the right L4-5 synovial cyst and bilateral L4-5 laminectomy and medial facetectomy. I have discussed the risks, benefits and alternatives to the proposed  operation in detail. All questions were answered. Risks discussed include but are not limited to: bleeding, infection, spinal fluid leak, injury to the nerves, weakness, numbness, paralysis, loss of bowel or bladder function, injury to the blood vessels, stroke, heart attack, blood clots, destabilization, no improvement or worsening of symptoms, recurrence, need for more surgery including fusion, death.  He wishes to proceed.  He will need clearance by his primary care physician and cardiologist given his medical risk factors.

## 2022-01-10 NOTE — PROGRESS NOTES
Neurosurgery History & Physical    Patient ID: Vince Chadwick III is a 71 y.o. male.    Chief Complaint   Patient presents with    Lumbar Spine Pain (L-Spine)     Pt having BLE muscle spasms which injections did give some relief. RLE loss of function, causes falls when bending, Pt reports weakness to RLE after second injection 12/13/21. Patient only feels relief of pain with sitting and laying. RLE numbness and tingling. Denies any bowel or bladder disturbance.        Review of Systems   Constitutional: Negative for chills, diaphoresis, fatigue and fever.   HENT: Negative for congestion, hearing loss, rhinorrhea and sore throat.    Eyes: Negative for photophobia, pain, redness and visual disturbance.   Respiratory: Negative for cough, chest tightness, shortness of breath and wheezing.    Cardiovascular: Negative for chest pain, palpitations and leg swelling.   Gastrointestinal: Negative for abdominal distention, abdominal pain, constipation, diarrhea, nausea and vomiting.   Genitourinary: Negative for difficulty urinating, dysuria, frequency, hematuria and urgency.   Musculoskeletal: Positive for back pain and gait problem. Negative for myalgias, neck pain and neck stiffness.   Skin: Negative for pallor and rash.   Neurological: Positive for weakness and numbness. Negative for dizziness, seizures, speech difficulty, light-headedness and headaches.   Psychiatric/Behavioral: Negative for confusion, hallucinations and sleep disturbance.       Past Medical History:   Diagnosis Date    ALLERGIC RHINITIS     Arthritis     Cataract     OU    Colon polyp     Diverticulitis     Diverticulosis     Hyperlipidemia     resolved    Hypertension     Irritable bowel syndrome     Lumbar disc disease      Social History     Socioeconomic History    Marital status:    Occupational History    Occupation: retired [employer]   Tobacco Use    Smoking status: Never Smoker    Smokeless tobacco: Never Used    Substance and Sexual Activity    Alcohol use: No     Alcohol/week: 0.0 standard drinks    Drug use: No    Sexual activity: Yes     Family History   Problem Relation Age of Onset    Glaucoma Father     Cataracts Father     Macular degeneration Father     Colon polyps Father     Glaucoma Paternal Uncle     Prostate cancer Paternal Uncle     Glaucoma Paternal Uncle     Cataracts Mother     Breast cancer Mother     Glaucoma Sister     Heart attack Paternal Grandfather 44    Colon cancer Neg Hx     Crohn's disease Neg Hx     Ulcerative colitis Neg Hx      Review of patient's allergies indicates:  No Known Allergies    Current Outpatient Medications:     atorvastatin (LIPITOR) 10 MG tablet, TAKE 1 TABLET BY MOUTH EVERY DAY IN THE EVENING, Disp: 90 tablet, Rfl: 1    azelastine (ASTELIN) 137 mcg (0.1 %) nasal spray, INSTILL 2 SPRAYS IN EACH NOSTRIL TWICE A DAY, Disp: 90 mL, Rfl: 1    benazepriL (LOTENSIN) 10 MG tablet, Take 1 tablet (10 mg total) by mouth once daily., Disp: 90 tablet, Rfl: 1    celecoxib (CELEBREX) 200 MG capsule, TAKE 1 CAPSULE BY MOUTH TWICE A DAY, Disp: 30 capsule, Rfl: 1    dicyclomine (BENTYL) 20 mg tablet, TAKE 1 TABLET BY MOUTH BEFORE MEALS & AT BEDTIME AS NEEDED, Disp: 720 tablet, Rfl: 1    EScitalopram oxalate (LEXAPRO) 20 MG tablet, TAKE 1 TABLET BY MOUTH EVERY DAY, Disp: 90 tablet, Rfl: 1    gabapentin (NEURONTIN) 300 MG capsule, Take 1 capsule (300 mg total) by mouth 3 (three) times daily., Disp: 90 capsule, Rfl: 2    omeprazole (PRILOSEC) 40 MG capsule, TAKE 1 CAPSULE (40 MG TOTAL) BY MOUTH BEFORE BREAKFAST., Disp: 90 capsule, Rfl: 3    tadalafiL (CIALIS) 20 MG Tab, Take 1 tabley by mouth 30 minutes before sexual activity. Do not exceed 1 tablet every 72 hours, Disp: 10 tablet, Rfl: 11    tamsulosin (FLOMAX) 0.4 mg Cap, TAKE 1 CAPSULE BY MOUTH TWICE A DAY, Disp: 180 capsule, Rfl: 3    tiZANidine (ZANAFLEX) 2 MG tablet, TAKE 1 TO 2 TABLETS BY MOUTH 3 TIMES A DAY AS  "NEEDED FOR MUSCLE SPASMS, Disp: 30 tablet, Rfl: 1    zaleplon (SONATA) 10 MG capsule, Take 1 capsule (10 mg total) by mouth nightly., Disp: 90 capsule, Rfl: 0  Blood pressure 120/75, pulse 73, height 5' 8" (1.727 m), weight 87.2 kg (192 lb 3.9 oz).      Neurologic Exam     Mental Status   Oriented to person, place, and time.   Oriented to person.   Oriented to place.   Oriented to time.   Follows 3 step commands.   Attention: normal. Concentration: normal.   Speech: speech is normal   Level of consciousness: alert  Knowledge: consistent with education.   Able to name object. Able to read. Able to repeat. Able to write. Normal comprehension.      Cranial Nerves      CN II   Visual acuity: normal  Right visual field deficit: none  Left visual field deficit: none      CN III, IV, VI   Pupils are equal, round, and reactive to light.  Right pupil: Size: 3 mm. Shape: regular. Reactivity: brisk. Consensual response: intact.   Left pupil: Size: 3 mm. Shape: regular. Reactivity: brisk. Consensual response: intact.   CN III: no CN III palsy  CN VI: no CN VI palsy  Nystagmus: none   Diplopia: none  Ophthalmoparesis: none  Conjugate gaze: present     CN V   Right facial sensation deficit: none  Left facial sensation deficit: none     CN VII   Right facial weakness: none  Left facial weakness: none     CN VIII   Hearing: intact     CN IX, X   CN IX normal.   CN X normal.      CN XI   Right sternocleidomastoid strength: normal  Left sternocleidomastoid strength: normal  Right trapezius strength: normal  Left trapezius strength: normal     CN XII   Fasciculations: absent  Tongue deviation: none     Motor Exam   Muscle bulk: normal  Overall muscle tone: normal  Right arm pronator drift: absent  Left arm pronator drift: absent     Strength   Right deltoid: 5/5  Left deltoid: 5/5  Right biceps: 5/5  Left biceps: 5/5  Right triceps: 5/5  Left triceps: 5/5  Right wrist flexion: 5/5  Left wrist flexion: 5/5  Right wrist extension: " 5/5  Left wrist extension: 5/5  Right interossei: 5/5  Left interossei: 5/5  Right iliopsoas: 5/5  Left iliopsoas: 5/5  Right quadriceps: 5/5  Left quadriceps: 5/5  Right hamstrin/5  Left hamstrin/5  Right anterior tibial: 5/5  Left anterior tibial: 5/5  Right posterior tibial: 5/5  Left posterior tibial: 5/5  Right peroneal: 4/5  Left peroneal: 5/5  Right gastroc: 4/5  Left gastroc: 5/5  Right EHL: 4/5  Left EHL: 5/5     Sensory Exam   Right arm light touch: normal  Left arm light touch: normal  Right leg light touch: numbness L5 distribution  Left leg light touch: normal     Gait, Coordination, and Reflexes      Gait  Gait: normal      Coordination   Romberg: negative  Finger to nose coordination: normal  Heel to shin coordination: normal  Tandem walking coordination: normal     Tremor   Resting tremor: absent  Intention tremor: absent  Action tremor: absent     Reflexes   Right brachioradialis: 1+  Left brachioradialis: 1+  Right biceps: 1+  Left biceps: 1+  Right triceps: 1+  Left triceps: 1+  Right patellar: 2+  Left patellar: 2+  Right achilles: 0  Left achilles: 0  Right Ng: absent  Left Ng: absent  Right ankle clonus: absent  Left ankle clonus: absent  Right plantar: normal  Left plantar: normal       Physical Exam  Constitutional: Oriented to person, place, and time. Appears well-developed and well-nourished.   HENT:   Head: Normocephalic and atraumatic.   Eyes: Pupils are equal, round, and reactive to light.   Neck: Normal range of motion. Neck supple.   Cardiovascular: Normal rate.    Pulmonary/Chest: Effort normal.   Musculoskeletal: Normal range of motion. Exhibits no edema.   Neurological: Alert and oriented to person, place, and time. Normal Finger-Nose-Finger Test, a normal Heel to Shin Test, a normal Romberg Test and a normal Tandem Gait Test. Gait normal.   Reflex Scores:       Tricep reflexes are 1+ on the right side and 1+ on the left side.       Bicep reflexes are 1+ on the  "right side and 1+ on the left side.       Brachioradialis reflexes are 1+ on the right side and 1+ on the left side.       Patellar reflexes are 2+ on the right side and 2+ on the left side.       Achilles reflexes are 0 on the right side and 0 on the left side.  Skin: Skin is warm, dry and intact.   Psychiatric: Normal mood and affect. Speech is normal and behavior is normal. Judgment and thought content normal.   Nursing note and vitals reviewed.      Provider dictation:  I reviewed the imaging.   "MRI and x-ray I of the lumbar spine shows degenerative scoliosis from T12-L4 with no associated severe central stenosis but multilevel lateral recess and foraminal stenosis.  The most notable finding is at L4-5 where alignment is normal but there is a large, compressive right synovial cyst resulting in severe central stenosis.  There is also facet and ligamentous hypertrophy involving the left."    The patient is a 71 year old male who presents for neurosurgical consultation referred by Dr. Sanchez. The patient reports onset of worsening back pain that started in June. He has shooting pain when standing down the posterior aspect of bilateral lower extremities, right > left. He also reports numbness in the same distribution. He reports right lower extremity weakness and states his leg will give out on him. He also reports balance difficulty. He denies upper extremity pain, numbness, or weakness. Denies bowel/bladder dysfunction. He is only able to stand for 5-10 minutes prior to needing to sit down due to the pain. He underwent ESIs with Dr. Sanchez with minimal relief.     On exam patient has + right SLR test. There is right DF, EHL, and PF weakness. There is numbness in the right L5 distribution.     Patient was offered a right minimally invasive approach with resection of the right L4-5 synovial cyst and bilateral L4-5 laminectomy and medial facetectomy. He will need PCP and Cardiology clearance.     1. Synovial " cyst of lumbar facet joint     2. DDD (degenerative disc disease), lumbar  Ambulatory referral/consult to Neurosurgery   3. Spinal stenosis of lumbar region with neurogenic claudication

## 2022-01-11 NOTE — PROGRESS NOTES
This note was completed with dictation software and grammatical errors may exist.    CC:  Bilateral buttock and leg pain    HPI:  The patient is a 71-year-old man with a history of hypertension, hyperlipidemia who presents in referral from Dr. Burleson for recurrent bilateral leg pain.  He is status post L5/S1 interlaminar epidural steroid injection on 12/13/2021 with relief of his left leg pain but not relief of his right leg pain.  He describes pain in his right low back, right buttock, posterior thigh and calf.  However, he does report that his weakness is getting worse.  This is in the right greater than left legs and he reports numbness in his right leg and left foot.  He states he cannot stand longer than 10 minutes and is off balance.  He states that he fell about 4 days ago while leaning over.  He denies hitting his head.  He denies bladder or bowel incontinence.    Pain intervention history: He is status post L3/4 interlaminar epidural steroid injection on 6/7/16 with about 40% relief.  He is status post L3/4 interlaminar epidural steroid injection on 7/14/16 with no additional relief.  He is status post left L3 and L4 transforaminal epidural steroid injection on 9/7/16 with 90% relief. He is status post L5/S1 interlaminar epidural steroid injection on 11/15/2021 with 50% relief. He is status post L5/S1 interlaminar epidural steroid injection on 12/13/2021 with relief of his left leg pain but not relief of his right leg pain.      Spine surgeries:    Antineuropathics: gabapentin  NSAIDs: celebrex  Physical therapy:  He has done 6 weeks of physical therapy at [facility] this year  Antidepressants: Lexapro 20  Muscle relaxers: Zanaflex 2 mg  Opioids:  Antiplatelets/Anticoagulants:    ROS:  He reports back pain only.  Balance of review of systems is negative.    No results found for: LABA1C, HGBA1C    Lab Results   Component Value Date    WBC 6.07 08/20/2020    HGB 14.3 08/20/2020    HCT 46.9 08/20/2020     MCV 92 08/20/2020     08/20/2020             Past Medical History:   Diagnosis Date    ALLERGIC RHINITIS     Arthritis     Cataract     OU    Colon polyp     Diverticulitis     Diverticulosis     Hyperlipidemia     resolved    Hypertension     Irritable bowel syndrome     Lumbar disc disease        Past Surgical History:   Procedure Laterality Date    APPENDECTOMY      CIRCUMCISION      COLONOSCOPY  06/02/2011    [facility].    One 1 mm polyp in the sigmoid colon.  FRAGMENT OF NONNEOPLASTIC COLONIC MUCOSA.  Sigmoid diverticulosis.  Spasms c/w IBS.  repeat in 7-8 years    COLONOSCOPY  09/12/2006    [place]    COLONOSCOPY N/A 5/13/2020    Procedure: COLONOSCOPY;  Surgeon: Davy Novak Jr., MD;  Location: [facility] ENDO;  Service: Endoscopy;  Laterality: N/A;    EPIDURAL STEROID INJECTION INTO LUMBAR SPINE N/A 11/15/2021    Procedure: Injection-steroid-epidural-lumbar L5/S1;  Surgeon: Khang Sanchez MD;  Location: [facility] OR;  Service: Pain Management;  Laterality: N/A;    EPIDURAL STEROID INJECTION INTO LUMBAR SPINE N/A 12/13/2021    Procedure: Injection-steroid-epidural-lumbar L5/S1;  Surgeon: Khang Sanchez MD;  Location: [facility] OR;  Service: Pain Management;  Laterality: N/A;    ESOPHAGOGASTRODUODENOSCOPY N/A 5/13/2020    Procedure: EGD (ESOPHAGOGASTRODUODENOSCOPY);  Surgeon: Davy Novak Jr., MD;  Location: [facility] ENDO;  Service: Endoscopy;  Laterality: N/A;    FOOT SURGERY      x 2    LUMBAR EPIDURAL INJECTION      PILONIDAL CYST DRAINAGE      s/p chalazion removal      OD    TONSILLECTOMY         Social History     Socioeconomic History    Marital status:    Occupational History    Occupation: retired [employer]   Tobacco Use    Smoking status: Never Smoker    Smokeless tobacco: Never Used   Substance and Sexual Activity    Alcohol use: No     Alcohol/week: 0.0 standard drinks    Drug use: No    Sexual activity: Yes         Medications/Allergies: See med  "card    Vitals:    01/07/22 1038   BP: 115/67   Pulse: 73   Temp: 96.7 °F (35.9 °C)   SpO2: 98%   Weight: 87.2 kg (192 lb 5.6 oz)   Height: 5' 8" (1.727 m)         Physical exam:  Gen: A and O x3, pleasant, well-groomed  Skin: No rashes or obvious lesions  HEENT: PERRLA, no obvious deformities on ears or in canals. Trachea midline.  CVS: Regular rate and rhythm, normal palpable pulses.  Resp:No increased work of breathing, symmetrical chest rise.  Abdomen: Soft, NT/ND.  Musculoskeletal:  Walks with a forward leaning gait    Neuro:  Lower extremities: 5/5 strength bilaterally  Reflexes: Patellar 1+, Achilles 0+ bilaterally.  Sensory:  Intact and symmetrical to light touch and pinprick in L2-S1 dermatomes bilaterally.    Lumbar spine:  Lumbar spine:  Range of motion is mildly reduced with flexion, moderately reduced with extension with increased pain with left greater than right oblique extension  Manuel's test causes no increased pain on either side.    Supine straight leg raise is negative bilaterally.    Internal and external rotation of the hip causes no increased pain on either side.  Myofascial exam: No tenderness to palpation across lumbar paraspinous muscles.    Imaging:  10/12/21 Xray L-spine:  There is a levoscoliotic curvature of the thoracolumbar spine, apex at L1-L2.  There is multilevel degenerative change of the thoracolumbar spine in the form of marginal osteophyte formation and disc space narrowing.  Disc space narrowing and degenerative endplate change appear most pronounced L2-L3 and L3-L4.  There is a minimal grade I retrolisthesis of L2 on L3.  No pars defects.  No acute lumbar compression fracture noted radiographically.  The SI joints are unremarkable.  The incidentally observed soft tissues of the abdomen and pelvis are unremarkable.    5/10/16 MRI L-spine:  1.  Mild lumbar scoliosis.  2.  L1-2 minimal disc bulge.  3.  L2-3 moderate spinal stenosis from a combination of disc bulge and " degenerative facet arthrosis.  There is moderate bilateral foraminal stenosis.  4.  L3-4 moderate spinal stenosis from a combination of disc bulge and degenerative facet arthrosis.  There is moderate right and severe left foraminal stenosis.  5.  L4-5 moderate spinal stenosis from a combination of disc bulge and degenerative facet arthrosis.  There is moderate right and mild left foraminal stenosis.  6.  L5-S1 mild degenerative facet arthrosis.    10/30/2021 MRI lumbar spine  On the nonstandard  image there is a 12 degree levo rotoscoliotic curvature centered in the upper lumbar region.  There is diffuse disc desiccation with disc space loss most prominent at L2/L3 through L3/L4.  The conus terminates at L1 and has an unremarkable appearance.  Mixed but primarily fatty degenerative endplate changes are noted at L3/L4.  Schmorl's node formation involves the inferior endplate of L2 and there is adjacent edematous endplate reaction.  There is no evidence of abnormal fluid signal within the disc spaces.  The conus terminates at L1.  The lumbar vertebrae otherwise display normal signal, morphology and alignment.  The individual disc levels appears follows:     T10/T11: This area was not previously imaged but there does appear to be at least mild canal stenosis related to facet and ligamentous hypertrophy posteriorly.     T11/T12 and T12/L1: There is no evidence of disc protrusion, canal or foraminal stenosis.     L1/L2: Annular disc bulge combines with degenerative facet changes to produce mild canal and bilateral inferior foraminal narrowing.  The changes are worse on the right foramen related to a superimposed right posterolateral disc protrusion and narrowing of the foramen due to scoliosis.     L2/L3: Prominent annular disc bulge and osteophyte formation combined with canal ligamentous hypertrophy to produce prominent right and moderate left-sided foraminal narrowing.  Right posterolateral superimposed  protrusion and a left posterolateral superimposed protrusion are suggested.  There is moderate central canal stenosis.  Degenerative facet changes are noted bilaterally.     L3/L4: Annular disc bulging is evident with a superimposed left posterolateral and lateral disc extrusion osteophyte formation producing left greater than right foraminal narrowing.  There is mild central canal stenosis.     L4/L5: Annular disc bulge and osteophyte formation combine with a broad-based central disc extrusion and a large new right posterolateral canal presumed synovial cyst which measures 13 by 11 by 15 mm.  This produces in combination with the degenerative disc disease prominent canal stenosis.  The left to right posterior canal measures only 5 mm.  There is prominent left and prominent right foraminal narrowing due to disc bulging and facet disease.  The presumed synovial cyst was not seen previously.     L5/S1: Degenerative facet changes are noted bilaterally and there is annular disc bulging.     This report was flagged in Epic as abnormal.      Assessment:  The patient is a 71-year-old man with a history of hypertension, hyperlipidemia who presents in referral from Dr. Burleson for recurrent bilateral leg pain.    1. DDD (degenerative disc disease), lumbar  Ambulatory referral/consult to Neurosurgery   2. Lumbar radiculopathy     3. Spinal stenosis of lumbar region, unspecified whether neurogenic claudication present           Plan:  1. Since he is reporting worsening weakness and difficulty with balance I am going to have him see Neurosurgery for further evaluation.  We reviewed his most recent lumbar spine MRI and discussed that his symptoms are likely in part due to a right-sided facet joint cyst at L4/5 contributing to canal stenosis.  In the meantime I will have him increase gabapentin to 3 times a day if tolerated.  2. He will follow-up as needed.

## 2022-01-13 ENCOUNTER — TELEPHONE (OUTPATIENT)
Dept: NEUROSURGERY | Facility: CLINIC | Age: 72
End: 2022-01-13
Payer: MEDICARE

## 2022-01-13 DIAGNOSIS — M54.16 LUMBAR RADICULOPATHY: Primary | ICD-10-CM

## 2022-01-13 DIAGNOSIS — Z01.818 PRE-OP EVALUATION: Primary | ICD-10-CM

## 2022-01-13 DIAGNOSIS — R79.1 ABNORMAL COAGULATION PROFILE: ICD-10-CM

## 2022-01-13 RX ORDER — LIDOCAINE HYDROCHLORIDE 10 MG/ML
1 INJECTION, SOLUTION EPIDURAL; INFILTRATION; INTRACAUDAL; PERINEURAL ONCE
Status: CANCELLED | OUTPATIENT
Start: 2022-01-13 | End: 2022-01-13

## 2022-01-13 RX ORDER — SODIUM CHLORIDE, SODIUM LACTATE, POTASSIUM CHLORIDE, CALCIUM CHLORIDE 600; 310; 30; 20 MG/100ML; MG/100ML; MG/100ML; MG/100ML
INJECTION, SOLUTION INTRAVENOUS CONTINUOUS
Status: CANCELLED | OUTPATIENT
Start: 2022-01-13

## 2022-01-13 NOTE — TELEPHONE ENCOUNTER
LALITA Chadwick will be having surgery on 1/26/22 with Dr. Lobato, Neurosurgeon, at [facility]. We are reaching out to obtain a surgical clearance from Dr. Lea to ensure the safety of our patient. The surgery is right minimally invasive approach resection L4-5 synovial cyst and bilateral laminectomy/medial facetectomy and will be under general anesthesia. This procedure typically lasts approximately 3 hours. We request that the patient hold all anticoagulant/antiinflammatory medications for 5-7 days prior to surgery. Please let us know if our office can be of further assistance.

## 2022-01-13 NOTE — TELEPHONE ENCOUNTER
Vince Chadwick will be having surgery on 1/26/22 with Dr. Lobato, Neurosurgeon, at [facility]. We are reaching out to obtain a surgical clearance from Dr. Burleson to ensure the safety of our patient. The surgery is Minimally invasive L4-5 synovial cyst and bilateral laminectomy/medial facetectomy and will be under general anesthesia. This procedure typically lasts approximately 3 hours. We request that the patient hold all anticoagulant/antiinflammatory medications for 5-7 days prior to surgery. Please let us know if our office can be of further assistance.

## 2022-01-14 ENCOUNTER — TELEPHONE (OUTPATIENT)
Dept: CARDIOLOGY | Facility: CLINIC | Age: 72
End: 2022-01-14

## 2022-01-14 ENCOUNTER — OFFICE VISIT (OUTPATIENT)
Dept: CARDIOLOGY | Facility: CLINIC | Age: 72
End: 2022-01-14
Payer: MEDICARE

## 2022-01-14 VITALS
DIASTOLIC BLOOD PRESSURE: 71 MMHG | WEIGHT: 191.81 LBS | BODY MASS INDEX: 29.07 KG/M2 | SYSTOLIC BLOOD PRESSURE: 118 MMHG | HEART RATE: 74 BPM | HEIGHT: 68 IN

## 2022-01-14 DIAGNOSIS — M51.9 LUMBAR DISC DISEASE: Primary | ICD-10-CM

## 2022-01-14 DIAGNOSIS — D64.9 ANEMIA, UNSPECIFIED TYPE: ICD-10-CM

## 2022-01-14 DIAGNOSIS — E78.5 HYPERLIPIDEMIA, UNSPECIFIED HYPERLIPIDEMIA TYPE: ICD-10-CM

## 2022-01-14 DIAGNOSIS — N40.0 BENIGN PROSTATIC HYPERPLASIA, UNSPECIFIED WHETHER LOWER URINARY TRACT SYMPTOMS PRESENT: ICD-10-CM

## 2022-01-14 DIAGNOSIS — R94.31 ABNORMAL ELECTROCARDIOGRAM (ECG) (EKG): ICD-10-CM

## 2022-01-14 DIAGNOSIS — Z01.818 PRE-OP EVALUATION: ICD-10-CM

## 2022-01-14 DIAGNOSIS — I10 PRIMARY HYPERTENSION: ICD-10-CM

## 2022-01-14 DIAGNOSIS — K57.90 DIVERTICULOSIS: ICD-10-CM

## 2022-01-14 DIAGNOSIS — Z01.818 PRE-OP EVALUATION: Primary | ICD-10-CM

## 2022-01-14 PROCEDURE — 99999 PR PBB SHADOW E&M-EST. PATIENT-LVL III: CPT | Mod: PBBFAC,,, | Performed by: INTERNAL MEDICINE

## 2022-01-14 PROCEDURE — 93010 ELECTROCARDIOGRAM REPORT: CPT | Mod: ,,, | Performed by: INTERNAL MEDICINE

## 2022-01-14 PROCEDURE — 99213 OFFICE O/P EST LOW 20 MIN: CPT | Mod: PBBFAC,PO | Performed by: INTERNAL MEDICINE

## 2022-01-14 PROCEDURE — 99999 PR PBB SHADOW E&M-EST. PATIENT-LVL III: ICD-10-PCS | Mod: PBBFAC,,, | Performed by: INTERNAL MEDICINE

## 2022-01-14 PROCEDURE — 99204 PR OFFICE/OUTPT VISIT, NEW, LEVL IV, 45-59 MIN: ICD-10-PCS | Mod: S$PBB,,, | Performed by: INTERNAL MEDICINE

## 2022-01-14 PROCEDURE — 93005 ELECTROCARDIOGRAM TRACING: CPT | Mod: PO

## 2022-01-14 PROCEDURE — 99204 OFFICE O/P NEW MOD 45 MIN: CPT | Mod: S$PBB,,, | Performed by: INTERNAL MEDICINE

## 2022-01-14 PROCEDURE — 93010 EKG 12-LEAD: ICD-10-PCS | Mod: ,,, | Performed by: INTERNAL MEDICINE

## 2022-01-14 NOTE — TELEPHONE ENCOUNTER
PLEAE SEE DR. STRINGER NOTE..    PT IS Low risk clear for surgery at present./ DR. STRINGER.    THANK YOU

## 2022-01-14 NOTE — PROGRESS NOTES
Subjective:    Patient ID:  Vince Chadwick III is a 71 y.o. male patient here for evaluation cardiac clearance (Laminectomy - Dr. Lobato 01/26/22)     HISTORY , PRIMARY COMPLAINT:  New patient visit, cardiology evaluation.  Preop assessment for lumbar disc, cyst surgery.  History of hypertension dyslipidemia.  Last noninvasive cardiac assessment 20/16 with negative nuclear study, echocardiogram unremarkable with preserved ejection fraction, no structural heart issues.  No chest pain shortness of breath.  Because of degenerative disease lower back, patient has been inactive.  Wife does report snoring, no prior history of obstructive sleep apnea.  Resting EKG today reveals incomplete right bundle-branch block, possible right ventricular enlargement.    No past history of structural ischemic or arrhythmic heart disease.  No PND orthopnea.  No history of DVT PE.  No history of CVA Ca.  No history of GI bleed or chronic kidney disease.          Review of patient's allergies indicates:  No Known Allergies    Past Medical History:   Diagnosis Date    ALLERGIC RHINITIS     Arthritis     Cataract     OU    Colon polyp     Diverticulitis     Diverticulosis     Hyperlipidemia     resolved    Hypertension     Irritable bowel syndrome     Lumbar disc disease      Past Surgical History:   Procedure Laterality Date    APPENDECTOMY      CIRCUMCISION      COLONOSCOPY  06/02/2011    [facility].    One 1 mm polyp in the sigmoid colon.  FRAGMENT OF NONNEOPLASTIC COLONIC MUCOSA.  Sigmoid diverticulosis.  Spasms c/w IBS.  repeat in 7-8 years    COLONOSCOPY  09/12/2006    Dr[place]ak[place]    COLONOSCOPY N/A 5/13/2020    Procedure: COLONOSCOPY;  Surgeon: Davy Novak Jr., MD;  Location: [facility] ENDO;  Service: Endoscopy;  Laterality: N/A;    EPIDURAL STEROID INJECTION INTO LUMBAR SPINE N/A 11/15/2021    Procedure: Injection-steroid-epidural-lumbar L5/S1;  Surgeon: Khang Sanchez MD;  Location: [facility] OR;   Service: Pain Management;  Laterality: N/A;    EPIDURAL STEROID INJECTION INTO LUMBAR SPINE N/A 12/13/2021    Procedure: Injection-steroid-epidural-lumbar L5/S1;  Surgeon: Khang Sanchez MD;  Location: [facility] OR;  Service: Pain Management;  Laterality: N/A;    ESOPHAGOGASTRODUODENOSCOPY N/A 5/13/2020    Procedure: EGD (ESOPHAGOGASTRODUODENOSCOPY);  Surgeon: Davy Novak Jr., MD;  Location: [facility] ENDO;  Service: Endoscopy;  Laterality: N/A;    FOOT SURGERY      x 2    LUMBAR EPIDURAL INJECTION      PILONIDAL CYST DRAINAGE      s/p chalazion removal      OD    TONSILLECTOMY       Social History     Tobacco Use    Smoking status: Never Smoker    Smokeless tobacco: Never Used   Substance Use Topics    Alcohol use: No     Alcohol/week: 0.0 standard drinks    Drug use: No        Review of Systems:    As noted in HPI in addition         REVIEW OF SYSTEMS  Review of Systems   Constitutional: Negative for decreased appetite, diaphoresis, night sweats, weight gain and weight loss.   HENT: Negative for nosebleeds and odynophagia.    Eyes: Negative for double vision and photophobia.   Cardiovascular: Negative for chest pain, claudication, cyanosis, dyspnea on exertion, irregular heartbeat, leg swelling, near-syncope, orthopnea, palpitations, paroxysmal nocturnal dyspnea and syncope.   Respiratory: Negative for cough, hemoptysis, shortness of breath and wheezing.    Hematologic/Lymphatic: Negative for adenopathy.   Skin: Negative for flushing, skin cancer and suspicious lesions.   Musculoskeletal: Positive for arthritis and back pain. Negative for gout, myalgias and neck pain.   Gastrointestinal: Negative for abdominal pain, heartburn, hematemesis and hematochezia.   Genitourinary: Negative for bladder incontinence, hesitancy and nocturia.   Neurological: Negative for focal weakness, headaches, light-headedness and paresthesias.   Psychiatric/Behavioral: Negative for memory loss and substance abuse.        Objective:        Vitals:    01/14/22 1020   BP: 118/71   Pulse: 74       Lab Results   Component Value Date    WBC 6.07 08/20/2020    HGB 14.3 08/20/2020    HCT 46.9 08/20/2020     08/20/2020    CHOL 141 02/22/2021    TRIG 121 02/22/2021    HDL 59 02/22/2021    ALT 6 (L) 02/22/2021    AST 16 02/22/2021     02/22/2021    K 4.2 02/22/2021     02/22/2021    CREATININE 1.1 02/22/2021    BUN 15 02/22/2021    CO2 27 02/22/2021    TSH 2.797 08/20/2019    PSA 1.9 08/20/2020      CARDIOGRAM RESULTS  No results found for this or any previous visit.        CURRENT/PREVIOUS VISIT EKG    No valid procedures specified.   No results found for this or any previous visit.    No valid procedures specified.    PHYSICAL EXAM  CONSTITUTIONAL: Well built, well nourished in no apparent distress  NECK: no carotid bruit, no JVD  LUNGS: CTA  CHEST WALL: no tenderness,  HEART: regular rate and rhythm, S1, S2 normal, no murmur, click, rub or gallop   ABDOMEN: soft, non-tender; bowel sounds normal; no masses,  no organomegaly  EXTREMITIES: Extremities normal, no edema, no calf tenderness noted  VASCULAR EXAM: 2 PLUS UPPER AND LOWER EXT PULSES  NEURO: AAO X 3, NO ACUTE FOCAL OR LATERALIZING FINDINGS    I HAVE REVIEWED :    The vital signs, nurses notes, and all the pertinent radiology and labs.         Current Outpatient Medications   Medication Instructions    atorvastatin (LIPITOR) 10 MG tablet TAKE 1 TABLET BY MOUTH EVERY DAY IN THE EVENING    azelastine (ASTELIN) 137 mcg (0.1 %) nasal spray INSTILL 2 SPRAYS IN EACH NOSTRIL TWICE A DAY    benazepriL (LOTENSIN) 10 mg, Oral, Daily    celecoxib (CELEBREX) 200 MG capsule TAKE 1 CAPSULE BY MOUTH TWICE A DAY    dicyclomine (BENTYL) 20 mg tablet TAKE 1 TABLET BY MOUTH BEFORE MEALS & AT BEDTIME AS NEEDED    EScitalopram oxalate (LEXAPRO) 20 MG tablet TAKE 1 TABLET BY MOUTH EVERY DAY    gabapentin (NEURONTIN) 300 mg, Oral, 3 times daily    omeprazole (PRILOSEC) 40 mg,  Oral, Before breakfast    tadalafiL (CIALIS) 20 MG Tab Take 1 tabley by mouth 30 minutes before sexual activity. Do not exceed 1 tablet every 72 hours    tamsulosin (FLOMAX) 0.4 mg Cap TAKE 1 CAPSULE BY MOUTH TWICE A DAY    tiZANidine (ZANAFLEX) 2 MG tablet TAKE 1 TO 2 TABLETS BY MOUTH 3 TIMES A DAY AS NEEDED FOR MUSCLE SPASMS    zaleplon (SONATA) 10 mg, Oral, Nightly          Assessment:   Preop, lumbar surgery.  Hypertension  Dyslipidemia  Abnormal EKG with right bundle-branch block, possible right ventricular enlargement.  History suggest possible GLEN.        Plan:   Suggest echo.  Lexiscan.  Low risk clear for surgery at present.          No follow-ups on file.

## 2022-01-20 ENCOUNTER — OFFICE VISIT (OUTPATIENT)
Dept: PRIMARY CARE CLINIC | Facility: CLINIC | Age: 72
End: 2022-01-20
Payer: MEDICARE

## 2022-01-20 VITALS
DIASTOLIC BLOOD PRESSURE: 60 MMHG | WEIGHT: 191.94 LBS | OXYGEN SATURATION: 96 % | HEART RATE: 72 BPM | BODY MASS INDEX: 29.09 KG/M2 | RESPIRATION RATE: 18 BRPM | SYSTOLIC BLOOD PRESSURE: 114 MMHG | HEIGHT: 68 IN

## 2022-01-20 DIAGNOSIS — Z01.818 PREOP EXAMINATION: Primary | ICD-10-CM

## 2022-01-20 DIAGNOSIS — M48.061 SPINAL STENOSIS OF LUMBAR REGION, UNSPECIFIED WHETHER NEUROGENIC CLAUDICATION PRESENT: ICD-10-CM

## 2022-01-20 DIAGNOSIS — E78.5 HYPERLIPIDEMIA, UNSPECIFIED HYPERLIPIDEMIA TYPE: ICD-10-CM

## 2022-01-20 DIAGNOSIS — M47.816 LUMBAR FACET ARTHROPATHY: ICD-10-CM

## 2022-01-20 DIAGNOSIS — I10 PRIMARY HYPERTENSION: ICD-10-CM

## 2022-01-20 DIAGNOSIS — M51.9 LUMBAR DISC DISEASE: ICD-10-CM

## 2022-01-20 DIAGNOSIS — M54.16 LUMBAR RADICULAR PAIN: ICD-10-CM

## 2022-01-20 PROCEDURE — 99214 PR OFFICE/OUTPT VISIT, EST, LEVL IV, 30-39 MIN: ICD-10-PCS | Mod: S$PBB,,, | Performed by: FAMILY MEDICINE

## 2022-01-20 PROCEDURE — 99999 PR PBB SHADOW E&M-EST. PATIENT-LVL IV: CPT | Mod: PBBFAC,,, | Performed by: FAMILY MEDICINE

## 2022-01-20 PROCEDURE — 99214 OFFICE O/P EST MOD 30 MIN: CPT | Mod: S$PBB,,, | Performed by: FAMILY MEDICINE

## 2022-01-20 PROCEDURE — 99999 PR PBB SHADOW E&M-EST. PATIENT-LVL IV: ICD-10-PCS | Mod: PBBFAC,,, | Performed by: FAMILY MEDICINE

## 2022-01-20 PROCEDURE — 99214 OFFICE O/P EST MOD 30 MIN: CPT | Mod: PBBFAC,PN | Performed by: FAMILY MEDICINE

## 2022-01-20 NOTE — Clinical Note
He may proceed with surgery as planned.  No contraindications.  Let me know if anything else is needed.

## 2022-01-20 NOTE — PROGRESS NOTES
THIS DOCUMENT WAS MADE IN PART WITH VOICE RECOGNITION SOFTWARE.  OCCASIONALLY THIS SOFTWARE WILL MISINTERPRET WORDS OR PHRASES.      Primary Care Provider Appointment - [facility]      Patient ID: Vince Chadwick III is a 71 y.o. male.    Vince was seen today for pre-op exam.    Diagnoses and all orders for this visit:    Preop examination  Reviewed cardiology consult.  He has no contraindication for surgery, he is not at an increased risk.  He is optimized.  He may proceed with surgery as planned.  Copy of this note will be sent to his neurosurgeon    Primary hypertension  Mild, stable and satisfactory control.  Continue Lotensin 10 mg daily    Hyperlipidemia, unspecified hyperlipidemia type  Mild and well controlled on atorvastatin 10 mg daily.    Spinal stenosis of lumbar region, unspecified whether neurogenic claudication present  Lumbar radicular pain  Lumbar facet arthropathy  Lumbar disc disease  Scheduled for surgery next Wednesday.  He may proceed        Follow Up:  Three months      Health Maintenance       Date Due Completion Date    Shingles Vaccine (1 of 2) Never done ---    Influenza Vaccine (1) 09/01/2021 10/2/2020    Lipid Panel 02/22/2022 2/22/2021    TETANUS VACCINE 09/22/2025 9/22/2015    Colorectal Cancer Screening 05/13/2027 5/13/2020    Override on 6/2/2011: Done (legay documents, repeat in 8 years)              Subjective:     Chief Complaint   Patient presents with    Pre-op Exam     Having back surgery on 01/26/22 at [facility] with Dr. Zabala.     I have reviewed the information entered by the ancillary staff regarding the chief complaint as well as the related history.    HPI    Patient is a/an 71 y.o.  male   RISK of ADMIT/ED: 2    Patient presents for a preop and follow-up of chronic conditions.  He is having minimally invasive spine surgery under general anesthesia next week.  He has lumbar degenerative disc disease, spinal and foraminal  stenosis, synovial cyst, pain has not been adequately relieved with conservative therapy, PT, nor injections.      Active Ambulatory Problems     Diagnosis Date Noted    BPH (benign prostatic hypertrophy) 09/05/2012    Anxiety 09/05/2012    Hypertension     Hyperlipidemia     Lumbar disc disease     ADD (attention deficit disorder) 05/03/2013    Insomnia 04/16/2015    Lumbar stenosis 05/31/2016    DDD (degenerative disc disease), lumbar 05/31/2016    Lumbar radicular pain 05/31/2016    Lumbar facet arthropathy 05/31/2016    Lumbar radiculopathy 06/07/2016    Anemia 05/13/2020    Diverticulosis     Irritable bowel syndrome      Resolved Ambulatory Problems     Diagnosis Date Noted    No Resolved Ambulatory Problems     Past Medical History:   Diagnosis Date    ALLERGIC RHINITIS     Arthritis     Cataract     Colon polyp     Diverticulitis        Past Surgical History:   Procedure Laterality Date    APPENDECTOMY      CIRCUMCISION      COLONOSCOPY  06/02/2011    [facility].    One 1 mm polyp in the sigmoid colon.  FRAGMENT OF NONNEOPLASTIC COLONIC MUCOSA.  Sigmoid diverticulosis.  Spasms c/w IBS.  repeat in 7-8 years    COLONOSCOPY  09/12/2006    Dr[place] Novak[place]    COLONOSCOPY N/A 5/13/2020    Procedure: COLONOSCOPY;  Surgeon: Davy Novak Jr., MD;  Location: [facility];  Service: Endoscopy;  Laterality: N/A;    EPIDURAL STEROID INJECTION INTO LUMBAR SPINE N/A 11/15/2021    Procedure: Injection-steroid-epidural-lumbar L5/S1;  Surgeon: Khang Sanchez MD;  Location: [facility] OR;  Service: Pain Management;  Laterality: N/A;    EPIDURAL STEROID INJECTION INTO LUMBAR SPINE N/A 12/13/2021    Procedure: Injection-steroid-epidural-lumbar L5/S1;  Surgeon: Khang Sanchez MD;  Location: [facility] OR;  Service: Pain Management;  Laterality: N/A;    ESOPHAGOGASTRODUODENOSCOPY N/A 5/13/2020    Procedure: EGD (ESOPHAGOGASTRODUODENOSCOPY);  Surgeon: Davy Novak Jr., MD;  Location: [facility];   Service: Endoscopy;  Laterality: N/A;    EYE SURGERY      FOOT SURGERY      x 2    LUMBAR EPIDURAL INJECTION      PILONIDAL CYST DRAINAGE      s/p chalazion removal      OD    TONSILLECTOMY         Past Medical History:   Diagnosis Date    ALLERGIC RHINITIS     Arthritis     Cataract     OU    Colon polyp     Diverticulitis     Diverticulosis     Hyperlipidemia     resolved    Hypertension     Irritable bowel syndrome     Lumbar disc disease        Social History     Socioeconomic History    Marital status:    Occupational History    Occupation: retired [employer]   Tobacco Use    Smoking status: Never Smoker    Smokeless tobacco: Never Used   Substance and Sexual Activity    Alcohol use: No     Alcohol/week: 0.0 standard drinks    Drug use: No    Sexual activity: Yes       Review of Systems   Constitutional: Negative.    HENT: Negative.    Eyes: Negative.    Respiratory: Negative.    Cardiovascular: Negative.    Gastrointestinal: Negative.    Genitourinary: Negative.    Musculoskeletal: Positive for back pain.   Psychiatric/Behavioral: Negative.        Objective     Physical Exam  Vitals reviewed.   Constitutional:       General: He is not in acute distress.     Appearance: He is well-developed and well-nourished. He is not diaphoretic.   HENT:      Head: Normocephalic and atraumatic.      Right Ear: Tympanic membrane, ear canal and external ear normal.      Left Ear: Tympanic membrane, ear canal and external ear normal.      Nose: Nose normal.      Mouth/Throat:      Mouth: Oropharynx is clear and moist.   Eyes:      General: No scleral icterus.        Right eye: No discharge.         Left eye: No discharge.      Extraocular Movements: EOM normal.      Conjunctiva/sclera: Conjunctivae normal.      Pupils: Pupils are equal, round, and reactive to light.   Neck:      Thyroid: No thyromegaly.      Vascular: No JVD.      Trachea: No tracheal deviation.   Cardiovascular:      Rate and  "Rhythm: Normal rate and regular rhythm.      Pulses: Intact distal pulses.      Heart sounds: Normal heart sounds. No murmur heard.  No friction rub. No gallop.    Pulmonary:      Effort: Pulmonary effort is normal. No respiratory distress.      Breath sounds: Normal breath sounds. No stridor. No wheezing or rales.   Chest:      Chest wall: No tenderness.   Abdominal:      General: Bowel sounds are normal. There is no distension.      Palpations: Abdomen is soft. There is no mass.      Tenderness: There is no abdominal tenderness. There is no guarding or rebound.   Musculoskeletal:      Cervical back: Normal range of motion and neck supple.   Lymphadenopathy:      Cervical: No cervical adenopathy.   Skin:     General: Skin is warm.      Findings: No rash.   Neurological:      Mental Status: He is alert and oriented to person, place, and time.      Cranial Nerves: No cranial nerve deficit.      Motor: No abnormal muscle tone.      Coordination: Coordination normal.      Deep Tendon Reflexes: Reflexes are normal and symmetric. Reflexes normal.   Psychiatric:         Mood and Affect: Mood and affect normal.         Behavior: Behavior normal.         Thought Content: Thought content normal.         Judgment: Judgment normal.       Vitals:    01/20/22 1001   BP: 114/60   BP Location: Right arm   Patient Position: Sitting   BP Method: Medium (Manual)   Pulse: 72   Resp: 18   SpO2: 96%   Weight: 87.1 kg (191 lb 14.6 oz)   Height: 5' 8" (1.727 m)       RECENT LABS:    Lab Results   Component Value Date    WBC 6.47 01/19/2022    HGB 15.2 01/19/2022    HCT 46.8 01/19/2022     01/19/2022    CHOL 141 02/22/2021    TRIG 121 02/22/2021    HDL 59 02/22/2021    ALT 13 01/19/2022    AST 35 01/19/2022     (L) 01/19/2022    K 4.4 01/19/2022    CL 98 01/19/2022    CREATININE 0.99 01/19/2022    BUN 17 01/19/2022    CO2 28 01/19/2022    TSH 2.797 08/20/2019    PSA 1.9 08/20/2020    INR 0.9 01/19/2022       Results for orders " placed or performed during the hospital encounter of 01/19/22   Comprehensive metabolic panel   Result Value Ref Range    Sodium 135 (L) 136 - 145 mmol/L    Potassium 4.4 3.5 - 5.1 mmol/L    Chloride 98 95 - 110 mmol/L    CO2 28 22 - 31 mmol/L    Glucose 89 70 - 110 mg/dL    BUN 17 9 - 21 mg/dL    Creatinine 0.99 0.50 - 1.40 mg/dL    Calcium 9.6 8.4 - 10.2 mg/dL    Total Protein 7.0 6.0 - 8.4 g/dL    Albumin 4.4 3.5 - 5.2 g/dL    Total Bilirubin 1.2 0.2 - 1.3 mg/dL    Alkaline Phosphatase 84 38 - 145 U/L    AST 35 17 - 59 U/L    ALT 13 0 - 50 U/L    Anion Gap 9 8 - 16 mmol/L    eGFR if African American >60 >60 mL/min/1.73 m^2    eGFR if non African American >60 >60 mL/min/1.73 m^2   CBC auto differential   Result Value Ref Range    WBC 6.47 3.90 - 12.70 K/uL    RBC 5.41 4.60 - 6.20 M/uL    Hemoglobin 15.2 14.0 - 18.0 g/dL    Hematocrit 46.8 40.0 - 54.0 %    MCV 87 82 - 98 fL    MCH 28.1 27.0 - 31.0 pg    MCHC 32.5 32.0 - 36.0 g/dL    RDW 13.6 11.5 - 14.5 %    Platelets 218 150 - 450 K/uL    MPV 12.5 9.2 - 12.9 fL    Immature Granulocytes 0.5 0.0 - 0.5 %    Gran # (ANC) 4.4 1.8 - 7.7 K/uL    Immature Grans (Abs) 0.03 0.00 - 0.04 K/uL    Lymph # 1.3 1.0 - 4.8 K/uL    Mono # 0.7 0.3 - 1.0 K/uL    Eos # 0.1 0.0 - 0.5 K/uL    Baso # 0.07 0.00 - 0.20 K/uL    nRBC 0 0 /100 WBC    Gran % 67.3 38.0 - 73.0 %    Lymph % 19.8 18.0 - 48.0 %    Mono % 10.5 4.0 - 15.0 %    Eosinophil % 0.8 0.0 - 8.0 %    Basophil % 1.1 0.0 - 1.9 %    Differential Method Automated    Protime-INR   Result Value Ref Range    PT 12.5 11.8 - 14.7 sec    INR 0.9    APTT   Result Value Ref Range    aPTT 33.1 24.6 - 36.7 sec   Urinalysis, Reflex to Urine Culture Urine, Clean Catch    Specimen: Urine   Result Value Ref Range    Specimen UA Urine, Clean Catch     Color, UA Yellow Yellow, Straw, Cori    Appearance, UA Clear Clear    pH, UA 6.0 5.0 - 8.0    Specific Gravity, UA 1.025 1.005 - 1.030    Protein, UA Negative Negative    Glucose, UA Negative  Negative    Ketones, UA Trace (A) Negative    Bilirubin (UA) Negative Negative    Occult Blood UA Negative Negative    Nitrite, UA Negative Negative    Urobilinogen, UA 0.2 <2.0 EU/dL    Leukocytes, UA Negative Negative

## 2022-02-07 ENCOUNTER — TELEPHONE (OUTPATIENT)
Dept: NEUROSURGERY | Facility: CLINIC | Age: 72
End: 2022-02-07
Payer: MEDICARE

## 2022-02-08 ENCOUNTER — CLINICAL SUPPORT (OUTPATIENT)
Dept: NEUROSURGERY | Facility: CLINIC | Age: 72
End: 2022-02-08
Payer: MEDICARE

## 2022-02-08 VITALS — TEMPERATURE: 97 F

## 2022-02-08 RX ORDER — OXYCODONE AND ACETAMINOPHEN 7.5; 325 MG/1; MG/1
1 TABLET ORAL EVERY 6 HOURS PRN
Qty: 28 TABLET | Refills: 0 | Status: SHIPPED | OUTPATIENT
Start: 2022-02-08 | End: 2022-03-08

## 2022-02-08 NOTE — PROGRESS NOTES
Patient is 12 days s/p L4-5 resection synovial cyst/lami with Dr. Lobato. Incision is healing appropriately without redness, swelling, or purulent drainage noted. Patient denies tenderness at the site. Incision cleaned with Chloraprep without complication. Patient reports good improvement in pain since the surgery. He is requesting pain medication refill today. Reviewed narcotics policy with patient. Re-educated patient on post-operative restrictions and proper incision care. Instructed patient to keep incision clean, dry, and open-to-air. Patient aware of scheduled post-operative diagnostics and follow up appointment. Instructed patient to contact our office with any additional questions or concerns.

## 2022-02-15 ENCOUNTER — TELEPHONE (OUTPATIENT)
Dept: NEUROSURGERY | Facility: CLINIC | Age: 72
End: 2022-02-15
Payer: MEDICARE

## 2022-02-15 RX ORDER — METHYLPREDNISOLONE 4 MG/1
TABLET ORAL
Qty: 1 EACH | Refills: 0 | Status: SHIPPED | OUTPATIENT
Start: 2022-02-15 | End: 2022-03-08

## 2022-02-15 NOTE — TELEPHONE ENCOUNTER
Called patient and discussed symptoms. I am sending a medrol dose pack to his pharmacy. He will contact us if his symptoms do not improve.

## 2022-02-15 NOTE — TELEPHONE ENCOUNTER
Spoke with patient. He said he has been having muscle cramps starting in his R upper buttock and traveling down his R leg. Patient also notes that he is unable to stand on his toes and has pain to bilateral calves. He wants to know if this is normal for after his surgery? Please advise.

## 2022-02-15 NOTE — TELEPHONE ENCOUNTER
----- Message from Shaneka Gruber MA sent at 2/15/2022 10:59 AM CST -----  Type: Needs Medical Advice  Who Called:  pt    Best Call Back Number: [phone] (home)     Additional Information: pt would like to speak with Genny regarding his procedure--please advise--thank you

## 2022-02-22 ENCOUNTER — CLINICAL SUPPORT (OUTPATIENT)
Dept: CARDIOLOGY | Facility: HOSPITAL | Age: 72
End: 2022-02-22
Attending: INTERNAL MEDICINE
Payer: MEDICARE

## 2022-02-22 ENCOUNTER — HOSPITAL ENCOUNTER (OUTPATIENT)
Dept: RADIOLOGY | Facility: HOSPITAL | Age: 72
Discharge: HOME | End: 2022-02-22
Attending: INTERNAL MEDICINE
Payer: MEDICARE

## 2022-02-22 VITALS — WEIGHT: 191 LBS | HEIGHT: 68 IN | BODY MASS INDEX: 28.95 KG/M2

## 2022-02-22 VITALS — BODY MASS INDEX: 28.95 KG/M2 | WEIGHT: 191 LBS | HEIGHT: 68 IN

## 2022-02-22 DIAGNOSIS — D64.9 ANEMIA, UNSPECIFIED TYPE: ICD-10-CM

## 2022-02-22 DIAGNOSIS — Z01.818 PRE-OP EVALUATION: ICD-10-CM

## 2022-02-22 DIAGNOSIS — M51.9 LUMBAR DISC DISEASE: ICD-10-CM

## 2022-02-22 DIAGNOSIS — R94.31 ABNORMAL ELECTROCARDIOGRAM (ECG) (EKG): ICD-10-CM

## 2022-02-22 LAB
ASCENDING AORTA: 2.56 CM
AV INDEX (PROSTH): 0.76
AV MEAN GRADIENT: 6 MMHG
AV PEAK GRADIENT: 10 MMHG
AV VALVE AREA: 2.58 CM2
AV VELOCITY RATIO: 0.86
BSA FOR ECHO PROCEDURE: 2.04 M2
CV ECHO LV RWT: 0.47 CM
CV PHARM DOSE: 0.4 MG
CV STRESS BASE HR: 72 BPM
DIASTOLIC BLOOD PRESSURE: 67 MMHG
DOP CALC AO PEAK VEL: 1.61 M/S
DOP CALC AO VTI: 33.98 CM
DOP CALC LVOT AREA: 3.4 CM2
DOP CALC LVOT DIAMETER: 2.08 CM
DOP CALC LVOT PEAK VEL: 1.38 M/S
DOP CALC LVOT STROKE VOLUME: 87.72 CM3
DOP CALCLVOT PEAK VEL VTI: 25.83 CM
E WAVE DECELERATION TIME: 171.86 MSEC
E/A RATIO: 0.68
E/E' RATIO: 12.17 M/S
ECHO LV POSTERIOR WALL: 0.87 CM (ref 0.6–1.1)
EJECTION FRACTION: 65 %
FRACTIONAL SHORTENING: 37 % (ref 28–44)
INTERVENTRICULAR SEPTUM: 0.93 CM (ref 0.6–1.1)
LA MAJOR: 4.71 CM
LA MINOR: 4.28 CM
LA WIDTH: 3.02 CM
LEFT ATRIUM SIZE: 3 CM
LEFT ATRIUM VOLUME INDEX: 17.3 ML/M2
LEFT ATRIUM VOLUME: 34.54 CM3
LEFT INTERNAL DIMENSION IN SYSTOLE: 2.33 CM (ref 2.1–4)
LEFT VENTRICLE DIASTOLIC VOLUME INDEX: 28.63 ML/M2
LEFT VENTRICLE DIASTOLIC VOLUME: 57.26 ML
LEFT VENTRICLE MASS INDEX: 48 G/M2
LEFT VENTRICLE SYSTOLIC VOLUME INDEX: 9.4 ML/M2
LEFT VENTRICLE SYSTOLIC VOLUME: 18.78 ML
LEFT VENTRICULAR INTERNAL DIMENSION IN DIASTOLE: 3.68 CM (ref 3.5–6)
LEFT VENTRICULAR MASS: 96.06 G
LV LATERAL E/E' RATIO: 12.17 M/S
LV SEPTAL E/E' RATIO: 12.17 M/S
MV A" WAVE DURATION": 10.66 MSEC
MV PEAK A VEL: 1.07 M/S
MV PEAK E VEL: 0.73 M/S
MV STENOSIS PRESSURE HALF TIME: 49.84 MS
MV VALVE AREA P 1/2 METHOD: 4.41 CM2
OHS CV CPX 1 MINUTE RECOVERY HEART RATE: 95 BPM
OHS CV CPX 85 PERCENT MAX PREDICTED HEART RATE MALE: 127
OHS CV CPX MAX PREDICTED HEART RATE: 149
OHS CV CPX PATIENT IS FEMALE: 0
OHS CV CPX PATIENT IS MALE: 1
OHS CV CPX PEAK DIASTOLIC BLOOD PRESSURE: 63 MMHG
OHS CV CPX PEAK HEAR RATE: 98 BPM
OHS CV CPX PEAK RATE PRESSURE PRODUCT: NORMAL
OHS CV CPX PEAK SYSTOLIC BLOOD PRESSURE: 137 MMHG
OHS CV CPX PERCENT MAX PREDICTED HEART RATE ACHIEVED: 66
OHS CV CPX RATE PRESSURE PRODUCT PRESENTING: 9288
OHS CV PHARM TIME: 948 MIN
PISA TR MAX VEL: 1.99 M/S
PULM VEIN S/D RATIO: 1.86
PV PEAK D VEL: 0.43 M/S
PV PEAK S VEL: 0.8 M/S
RA MAJOR: 3.5 CM
RA PRESSURE: 8 MMHG
RA WIDTH: 3.08 CM
RIGHT VENTRICULAR END-DIASTOLIC DIMENSION: 2.65 CM
RV TISSUE DOPPLER FREE WALL SYSTOLIC VELOCITY 1 (APICAL 4 CHAMBER VIEW): 14.78 CM/S
SINUS: 3.13 CM
STJ: 2.32 CM
SYSTOLIC BLOOD PRESSURE: 129 MMHG
TDI LATERAL: 0.06 M/S
TDI SEPTAL: 0.06 M/S
TDI: 0.06 M/S
TR MAX PG: 16 MMHG
TRICUSPID ANNULAR PLANE SYSTOLIC EXCURSION: 2.3 CM
TV REST PULMONARY ARTERY PRESSURE: 24 MMHG

## 2022-02-22 PROCEDURE — 93018 CV STRESS TEST I&R ONLY: CPT | Mod: ,,, | Performed by: INTERNAL MEDICINE

## 2022-02-22 PROCEDURE — 63600175 PHARM REV CODE 636 W HCPCS: Mod: PO | Performed by: INTERNAL MEDICINE

## 2022-02-22 PROCEDURE — 93017 CV STRESS TEST TRACING ONLY: CPT | Mod: PO

## 2022-02-22 PROCEDURE — 93306 ECHO (CUPID ONLY): ICD-10-PCS | Mod: 26,,, | Performed by: INTERNAL MEDICINE

## 2022-02-22 PROCEDURE — 78452 STRESS TEST WITH MYOCARDIAL PERFUSION (CUPID ONLY): ICD-10-PCS | Mod: 26,,, | Performed by: INTERNAL MEDICINE

## 2022-02-22 PROCEDURE — 93306 TTE W/DOPPLER COMPLETE: CPT | Mod: PO

## 2022-02-22 PROCEDURE — 93018 PR CARDIAC STRESS TST,INTERP/REPT ONLY: ICD-10-PCS | Mod: ,,, | Performed by: INTERNAL MEDICINE

## 2022-02-22 PROCEDURE — 93016 STRESS TEST WITH MYOCARDIAL PERFUSION (CUPID ONLY): ICD-10-PCS | Mod: ,,, | Performed by: INTERNAL MEDICINE

## 2022-02-22 PROCEDURE — 93306 TTE W/DOPPLER COMPLETE: CPT | Mod: 26,,, | Performed by: INTERNAL MEDICINE

## 2022-02-22 PROCEDURE — A9502 TC99M TETROFOSMIN: HCPCS | Mod: PO

## 2022-02-22 PROCEDURE — 93016 CV STRESS TEST SUPVJ ONLY: CPT | Mod: ,,, | Performed by: INTERNAL MEDICINE

## 2022-02-22 PROCEDURE — 78452 HT MUSCLE IMAGE SPECT MULT: CPT | Mod: 26,,, | Performed by: INTERNAL MEDICINE

## 2022-02-22 RX ORDER — REGADENOSON 0.08 MG/ML
0.4 INJECTION, SOLUTION INTRAVENOUS ONCE
Status: COMPLETED | OUTPATIENT
Start: 2022-02-22 | End: 2022-02-22

## 2022-02-22 RX ADMIN — REGADENOSON 0.4 MG: 0.08 INJECTION, SOLUTION INTRAVENOUS at 09:02

## 2022-02-26 ENCOUNTER — PATIENT MESSAGE (OUTPATIENT)
Dept: PRIMARY CARE CLINIC | Facility: CLINIC | Age: 72
End: 2022-02-26
Payer: MEDICARE

## 2022-02-28 ENCOUNTER — OFFICE VISIT (OUTPATIENT)
Dept: NEUROSURGERY | Facility: CLINIC | Age: 72
End: 2022-02-28
Payer: MEDICARE

## 2022-02-28 VITALS
HEART RATE: 81 BPM | BODY MASS INDEX: 28.94 KG/M2 | SYSTOLIC BLOOD PRESSURE: 118 MMHG | HEIGHT: 68 IN | DIASTOLIC BLOOD PRESSURE: 78 MMHG | WEIGHT: 190.94 LBS

## 2022-02-28 DIAGNOSIS — Z98.890 HISTORY OF LUMBAR LAMINECTOMY FOR SPINAL CORD DECOMPRESSION: Primary | ICD-10-CM

## 2022-02-28 PROCEDURE — 99024 PR POST-OP FOLLOW-UP VISIT: ICD-10-PCS | Mod: POP,,, | Performed by: PHYSICIAN ASSISTANT

## 2022-02-28 PROCEDURE — 99024 POSTOP FOLLOW-UP VISIT: CPT | Mod: POP,,, | Performed by: PHYSICIAN ASSISTANT

## 2022-02-28 NOTE — PROGRESS NOTES
Neurosurgery History & Physical    Patient ID: Vince Chadwick III is a 71 y.o. male.    Chief Complaint   Patient presents with    Follow-up     4 week POV resection L4-5 synovial cyst. Feels like he doesn't have enough strength to put weight on toes.        Review of Systems   Constitutional: Negative for chills, diaphoresis, fatigue and fever.   HENT: Negative for congestion, hearing loss, rhinorrhea and sore throat.    Eyes: Negative for photophobia, pain, redness and visual disturbance.   Respiratory: Negative for cough, chest tightness, shortness of breath and wheezing.    Cardiovascular: Negative for chest pain, palpitations and leg swelling.   Gastrointestinal: Negative for abdominal distention, abdominal pain, constipation, diarrhea, nausea and vomiting.   Genitourinary: Negative for difficulty urinating, dysuria, frequency, hematuria and urgency.   Musculoskeletal: Positive for gait problem. Negative for back pain, myalgias, neck pain and neck stiffness.   Skin: Negative for pallor and rash.   Neurological: Positive for weakness. Negative for dizziness, seizures, speech difficulty, light-headedness, numbness and headaches.   Psychiatric/Behavioral: Negative for confusion, hallucinations and sleep disturbance.       Past Medical History:   Diagnosis Date    ALLERGIC RHINITIS     Arthritis     Cataract     OU    Colon polyp     Diverticulitis     Diverticulosis     Hyperlipidemia     resolved    Hypertension     Irritable bowel syndrome     Lumbar disc disease      Social History     Socioeconomic History    Marital status:    Occupational History    Occupation: retired [employer]   Tobacco Use    Smoking status: Never Smoker    Smokeless tobacco: Never Used   Substance and Sexual Activity    Alcohol use: No     Alcohol/week: 0.0 standard drinks    Drug use: No    Sexual activity: Yes     Family History   Problem Relation Age of Onset    Glaucoma Father     Cataracts Father      Macular degeneration Father     Colon polyps Father     Glaucoma Paternal Uncle     Prostate cancer Paternal Uncle     Glaucoma Paternal Uncle     Cataracts Mother     Breast cancer Mother     Glaucoma Sister     Heart attack Paternal Grandfather 44    Colon cancer Neg Hx     Crohn's disease Neg Hx     Ulcerative colitis Neg Hx      Review of patient's allergies indicates:  No Known Allergies    Current Outpatient Medications:     atorvastatin (LIPITOR) 10 MG tablet, TAKE 1 TABLET BY MOUTH EVERY DAY IN THE EVENING, Disp: 90 tablet, Rfl: 1    azelastine (ASTELIN) 137 mcg (0.1 %) nasal spray, INSTILL 2 SPRAYS IN EACH NOSTRIL TWICE A DAY, Disp: 90 mL, Rfl: 1    benazepriL (LOTENSIN) 10 MG tablet, Take 1 tablet (10 mg total) by mouth once daily. (Patient taking differently: Take 10 mg by mouth every evening.), Disp: 90 tablet, Rfl: 1    dicyclomine (BENTYL) 20 mg tablet, TAKE 1 TABLET BY MOUTH BEFORE MEALS & AT BEDTIME AS NEEDED, Disp: 720 tablet, Rfl: 1    EScitalopram oxalate (LEXAPRO) 20 MG tablet, TAKE 1 TABLET BY MOUTH EVERY DAY, Disp: 90 tablet, Rfl: 1    gabapentin (NEURONTIN) 300 MG capsule, Take 1 capsule (300 mg total) by mouth 3 (three) times daily., Disp: 90 capsule, Rfl: 2    methylPREDNISolone (MEDROL DOSEPACK) 4 mg tablet, use as directed, Disp: 1 each, Rfl: 0    omeprazole (PRILOSEC) 40 MG capsule, TAKE 1 CAPSULE (40 MG TOTAL) BY MOUTH BEFORE BREAKFAST., Disp: 90 capsule, Rfl: 3    oxyCODONE-acetaminophen (PERCOCET) 7.5-325 mg per tablet, Take 1 tablet by mouth every 6 (six) hours as needed for Pain., Disp: 28 tablet, Rfl: 0    tadalafiL (CIALIS) 20 MG Tab, Take 1 tabley by mouth 30 minutes before sexual activity. Do not exceed 1 tablet every 72 hours, Disp: 10 tablet, Rfl: 11    tamsulosin (FLOMAX) 0.4 mg Cap, TAKE 1 CAPSULE BY MOUTH TWICE A DAY, Disp: 180 capsule, Rfl: 3    zaleplon (SONATA) 10 MG capsule, Take 1 capsule (10 mg total) by mouth nightly., Disp: 90 capsule, Rfl:  "0  Blood pressure 118/78, pulse 81, height 5' 8" (1.727 m), weight 86.6 kg (190 lb 14.7 oz).      Neurologic Exam     Mental Status   Oriented to person, place, and time.   Oriented to person.   Oriented to place.   Oriented to time.   Follows 3 step commands.   Attention: normal. Concentration: normal.   Speech: speech is normal   Level of consciousness: alert  Knowledge: consistent with education.   Able to name object. Able to read. Able to repeat. Able to write. Normal comprehension.      Cranial Nerves      CN II   Visual acuity: normal  Right visual field deficit: none  Left visual field deficit: none      CN III, IV, VI   Pupils are equal, round, and reactive to light.  Right pupil: Size: 3 mm. Shape: regular. Reactivity: brisk. Consensual response: intact.   Left pupil: Size: 3 mm. Shape: regular. Reactivity: brisk. Consensual response: intact.   CN III: no CN III palsy  CN VI: no CN VI palsy  Nystagmus: none   Diplopia: none  Ophthalmoparesis: none  Conjugate gaze: present     CN V   Right facial sensation deficit: none  Left facial sensation deficit: none     CN VII   Right facial weakness: none  Left facial weakness: none     CN VIII   Hearing: intact     CN IX, X   CN IX normal.   CN X normal.      CN XI   Right sternocleidomastoid strength: normal  Left sternocleidomastoid strength: normal  Right trapezius strength: normal  Left trapezius strength: normal     CN XII   Fasciculations: absent  Tongue deviation: none     Motor Exam   Muscle bulk: normal  Overall muscle tone: normal  Right arm pronator drift: absent  Left arm pronator drift: absent     Strength   Right deltoid: 5/5  Left deltoid: 5/5  Right biceps: 5/5  Left biceps: 5/5  Right triceps: 5/5  Left triceps: 5/5  Right wrist flexion: 5/5  Left wrist flexion: 5/5  Right wrist extension: 5/5  Left wrist extension: 5/5  Right interossei: 5/5  Left interossei: 5/5  Right iliopsoas: 5/5  Left iliopsoas: 5/5  Right quadriceps: 5/5  Left quadriceps: " 5/5  Right hamstrin/5  Left hamstrin/5  Right anterior tibial: 5/5  Left anterior tibial: 5/5  Right posterior tibial: 5/5  Left posterior tibial: 5/5  Right peroneal: 5/5  Left peroneal: 5/5  Right gastroc: 4/5  Left gastroc: 5/5  Right EHL: 5/5  Left EHL: 5/5     Sensory Exam   Right arm light touch: normal  Left arm light touch: normal  Right leg light touch: normal  Left leg light touch: normal     Gait, Coordination, and Reflexes      Gait  Gait: normal      Coordination   Romberg: negative  Finger to nose coordination: normal  Heel to shin coordination: normal  Tandem walking coordination: normal     Tremor   Resting tremor: absent  Intention tremor: absent  Action tremor: absent     Reflexes   Right brachioradialis: 1+  Left brachioradialis: 1+  Right biceps: 1+  Left biceps: 1+  Right triceps: 1+  Left triceps: 1+  Right patellar: 2+  Left patellar: 2+  Right achilles: 0  Left achilles: 0  Right Ng: absent  Left Ng: absent  Right ankle clonus: absent  Left ankle clonus: absent  Right plantar: normal  Left plantar: normal       Physical Exam  Constitutional: Oriented to person, place, and time. Appears well-developed and well-nourished.   HENT:   Head: Normocephalic and atraumatic.   Eyes: Pupils are equal, round, and reactive to light.   Neck: Normal range of motion. Neck supple.   Cardiovascular: Normal rate.    Pulmonary/Chest: Effort normal.   Musculoskeletal: Normal range of motion. Exhibits no edema.   Neurological: Alert and oriented to person, place, and time. Normal Finger-Nose-Finger Test, a normal Heel to Shin Test, a normal Romberg Test and a normal Tandem Gait Test. Gait normal.   Reflex Scores:       Tricep reflexes are 1+ on the right side and 1+ on the left side.       Bicep reflexes are 1+ on the right side and 1+ on the left side.       Brachioradialis reflexes are 1+ on the right side and 1+ on the left side.       Patellar reflexes are 2+ on the right side and 2+ on the  left side.       Achilles reflexes are 0 on the right side and 0 on the left side.  Skin: Skin is warm, dry and intact.   Psychiatric: Normal mood and affect. Speech is normal and behavior is normal. Judgment and thought content normal.   Nursing note and vitals reviewed.      Provider dictation:    The patient is a 71 year old male who presents for 4 week post-op appointment s/p L4-L5 bilateral laminectomies and medial facetectomies and resection of right synovial cyst via a right-sided minimally invasive approach. The patient reports significant improvement in symptoms immediately following surgery. This lasted for about 2 weeks and then patient reports onset of right lower extremity pain again. The pain starts in the right buttock and shoots down the posterior aspect of the right lower extremity into the calf. The intensity of the pain is the same as prior to surgery and limiting his mobility. He does report resolution of right lower extremity numbness. He notes difficulty with toe walking on the right side. I provided the patient with a medrol dose pack recently and patient notes significant improvement in the pain while taking the steroid.     On exam patient has improvement in right DF and EHL weakness. There is continued right PF weakness. Sensation intact. Incision is well healed.      Overall, there are signs of improvement following surgery with resolution of numbness and improvement in weakness. I am sending a referral for outpatient PT and we will have the patient scheduled to follow-up with Dr. Sanchez for management of the recurring pain. I will defer ordering imaging at this time and the patient will follow-up as scheduled with Dr. Lobato at his 3 month post-op appointment. We will reassess at that time. Patient was informed to contact the clinic with any worsening symptoms.        1. History of lumbar laminectomy for spinal cord decompression  Ambulatory referral/consult to  Physical/Occupational Therapy

## 2022-03-02 ENCOUNTER — TELEPHONE (OUTPATIENT)
Dept: FAMILY MEDICINE | Facility: CLINIC | Age: 72
End: 2022-03-02
Payer: MEDICARE

## 2022-03-02 ENCOUNTER — NURSE TRIAGE (OUTPATIENT)
Dept: ADMINISTRATIVE | Facility: CLINIC | Age: 72
End: 2022-03-02
Payer: MEDICARE

## 2022-03-02 NOTE — TELEPHONE ENCOUNTER
----- Message from Matilda Sanford sent at 3/2/2022  2:19 PM CST -----  Regarding: pt called  Name of Who is Calling: LALITA MAE III [[id number]]      What is the request in detail: pt is requesting to reschedule his appt . Please advise       Can the clinic reply by [facility]: No      [facility] [phone]

## 2022-03-02 NOTE — TELEPHONE ENCOUNTER
Reason for Disposition   Second attempt to contact caller AND no contact made. Phone number verified.    Protocols used: NO CONTACT OR DUPLICATE CONTACT CALL-A-AH     stated wife was on the other line due to  vomiting. Wife disconnected from  before transfer. Called times 2 now answer. Voice message left. Please call and advise pt/cg.

## 2022-03-03 NOTE — TELEPHONE ENCOUNTER
Left a message on patients voice mail for him to call the office back so we can get his appt rescheduled.

## 2022-03-08 ENCOUNTER — OFFICE VISIT (OUTPATIENT)
Dept: PRIMARY CARE CLINIC | Facility: CLINIC | Age: 72
End: 2022-03-08
Payer: MEDICARE

## 2022-03-08 VITALS
OXYGEN SATURATION: 96 % | DIASTOLIC BLOOD PRESSURE: 70 MMHG | HEART RATE: 76 BPM | SYSTOLIC BLOOD PRESSURE: 114 MMHG | WEIGHT: 187.38 LBS | RESPIRATION RATE: 18 BRPM | BODY MASS INDEX: 28.4 KG/M2 | HEIGHT: 68 IN

## 2022-03-08 DIAGNOSIS — I70.0 AORTIC CALCIFICATION: ICD-10-CM

## 2022-03-08 DIAGNOSIS — I25.10 CORONARY ARTERY CALCIFICATION: ICD-10-CM

## 2022-03-08 DIAGNOSIS — K52.9 GASTROENTERITIS: Primary | ICD-10-CM

## 2022-03-08 DIAGNOSIS — I10 PRIMARY HYPERTENSION: Chronic | ICD-10-CM

## 2022-03-08 DIAGNOSIS — I25.84 CORONARY ARTERY CALCIFICATION: ICD-10-CM

## 2022-03-08 DIAGNOSIS — E78.5 HYPERLIPIDEMIA, UNSPECIFIED HYPERLIPIDEMIA TYPE: Chronic | ICD-10-CM

## 2022-03-08 PROCEDURE — 99214 OFFICE O/P EST MOD 30 MIN: CPT | Mod: S$PBB,,, | Performed by: FAMILY MEDICINE

## 2022-03-08 PROCEDURE — 99214 PR OFFICE/OUTPT VISIT, EST, LEVL IV, 30-39 MIN: ICD-10-PCS | Mod: S$PBB,,, | Performed by: FAMILY MEDICINE

## 2022-03-08 PROCEDURE — 99214 OFFICE O/P EST MOD 30 MIN: CPT | Mod: PBBFAC,PN | Performed by: FAMILY MEDICINE

## 2022-03-08 PROCEDURE — 99999 PR PBB SHADOW E&M-EST. PATIENT-LVL IV: ICD-10-PCS | Mod: PBBFAC,,, | Performed by: FAMILY MEDICINE

## 2022-03-08 PROCEDURE — 99999 PR PBB SHADOW E&M-EST. PATIENT-LVL IV: CPT | Mod: PBBFAC,,, | Performed by: FAMILY MEDICINE

## 2022-03-08 RX ORDER — METHOCARBAMOL 500 MG/1
500 TABLET, FILM COATED ORAL 3 TIMES DAILY
Qty: 30 TABLET | Refills: 1 | Status: SHIPPED | OUTPATIENT
Start: 2022-03-08 | End: 2022-08-03

## 2022-03-08 NOTE — PROGRESS NOTES
THIS DOCUMENT WAS MADE IN PART WITH VOICE RECOGNITION SOFTWARE.  OCCASIONALLY THIS SOFTWARE WILL MISINTERPRET WORDS OR PHRASES.      Primary Care Provider Appointment - [facility] ([facility])      Patient ID: Vince Chadwick III is a 71 y.o. male.  Vince was seen today for fall, dizziness and hospital follow up.    Diagnoses and all orders for this visit:    Gastroenteritis  Reviewed recent emergency room encounter.  Symptoms have resolved.    Aortic calcification  Coronary artery calcification  Incidental note of mild aortic calcifications as well as coronary calcifications seen on CT performed in the ER.  Discussed risk factor modification, continue a statin medication.  He may wish to consider beginning an 81 mg aspirin.  Although I would recommend waiting a month or two until he has fully recovered from his surgery and well over the gastroenteritis.  He has no angina.  He does have some mild dyspnea with exertion that he feels is related to his sedentary condition for the last several months related to his back.  But he will monitor and report any worsening.  He will also follow with his cardiologist     Primary hypertension  Stable satisfactory    Hyperlipidemia, unspecified hyperlipidemia type  Well controlled on 10 mg atorvastatin.    Other orders  -     methocarbamoL (ROBAXIN) 500 MG Tab; Take 1 tablet (500 mg total) by mouth 3 (three) times daily.  He does report some muscle tightness and that the tizanidine it made him too sleepy.  Will try an alternative muscle relaxant but still must take similar precautions       Follow Up:  Three months      Health Maintenance       Date Due Completion Date    Shingles Vaccine (1 of 2) Never done ---    Influenza Vaccine (1) 09/01/2021 10/2/2020    Lipid Panel 02/22/2022 2/22/2021    TETANUS VACCINE 09/22/2025 9/22/2015    Colorectal Cancer Screening 05/13/2027 5/13/2020    Override on 6/2/2011: Done (legay documents, repeat in 8 years)               Subjective:     Chief Complaint   Patient presents with    Fall     Wants to discuss recent falls and dizziness, thinks it could be from the Gabapentin.     [facility] Follow Up     Seen at [facility] on 03/02/22 for abdominal pain and gastroenteritis.      I have reviewed the information entered by the ancillary staff regarding the chief complaint as well as the related history.    HPI    Patient is a/an 71 y.o.  male   RISK of ADMIT/ED: 9    Abd, epigastric with n/v, severe, had to call 911    Discussed questions about his CT.  atelectisis  Calcified granuloma liver  Atherosclerosis coronary arteries and abd aorta    He also reports some intermittent dizziness particularly with head movement are looking up.  His exam was unremarkable.  He also reported some tinnitus and hearing loss.  Advised he may want to consult with ENT but his schedules full right now with additional follow-ups so he will let me know when and if any symptoms worsen.      Active Ambulatory Problems     Diagnosis Date Noted    BPH (benign prostatic hypertrophy) 09/05/2012    Anxiety 09/05/2012    Hypertension     Hyperlipidemia     Lumbar disc disease     ADD (attention deficit disorder) 05/03/2013    Insomnia 04/16/2015    Lumbar stenosis 05/31/2016    DDD (degenerative disc disease), lumbar 05/31/2016    Lumbar radicular pain 05/31/2016    Lumbar facet arthropathy 05/31/2016    Lumbar radiculopathy 06/07/2016    Anemia 05/13/2020    Diverticulosis     Irritable bowel syndrome     Aortic calcification 03/08/2022    Coronary artery calcification 03/08/2022     Resolved Ambulatory Problems     Diagnosis Date Noted    No Resolved Ambulatory Problems     Past Medical History:   Diagnosis Date    ALLERGIC RHINITIS     Arthritis     Cataract     Colon polyp     Diverticulitis        Past Surgical History:   Procedure Laterality Date    APPENDECTOMY      CIRCUMCISION      COLONOSCOPY  06/02/2011     [facility].    One 1 mm polyp in the sigmoid colon.  FRAGMENT OF NONNEOPLASTIC COLONIC MUCOSA.  Sigmoid diverticulosis.  Spasms c/w IBS.  repeat in 7-8 years    COLONOSCOPY  09/12/2006    Dr. [place]ak[place]    COLONOSCOPY N/A 5/13/2020    Procedure: COLONOSCOPY;  Surgeon: Davy Novak Jr., MD;  Location: [facility] ENDO;  Service: Endoscopy;  Laterality: N/A;    EPIDURAL STEROID INJECTION INTO LUMBAR SPINE N/A 11/15/2021    Procedure: Injection-steroid-epidural-lumbar L5/S1;  Surgeon: Khang Sanchez MD;  Location: [facility];  Service: Pain Management;  Laterality: N/A;    EPIDURAL STEROID INJECTION INTO LUMBAR SPINE N/A 12/13/2021    Procedure: Injection-steroid-epidural-lumbar L5/S1;  Surgeon: Khang Sanchez MD;  Location: [facility];  Service: Pain Management;  Laterality: N/A;    ESOPHAGOGASTRODUODENOSCOPY N/A 5/13/2020    Procedure: EGD (ESOPHAGOGASTRODUODENOSCOPY);  Surgeon: Davy Novak Jr., MD;  Location: [facility];  Service: Endoscopy;  Laterality: N/A;    EYE SURGERY      FOOT SURGERY      x 2    LAMINECTOMY USING MINIMALLY INVASIVE TECHNIQUE N/A 1/26/2022    Procedure: LAMINECTOMY, SPINE, MINIMALLY INVASIVE  RIGHT MIS APPROACH RESECTION L4-5 SYNOVIAL CYST AND BILATERAL LAMINECTOMY/MEDIAL FACETECTOMY;  Surgeon: Penny Lobato MD;  [facility];  Service: Neurosurgery;  Laterality: N/A;    LUMBAR EPIDURAL INJECTION      PILONIDAL CYST DRAINAGE      s/p chalazion removal      OD    TONSILLECTOMY         Past Medical History:   Diagnosis Date    ALLERGIC RHINITIS     Arthritis     Cataract     OU    Colon polyp     Diverticulitis     Diverticulosis     Hyperlipidemia     resolved    Hypertension     Irritable bowel syndrome     Lumbar disc disease        Social History     Socioeconomic History    Marital status:    Occupational History    Occupation: retired [employer]   Tobacco Use    Smoking status: Never Smoker    Smokeless tobacco: Never Used   Substance  and Sexual Activity    Alcohol use: No     Alcohol/week: 0.0 standard drinks    Drug use: No    Sexual activity: Yes       Review of Systems   Constitutional: Positive for activity change.   HENT: Positive for tinnitus.    Respiratory: Positive for shortness of breath (mild LAMB, has been sedentary for months).    Cardiovascular: Negative for chest pain and leg swelling.   Gastrointestinal:        Abd pain, N/V all resolved   Neurological: Positive for dizziness.       Objective     Physical Exam  Vitals reviewed.   Constitutional:       General: He is not in acute distress.     Appearance: Normal appearance. He is well-developed. He is not diaphoretic.   HENT:      Head: Normocephalic and atraumatic.      Right Ear: Tympanic membrane, ear canal and external ear normal. There is no impacted cerumen.      Left Ear: Tympanic membrane, ear canal and external ear normal. There is no impacted cerumen.      Mouth/Throat:      Pharynx: Oropharynx is clear. No oropharyngeal exudate or posterior oropharyngeal erythema.   Eyes:      General: No scleral icterus.     Conjunctiva/sclera: Conjunctivae normal.   Cardiovascular:      Rate and Rhythm: Normal rate and regular rhythm.      Heart sounds: Normal heart sounds. No murmur heard.    No gallop.   Pulmonary:      Effort: Pulmonary effort is normal. No respiratory distress.   Abdominal:      General: Abdomen is flat. Bowel sounds are normal. There is no distension.      Tenderness: There is no abdominal tenderness. There is no guarding.   Musculoskeletal:      Cervical back: Normal range of motion and neck supple.   Skin:     General: Skin is warm and dry.   Neurological:      Mental Status: He is alert and oriented to person, place, and time.      Deep Tendon Reflexes: Reflexes are normal and symmetric.   Psychiatric:         Behavior: Behavior normal.       Vitals:    03/08/22 0901   BP: 114/70   BP Location: Right arm   Patient Position: Sitting   BP Method: Large  "(Manual)   Pulse: 76   Resp: 18   SpO2: 96%   Weight: 85 kg (187 lb 6.3 oz)   Height: 5' 8" (1.727 m)       RECENT LABS:    Lab Results   Component Value Date    WBC 16.60 (H) 03/02/2022    HGB 15.4 03/02/2022    HCT 46.5 03/02/2022     03/02/2022    CHOL 141 02/22/2021    TRIG 121 02/22/2021    HDL 59 02/22/2021    ALT 16 03/02/2022    AST 30 03/02/2022     03/02/2022    K 4.2 03/02/2022     03/02/2022    CREATININE 0.96 03/02/2022    BUN 15 03/02/2022    CO2 26 03/02/2022    TSH 2.797 08/20/2019    PSA 1.9 08/20/2020    INR 0.9 01/19/2022       Results for orders placed or performed during the hospital encounter of 03/02/22   CBC Auto Differential   Result Value Ref Range    WBC 16.60 (H) 3.90 - 12.70 K/uL    RBC 5.45 4.60 - 6.20 M/uL    Hemoglobin 15.4 14.0 - 18.0 g/dL    Hematocrit 46.5 40.0 - 54.0 %    MCV 85 82 - 98 fL    MCH 28.3 27.0 - 31.0 pg    MCHC 33.1 32.0 - 36.0 g/dL    RDW 13.8 11.5 - 14.5 %    Platelets 218 150 - 450 K/uL    MPV 12.0 9.2 - 12.9 fL    Immature Granulocytes 0.4 0.0 - 0.5 %    Gran # (ANC) 15.1 (H) 1.8 - 7.7 K/uL    Immature Grans (Abs) 0.07 (H) 0.00 - 0.04 K/uL    Lymph # 0.8 (L) 1.0 - 4.8 K/uL    Mono # 0.6 0.3 - 1.0 K/uL    Eos # 0.0 0.0 - 0.5 K/uL    Baso # 0.05 0.00 - 0.20 K/uL    nRBC 0 0 /100 WBC    Gran % 90.7 (H) 38.0 - 73.0 %    Lymph % 4.8 (L) 18.0 - 48.0 %    Mono % 3.7 (L) 4.0 - 15.0 %    Eosinophil % 0.1 0.0 - 8.0 %    Basophil % 0.3 0.0 - 1.9 %    Differential Method Automated    Comprehensive Metabolic Panel   Result Value Ref Range    Sodium 138 136 - 145 mmol/L    Potassium 4.2 3.5 - 5.1 mmol/L    Chloride 103 95 - 110 mmol/L    CO2 26 22 - 31 mmol/L    Glucose 177 (H) 70 - 110 mg/dL    BUN 15 9 - 21 mg/dL    Creatinine 0.96 0.50 - 1.40 mg/dL    Calcium 9.2 8.4 - 10.2 mg/dL    Total Protein 7.2 6.0 - 8.4 g/dL    Albumin 4.4 3.5 - 5.2 g/dL    Total Bilirubin 1.4 (H) 0.2 - 1.3 mg/dL    Alkaline Phosphatase 115 38 - 145 U/L    AST 30 17 - 59 U/L    ALT " 16 0 - 50 U/L    Anion Gap 9 8 - 16 mmol/L    eGFR if African American >60 >60 mL/min/1.73 m^2    eGFR if non African American >60 >60 mL/min/1.73 m^2   Urinalysis, Reflex to Urine Culture Urine, Clean Catch    Specimen: Urine   Result Value Ref Range    Specimen UA Urine, Clean Catch     Color, UA Yellow Yellow, Straw, Cori    Appearance, UA Clear Clear    pH, UA 5.0 5.0 - 8.0    Specific Gravity, UA 1.025 1.005 - 1.030    Protein, UA Negative Negative    Glucose, UA Negative Negative    Ketones, UA Trace (A) Negative    Bilirubin (UA) Negative Negative    Occult Blood UA Negative Negative    Nitrite, UA Negative Negative    Urobilinogen, UA 0.2 <2.0 EU/dL    Leukocytes, UA Negative Negative   COVID-19 Rapid Screening   Result Value Ref Range    SARS-CoV-2 RNA, Amplification, Qual Negative Negative

## 2022-04-01 RX ORDER — ZALEPLON 10 MG/1
10 CAPSULE ORAL NIGHTLY
Qty: 90 CAPSULE | Refills: 1 | Status: SHIPPED | OUTPATIENT
Start: 2022-04-01 | End: 2022-09-26

## 2022-04-06 ENCOUNTER — OFFICE VISIT (OUTPATIENT)
Dept: CARDIOLOGY | Facility: CLINIC | Age: 72
End: 2022-04-06
Payer: MEDICARE

## 2022-04-06 VITALS
WEIGHT: 188.06 LBS | HEART RATE: 87 BPM | SYSTOLIC BLOOD PRESSURE: 117 MMHG | BODY MASS INDEX: 28.5 KG/M2 | DIASTOLIC BLOOD PRESSURE: 77 MMHG | HEIGHT: 68 IN

## 2022-04-06 DIAGNOSIS — D64.9 ANEMIA, UNSPECIFIED TYPE: ICD-10-CM

## 2022-04-06 DIAGNOSIS — I10 PRIMARY HYPERTENSION: Chronic | ICD-10-CM

## 2022-04-06 DIAGNOSIS — I25.10 CORONARY ARTERY CALCIFICATION: Chronic | ICD-10-CM

## 2022-04-06 DIAGNOSIS — I70.0 AORTIC CALCIFICATION: Chronic | ICD-10-CM

## 2022-04-06 DIAGNOSIS — M54.16 LUMBAR RADICULOPATHY: Primary | ICD-10-CM

## 2022-04-06 DIAGNOSIS — E78.5 HYPERLIPIDEMIA, UNSPECIFIED HYPERLIPIDEMIA TYPE: Chronic | ICD-10-CM

## 2022-04-06 DIAGNOSIS — I25.84 CORONARY ARTERY CALCIFICATION: Chronic | ICD-10-CM

## 2022-04-06 PROCEDURE — 99214 PR OFFICE/OUTPT VISIT, EST, LEVL IV, 30-39 MIN: ICD-10-PCS | Mod: S$PBB,,, | Performed by: INTERNAL MEDICINE

## 2022-04-06 PROCEDURE — 99999 PR PBB SHADOW E&M-EST. PATIENT-LVL III: ICD-10-PCS | Mod: PBBFAC,,, | Performed by: INTERNAL MEDICINE

## 2022-04-06 PROCEDURE — 99214 OFFICE O/P EST MOD 30 MIN: CPT | Mod: S$PBB,,, | Performed by: INTERNAL MEDICINE

## 2022-04-06 PROCEDURE — 99213 OFFICE O/P EST LOW 20 MIN: CPT | Mod: PBBFAC,PO | Performed by: INTERNAL MEDICINE

## 2022-04-06 PROCEDURE — 99999 PR PBB SHADOW E&M-EST. PATIENT-LVL III: CPT | Mod: PBBFAC,,, | Performed by: INTERNAL MEDICINE

## 2022-04-06 NOTE — PROGRESS NOTES
Subjective:    Patient ID:  Vince Chadwick III is a 72 y.o. male patient here for evaluation Hypertension (3 month f/u )      History of Present Illness:  Cardiology follow-up.  Patient is status post lumbar cyst surgery, currently in physical therapy.  Preop evaluation which included nuclear stress test and echo unremarkable.  Risk factors include abnormal EKG, hypertension dyslipidemia, aortic calcification.    No chest pain shortness of breath PND orthopnea.  No edema             Review of patient's allergies indicates:  No Known Allergies    Past Medical History:   Diagnosis Date    ALLERGIC RHINITIS     Arthritis     Cataract     OU    Colon polyp     Diverticulitis     Diverticulosis     Hyperlipidemia     resolved    Hypertension     Irritable bowel syndrome     Lumbar disc disease      Past Surgical History:   Procedure Laterality Date    APPENDECTOMY      CIRCUMCISION      COLONOSCOPY  06/02/2011    [facility].    One 1 mm polyp in the sigmoid colon.  FRAGMENT OF NONNEOPLASTIC COLONIC MUCOSA.  Sigmoid diverticulosis.  Spasms c/w IBS.  repeat in 7-8 years    COLONOSCOPY  09/12/2006    Dr. Novak, [place]    COLONOSCOPY N/A 5/13/2020    Procedure: COLONOSCOPY;  Surgeon: Davy Novak Jr., MD;  Location: [facility] ENDO;  Service: Endoscopy;  Laterality: N/A;    EPIDURAL STEROID INJECTION INTO LUMBAR SPINE N/A 11/15/2021    Procedure: Injection-steroid-epidural-lumbar L5/S1;  Surgeon: Khang Sanchez MD;  Location: [facility] OR;  Service: Pain Management;  Laterality: N/A;    EPIDURAL STEROID INJECTION INTO LUMBAR SPINE N/A 12/13/2021    Procedure: Injection-steroid-epidural-lumbar L5/S1;  Surgeon: Khang Sanchez MD;  Location: [facility] OR;  Service: Pain Management;  Laterality: N/A;    ESOPHAGOGASTRODUODENOSCOPY N/A 5/13/2020    Procedure: EGD (ESOPHAGOGASTRODUODENOSCOPY);  Surgeon: Davy Novak Jr., MD;  Location: [facility] ENDO;  Service: Endoscopy;  Laterality: N/A;    EYE SURGERY       FOOT SURGERY      x 2    LAMINECTOMY USING MINIMALLY INVASIVE TECHNIQUE N/A 1/26/2022    Procedure: LAMINECTOMY, SPINE, MINIMALLY INVASIVE  RIGHT MIS APPROACH RESECTION L4-5 SYNOVIAL CYST AND BILATERAL LAMINECTOMY/MEDIAL FACETECTOMY;  Surgeon: Penny Lobato MD;  Location: [facility] OR;  Service: Neurosurgery;  Laterality: N/A;    LUMBAR EPIDURAL INJECTION      PILONIDAL CYST DRAINAGE      s/p chalazion removal      OD    TONSILLECTOMY       Social History     Tobacco Use    Smoking status: Never Smoker    Smokeless tobacco: Never Used   Substance Use Topics    Alcohol use: No     Alcohol/week: 0.0 standard drinks    Drug use: No        Review of Systems:    As noted in HPI in addition      REVIEW OF SYSTEMS  Review of Systems   Constitutional: Negative for decreased appetite, diaphoresis, night sweats, weight gain and weight loss.   HENT: Negative for nosebleeds and odynophagia.    Eyes: Negative for double vision and photophobia.   Cardiovascular: Negative for chest pain, claudication, cyanosis, dyspnea on exertion, irregular heartbeat, leg swelling, near-syncope, orthopnea, palpitations, paroxysmal nocturnal dyspnea and syncope.   Respiratory: Negative for cough, hemoptysis, shortness of breath and wheezing.    Hematologic/Lymphatic: Negative for adenopathy.   Skin: Negative for flushing, skin cancer and suspicious lesions.   Musculoskeletal: Negative for gout, myalgias and neck pain.   Gastrointestinal: Negative for abdominal pain, heartburn, hematemesis and hematochezia.   Genitourinary: Negative for bladder incontinence, hesitancy and nocturia.   Neurological: Negative for focal weakness, headaches, light-headedness and paresthesias.   Psychiatric/Behavioral: Negative for memory loss and substance abuse.              Objective:        Vitals:    04/06/22 0954   BP: 117/77   Pulse: 87       Lab Results   Component Value Date    WBC 16.60 (H) 03/02/2022    HGB 15.4 03/02/2022    HCT 46.5 03/02/2022      03/02/2022    CHOL 141 02/22/2021    TRIG 121 02/22/2021    HDL 59 02/22/2021    ALT 16 03/02/2022    AST 30 03/02/2022     03/02/2022    K 4.2 03/02/2022     03/02/2022    CREATININE 0.96 03/02/2022    BUN 15 03/02/2022    CO2 26 03/02/2022    TSH 2.797 08/20/2019    PSA 1.9 08/20/2020    INR 0.9 01/19/2022        ECHOCARDIOGRAM RESULTS  Results for orders placed in visit on 02/22/22    Echo    Interpretation Summary  · Concentric remodeling and normal systolic function.  · The estimated ejection fraction is 65%.  · Grade I left ventricular diastolic dysfunction.  · Normal right ventricular size with normal right ventricular systolic function.  · Intermediate central venous pressure (8 mmHg).  · The estimated PA systolic pressure is 24 mmHg.        CURRENT/PREVIOUS VISIT EKG  Results for orders placed or performed during the hospital encounter of 03/02/22   EKG 12-lead    Collection Time: 03/02/22  8:37 AM    Narrative    Test Reason : R10.9,    Vent. Rate : 065 BPM     Atrial Rate : 065 BPM     P-R Int : 134 ms          QRS Dur : 100 ms      QT Int : 434 ms       P-R-T Axes : 016 073 038 degrees     QTc Int : 451 ms    Normal sinus rhythm  Low voltage QRS  Incomplete right bundle branch block  Borderline Abnormal ECG  When compared with ECG of 22-FEB-2022 09:46,  Previous ECG has undetermined rhythm, needs review  T wave inversion no longer evident in Anterior leads  Confirmed by Clive Estrella MD ([id number]) on 3/7/2022 6:12:09 PM    Referred By: ERIK   SELF           Confirmed By:Clive Estrella MD     No valid procedures specified.   Results for orders placed during the hospital encounter of 02/22/22    Nuclear Stress - Cardiology Interpreted    Interpretation Summary    Normal myocardial perfusion scan. There is no evidence of myocardial ischemia or infarction.    The EKG portion of this study is negative for ischemia.    No valid procedures specified.    PHYSICAL EXAM  CONSTITUTIONAL:  Well built, well nourished in no apparent distress  NECK: no carotid bruit, no JVD  LUNGS: CTA  CHEST WALL: no tenderness,  HEART: regular rate and rhythm, S1, S2 normal, no murmur, click, rub or gallop   ABDOMEN: soft, non-tender; bowel sounds normal; no masses,  no organomegaly  EXTREMITIES: Extremities normal, no edema, no calf tenderness noted  NEURO: AAO X 3    I HAVE REVIEWED :    The vital signs, nurses notes, and all the pertinent radiology and labs.         Current Outpatient Medications   Medication Instructions    atorvastatin (LIPITOR) 10 MG tablet TAKE 1 TABLET BY MOUTH EVERY DAY IN THE EVENING    azelastine (ASTELIN) 137 mcg (0.1 %) nasal spray INSTILL 2 SPRAYS IN EACH NOSTRIL TWICE A DAY    benazepriL (LOTENSIN) 10 mg, Oral, Daily    dicyclomine (BENTYL) 20 mg tablet TAKE 1 TABLET BY MOUTH BEFORE MEALS & AT BEDTIME AS NEEDED    EScitalopram oxalate (LEXAPRO) 20 MG tablet TAKE 1 TABLET BY MOUTH EVERY DAY    gabapentin (NEURONTIN) 300 mg, Oral, 3 times daily    methocarbamoL (ROBAXIN) 500 mg, Oral, 3 times daily    omeprazole (PRILOSEC) 40 mg, Oral, Before breakfast    tadalafiL (CIALIS) 20 MG Tab Take 1 tabley by mouth 30 minutes before sexual activity. Do not exceed 1 tablet every 72 hours    tamsulosin (FLOMAX) 0.4 mg Cap TAKE 1 CAPSULE BY MOUTH TWICE A DAY    zaleplon (SONATA) 10 mg, Oral, Nightly          Assessment:   Hypertension  Dyslipidemia  Aortic atherosclerotic disease  History of right bundle-branch block      Plan:     Continue atorvastatin and Lotensin.  Risk factor modification.  Weight loss diet exercise.  Six months.        No follow-ups on file.

## 2022-05-03 ENCOUNTER — OFFICE VISIT (OUTPATIENT)
Dept: NEUROSURGERY | Facility: CLINIC | Age: 72
End: 2022-05-03
Payer: MEDICARE

## 2022-05-03 VITALS
HEIGHT: 68 IN | DIASTOLIC BLOOD PRESSURE: 76 MMHG | RESPIRATION RATE: 18 BRPM | SYSTOLIC BLOOD PRESSURE: 115 MMHG | WEIGHT: 188.06 LBS | HEART RATE: 65 BPM | BODY MASS INDEX: 28.5 KG/M2

## 2022-05-03 DIAGNOSIS — M71.38 SYNOVIAL CYST OF LUMBAR FACET JOINT: ICD-10-CM

## 2022-05-03 DIAGNOSIS — M54.16 LUMBAR RADICULOPATHY: ICD-10-CM

## 2022-05-03 DIAGNOSIS — Z98.890 HISTORY OF LUMBAR LAMINECTOMY FOR SPINAL CORD DECOMPRESSION: Primary | ICD-10-CM

## 2022-05-03 PROCEDURE — 99214 OFFICE O/P EST MOD 30 MIN: CPT | Mod: S$PBB,,, | Performed by: NEUROLOGICAL SURGERY

## 2022-05-03 PROCEDURE — 99214 PR OFFICE/OUTPT VISIT, EST, LEVL IV, 30-39 MIN: ICD-10-PCS | Mod: S$PBB,,, | Performed by: NEUROLOGICAL SURGERY

## 2022-05-03 NOTE — PROGRESS NOTES
I have seen the patient, reviewed the Advanced Practice Provider's history and physical, assessment and plan. I have personally interviewed and examined the patient at bedside and interpreted the relevant imaging and lab work and I agree with the findings. I personally performed the documented services. See below for any additional comments.      Does occasionally get focal axial low back pain which he states is much less severe than before surgery and does not prevent him from doing any activity.  He reports resolution of his right ankle weakness.    On exam, he has full strength, no numbness, incision is well healed.    Very pleased with the outcome of surgery.  He may follow up as needed.

## 2022-05-03 NOTE — PROGRESS NOTES
Neurosurgery History & Physical    Patient ID: Vince Chadwick III is a 72 y.o. male.    Chief Complaint   Patient presents with    Post-op Evaluation     3 month POV, resection L4-5 synovial cyst. Patient reports he is doing well since surgery.  He occasionally gets pain where the cyst was on the back.         Review of Systems   Constitutional: Negative for chills, diaphoresis, fatigue and fever.   HENT: Negative for congestion, hearing loss, rhinorrhea and sore throat.    Eyes: Negative for photophobia, pain, redness and visual disturbance.   Respiratory: Negative for cough, chest tightness, shortness of breath and wheezing.    Cardiovascular: Negative for chest pain, palpitations and leg swelling.   Gastrointestinal: Negative for abdominal distention, abdominal pain, constipation, diarrhea, nausea and vomiting.   Genitourinary: Negative for difficulty urinating, dysuria, frequency, hematuria and urgency.   Musculoskeletal: Positive for back pain. Negative for gait problem, myalgias, neck pain and neck stiffness.   Skin: Negative for pallor and rash.   Neurological: Negative for dizziness, seizures, speech difficulty, weakness, light-headedness, numbness and headaches.   Psychiatric/Behavioral: Negative for confusion, hallucinations and sleep disturbance.       Past Medical History:   Diagnosis Date    ALLERGIC RHINITIS     Arthritis     Cataract     OU    Colon polyp     Diverticulitis     Diverticulosis     Hyperlipidemia     resolved    Hypertension     Irritable bowel syndrome     Lumbar disc disease      Social History     Socioeconomic History    Marital status:    Occupational History    Occupation: retired [employer]   Tobacco Use    Smoking status: Never Smoker    Smokeless tobacco: Never Used   Substance and Sexual Activity    Alcohol use: No     Alcohol/week: 0.0 standard drinks    Drug use: No    Sexual activity: Yes     Family History   Problem Relation Age of Onset     Glaucoma Father     Cataracts Father     Macular degeneration Father     Colon polyps Father     Glaucoma Paternal Uncle     Prostate cancer Paternal Uncle     Glaucoma Paternal Uncle     Cataracts Mother     Breast cancer Mother     Glaucoma Sister     Heart attack Paternal Grandfather 44    Colon cancer Neg Hx     Crohn's disease Neg Hx     Ulcerative colitis Neg Hx      Review of patient's allergies indicates:  No Known Allergies    Current Outpatient Medications:     atorvastatin (LIPITOR) 10 MG tablet, TAKE 1 TABLET BY MOUTH EVERY DAY IN THE EVENING, Disp: 90 tablet, Rfl: 1    azelastine (ASTELIN) 137 mcg (0.1 %) nasal spray, INSTILL 2 SPRAYS IN EACH NOSTRIL TWICE A DAY, Disp: 90 mL, Rfl: 1    benazepriL (LOTENSIN) 10 MG tablet, Take 1 tablet (10 mg total) by mouth once daily. (Patient taking differently: Take 10 mg by mouth every evening.), Disp: 90 tablet, Rfl: 1    dicyclomine (BENTYL) 20 mg tablet, TAKE 1 TABLET BY MOUTH BEFORE MEALS & AT BEDTIME AS NEEDED, Disp: 720 tablet, Rfl: 1    EScitalopram oxalate (LEXAPRO) 20 MG tablet, TAKE 1 TABLET BY MOUTH EVERY DAY, Disp: 90 tablet, Rfl: 1    omeprazole (PRILOSEC) 40 MG capsule, TAKE 1 CAPSULE (40 MG TOTAL) BY MOUTH BEFORE BREAKFAST., Disp: 90 capsule, Rfl: 3    tadalafiL (CIALIS) 20 MG Tab, Take 1 tabley by mouth 30 minutes before sexual activity. Do not exceed 1 tablet every 72 hours, Disp: 10 tablet, Rfl: 11    tamsulosin (FLOMAX) 0.4 mg Cap, TAKE 1 CAPSULE BY MOUTH TWICE A DAY, Disp: 180 capsule, Rfl: 3    zaleplon (SONATA) 10 MG capsule, Take 1 capsule (10 mg total) by mouth nightly., Disp: 90 capsule, Rfl: 1    gabapentin (NEURONTIN) 300 MG capsule, Take 1 capsule (300 mg total) by mouth 3 (three) times daily. (Patient not taking: Reported on 4/6/2022), Disp: 90 capsule, Rfl: 2    methocarbamoL (ROBAXIN) 500 MG Tab, Take 1 tablet (500 mg total) by mouth 3 (three) times daily. (Patient not taking: Reported on 4/6/2022), Disp: 30  "tablet, Rfl: 1  Blood pressure 115/76, pulse 65, resp. rate 18, height 5' 8" (1.727 m), weight 85.3 kg (188 lb 0.8 oz).      Neurologic Exam     Mental Status   Oriented to person, place, and time.   Oriented to person.   Oriented to place.   Oriented to time.   Follows 3 step commands.   Attention: normal. Concentration: normal.   Speech: speech is normal   Level of consciousness: alert  Knowledge: consistent with education.   Able to name object. Able to read. Able to repeat. Able to write. Normal comprehension.      Cranial Nerves      CN II   Visual acuity: normal  Right visual field deficit: none  Left visual field deficit: none      CN III, IV, VI   Pupils are equal, round, and reactive to light.  Right pupil: Size: 3 mm. Shape: regular. Reactivity: brisk. Consensual response: intact.   Left pupil: Size: 3 mm. Shape: regular. Reactivity: brisk. Consensual response: intact.   CN III: no CN III palsy  CN VI: no CN VI palsy  Nystagmus: none   Diplopia: none  Ophthalmoparesis: none  Conjugate gaze: present     CN V   Right facial sensation deficit: none  Left facial sensation deficit: none     CN VII   Right facial weakness: none  Left facial weakness: none     CN VIII   Hearing: intact     CN IX, X   CN IX normal.   CN X normal.      CN XI   Right sternocleidomastoid strength: normal  Left sternocleidomastoid strength: normal  Right trapezius strength: normal  Left trapezius strength: normal     CN XII   Fasciculations: absent  Tongue deviation: none     Motor Exam   Muscle bulk: normal  Overall muscle tone: normal  Right arm pronator drift: absent  Left arm pronator drift: absent     Strength   Right deltoid: 5/5  Left deltoid: 5/5  Right biceps: 5/5  Left biceps: 5/5  Right triceps: 5/5  Left triceps: 5/5  Right wrist flexion: 5/5  Left wrist flexion: 5/5  Right wrist extension: 5/5  Left wrist extension: 5/5  Right interossei: 5/5  Left interossei: 5/5  Right iliopsoas: 5/5  Left iliopsoas: 5/5  Right " quadriceps: 5/5  Left quadriceps: 5/5  Right hamstrin/5  Left hamstrin/5  Right anterior tibial: 5/5  Left anterior tibial: 5/5  Right posterior tibial: 5/5  Left posterior tibial: 5/5  Right peroneal: 5/5  Left peroneal: 5/5  Right gastroc: 5/5  Left gastroc: 5/5  Right EHL: 5/5  Left EHL: 5/5     Sensory Exam   Right arm light touch: normal  Left arm light touch: normal  Right leg light touch: normal  Left leg light touch: normal     Gait, Coordination, and Reflexes      Gait  Gait: normal      Coordination   Romberg: negative  Finger to nose coordination: normal  Heel to shin coordination: normal  Tandem walking coordination: normal     Tremor   Resting tremor: absent  Intention tremor: absent  Action tremor: absent     Reflexes   Right brachioradialis: 1+  Left brachioradialis: 1+  Right biceps: 1+  Left biceps: 1+  Right triceps: 1+  Left triceps: 1+  Right patellar: 2+  Left patellar: 2+  Right achilles: 0  Left achilles: 0  Right Ng: absent  Left Ng: absent  Right ankle clonus: absent  Left ankle clonus: absent  Right plantar: normal  Left plantar: normal       Physical Exam  Constitutional: Oriented to person, place, and time. Appears well-developed and well-nourished.   HENT:   Head: Normocephalic and atraumatic.   Eyes: Pupils are equal, round, and reactive to light.   Neck: Normal range of motion. Neck supple.   Cardiovascular: Normal rate.    Pulmonary/Chest: Effort normal.   Musculoskeletal: Normal range of motion. Exhibits no edema.   Neurological: Alert and oriented to person, place, and time. Normal Finger-Nose-Finger Test, a normal Heel to Shin Test, a normal Romberg Test and a normal Tandem Gait Test. Gait normal.   Reflex Scores:       Tricep reflexes are 1+ on the right side and 1+ on the left side.       Bicep reflexes are 1+ on the right side and 1+ on the left side.       Brachioradialis reflexes are 1+ on the right side and 1+ on the left side.       Patellar reflexes are 2+  on the right side and 2+ on the left side.       Achilles reflexes are 0 on the right side and 0 on the left side.  Skin: Skin is warm, dry and intact.   Psychiatric: Normal mood and affect. Speech is normal and behavior is normal. Judgment and thought content normal.   Nursing note and vitals reviewed.      Provider dictation:    The patient is a 72 year old male who presents for 3 month post-op appointment s/p L4-L5 bilateral laminectomies and medial facetectomies and resection of right synovial cyst via a right-sided minimally invasive approach. He denies any current lower extremity pain or numbness. Denies weakness. He will occasionally get axial lower back pain, but tolerable. He is ambulating longer distances and is overall happy with his recovery so far. He has participated in physical therapy.     On exam patient has improvement in right DF/EHL/PF weakness. Sensation intact. Incision is well healed.    Mr. Chadwick is recovering well from surgery. He can follow-up as needed.       1. History of lumbar laminectomy for spinal cord decompression     2. Lumbar radiculopathy     3. Synovial cyst of lumbar facet joint

## 2022-05-05 DIAGNOSIS — J30.9 ALLERGIC RHINITIS, UNSPECIFIED SEASONALITY, UNSPECIFIED TRIGGER: ICD-10-CM

## 2022-05-05 RX ORDER — AZELASTINE 1 MG/ML
SPRAY, METERED NASAL
Qty: 90 ML | Refills: 3 | Status: SHIPPED | OUTPATIENT
Start: 2022-05-05 | End: 2023-03-09

## 2022-05-05 NOTE — TELEPHONE ENCOUNTER
Care Due:                  Date            Visit Type   Department     Provider  --------------------------------------------------------------------------------                                ESTABLISHED                  Karan Gregg  Last Visit: 03-      PATIENT      None Found     [facility]                              ESTABLISHED                  Karan Gregg  Next Visit: 06-      PATIENT      None Found     [facility]                                                            Last  Test          Frequency    Reason                     Performed    Due Date  --------------------------------------------------------------------------------    Lipid Panel.  12 months..  atorvastatin.............  02- 02-    [facility] Embedded Care Gaps. Reference number: [id number]. 5/05/2022   12:10:05 AM STORMT

## 2022-05-05 NOTE — TELEPHONE ENCOUNTER
Refill Authorization Note   Vince Chadwick  is requesting a refill authorization.  Brief Assessment and Rationale for Refill:  Approve     Medication Therapy Plan:       Medication Reconciliation Completed: No   Comments:     Provider Staff:      Action is required for this patient.   Please see care gap opportunities below in Care Due Message.      Thanks!  [facility]     Note composed:11:02 AM 05/05/2022

## 2022-05-12 RX ORDER — ATORVASTATIN CALCIUM 10 MG/1
TABLET, FILM COATED ORAL
Qty: 90 TABLET | Refills: 1 | Status: SHIPPED | OUTPATIENT
Start: 2022-05-12 | End: 2022-11-02 | Stop reason: SDUPTHER

## 2022-05-12 NOTE — TELEPHONE ENCOUNTER
No new care gaps identified.  [facility] Embedded Care Gaps. Reference number: [id number]. 5/12/2022   1:26:02 AM STORMT

## 2022-05-12 NOTE — TELEPHONE ENCOUNTER
Refill Routing Note   Medication(s) are not appropriate for processing by [facility] for the following reason(s):      - Required laboratory values are outdated  - Required laboratory values are abnormal    [facility] action(s):  Defer          Medication reconciliation completed: No     Appointments  past 12m or future 3m with PCP    Date Provider   Last Visit   3/8/2022 Karan [facility] MD   Next Visit   6/7/2022 Karan [facility] MD   ED visits in past 90 days: 1        Note composed:11:38 AM 05/12/2022

## 2022-05-27 ENCOUNTER — TELEPHONE (OUTPATIENT)
Dept: PRIMARY CARE CLINIC | Facility: CLINIC | Age: 72
End: 2022-05-27

## 2022-05-27 NOTE — TELEPHONE ENCOUNTER
Spoke to patient and he was needing to reschedule his upcoming appointment for a later time. Rescheduled and verbalized understanding.

## 2022-06-28 RX ORDER — BENAZEPRIL HYDROCHLORIDE 10 MG/1
TABLET ORAL
Qty: 90 TABLET | Refills: 3 | Status: SHIPPED | OUTPATIENT
Start: 2022-06-28 | End: 2023-07-04 | Stop reason: SDUPTHER

## 2022-06-28 RX ORDER — ESCITALOPRAM OXALATE 20 MG/1
TABLET ORAL
Qty: 90 TABLET | Refills: 3 | Status: SHIPPED | OUTPATIENT
Start: 2022-06-28 | End: 2023-09-23 | Stop reason: SDUPTHER

## 2022-06-28 NOTE — TELEPHONE ENCOUNTER
Refill Decision Note   Vince Chadwick  is requesting a refill authorization.  Brief Assessment and Rationale for Refill:  Approve     Medication Therapy Plan:       Medication Reconciliation Completed: No   Comments:     No Care Gaps recommended.     Note composed:12:32 PM 06/28/2022

## 2022-06-28 NOTE — TELEPHONE ENCOUNTER
No new care gaps identified.  [facility] Embedded Care Gaps. Reference number: [id number]. 6/28/2022   12:09:37 AM STORMT

## 2022-08-03 ENCOUNTER — OFFICE VISIT (OUTPATIENT)
Dept: PRIMARY CARE CLINIC | Facility: CLINIC | Age: 72
End: 2022-08-03
Payer: MEDICARE

## 2022-08-03 VITALS
HEART RATE: 88 BPM | DIASTOLIC BLOOD PRESSURE: 68 MMHG | BODY MASS INDEX: 27 KG/M2 | SYSTOLIC BLOOD PRESSURE: 110 MMHG | OXYGEN SATURATION: 98 % | RESPIRATION RATE: 18 BRPM | HEIGHT: 68 IN | WEIGHT: 178.13 LBS

## 2022-08-03 DIAGNOSIS — E78.5 HYPERLIPIDEMIA, UNSPECIFIED HYPERLIPIDEMIA TYPE: Chronic | ICD-10-CM

## 2022-08-03 DIAGNOSIS — F41.9 ANXIETY: ICD-10-CM

## 2022-08-03 DIAGNOSIS — G47.00 INSOMNIA, UNSPECIFIED TYPE: ICD-10-CM

## 2022-08-03 DIAGNOSIS — Z12.5 SPECIAL SCREENING EXAMINATION FOR NEOPLASM OF PROSTATE: ICD-10-CM

## 2022-08-03 DIAGNOSIS — I10 PRIMARY HYPERTENSION: Primary | Chronic | ICD-10-CM

## 2022-08-03 PROCEDURE — 99214 OFFICE O/P EST MOD 30 MIN: CPT | Mod: S$PBB,,, | Performed by: FAMILY MEDICINE

## 2022-08-03 PROCEDURE — 99999 PR PBB SHADOW E&M-EST. PATIENT-LVL IV: ICD-10-PCS | Mod: PBBFAC,,, | Performed by: FAMILY MEDICINE

## 2022-08-03 PROCEDURE — 99999 PR PBB SHADOW E&M-EST. PATIENT-LVL IV: CPT | Mod: PBBFAC,,, | Performed by: FAMILY MEDICINE

## 2022-08-03 PROCEDURE — 99214 OFFICE O/P EST MOD 30 MIN: CPT | Mod: PBBFAC,PN | Performed by: FAMILY MEDICINE

## 2022-08-03 PROCEDURE — 99214 PR OFFICE/OUTPT VISIT, EST, LEVL IV, 30-39 MIN: ICD-10-PCS | Mod: S$PBB,,, | Performed by: FAMILY MEDICINE

## 2022-08-03 NOTE — ASSESSMENT & PLAN NOTE
Stable satisfactory continue current medications.  Recommend more regular exercise and healthier diet

## 2022-08-03 NOTE — PROGRESS NOTES
THIS DOCUMENT WAS MADE IN PART WITH VOICE RECOGNITION SOFTWARE.  OCCASIONALLY THIS SOFTWARE WILL MISINTERPRET WORDS OR PHRASES.      Primary Care Provider Appointment   [facility] ([facility])  [address] [place] [address]   Ph: [phone]  Fax: [phone]      Patient ID: Vince Chadwick III is a 72 y.o. male.    ASSESSMENT/PLAN by Problem List:  Problem List Items Addressed This Visit     Hypertension - Primary (Chronic)     Stable satisfactory continue current medications.  Recommend more regular exercise and healthier diet           Relevant Orders    Comprehensive Metabolic Panel    CBC Auto Differential    Hyperlipidemia (Chronic)     This has been stable, continue atorvastatin.  Check labs in 3-4 months and follow afterwards           Relevant Orders    Lipid Panel    Anxiety     Stable continue Lexapro 20 mg daily           Insomnia     Persistent but well controlled with sonata 10 mg.  Will continue but monitor carefully             Other Visit Diagnoses     Special screening examination for neoplasm of prostate        Relevant Orders    PSA, Screening      Regarding weight loss.  He was not intentionally losing weight, but not eating as much.  He does have some chronic constipation but no acute GI concerns.  Discussed a balance healthy diet, I want him to monitor this carefully.  If he continues to lose unexpectedly then I want him to notify me sooner than his    Follow Up:  3-4 months    Health Maintenance       Date Due Completion Date    Aspirin/Antiplatelet Therapy Never done ---    Shingles Vaccine (1 of 2) Never done ---    Lipid Panel 02/22/2022 2/22/2021    COVID-19 Vaccine (4 - Booster for Pfizer series) 04/07/2022 12/7/2021    Influenza Vaccine (1) 09/01/2022 10/2/2020    High Dose Statin 08/03/2023 8/3/2022    TETANUS VACCINE 09/22/2025 9/22/2015    Colorectal Cancer Screening 05/13/2027 5/13/2020    Override on 6/2/2011: Done (legay  documents, repeat in 8 years)            Subjective:     Chief Complaint   Patient presents with    Hypertension     F/u visit     Hyperlipidemia     F/u visit     I have reviewed the information entered by the ancillary staff regarding the chief complaint as well as the related history.    HPI    Patient is a/an 72 y.o.  male   RISK of ADMIT/ED: 9    Has lost about 10 lbs, not eating as much, but not specifically trying to lose weight.  Does have intermittent constipation, states 'I don't like Miralax', stopped metamucil, doesn't know why.  Advised to restart    For complete problem list, past medical history, surgical history, social history, etc., see appropriate section in the electronic medical record    Review of Systems   Constitutional: Positive for unexpected weight change.   Respiratory: Negative.    Cardiovascular: Negative.    Gastrointestinal: Negative.    Genitourinary: Negative.    Musculoskeletal: Negative.        Objective     Physical Exam  Vitals reviewed.   Constitutional:       General: He is not in acute distress.     Appearance: He is well-developed. He is not diaphoretic.   HENT:      Head: Normocephalic and atraumatic.      Right Ear: Tympanic membrane, ear canal and external ear normal. There is no impacted cerumen.      Left Ear: Tympanic membrane, ear canal and external ear normal. There is no impacted cerumen.      Mouth/Throat:      Pharynx: Oropharynx is clear. No oropharyngeal exudate.   Eyes:      General: No scleral icterus.        Right eye: No discharge.         Left eye: No discharge.      Conjunctiva/sclera: Conjunctivae normal.   Cardiovascular:      Rate and Rhythm: Normal rate and regular rhythm.      Heart sounds: Normal heart sounds. No murmur heard.    No gallop.   Pulmonary:      Effort: Pulmonary effort is normal. No respiratory distress.   Abdominal:      General: Abdomen is flat. Bowel sounds are normal. There is no distension.      Palpations: There is no mass.       "Tenderness: There is no abdominal tenderness. There is no guarding or rebound.   Musculoskeletal:      Cervical back: Normal range of motion and neck supple.   Skin:     General: Skin is warm and dry.   Neurological:      Mental Status: He is alert and oriented to person, place, and time.      Deep Tendon Reflexes: Reflexes are normal and symmetric.   Psychiatric:         Behavior: Behavior normal.       Vitals:    08/03/22 1018   BP: 110/68   BP Location: Right arm   Patient Position: Sitting   BP Method: Medium (Manual)   Pulse: 88   Resp: 18   SpO2: 98%   Weight: 80.8 kg (178 lb 2.1 oz)   Height: 5' 8" (1.727 m)       RECENT LABS:    Lab Results   Component Value Date    WBC 16.60 (H) 03/02/2022    HGB 15.4 03/02/2022    HCT 46.5 03/02/2022     03/02/2022    CHOL 141 02/22/2021    TRIG 121 02/22/2021    HDL 59 02/22/2021    ALT 16 03/02/2022    AST 30 03/02/2022     03/02/2022    K 4.2 03/02/2022     03/02/2022    CREATININE 0.96 03/02/2022    BUN 15 03/02/2022    CO2 26 03/02/2022    TSH 2.797 08/20/2019    PSA 1.9 08/20/2020    INR 0.9 01/19/2022       Results for orders placed or performed during the hospital encounter of 03/02/22   CBC Auto Differential   Result Value Ref Range    WBC 16.60 (H) 3.90 - 12.70 K/uL    RBC 5.45 4.60 - 6.20 M/uL    Hemoglobin 15.4 14.0 - 18.0 g/dL    Hematocrit 46.5 40.0 - 54.0 %    MCV 85 82 - 98 fL    MCH 28.3 27.0 - 31.0 pg    MCHC 33.1 32.0 - 36.0 g/dL    RDW 13.8 11.5 - 14.5 %    Platelets 218 150 - 450 K/uL    MPV 12.0 9.2 - 12.9 fL    Immature Granulocytes 0.4 0.0 - 0.5 %    Gran # (ANC) 15.1 (H) 1.8 - 7.7 K/uL    Immature Grans (Abs) 0.07 (H) 0.00 - 0.04 K/uL    Lymph # 0.8 (L) 1.0 - 4.8 K/uL    Mono # 0.6 0.3 - 1.0 K/uL    Eos # 0.0 0.0 - 0.5 K/uL    Baso # 0.05 0.00 - 0.20 K/uL    nRBC 0 0 /100 WBC    Gran % 90.7 (H) 38.0 - 73.0 %    Lymph % 4.8 (L) 18.0 - 48.0 %    Mono % 3.7 (L) 4.0 - 15.0 %    Eosinophil % 0.1 0.0 - 8.0 %    Basophil % 0.3 0.0 - 1.9 " %    Differential Method Automated    Comprehensive Metabolic Panel   Result Value Ref Range    Sodium 138 136 - 145 mmol/L    Potassium 4.2 3.5 - 5.1 mmol/L    Chloride 103 95 - 110 mmol/L    CO2 26 22 - 31 mmol/L    Glucose 177 (H) 70 - 110 mg/dL    BUN 15 9 - 21 mg/dL    Creatinine 0.96 0.50 - 1.40 mg/dL    Calcium 9.2 8.4 - 10.2 mg/dL    Total Protein 7.2 6.0 - 8.4 g/dL    Albumin 4.4 3.5 - 5.2 g/dL    Total Bilirubin 1.4 (H) 0.2 - 1.3 mg/dL    Alkaline Phosphatase 115 38 - 145 U/L    AST 30 17 - 59 U/L    ALT 16 0 - 50 U/L    Anion Gap 9 8 - 16 mmol/L    eGFR if African American >60 >60 mL/min/1.73 m^2    eGFR if non African American >60 >60 mL/min/1.73 m^2   Urinalysis, Reflex to Urine Culture Urine, Clean Catch    Specimen: Urine   Result Value Ref Range    Specimen UA Urine, Clean Catch     Color, UA Yellow Yellow, Straw, Cori    Appearance, UA Clear Clear    pH, UA 5.0 5.0 - 8.0    Specific Gravity, UA 1.025 1.005 - 1.030    Protein, UA Negative Negative    Glucose, UA Negative Negative    Ketones, UA Trace (A) Negative    Bilirubin (UA) Negative Negative    Occult Blood UA Negative Negative    Nitrite, UA Negative Negative    Urobilinogen, UA 0.2 <2.0 EU/dL    Leukocytes, UA Negative Negative   COVID-19 Rapid Screening   Result Value Ref Range    SARS-CoV-2 RNA, Amplification, Qual Negative Negative

## 2022-08-03 NOTE — ASSESSMENT & PLAN NOTE
This has been stable, continue atorvastatin.  Check labs in 3-4 months and follow afterwards   Ketoconazole Pregnancy And Lactation Text: This medication is Pregnancy Category C and it isn't know if it is safe during pregnancy. It is also excreted in breast milk and breast feeding isn't recommended.

## 2022-08-26 ENCOUNTER — OFFICE VISIT (OUTPATIENT)
Dept: PRIMARY CARE CLINIC | Facility: CLINIC | Age: 72
End: 2022-08-26
Payer: MEDICARE

## 2022-08-26 VITALS
BODY MASS INDEX: 26.83 KG/M2 | HEART RATE: 76 BPM | SYSTOLIC BLOOD PRESSURE: 120 MMHG | HEIGHT: 68 IN | WEIGHT: 177 LBS | DIASTOLIC BLOOD PRESSURE: 70 MMHG

## 2022-08-26 DIAGNOSIS — S00.96XA INSECT BITE OF HEAD, UNSPECIFIED PART, INITIAL ENCOUNTER: Primary | ICD-10-CM
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
## 2022-08-26 DIAGNOSIS — F41.9 ANXIETY: ICD-10-CM

## 2022-08-26 PROCEDURE — 99999 PR PBB SHADOW E&M-EST. PATIENT-LVL III: ICD-10-PCS | Mod: PBBFAC,,, | Performed by: FAMILY MEDICINE

## 2022-08-26 PROCEDURE — 99213 PR OFFICE/OUTPT VISIT, EST, LEVL III, 20-29 MIN: ICD-10-PCS | Mod: S$PBB,,, | Performed by: FAMILY MEDICINE

## 2022-08-26 PROCEDURE — 99213 OFFICE O/P EST LOW 20 MIN: CPT | Mod: PBBFAC,PN | Performed by: FAMILY MEDICINE

## 2022-08-26 PROCEDURE — 99213 OFFICE O/P EST LOW 20 MIN: CPT | Mod: S$PBB,,, | Performed by: FAMILY MEDICINE

## 2022-08-26 PROCEDURE — 99999 PR PBB SHADOW E&M-EST. PATIENT-LVL III: CPT | Mod: PBBFAC,,, | Performed by: FAMILY MEDICINE

## 2022-08-26 RX ORDER — MUPIROCIN 20 MG/G
OINTMENT TOPICAL 3 TIMES DAILY
Qty: 22 G | Refills: 0 | Status: SHIPPED | OUTPATIENT
Start: 2022-08-26 | End: 2023-03-09

## 2022-08-26 NOTE — PROGRESS NOTES
THIS DOCUMENT WAS MADE IN PART WITH VOICE RECOGNITION SOFTWARE.  OCCASIONALLY THIS SOFTWARE WILL MISINTERPRET WORDS OR PHRASES.      Primary Care Provider Appointment   [facility] ([facility])  [address], [place] [address]   Ph: [phone]  Fax: [phone]      Patient ID: Vince Chadwick III is a 72 y.o. male.  Vince was seen today for sore.    Diagnoses and all orders for this visit:    Insect bite of head, unspecified part, initial encounter  Suspect some type of insect bite.  Does not have a malignant appearance, has a more inflammatory appearance.  Will apply Bactroban ointment and monitor closely however if it is worsening or not improving by next week then may need to re-evaluate and request Dermatology so he will send me an update next week.    Anxiety  Patient does report some increased stress.  He remains on Lexapro.  He does report in the past he said some exacerbation of anxiety and even panic attacks many years ago.  He does want to let me know but thinks he has things under control however if his anxiety escalates I may consider adding BuSpar.  So he will keep me informed let me know if he needs any additional assistance.    Other orders  -     mupirocin (BACTROBAN) 2 % ointment; Apply topically 3 (three) times daily.        Health Maintenance       Date Due Completion Date    Aspirin/Antiplatelet Therapy Never done ---    Shingles Vaccine (1 of 2) Never done ---    Lipid Panel 02/22/2022 2/22/2021    COVID-19 Vaccine (4 - Booster for Pfizer series) 04/07/2022 12/7/2021    Influenza Vaccine (1) 09/01/2022 10/2/2020    High Dose Statin 08/26/2023 8/26/2022    TETANUS VACCINE 09/22/2025 9/22/2015    Colorectal Cancer Screening 05/13/2027 5/13/2020    Override on 6/2/2011: Done (legay documents, repeat in 8 years)            Subjective:     Chief Complaint   Patient presents with    Sore     To the side of R eye- noticed this initially 2 weeks ago  "    I have reviewed the information entered by the ancillary staff regarding the chief complaint as well as the related history.    HPI    Patient is a/an 72 y.o.  male   RISK of ADMIT/ED: 9    Lesion on the right side of his face.  Possible insect bite, wants to make certain it was not shingles.  He noticed one small red lesion just above and on the forehead but that seems to be improving.  First noticed the original lesion last week, there does seem to be some improvement over the last few days.  No fever or chills.  He does not describe any paresthesias, pain, no change in vision.    For complete problem list, past medical history, surgical history, social history, etc., see appropriate section in the electronic medical record    Review of Systems   Constitutional: Negative for chills and fever.   Psychiatric/Behavioral: The patient is nervous/anxious.        Objective     Physical Exam  HENT:      Head:         Vitals:    08/26/22 1305   BP: 120/70   BP Location: Left arm   Patient Position: Sitting   BP Method: Large (Manual)   Pulse: 76   Weight: 80.3 kg (177 lb 0.5 oz)   Height: 5' 8" (1.727 m)       RECENT LABS:    Lab Results   Component Value Date    WBC 16.60 (H) 03/02/2022    HGB 15.4 03/02/2022    HCT 46.5 03/02/2022     03/02/2022    CHOL 141 02/22/2021    TRIG 121 02/22/2021    HDL 59 02/22/2021    ALT 16 03/02/2022    AST 30 03/02/2022     03/02/2022    K 4.2 03/02/2022     03/02/2022    CREATININE 0.96 03/02/2022    BUN 15 03/02/2022    CO2 26 03/02/2022    TSH 2.797 08/20/2019    PSA 1.9 08/20/2020    INR 0.9 01/19/2022       Results for orders placed or performed during the hospital encounter of 03/02/22   CBC Auto Differential   Result Value Ref Range    WBC 16.60 (H) 3.90 - 12.70 K/uL    RBC 5.45 4.60 - 6.20 M/uL    Hemoglobin 15.4 14.0 - 18.0 g/dL    Hematocrit 46.5 40.0 - 54.0 %    MCV 85 82 - 98 fL    MCH 28.3 27.0 - 31.0 pg    MCHC 33.1 32.0 - 36.0 g/dL    RDW 13.8 11.5 - " 14.5 %    Platelets 218 150 - 450 K/uL    MPV 12.0 9.2 - 12.9 fL    Immature Granulocytes 0.4 0.0 - 0.5 %    Gran # (ANC) 15.1 (H) 1.8 - 7.7 K/uL    Immature Grans (Abs) 0.07 (H) 0.00 - 0.04 K/uL    Lymph # 0.8 (L) 1.0 - 4.8 K/uL    Mono # 0.6 0.3 - 1.0 K/uL    Eos # 0.0 0.0 - 0.5 K/uL    Baso # 0.05 0.00 - 0.20 K/uL    nRBC 0 0 /100 WBC    Gran % 90.7 (H) 38.0 - 73.0 %    Lymph % 4.8 (L) 18.0 - 48.0 %    Mono % 3.7 (L) 4.0 - 15.0 %    Eosinophil % 0.1 0.0 - 8.0 %    Basophil % 0.3 0.0 - 1.9 %    Differential Method Automated    Comprehensive Metabolic Panel   Result Value Ref Range    Sodium 138 136 - 145 mmol/L    Potassium 4.2 3.5 - 5.1 mmol/L    Chloride 103 95 - 110 mmol/L    CO2 26 22 - 31 mmol/L    Glucose 177 (H) 70 - 110 mg/dL    BUN 15 9 - 21 mg/dL    Creatinine 0.96 0.50 - 1.40 mg/dL    Calcium 9.2 8.4 - 10.2 mg/dL    Total Protein 7.2 6.0 - 8.4 g/dL    Albumin 4.4 3.5 - 5.2 g/dL    Total Bilirubin 1.4 (H) 0.2 - 1.3 mg/dL    Alkaline Phosphatase 115 38 - 145 U/L    AST 30 17 - 59 U/L    ALT 16 0 - 50 U/L    Anion Gap 9 8 - 16 mmol/L    eGFR if African American >60 >60 mL/min/1.73 m^2    eGFR if non African American >60 >60 mL/min/1.73 m^2   Urinalysis, Reflex to Urine Culture Urine, Clean Catch    Specimen: Urine   Result Value Ref Range    Specimen UA Urine, Clean Catch     Color, UA Yellow Yellow, Straw, Cori    Appearance, UA Clear Clear    pH, UA 5.0 5.0 - 8.0    Specific Gravity, UA 1.025 1.005 - 1.030    Protein, UA Negative Negative    Glucose, UA Negative Negative    Ketones, UA Trace (A) Negative    Bilirubin (UA) Negative Negative    Occult Blood UA Negative Negative    Nitrite, UA Negative Negative    Urobilinogen, UA 0.2 <2.0 EU/dL    Leukocytes, UA Negative Negative   COVID-19 Rapid Screening   Result Value Ref Range    SARS-CoV-2 RNA, Amplification, Qual Negative Negative

## 2022-09-22 ENCOUNTER — PATIENT MESSAGE (OUTPATIENT)
Dept: PRIMARY CARE CLINIC | Facility: CLINIC | Age: 72
End: 2022-09-22
Payer: MEDICARE

## 2022-09-22 ENCOUNTER — OFFICE VISIT (OUTPATIENT)
Dept: URGENT CARE | Facility: CLINIC | Age: 72
End: 2022-09-22
Payer: MEDICARE

## 2022-09-22 VITALS
WEIGHT: 175 LBS | RESPIRATION RATE: 16 BRPM | HEIGHT: 68 IN | HEART RATE: 91 BPM | OXYGEN SATURATION: 98 % | BODY MASS INDEX: 26.52 KG/M2 | DIASTOLIC BLOOD PRESSURE: 76 MMHG | SYSTOLIC BLOOD PRESSURE: 128 MMHG | TEMPERATURE: 98 F

## 2022-09-22 DIAGNOSIS — R10.10 PAIN OF UPPER ABDOMEN: ICD-10-CM

## 2022-09-22 DIAGNOSIS — R11.0 NAUSEA: Primary | ICD-10-CM

## 2022-09-22 LAB
CTP QC/QA: YES
CTP QC/QA: YES
POC MOLECULAR INFLUENZA A AGN: NEGATIVE
POC MOLECULAR INFLUENZA B AGN: NEGATIVE
SARS-COV-2 RDRP RESP QL NAA+PROBE: NEGATIVE

## 2022-09-22 PROCEDURE — 99213 PR OFFICE/OUTPT VISIT, EST, LEVL III, 20-29 MIN: ICD-10-PCS | Mod: 25,S$GLB,, | Performed by: NURSE PRACTITIONER

## 2022-09-22 PROCEDURE — 96372 THER/PROPH/DIAG INJ SC/IM: CPT | Mod: S$GLB,,, | Performed by: NURSE PRACTITIONER

## 2022-09-22 PROCEDURE — 96372 PR INJECTION,THERAP/PROPH/DIAG2ST, IM OR SUBCUT: ICD-10-PCS | Mod: S$GLB,,, | Performed by: NURSE PRACTITIONER

## 2022-09-22 PROCEDURE — 99213 OFFICE O/P EST LOW 20 MIN: CPT | Mod: 25,S$GLB,, | Performed by: NURSE PRACTITIONER

## 2022-09-22 PROCEDURE — U0002 COVID-19 LAB TEST NON-CDC: HCPCS | Mod: QW,CR,S$GLB, | Performed by: NURSE PRACTITIONER

## 2022-09-22 PROCEDURE — 87502 INFLUENZA DNA AMP PROBE: CPT | Mod: QW,S$GLB,, | Performed by: NURSE PRACTITIONER

## 2022-09-22 PROCEDURE — U0002: ICD-10-PCS | Mod: QW,CR,S$GLB, | Performed by: NURSE PRACTITIONER

## 2022-09-22 PROCEDURE — 87502 POCT INFLUENZA A/B MOLECULAR: ICD-10-PCS | Mod: QW,S$GLB,, | Performed by: NURSE PRACTITIONER

## 2022-09-22 RX ORDER — ONDANSETRON 8 MG/1
8 TABLET, ORALLY DISINTEGRATING ORAL EVERY 8 HOURS PRN
Qty: 10 TABLET | Refills: 0 | Status: SHIPPED | OUTPATIENT
Start: 2022-09-22 | End: 2022-09-25

## 2022-09-22 RX ORDER — SODIUM CHLORIDE 9 MG/ML
INJECTION, SOLUTION INTRAVENOUS
Status: COMPLETED | OUTPATIENT
Start: 2022-09-22 | End: 2022-09-22

## 2022-09-22 RX ORDER — ONDANSETRON 2 MG/ML
8 INJECTION INTRAMUSCULAR; INTRAVENOUS
Status: COMPLETED | OUTPATIENT
Start: 2022-09-22 | End: 2022-09-22

## 2022-09-22 RX ADMIN — ONDANSETRON 8 MG: 2 INJECTION INTRAMUSCULAR; INTRAVENOUS at 04:09

## 2022-09-22 RX ADMIN — SODIUM CHLORIDE 500 ML: 9 INJECTION, SOLUTION INTRAVENOUS at 04:09

## 2022-09-22 NOTE — PROGRESS NOTES
"Subjective:       Patient ID: Vince Chadwick III is a 72 y.o. male.    Vitals:  height is 5' 8" (1.727 m) and weight is 79.4 kg (175 lb). His oral temperature is 98.3 °F (36.8 °C). His blood pressure is 128/76 and his pulse is 91. His respiration is 16 and oxygen saturation is 98%.     Chief Complaint: Nausea    Pt presents today with nausea, vomiting, and diarrhea started this am around 2 has not been able to tolerate fluids or food today. Reports epigastric pain that occurs with vomiting. PT has taken OTC meds with no relief, due to vomiting them up. Denies fever, chills,,blood in diarrhea.    Nausea  This is a new problem. The current episode started today. The problem occurs constantly. The problem has been unchanged. Associated symptoms include a change in bowel habit, congestion, fatigue, nausea, vomiting and weakness. Pertinent negatives include no abdominal pain, anorexia, arthralgias, chest pain, chills, coughing, diaphoresis, fever, headaches, joint swelling, myalgias, neck pain, numbness, rash, sore throat, swollen glands, urinary symptoms, vertigo or visual change. Nothing aggravates the symptoms. He has tried immobilization and position changes for the symptoms. The treatment provided no relief.     Constitution: Positive for fatigue. Negative for chills, sweating and fever.   HENT:  Positive for congestion. Negative for sore throat.    Neck: Negative for neck pain.   Cardiovascular:  Negative for chest pain.   Respiratory:  Negative for cough.    Gastrointestinal:  Positive for nausea, vomiting and diarrhea. Negative for abdominal pain.   Musculoskeletal:  Negative for joint pain, joint swelling and muscle ache.   Skin:  Negative for rash.   Neurological:  Negative for history of vertigo, headaches and numbness.     Objective:      Physical Exam   Constitutional: He is oriented to person, place, and time. He appears well-developed. No distress.   HENT:   Head: Normocephalic and atraumatic.   Ears: "   Right Ear: External ear normal.   Left Ear: External ear normal.   Nose: Nose normal.   Mouth/Throat: Mucous membranes are normal.   Eyes: Conjunctivae and lids are normal.   Neck: Trachea normal. Neck supple. No neck rigidity present.   Cardiovascular: Normal rate, regular rhythm and normal heart sounds.   Pulmonary/Chest: Effort normal and breath sounds normal. No respiratory distress. He has no wheezes.   Abdominal: Normal appearance and bowel sounds are normal. He exhibits no distension, no fluid wave, no abdominal bruit, no pulsatile midline mass and no mass. Soft. There is no splenomegaly or hepatomegaly. No signs of injury. There is abdominal tenderness (epigastric tenderness worsened during visit) in the epigastric area. There is no rebound, no guarding and negative Cherry's sign.      Comments: Patient initially non-tender on exam, abdominal pain worsened during visit, epigastric tenderness present, non-tender to lower quadrants, patient has had appendectomy   Musculoskeletal: Normal range of motion.         General: Normal range of motion.   Neurological: He is alert and oriented to person, place, and time. He has normal strength.   Skin: Skin is warm, dry, intact, not diaphoretic and not pale.   Psychiatric: His speech is normal and behavior is normal. Judgment and thought content normal.   Nursing note and vitals reviewed.      Results for orders placed or performed in visit on 09/22/22   POCT COVID-19 Rapid Screening   Result Value Ref Range    POC Rapid COVID Negative Negative     Acceptable Yes    POCT Influenza A/B MOLECULAR   Result Value Ref Range    POC Molecular Influenza A Ag Negative Negative, Not Reported    POC Molecular Influenza B Ag Negative Negative, Not Reported     Acceptable Yes      IV started on patient's right AC x1 attempt by Dedrick Castillo NP, 20 gauge, started normal saline bolus on patient, patient given zofran 8 mg IV. During course of visit and  IV fluid administration, patient began to feel worse, c/o increased abdominal pain, epigastric pain, continued nausea. IV d/c'd, patient received 250 mls, d/c'd IV canula tip intact, bandage applied, patient sent to ER due to continued nausea, increased abdominal pain.   Notified Dr. Real of patient's status, worsening abdominal pain, nausea, vomiting during visit and transfer to ED for further evaluation of abdominal pain.  Assessment:       1. Nausea    2. Pain of upper abdomen          Plan:         Nausea  -     POCT COVID-19 Rapid Screening  -     POCT Influenza A/B MOLECULAR  -     ondansetron injection 8 mg  -     0.9%  NaCl infusion  -     ondansetron (ZOFRAN-ODT) 8 MG TbDL; Take 1 tablet (8 mg total) by mouth every 8 (eight) hours as needed (nausea). One tablet sublingual tid prn nausea  Dispense: 10 tablet; Refill: 0  -     Refer to Emergency Dept.    Pain of upper abdomen  -     Refer to Emergency Dept.

## 2022-09-22 NOTE — TELEPHONE ENCOUNTER
Called and spoke with patients wife, Irlanda. She states pt went to urgent, and is currently in appointment. Advised to call clinic for further assistance.

## 2022-09-24 ENCOUNTER — IMMUNIZATION (OUTPATIENT)
Dept: FAMILY MEDICINE | Facility: CLINIC | Age: 72
End: 2022-09-24
Payer: MEDICARE

## 2022-09-24 PROCEDURE — G0008 ADMIN INFLUENZA VIRUS VAC: HCPCS | Mod: PBBFAC,PO

## 2022-10-04 ENCOUNTER — OFFICE VISIT (OUTPATIENT)
Dept: CARDIOLOGY | Facility: CLINIC | Age: 72
End: 2022-10-04
Payer: MEDICARE

## 2022-10-04 VITALS
OXYGEN SATURATION: 97 % | HEART RATE: 76 BPM | HEIGHT: 68 IN | BODY MASS INDEX: 26.63 KG/M2 | WEIGHT: 175.69 LBS | DIASTOLIC BLOOD PRESSURE: 72 MMHG | SYSTOLIC BLOOD PRESSURE: 120 MMHG

## 2022-10-04 DIAGNOSIS — I10 PRIMARY HYPERTENSION: Chronic | ICD-10-CM

## 2022-10-04 DIAGNOSIS — I25.84 CORONARY ARTERY CALCIFICATION: Chronic | ICD-10-CM

## 2022-10-04 DIAGNOSIS — I70.0 AORTIC CALCIFICATION: Chronic | ICD-10-CM

## 2022-10-04 DIAGNOSIS — E78.5 HYPERLIPIDEMIA, UNSPECIFIED HYPERLIPIDEMIA TYPE: Chronic | ICD-10-CM

## 2022-10-04 DIAGNOSIS — I25.10 CORONARY ARTERY CALCIFICATION: Chronic | ICD-10-CM

## 2022-10-04 DIAGNOSIS — M51.9 LUMBAR DISC DISEASE: Primary | ICD-10-CM

## 2022-10-04 PROCEDURE — 99999 PR PBB SHADOW E&M-EST. PATIENT-LVL III: CPT | Mod: PBBFAC,,, | Performed by: INTERNAL MEDICINE

## 2022-10-04 PROCEDURE — 99999 PR PBB SHADOW E&M-EST. PATIENT-LVL III: ICD-10-PCS | Mod: PBBFAC,,, | Performed by: INTERNAL MEDICINE

## 2022-10-04 PROCEDURE — 99214 PR OFFICE/OUTPT VISIT, EST, LEVL IV, 30-39 MIN: ICD-10-PCS | Mod: S$PBB,,, | Performed by: INTERNAL MEDICINE

## 2022-10-04 PROCEDURE — 99214 OFFICE O/P EST MOD 30 MIN: CPT | Mod: S$PBB,,, | Performed by: INTERNAL MEDICINE

## 2022-10-04 PROCEDURE — 99213 OFFICE O/P EST LOW 20 MIN: CPT | Mod: PBBFAC,PO | Performed by: INTERNAL MEDICINE

## 2022-10-11 ENCOUNTER — TELEPHONE (OUTPATIENT)
Dept: PRIMARY CARE CLINIC | Facility: CLINIC | Age: 72
End: 2022-10-11

## 2022-10-13 ENCOUNTER — PATIENT MESSAGE (OUTPATIENT)
Dept: PRIMARY CARE CLINIC | Facility: CLINIC | Age: 72
End: 2022-10-13
Payer: MEDICARE

## 2022-10-13 DIAGNOSIS — N40.1 BENIGN NON-NODULAR PROSTATIC HYPERPLASIA WITH LOWER URINARY TRACT SYMPTOMS: ICD-10-CM

## 2022-10-13 RX ORDER — TAMSULOSIN HYDROCHLORIDE 0.4 MG/1
CAPSULE ORAL
Qty: 180 CAPSULE | Refills: 3 | Status: SHIPPED | OUTPATIENT
Start: 2022-10-13 | End: 2023-10-04

## 2022-10-14 ENCOUNTER — TELEPHONE (OUTPATIENT)
Dept: PRIMARY CARE CLINIC | Facility: CLINIC | Age: 72
End: 2022-10-14

## 2022-10-17 NOTE — PROGRESS NOTES
Subjective:    Patient ID:  Vince Chadwick III is a 72 y.o. male patient here for evaluation Essential hypertension      History of Present Illness:  Cardiology follow-up.  Patient is status post lumbar back surgery, cyst removed.  Overall neurologic symptomatology improved.  Ongoing risk factors include hypertension dyslipidemia.  Abnormal baseline EKG with right bundle-branch block.  Incidental note on CT scans of the abdomen pelvis without  aortic calcific disease, no aneurysm.    No significant dyspnea, no angina.  No arrhythmia.  Patient ambulatory without complaints.    Review of patient's allergies indicates:  No Known Allergies    Past Medical History:   Diagnosis Date    ALLERGIC RHINITIS     Arthritis     Cataract     OU    Colon polyp     Diverticulitis     Diverticulosis     Hyperlipidemia     resolved    Hypertension     Irritable bowel syndrome     Lumbar disc disease      Past Surgical History:   Procedure Laterality Date    APPENDECTOMY      CIRCUMCISION      COLONOSCOPY  06/02/2011    KARLA.    One 1 mm polyp in the sigmoid colon.  FRAGMENT OF NONNEOPLASTIC COLONIC MUCOSA.  Sigmoid diverticulosis.  Spasms c/w IBS.  repeat in 7-8 years    COLONOSCOPY  09/12/2006    Dr. Novak, [place]    COLONOSCOPY N/A 5/13/2020    Procedure: COLONOSCOPY;  Surgeon: Davy Novak Jr., MD;  Location: [facility] ENDO;  Service: Endoscopy;  Laterality: N/A;    EPIDURAL STEROID INJECTION INTO LUMBAR SPINE N/A 11/15/2021    Procedure: Injection-steroid-epidural-lumbar L5/S1;  Surgeon: Khang Sanchez MD;  Location: [facility] OR;  Service: Pain Management;  Laterality: N/A;    EPIDURAL STEROID INJECTION INTO LUMBAR SPINE N/A 12/13/2021    Procedure: Injection-steroid-epidural-lumbar L5/S1;  Surgeon: Khang Sanchez MD;  Location: [facility] OR;  Service: Pain Management;  Laterality: N/A;    ESOPHAGOGASTRODUODENOSCOPY N/A 5/13/2020    Procedure: EGD (ESOPHAGOGASTRODUODENOSCOPY);  Surgeon: Davy Novak Jr., MD;   Location: [facility] ENDO;  Service: Endoscopy;  Laterality: N/A;    EYE SURGERY      FOOT SURGERY      x 2    LAMINECTOMY USING MINIMALLY INVASIVE TECHNIQUE N/A 1/26/2022    Procedure: LAMINECTOMY, SPINE, MINIMALLY INVASIVE  RIGHT MIS APPROACH RESECTION L4-5 SYNOVIAL CYST AND BILATERAL LAMINECTOMY/MEDIAL FACETECTOMY;  Surgeon: Penny Lobato MD;  Location: [facility] OR;  Service: Neurosurgery;  Laterality: N/A;    LUMBAR EPIDURAL INJECTION      PILONIDAL CYST DRAINAGE      s/p chalazion removal      OD    TONSILLECTOMY       Social History     Tobacco Use    Smoking status: Never     Passive exposure: Never    Smokeless tobacco: Never   Substance Use Topics    Alcohol use: No     Alcohol/week: 0.0 standard drinks    Drug use: No        Review of Systems:    As noted in HPI in addition      REVIEW OF SYSTEMS  Review of Systems   Constitutional: Negative for decreased appetite, diaphoresis, night sweats, weight gain and weight loss.   HENT:  Negative for nosebleeds and odynophagia.    Eyes:  Negative for double vision and photophobia.   Cardiovascular:  Negative for chest pain, claudication, cyanosis, dyspnea on exertion, irregular heartbeat, leg swelling, near-syncope, orthopnea, palpitations, paroxysmal nocturnal dyspnea and syncope.   Respiratory:  Negative for cough, hemoptysis, shortness of breath and wheezing.    Hematologic/Lymphatic: Negative for adenopathy.   Skin:  Negative for flushing, skin cancer and suspicious lesions.   Musculoskeletal:  Negative for gout, myalgias and neck pain.   Gastrointestinal:  Negative for abdominal pain, heartburn, hematemesis and hematochezia.   Genitourinary:  Negative for bladder incontinence, hesitancy and nocturia.   Neurological:  Negative for focal weakness, headaches, light-headedness and paresthesias.   Psychiatric/Behavioral:  Negative for memory loss and substance abuse.             Objective:        Vitals:    10/04/22 1322   BP: 120/72   Pulse: 76       Lab Results    Component Value Date    WBC 16.60 (H) 03/02/2022    HGB 15.4 03/02/2022    HCT 46.5 03/02/2022     03/02/2022    CHOL 141 02/22/2021    TRIG 121 02/22/2021    HDL 59 02/22/2021    ALT 16 03/02/2022    AST 30 03/02/2022     03/02/2022    K 4.2 03/02/2022     03/02/2022    CREATININE 0.96 03/02/2022    BUN 15 03/02/2022    CO2 26 03/02/2022    TSH 2.797 08/20/2019    PSA 1.9 08/20/2020    INR 0.9 01/19/2022        ECHOCARDIOGRAM RESULTS  Results for orders placed in visit on 02/22/22    Echo    Interpretation Summary  · Concentric remodeling and normal systolic function.  · The estimated ejection fraction is 65%.  · Grade I left ventricular diastolic dysfunction.  · Normal right ventricular size with normal right ventricular systolic function.  · Intermediate central venous pressure (8 mmHg).  · The estimated PA systolic pressure is 24 mmHg.        CURRENT/PREVIOUS VISIT EKG  Results for orders placed or performed during the hospital encounter of 03/02/22   EKG 12-lead    Collection Time: 03/02/22  8:37 AM    Narrative    Test Reason : R10.9,    Vent. Rate : 065 BPM     Atrial Rate : 065 BPM     P-R Int : 134 ms          QRS Dur : 100 ms      QT Int : 434 ms       P-R-T Axes : 016 073 038 degrees     QTc Int : 451 ms    Normal sinus rhythm  Low voltage QRS  Incomplete right bundle branch block  Borderline Abnormal ECG  When compared with ECG of 22-FEB-2022 09:46,  Previous ECG has undetermined rhythm, needs review  T wave inversion no longer evident in Anterior leads  Confirmed by Clive Estrella MD ([id number]) on 3/7/2022 6:12:09 PM    Referred By: [email or web address]   SELF           Confirmed By:Clive Estrella MD     No valid procedures specified.   Results for orders placed during the hospital encounter of 02/22/22    Nuclear Stress - Cardiology Interpreted    Interpretation Summary    Normal myocardial perfusion scan. There is no evidence of myocardial ischemia or infarction.    The EKG portion of this  study is negative for ischemia.    No valid procedures specified.    PHYSICAL EXAM  CONSTITUTIONAL: Well built, well nourished in no apparent distress  NECK: no carotid bruit, no JVD  LUNGS: CTA  CHEST WALL: no tenderness,  HEART: regular rate and rhythm, S1, S2 normal, no murmur, click, rub or gallop   ABDOMEN: soft, non-tender; bowel sounds normal; no masses,  no organomegaly  EXTREMITIES: Extremities normal, no edema, no calf tenderness noted  NEURO: AAO X 3    I HAVE REVIEWED :    The vital signs, nurses notes, and all the pertinent radiology and labs.         Current Outpatient Medications   Medication Instructions    atorvastatin (LIPITOR) 10 MG tablet TAKE 1 TABLET BY MOUTH EVERY DAY IN THE EVENING    azelastine (ASTELIN) 137 mcg (0.1 %) nasal spray INSTILL 2 SPRAYS IN EACH NOSTRIL TWICE A DAY    benazepriL (LOTENSIN) 10 MG tablet TAKE 1 TABLET BY MOUTH EVERY DAY    dicyclomine (BENTYL) 20 mg tablet TAKE 1 TABLET BY MOUTH BEFORE MEALS & AT BEDTIME AS NEEDED    EScitalopram oxalate (LEXAPRO) 20 MG tablet TAKE 1 TABLET BY MOUTH EVERY DAY    mupirocin (BACTROBAN) 2 % ointment Topical (Top), 3 times daily    omeprazole (PRILOSEC) 40 mg, Oral, Before breakfast    ondansetron (ZOFRAN-ODT) 4 mg, Oral, Every 8 hours PRN    prochlorperazine (COMPAZINE) 10 mg, Oral, Every 6 hours PRN    tadalafiL (CIALIS) 20 MG Tab Take 1 tabley by mouth 30 minutes before sexual activity. Do not exceed 1 tablet every 72 hours    tamsulosin (FLOMAX) 0.4 mg Cap TAKE 1 CAPSULE BY MOUTH TWICE A DAY    zaleplon (SONATA) 10 mg, Oral, Nightly          Assessment:   Hypertension  Dyslipidemia  Status post back surgery, cyst removed for compression neurologic symptoms.  Improved  Aortic atherosclerotic disease without aneurysm.  Right bundle-branch block.        Plan:   CONT PRESENT MEDS  6M  RFM        CONTINUE LOTENSIN STATIN AGENT  No follow-ups on file.          Preventive measure Type 2 diabetes mellitus

## 2022-10-19 ENCOUNTER — TELEPHONE (OUTPATIENT)
Dept: CARDIOLOGY | Facility: CLINIC | Age: 72
End: 2022-10-19
Payer: MEDICARE

## 2022-10-26 ENCOUNTER — LAB VISIT (OUTPATIENT)
Dept: LAB | Facility: HOSPITAL | Age: 72
End: 2022-10-26
Attending: FAMILY MEDICINE
Payer: MEDICARE

## 2022-10-26 DIAGNOSIS — I10 PRIMARY HYPERTENSION: Chronic | ICD-10-CM

## 2022-10-26 DIAGNOSIS — E78.5 HYPERLIPIDEMIA, UNSPECIFIED HYPERLIPIDEMIA TYPE: Chronic | ICD-10-CM

## 2022-10-26 DIAGNOSIS — Z12.5 SPECIAL SCREENING EXAMINATION FOR NEOPLASM OF PROSTATE: ICD-10-CM

## 2022-10-26 LAB
ALBUMIN SERPL BCP-MCNC: 3.8 G/DL (ref 3.5–5.2)
ALP SERPL-CCNC: 69 U/L (ref 55–135)
ALT SERPL W/O P-5'-P-CCNC: 7 U/L (ref 10–44)
ANION GAP SERPL CALC-SCNC: 11 MMOL/L (ref 8–16)
AST SERPL-CCNC: 17 U/L (ref 10–40)
BASOPHILS # BLD AUTO: 0.07 K/UL (ref 0–0.2)
BASOPHILS NFR BLD: 1.4 % (ref 0–1.9)
BILIRUB SERPL-MCNC: 0.7 MG/DL (ref 0.1–1)
BUN SERPL-MCNC: 16 MG/DL (ref 8–23)
CALCIUM SERPL-MCNC: 9 MG/DL (ref 8.7–10.5)
CHLORIDE SERPL-SCNC: 105 MMOL/L (ref 95–110)
CHOLEST SERPL-MCNC: 142 MG/DL (ref 120–199)
CHOLEST/HDLC SERPL: 2.3 {RATIO} (ref 2–5)
CO2 SERPL-SCNC: 26 MMOL/L (ref 23–29)
COMPLEXED PSA SERPL-MCNC: 2.9 NG/ML (ref 0–4)
CREAT SERPL-MCNC: 1 MG/DL (ref 0.5–1.4)
DIFFERENTIAL METHOD: ABNORMAL
EOSINOPHIL # BLD AUTO: 0.2 K/UL (ref 0–0.5)
EOSINOPHIL NFR BLD: 3.6 % (ref 0–8)
ERYTHROCYTE [DISTWIDTH] IN BLOOD BY AUTOMATED COUNT: 14.4 % (ref 11.5–14.5)
EST. GFR  (NO RACE VARIABLE): >60 ML/MIN/1.73 M^2
GLUCOSE SERPL-MCNC: 99 MG/DL (ref 70–110)
HCT VFR BLD AUTO: 45.5 % (ref 40–54)
HDLC SERPL-MCNC: 61 MG/DL (ref 40–75)
HDLC SERPL: 43 % (ref 20–50)
HGB BLD-MCNC: 14.5 G/DL (ref 14–18)
IMM GRANULOCYTES # BLD AUTO: 0 K/UL (ref 0–0.04)
IMM GRANULOCYTES NFR BLD AUTO: 0 % (ref 0–0.5)
LDLC SERPL CALC-MCNC: 67.4 MG/DL (ref 63–159)
LYMPHOCYTES # BLD AUTO: 1.3 K/UL (ref 1–4.8)
LYMPHOCYTES NFR BLD: 26 % (ref 18–48)
MCH RBC QN AUTO: 28.7 PG (ref 27–31)
MCHC RBC AUTO-ENTMCNC: 31.9 G/DL (ref 32–36)
MCV RBC AUTO: 90 FL (ref 82–98)
MONOCYTES # BLD AUTO: 0.4 K/UL (ref 0.3–1)
MONOCYTES NFR BLD: 8.3 % (ref 4–15)
NEUTROPHILS # BLD AUTO: 3.1 K/UL (ref 1.8–7.7)
NEUTROPHILS NFR BLD: 60.7 % (ref 38–73)
NONHDLC SERPL-MCNC: 81 MG/DL
NRBC BLD-RTO: 0 /100 WBC
PLATELET # BLD AUTO: 207 K/UL (ref 150–450)
PMV BLD AUTO: 12.8 FL (ref 9.2–12.9)
POTASSIUM SERPL-SCNC: 4.2 MMOL/L (ref 3.5–5.1)
PROT SERPL-MCNC: 6.7 G/DL (ref 6–8.4)
RBC # BLD AUTO: 5.06 M/UL (ref 4.6–6.2)
SODIUM SERPL-SCNC: 142 MMOL/L (ref 136–145)
TRIGL SERPL-MCNC: 68 MG/DL (ref 30–150)
WBC # BLD AUTO: 5.03 K/UL (ref 3.9–12.7)

## 2022-10-26 PROCEDURE — 80061 LIPID PANEL: CPT | Performed by: FAMILY MEDICINE

## 2022-10-26 PROCEDURE — 80053 COMPREHEN METABOLIC PANEL: CPT | Performed by: FAMILY MEDICINE

## 2022-10-26 PROCEDURE — 85025 COMPLETE CBC W/AUTO DIFF WBC: CPT | Performed by: FAMILY MEDICINE

## 2022-10-26 PROCEDURE — 84153 ASSAY OF PSA TOTAL: CPT | Performed by: FAMILY MEDICINE

## 2022-10-26 PROCEDURE — 36415 COLL VENOUS BLD VENIPUNCTURE: CPT | Mod: PN | Performed by: FAMILY MEDICINE

## 2022-10-28 ENCOUNTER — OFFICE VISIT (OUTPATIENT)
Dept: OPTOMETRY | Facility: CLINIC | Age: 72
End: 2022-10-28
Payer: MEDICARE

## 2022-10-28 DIAGNOSIS — H25.13 NUCLEAR SCLEROSIS OF BOTH EYES: Primary | ICD-10-CM

## 2022-10-28 DIAGNOSIS — H40.013 OPEN ANGLE WITH BORDERLINE FINDINGS AND LOW GLAUCOMA RISK IN BOTH EYES: ICD-10-CM

## 2022-10-28 DIAGNOSIS — H52.7 REFRACTIVE ERROR: ICD-10-CM

## 2022-10-28 PROCEDURE — 92004 COMPRE OPH EXAM NEW PT 1/>: CPT | Mod: S$PBB,,, | Performed by: OPTOMETRIST

## 2022-10-28 PROCEDURE — 99213 OFFICE O/P EST LOW 20 MIN: CPT | Mod: PBBFAC,PO | Performed by: OPTOMETRIST

## 2022-10-28 PROCEDURE — 92133 CPTRZD OPH DX IMG PST SGM ON: CPT | Mod: PBBFAC,PO | Performed by: OPTOMETRIST

## 2022-10-28 PROCEDURE — 92015 PR REFRACTION: ICD-10-PCS | Mod: ,,, | Performed by: OPTOMETRIST

## 2022-10-28 PROCEDURE — 92015 DETERMINE REFRACTIVE STATE: CPT | Mod: ,,, | Performed by: OPTOMETRIST

## 2022-10-28 PROCEDURE — 92133 POSTERIOR SEGMENT OCT OPTIC NERVE(OCULAR COHERENCE TOMOGRAPHY) - OU - BOTH EYES: ICD-10-PCS | Mod: 26,S$PBB,, | Performed by: OPTOMETRIST

## 2022-10-28 PROCEDURE — 99999 PR PBB SHADOW E&M-EST. PATIENT-LVL III: ICD-10-PCS | Mod: PBBFAC,,, | Performed by: OPTOMETRIST

## 2022-10-28 PROCEDURE — 99999 PR PBB SHADOW E&M-EST. PATIENT-LVL III: CPT | Mod: PBBFAC,,, | Performed by: OPTOMETRIST

## 2022-10-28 PROCEDURE — 92004 PR EYE EXAM, NEW PATIENT,COMPREHESV: ICD-10-PCS | Mod: S$PBB,,, | Performed by: OPTOMETRIST

## 2022-10-28 NOTE — PROGRESS NOTES
HPI     Glaucoma Suspect     Additional comments: Per pt, has [facility] history of glaucoma           Comments    Dle- 09/18/2019    Pt here for routine eye exam. Pt sts changes to near va. Floaters,   occasional. Denies flashes/eye pain. Excess tearing OU, constant. Dryness,   occasional. Gtts: otc AT's prn.          Last edited by Pb Aquino, OD on 10/28/2022 11:28 AM.            Assessment /Plan     For exam results, see Encounter Report.    Nuclear sclerosis of both eyes    Open angle with borderline findings and low glaucoma risk in both eyes    Refractive error      Educated pt on presence of cataracts and effects on vision. No surgery at this time. Recheck in one year.   2. Low risk based on [facility] hist and cd ratio, iop normal and oct stable from 2019, OD>OS, RTC yearly with HVf(24-2)justin afst, OCT of rnfl, pachy and gonio.  3. Spectacle Rx given, discussed different options for glasses. RTC 1 year routine eye exam.

## 2022-11-02 ENCOUNTER — OFFICE VISIT (OUTPATIENT)
Dept: PRIMARY CARE CLINIC | Facility: CLINIC | Age: 72
End: 2022-11-02
Payer: MEDICARE

## 2022-11-02 VITALS
HEIGHT: 68 IN | RESPIRATION RATE: 20 BRPM | HEART RATE: 73 BPM | OXYGEN SATURATION: 97 % | BODY MASS INDEX: 26.76 KG/M2 | WEIGHT: 176.56 LBS

## 2022-11-02 DIAGNOSIS — I10 PRIMARY HYPERTENSION: Primary | Chronic | ICD-10-CM

## 2022-11-02 DIAGNOSIS — G47.00 INSOMNIA, UNSPECIFIED TYPE: ICD-10-CM

## 2022-11-02 DIAGNOSIS — I25.10 CORONARY ARTERY CALCIFICATION: Chronic | ICD-10-CM

## 2022-11-02 DIAGNOSIS — E78.5 HYPERLIPIDEMIA, UNSPECIFIED HYPERLIPIDEMIA TYPE: Chronic | ICD-10-CM

## 2022-11-02 DIAGNOSIS — F41.9 ANXIETY: Chronic | ICD-10-CM

## 2022-11-02 DIAGNOSIS — I25.84 CORONARY ARTERY CALCIFICATION: Chronic | ICD-10-CM

## 2022-11-02 PROCEDURE — 99999 PR PBB SHADOW E&M-EST. PATIENT-LVL III: CPT | Mod: PBBFAC,,, | Performed by: FAMILY MEDICINE

## 2022-11-02 PROCEDURE — 99999 PR PBB SHADOW E&M-EST. PATIENT-LVL III: ICD-10-PCS | Mod: PBBFAC,,, | Performed by: FAMILY MEDICINE

## 2022-11-02 PROCEDURE — 99214 PR OFFICE/OUTPT VISIT, EST, LEVL IV, 30-39 MIN: ICD-10-PCS | Mod: S$PBB,,, | Performed by: FAMILY MEDICINE

## 2022-11-02 PROCEDURE — 99213 OFFICE O/P EST LOW 20 MIN: CPT | Mod: PBBFAC,PN | Performed by: FAMILY MEDICINE

## 2022-11-02 PROCEDURE — 99214 OFFICE O/P EST MOD 30 MIN: CPT | Mod: S$PBB,,, | Performed by: FAMILY MEDICINE

## 2022-11-02 RX ORDER — ATORVASTATIN CALCIUM 10 MG/1
10 TABLET, FILM COATED ORAL NIGHTLY
Qty: 90 TABLET | Refills: 1 | Status: SHIPPED | OUTPATIENT
Start: 2022-11-02 | End: 2023-07-04 | Stop reason: SDUPTHER

## 2022-11-02 NOTE — PROGRESS NOTES
THIS DOCUMENT WAS MADE IN PART WITH VOICE RECOGNITION SOFTWARE.  OCCASIONALLY THIS SOFTWARE WILL MISINTERPRET WORDS OR PHRASES.      Primary Care Provider Appointment   [facility] ([facility] [place]  [address] [facility] [place] [address]   Ph: [phone]  Fax: [phone]      Patient ID: Vince Chadwick III is a 72 y.o. male.    ASSESSMENT/PLAN by Problem List:  Problem List Items Addressed This Visit       Hypertension - Primary (Chronic)     Chronic stable satisfactory control, continue current medications along with healthy lifestyle         Coronary artery calcification (Chronic)     Remains asymptomatic.  Continue atorvastatin and adequate blood pressure control.  Report any changes.         Hyperlipidemia (Chronic)     Lipids are satisfactory, at target, continue atorvastatin 10 mg         Anxiety (Chronic)     He appears to be doing well on Lexapro 20 mg.  There is ongoing stress so continue and monitor and report any worsening or changes         Insomnia (Chronic)     He continues to do well on sonata although quantity is being limited by insurance which I think is a good opportunity to advise him to try to cut back and not use it every night.  He is agreeable to do so.            Follow Up:  4 months      Health Maintenance         Date Due Completion Date    Aspirin/Antiplatelet Therapy Never done ---    Shingles Vaccine (1 of 2) Never done ---    COVID-19 Vaccine (4 - Booster for Pfizer series) 02/01/2022 12/7/2021    Lipid Panel 10/26/2023 10/26/2022    High Dose Statin 10/28/2023 10/28/2022    TETANUS VACCINE 09/22/2025 9/22/2015    Colorectal Cancer Screening 05/13/2027 5/13/2020    Override on 6/2/2011: Done (legay documents, repeat in 8 years)              Subjective:     Chief Complaint   Patient presents with    Follow-up     I have reviewed the information entered by the ancillary staff regarding the chief complaint as well as the related  "history.    HPI    Patient is a/an 72 y.o.  male   RISK of ADMIT/ED: 9    Follow-up several topics, see above for details addressed    For complete problem list, past medical history, surgical history, social history, etc., see appropriate section in the electronic medical record    Review of Systems   HENT: Negative.     Respiratory: Negative.     Cardiovascular: Negative.    Gastrointestinal: Negative.    Genitourinary: Negative.    Musculoskeletal:  Positive for back pain.   Psychiatric/Behavioral:  Positive for sleep disturbance. Negative for suicidal ideas. The patient is nervous/anxious.      Objective     Physical Exam  Constitutional:       General: He is not in acute distress.     Appearance: He is well-developed. He is not diaphoretic.   HENT:      Head: Normocephalic and atraumatic.   Eyes:      General: No scleral icterus.     Conjunctiva/sclera: Conjunctivae normal.   Pulmonary:      Effort: Pulmonary effort is normal. No respiratory distress.   Neurological:      General: No focal deficit present.      Mental Status: He is alert and oriented to person, place, and time.      Coordination: Coordination normal.   Psychiatric:         Behavior: Behavior normal.     Vitals:    11/02/22 1112   Pulse: 73   Resp: 20   SpO2: 97%   Weight: 80.1 kg (176 lb 9.4 oz)   Height: 5' 8" (1.727 m)       RECENT LABS:    Lab Results   Component Value Date    WBC 5.03 10/26/2022    HGB 14.5 10/26/2022    HCT 45.5 10/26/2022     10/26/2022    CHOL 142 10/26/2022    TRIG 68 10/26/2022    HDL 61 10/26/2022    ALT 7 (L) 10/26/2022    AST 17 10/26/2022     10/26/2022    K 4.2 10/26/2022     10/26/2022    CREATININE 1.0 10/26/2022    BUN 16 10/26/2022    CO2 26 10/26/2022    TSH 2.797 08/20/2019    PSA 2.9 10/26/2022    INR 0.9 01/19/2022       Results for orders placed or performed in visit on 10/26/22   Comprehensive Metabolic Panel   Result Value Ref Range    Sodium 142 136 - 145 mmol/L    Potassium 4.2 3.5 " - 5.1 mmol/L    Chloride 105 95 - 110 mmol/L    CO2 26 23 - 29 mmol/L    Glucose 99 70 - 110 mg/dL    BUN 16 8 - 23 mg/dL    Creatinine 1.0 0.5 - 1.4 mg/dL    Calcium 9.0 8.7 - 10.5 mg/dL    Total Protein 6.7 6.0 - 8.4 g/dL    Albumin 3.8 3.5 - 5.2 g/dL    Total Bilirubin 0.7 0.1 - 1.0 mg/dL    Alkaline Phosphatase 69 55 - 135 U/L    AST 17 10 - 40 U/L    ALT 7 (L) 10 - 44 U/L    Anion Gap 11 8 - 16 mmol/L    eGFR >60.0 >60 mL/min/1.73 m^2   Lipid Panel   Result Value Ref Range    Cholesterol 142 120 - 199 mg/dL    Triglycerides 68 30 - 150 mg/dL    HDL 61 40 - 75 mg/dL    LDL Cholesterol 67.4 63.0 - 159.0 mg/dL    HDL/Cholesterol Ratio 43.0 20.0 - 50.0 %    Total Cholesterol/HDL Ratio 2.3 2.0 - 5.0    Non-HDL Cholesterol 81 mg/dL   CBC Auto Differential   Result Value Ref Range    WBC 5.03 3.90 - 12.70 K/uL    RBC 5.06 4.60 - 6.20 M/uL    Hemoglobin 14.5 14.0 - 18.0 g/dL    Hematocrit 45.5 40.0 - 54.0 %    MCV 90 82 - 98 fL    MCH 28.7 27.0 - 31.0 pg    MCHC 31.9 (L) 32.0 - 36.0 g/dL    RDW 14.4 11.5 - 14.5 %    Platelets 207 150 - 450 K/uL    MPV 12.8 9.2 - 12.9 fL    Immature Granulocytes 0.0 0.0 - 0.5 %    Gran # (ANC) 3.1 1.8 - 7.7 K/uL    Immature Grans (Abs) 0.00 0.00 - 0.04 K/uL    Lymph # 1.3 1.0 - 4.8 K/uL    Mono # 0.4 0.3 - 1.0 K/uL    Eos # 0.2 0.0 - 0.5 K/uL    Baso # 0.07 0.00 - 0.20 K/uL    nRBC 0 0 /100 WBC    Gran % 60.7 38.0 - 73.0 %    Lymph % 26.0 18.0 - 48.0 %    Mono % 8.3 4.0 - 15.0 %    Eosinophil % 3.6 0.0 - 8.0 %    Basophil % 1.4 0.0 - 1.9 %    Differential Method Automated    PSA, Screening   Result Value Ref Range    PSA, Screen 2.9 0.00 - 4.00 ng/mL

## 2022-11-02 NOTE — ASSESSMENT & PLAN NOTE
He continues to do well on sonata although quantity is being limited by insurance which I think is a good opportunity to advise him to try to cut back and not use it every night.  He is agreeable to do so.

## 2022-11-02 NOTE — ASSESSMENT & PLAN NOTE
Remains asymptomatic.  Continue atorvastatin and adequate blood pressure control.  Report any changes.

## 2022-11-02 NOTE — ASSESSMENT & PLAN NOTE
He appears to be doing well on Lexapro 20 mg.  There is ongoing stress so continue and monitor and report any worsening or changes

## 2022-11-28 ENCOUNTER — TELEPHONE (OUTPATIENT)
Dept: CARDIOLOGY | Facility: CLINIC | Age: 72
End: 2022-11-28
Payer: MEDICARE

## 2023-01-17 ENCOUNTER — TELEPHONE (OUTPATIENT)
Dept: CARDIOLOGY | Facility: CLINIC | Age: 73
End: 2023-01-17
Payer: MEDICARE

## 2023-01-25 ENCOUNTER — OFFICE VISIT (OUTPATIENT)
Dept: PAIN MEDICINE | Facility: CLINIC | Age: 73
End: 2023-01-25
Payer: MEDICARE

## 2023-01-25 ENCOUNTER — LAB VISIT (OUTPATIENT)
Dept: LAB | Facility: HOSPITAL | Age: 73
End: 2023-01-25
Attending: PHYSICIAN ASSISTANT
Payer: MEDICARE

## 2023-01-25 VITALS
BODY MASS INDEX: 25.84 KG/M2 | HEIGHT: 68 IN | SYSTOLIC BLOOD PRESSURE: 115 MMHG | DIASTOLIC BLOOD PRESSURE: 65 MMHG | WEIGHT: 170.5 LBS | HEART RATE: 64 BPM

## 2023-01-25 DIAGNOSIS — Z00.00 ROUTINE ADULT HEALTH MAINTENANCE: ICD-10-CM

## 2023-01-25 DIAGNOSIS — M51.36 DDD (DEGENERATIVE DISC DISEASE), LUMBAR: ICD-10-CM

## 2023-01-25 DIAGNOSIS — M54.16 LUMBAR RADICULOPATHY: Primary | ICD-10-CM

## 2023-01-25 LAB
CREAT SERPL-MCNC: 1 MG/DL (ref 0.5–1.4)
EST. GFR  (NO RACE VARIABLE): >60 ML/MIN/1.73 M^2

## 2023-01-25 PROCEDURE — 99213 PR OFFICE/OUTPT VISIT, EST, LEVL III, 20-29 MIN: ICD-10-PCS | Mod: S$PBB,,, | Performed by: PHYSICIAN ASSISTANT

## 2023-01-25 PROCEDURE — 99213 OFFICE O/P EST LOW 20 MIN: CPT | Mod: S$PBB,,, | Performed by: PHYSICIAN ASSISTANT

## 2023-01-25 PROCEDURE — 99999 PR PBB SHADOW E&M-EST. PATIENT-LVL III: ICD-10-PCS | Mod: PBBFAC,,, | Performed by: PHYSICIAN ASSISTANT

## 2023-01-25 PROCEDURE — 36415 COLL VENOUS BLD VENIPUNCTURE: CPT | Mod: PO | Performed by: PHYSICIAN ASSISTANT

## 2023-01-25 PROCEDURE — 82565 ASSAY OF CREATININE: CPT | Performed by: PHYSICIAN ASSISTANT

## 2023-01-25 PROCEDURE — 99213 OFFICE O/P EST LOW 20 MIN: CPT | Mod: PBBFAC,PN | Performed by: PHYSICIAN ASSISTANT

## 2023-01-25 PROCEDURE — 99999 PR PBB SHADOW E&M-EST. PATIENT-LVL III: CPT | Mod: PBBFAC,,, | Performed by: PHYSICIAN ASSISTANT

## 2023-01-30 ENCOUNTER — HOSPITAL ENCOUNTER (OUTPATIENT)
Dept: RADIOLOGY | Facility: HOSPITAL | Age: 73
Discharge: HOME | End: 2023-01-30
Attending: PHYSICIAN ASSISTANT
Payer: MEDICARE

## 2023-01-30 DIAGNOSIS — M51.36 DDD (DEGENERATIVE DISC DISEASE), LUMBAR: ICD-10-CM

## 2023-01-30 PROCEDURE — 72158 MRI LUMBAR SPINE W/O & W/DYE: CPT | Mod: TC,PO

## 2023-01-30 PROCEDURE — 72158 MRI LUMBAR SPINE W/O & W/DYE: CPT | Mod: 26,,, | Performed by: RADIOLOGY

## 2023-01-30 PROCEDURE — 72158 MRI LUMBAR SPINE W WO CONTRAST: ICD-10-PCS | Mod: 26,,, | Performed by: RADIOLOGY

## 2023-01-30 PROCEDURE — 25500020 PHARM REV CODE 255: Mod: PO | Performed by: PHYSICIAN ASSISTANT

## 2023-01-30 PROCEDURE — A9585 GADOBUTROL INJECTION: HCPCS | Mod: PO | Performed by: PHYSICIAN ASSISTANT

## 2023-01-30 RX ORDER — GADOBUTROL 604.72 MG/ML
7 INJECTION INTRAVENOUS
Status: COMPLETED | OUTPATIENT
Start: 2023-01-30 | End: 2023-01-30

## 2023-01-30 RX ADMIN — GADOBUTROL 7 ML: 604.72 INJECTION INTRAVENOUS at 12:01

## 2023-02-01 ENCOUNTER — PATIENT MESSAGE (OUTPATIENT)
Dept: PAIN MEDICINE | Facility: CLINIC | Age: 73
End: 2023-02-01
Payer: MEDICARE

## 2023-02-01 DIAGNOSIS — M54.16 LUMBAR RADICULOPATHY: Primary | ICD-10-CM

## 2023-02-01 RX ORDER — SODIUM CHLORIDE, SODIUM LACTATE, POTASSIUM CHLORIDE, CALCIUM CHLORIDE 600; 310; 30; 20 MG/100ML; MG/100ML; MG/100ML; MG/100ML
INJECTION, SOLUTION INTRAVENOUS CONTINUOUS
Status: CANCELLED | OUTPATIENT
Start: 2023-02-01

## 2023-02-01 RX ORDER — ALPRAZOLAM 0.5 MG/1
1 TABLET, ORALLY DISINTEGRATING ORAL ONCE AS NEEDED
Status: CANCELLED | OUTPATIENT
Start: 2023-02-01 | End: 2034-06-30

## 2023-02-01 NOTE — PROGRESS NOTES
This note was completed with dictation software and grammatical errors may exist.    CC:  Bilateral buttock and leg pain    HPI:  The patient is a 72-year-old man with a history of hypertension, hyperlipidemia who presents in referral from Dr. Burleson for recurrent bilateral leg pain.  He returns in follow-up today with right leg pain.  It has been just over a year since his last visit.  He underwent L4-L5 bilateral laminectomies and medial facetectomies and resection of right synovial cyst via a right-sided minimally invasive approach with Dr Lobato on 01/26/2022 with almost complete relief of his symptoms.  He states that about a month ago he developed right anterolateral thigh pain that radiates into his right knee.  This initially started with numbness in his right knee before the pain started.  He describes it as shooting, hot and sharp.  The pain is worse with walking and improved with sitting.  He denies having weakness or incontinence.    Pain intervention history: He is status post L3/4 interlaminar epidural steroid injection on 6/7/16 with about 40% relief.  He is status post L3/4 interlaminar epidural steroid injection on 7/14/16 with no additional relief.  He is status post left L3 and L4 transforaminal epidural steroid injection on 9/7/16 with 90% relief. He is status post L5/S1 interlaminar epidural steroid injection on 11/15/2021 with 50% relief. He is status post L5/S1 interlaminar epidural steroid injection on 12/13/2021 with relief of his left leg pain but not relief of his right leg pain.      Spine surgeries: 1/26/22 L4-L5 bilateral laminectomies and medial facetectomies and resection of right synovial cyst via a right-sided minimally invasive approach with Dr Lobato    Antineuropathics: gabapentin 300 mg twice daily  NSAIDs: celebrex  Physical therapy:  He has done 6 weeks of physical therapy at [facility] prior to surgery  Antidepressants: Lexapro   Muscle relaxers: Zanaflex 2  mg  Opioids:  Antiplatelets/Anticoagulants:    ROS:  He reports back pain only.  Balance of review of systems is negative.    No results found for: LABA1C, HGBA1C    Lab Results   Component Value Date    WBC 5.03 10/26/2022    HGB 14.5 10/26/2022    HCT 45.5 10/26/2022    MCV 90 10/26/2022     10/26/2022             Past Medical History:   Diagnosis Date    ALLERGIC RHINITIS     Arthritis     Cataract     OU    Colon polyp     Diverticulitis     Diverticulosis     Hyperlipidemia     resolved    Hypertension     Irritable bowel syndrome     Lumbar disc disease        Past Surgical History:   Procedure Laterality Date    APPENDECTOMY      CIRCUMCISION      COLONOSCOPY  06/02/2011    KARLA.    One 1 mm polyp in the sigmoid colon.  FRAGMENT OF NONNEOPLASTIC COLONIC MUCOSA.  Sigmoid diverticulosis.  Spasms c/w IBS.  repeat in 7-8 years    COLONOSCOPY  09/12/2006    [place]    COLONOSCOPY N/A 5/13/2020    Procedure: COLONOSCOPY;  Surgeon: Davy Novak Jr., MD;  Location: [facility] ENDO;  Service: Endoscopy;  Laterality: N/A;    EPIDURAL STEROID INJECTION INTO LUMBAR SPINE N/A 11/15/2021    Procedure: Injection-steroid-epidural-lumbar L5/S1;  Surgeon: Khang Sanchez MD;  Location: [facility] OR;  Service: Pain Management;  Laterality: N/A;    EPIDURAL STEROID INJECTION INTO LUMBAR SPINE N/A 12/13/2021    Procedure: Injection-steroid-epidural-lumbar L5/S1;  Surgeon: Khang Sanchez MD;  Location: [facility] OR;  Service: Pain Management;  Laterality: N/A;    ESOPHAGOGASTRODUODENOSCOPY N/A 5/13/2020    Procedure: EGD (ESOPHAGOGASTRODUODENOSCOPY);  Surgeon: Davy Novak Jr., MD;  Location: [facility] ENDO;  Service: Endoscopy;  Laterality: N/A;    EYE SURGERY      FOOT SURGERY      x 2    LAMINECTOMY USING MINIMALLY INVASIVE TECHNIQUE N/A 1/26/2022    Procedure: LAMINECTOMY, SPINE, MINIMALLY INVASIVE  RIGHT MIS APPROACH RESECTION L4-5 SYNOVIAL CYST AND BILATERAL LAMINECTOMY/MEDIAL FACETECTOMY;  Surgeon: Penny OVERTON  "MD Cheryle;  Location: [facility];  Service: Neurosurgery;  Laterality: N/A;    LUMBAR EPIDURAL INJECTION      PILONIDAL CYST DRAINAGE      s/p chalazion removal      OD    TONSILLECTOMY         Social History     Socioeconomic History    Marital status:    Occupational History    Occupation: retired [employer]   Tobacco Use    Smoking status: Never     Passive exposure: Never    Smokeless tobacco: Never   Substance and Sexual Activity    Alcohol use: No     Alcohol/week: 0.0 standard drinks    Drug use: No    Sexual activity: Yes     Social Determinants of Health     Financial Resource Strain: Low Risk     Difficulty of Paying Living Expenses: Not very hard   Food Insecurity: No Food Insecurity    Worried About Running Out of Food in the Last Year: Never true    Ran Out of Food in the Last Year: Never true   Transportation Needs: No Transportation Needs    Lack of Transportation (Medical): No    Lack of Transportation (Non-Medical): No   Physical Activity: Inactive    Days of Exercise per Week: 0 days    Minutes of Exercise per Session: 0 min   Stress: No Stress Concern Present    Feeling of Stress : Only a little   Social Connections: Unknown    Frequency of Communication with Friends and Family: More than three times a week    Frequency of Social Gatherings with Friends and Family: Once a week    Active Member of Clubs or Organizations: No    Attends Club or Organization Meetings: Never    Marital Status:    Housing Stability: Low Risk     Unable to Pay for Housing in the Last Year: No    Number of Places Lived in the Last Year: 1    Unstable Housing in the Last Year: No         Medications/Allergies: See med card    Vitals:    01/25/23 0841   BP: 115/65   Pulse: 64   Weight: 77.3 kg (170 lb 8.4 oz)   Height: 5' 8" (1.727 m)   PainSc:   9   PainLoc: Leg         Physical exam:  Gen: A and O x3, pleasant, well-groomed  Skin: No rashes or obvious lesions  HEENT: PERRLA, no obvious deformities on " ears or in canals. Trachea midline.  CVS: Regular rate and rhythm, normal palpable pulses.  Resp:No increased work of breathing, symmetrical chest rise.  Abdomen: Soft, NT/ND.  Musculoskeletal:  Walks with a forward leaning gait    Neuro:  Lower extremities: 5/5 strength bilaterally  Reflexes: Patellar 1+, Achilles 0+ bilaterally.  Sensory:  Intact and symmetrical to light touch and pinprick in L2-S1 dermatomes bilaterally.    Lumbar spine:  Lumbar spine:  Range of motion is mildly reduced with flexion, moderately reduced with extension with increased right thigh pain with extension.  Manuel's test causes no increased pain on either side.    Supine straight leg raise causes increased right leg pain.  Internal and external rotation of the hip causes no increased pain on either side.  Myofascial exam: No tenderness to palpation across lumbar paraspinous muscles.    Imaging:  10/12/21 Xray L-spine:  There is a levoscoliotic curvature of the thoracolumbar spine, apex at L1-L2.  There is multilevel degenerative change of the thoracolumbar spine in the form of marginal osteophyte formation and disc space narrowing.  Disc space narrowing and degenerative endplate change appear most pronounced L2-L3 and L3-L4.  There is a minimal grade I retrolisthesis of L2 on L3.  No pars defects.  No acute lumbar compression fracture noted radiographically.  The SI joints are unremarkable.  The incidentally observed soft tissues of the abdomen and pelvis are unremarkable.    5/10/16 MRI L-spine:  1.  Mild lumbar scoliosis.  2.  L1-2 minimal disc bulge.  3.  L2-3 moderate spinal stenosis from a combination of disc bulge and degenerative facet arthrosis.  There is moderate bilateral foraminal stenosis.  4.  L3-4 moderate spinal stenosis from a combination of disc bulge and degenerative facet arthrosis.  There is moderate right and severe left foraminal stenosis.  5.  L4-5 moderate spinal stenosis from a combination of disc bulge and  degenerative facet arthrosis.  There is moderate right and mild left foraminal stenosis.  6.  L5-S1 mild degenerative facet arthrosis.    10/30/2021 MRI lumbar spine  On the nonstandard  image there is a 12 degree levo rotoscoliotic curvature centered in the upper lumbar region.  There is diffuse disc desiccation with disc space loss most prominent at L2/L3 through L3/L4.  The conus terminates at L1 and has an unremarkable appearance.  Mixed but primarily fatty degenerative endplate changes are noted at L3/L4.  Schmorl's node formation involves the inferior endplate of L2 and there is adjacent edematous endplate reaction.  There is no evidence of abnormal fluid signal within the disc spaces.  The conus terminates at L1.  The lumbar vertebrae otherwise display normal signal, morphology and alignment.  The individual disc levels appears follows:     T10/T11: This area was not previously imaged but there does appear to be at least mild canal stenosis related to facet and ligamentous hypertrophy posteriorly.     T11/T12 and T12/L1: There is no evidence of disc protrusion, canal or foraminal stenosis.     L1/L2: Annular disc bulge combines with degenerative facet changes to produce mild canal and bilateral inferior foraminal narrowing.  The changes are worse on the right foramen related to a superimposed right posterolateral disc protrusion and narrowing of the foramen due to scoliosis.     L2/L3: Prominent annular disc bulge and osteophyte formation combined with canal ligamentous hypertrophy to produce prominent right and moderate left-sided foraminal narrowing.  Right posterolateral superimposed protrusion and a left posterolateral superimposed protrusion are suggested.  There is moderate central canal stenosis.  Degenerative facet changes are noted bilaterally.     L3/L4: Annular disc bulging is evident with a superimposed left posterolateral and lateral disc extrusion osteophyte formation producing left greater  than right foraminal narrowing.  There is mild central canal stenosis.     L4/L5: Annular disc bulge and osteophyte formation combine with a broad-based central disc extrusion and a large new right posterolateral canal presumed synovial cyst which measures 13 by 11 by 15 mm.  This produces in combination with the degenerative disc disease prominent canal stenosis.  The left to right posterior canal measures only 5 mm.  There is prominent left and prominent right foraminal narrowing due to disc bulging and facet disease.  The presumed synovial cyst was not seen previously.     L5/S1: Degenerative facet changes are noted bilaterally and there is annular disc bulging.     This report was flagged in Epic as abnormal.      Assessment:  The patient is a 72-year-old man with a history of hypertension, hyperlipidemia who presents in referral from Dr. Burleson for recurrent bilateral leg pain.    1. Lumbar radiculopathy        2. DDD (degenerative disc disease), lumbar  MRI Lumbar Spine W WO Cont      3. Routine adult health maintenance  Creatinine, serum            Plan:  1. The patient had about 1 year of almost complete relief following his lumbar surgery but is now experiencing radicular pain different than his previous pain.  I am going to update his lumbar spine MRI and we will call him with results and recommendations.

## 2023-02-01 NOTE — TELEPHONE ENCOUNTER
Please let the patient know I reviewed his new lumbar spine MRI.  This does show successful resection of his L4/5 facet joint cyst and decompression of the spinal canal at this level.  However, he does have worsening spinal stenosis at L2/3 as well as a worsening right L2/3 facet joint cyst causing lateral recess stenosis and likely his right L3 and L4 symptoms.  I have reviewed his case with Dr. Sanchez.  Please schedule him for a right L2/3 facet joint cyst aspiration.  Two weeks later schedule an L2/3 interlaminar epidural steroid injection to the right.  Let him know that if his pain resolves after the cyst aspiration we do not need to do the steroid injection.  Schedule a follow-up 2-3 weeks after the epidural steroid injection.    Physician - Dr Sanchez    Type of Procedure/Injection - L2/3 Facet Joint Cyst Aspiration       Laterality - Right    Type of Sedation - RNIV    Need to hold medication - no    N/A    Clearance needed - no    2 Weeks later:    Type of Procedure/Injection - Lumbar Epidural  L2/3         Laterality - NA    Type of Sedation - Local    Need to hold medication - no    N/A    Clearance needed - no    Follow up - 2 week

## 2023-02-01 NOTE — H&P (VIEW-ONLY)
This note was completed with dictation software and grammatical errors may exist.    CC:  Bilateral buttock and leg pain    HPI:  The patient is a 72-year-old man with a history of hypertension, hyperlipidemia who presents in referral from Dr. Burleson for recurrent bilateral leg pain.  He returns in follow-up today with right leg pain.  It has been just over a year since his last visit.  He underwent L4-L5 bilateral laminectomies and medial facetectomies and resection of right synovial cyst via a right-sided minimally invasive approach with Dr Lobato on 01/26/2022 with almost complete relief of his symptoms.  He states that about a month ago he developed right anterolateral thigh pain that radiates into his right knee.  This initially started with numbness in his right knee before the pain started.  He describes it as shooting, hot and sharp.  The pain is worse with walking and improved with sitting.  He denies having weakness or incontinence.    Pain intervention history: He is status post L3/4 interlaminar epidural steroid injection on 6/7/16 with about 40% relief.  He is status post L3/4 interlaminar epidural steroid injection on 7/14/16 with no additional relief.  He is status post left L3 and L4 transforaminal epidural steroid injection on 9/7/16 with 90% relief. He is status post L5/S1 interlaminar epidural steroid injection on 11/15/2021 with 50% relief. He is status post L5/S1 interlaminar epidural steroid injection on 12/13/2021 with relief of his left leg pain but not relief of his right leg pain.      Spine surgeries: 1/26/22 L4-L5 bilateral laminectomies and medial facetectomies and resection of right synovial cyst via a right-sided minimally invasive approach with Dr Lobato    Antineuropathics: gabapentin 300 mg twice daily  NSAIDs: celebrex  Physical therapy:  He has done 6 weeks of physical therapy at [facility] prior to surgery  Antidepressants: Lexapro   Muscle relaxers: Zanaflex 2  mg  Opioids:  Antiplatelets/Anticoagulants:    ROS:  He reports back pain only.  Balance of review of systems is negative.    No results found for: LABA1C, HGBA1C    Lab Results   Component Value Date    WBC 5.03 10/26/2022    HGB 14.5 10/26/2022    HCT 45.5 10/26/2022    MCV 90 10/26/2022     10/26/2022             Past Medical History:   Diagnosis Date    ALLERGIC RHINITIS     Arthritis     Cataract     OU    Colon polyp     Diverticulitis     Diverticulosis     Hyperlipidemia     resolved    Hypertension     Irritable bowel syndrome     Lumbar disc disease        Past Surgical History:   Procedure Laterality Date    APPENDECTOMY      CIRCUMCISION      COLONOSCOPY  06/02/2011    KARLA.    One 1 mm polyp in the sigmoid colon.  FRAGMENT OF NONNEOPLASTIC COLONIC MUCOSA.  Sigmoid diverticulosis.  Spasms c/w IBS.  repeat in 7-8 years    COLONOSCOPY  09/12/2006    [place] Novak[place]    COLONOSCOPY N/A 5/13/2020    Procedure: COLONOSCOPY;  Surgeon: Davy Novak Jr., MD;  Location: [facility] ENDO;  Service: Endoscopy;  Laterality: N/A;    EPIDURAL STEROID INJECTION INTO LUMBAR SPINE N/A 11/15/2021    Procedure: Injection-steroid-epidural-lumbar L5/S1;  Surgeon: Khang Sanchez MD;  Location: [facility] OR;  Service: Pain Management;  Laterality: N/A;    EPIDURAL STEROID INJECTION INTO LUMBAR SPINE N/A 12/13/2021    Procedure: Injection-steroid-epidural-lumbar L5/S1;  Surgeon: Khang Sanchez MD;  Location: [facility] OR;  Service: Pain Management;  Laterality: N/A;    ESOPHAGOGASTRODUODENOSCOPY N/A 5/13/2020    Procedure: EGD (ESOPHAGOGASTRODUODENOSCOPY);  Surgeon: Davy Novak Jr., MD;  Location: [facility] ENDO;  Service: Endoscopy;  Laterality: N/A;    EYE SURGERY      FOOT SURGERY      x 2    LAMINECTOMY USING MINIMALLY INVASIVE TECHNIQUE N/A 1/26/2022    Procedure: LAMINECTOMY, SPINE, MINIMALLY INVASIVE  RIGHT MIS APPROACH RESECTION L4-5 SYNOVIAL CYST AND BILATERAL LAMINECTOMY/MEDIAL FACETECTOMY;  Surgeon: Penny OVERTON  "MD Cheryle;  Location: [facility];  Service: Neurosurgery;  Laterality: N/A;    LUMBAR EPIDURAL INJECTION      PILONIDAL CYST DRAINAGE      s/p chalazion removal      OD    TONSILLECTOMY         Social History     Socioeconomic History    Marital status:    Occupational History    Occupation: retired [employer]   Tobacco Use    Smoking status: Never     Passive exposure: Never    Smokeless tobacco: Never   Substance and Sexual Activity    Alcohol use: No     Alcohol/week: 0.0 standard drinks    Drug use: No    Sexual activity: Yes     Social Determinants of Health     Financial Resource Strain: Low Risk     Difficulty of Paying Living Expenses: Not very hard   Food Insecurity: No Food Insecurity    Worried About Running Out of Food in the Last Year: Never true    Ran Out of Food in the Last Year: Never true   Transportation Needs: No Transportation Needs    Lack of Transportation (Medical): No    Lack of Transportation (Non-Medical): No   Physical Activity: Inactive    Days of Exercise per Week: 0 days    Minutes of Exercise per Session: 0 min   Stress: No Stress Concern Present    Feeling of Stress : Only a little   Social Connections: Unknown    Frequency of Communication with Friends and Family: More than three times a week    Frequency of Social Gatherings with Friends and Family: Once a week    Active Member of Clubs or Organizations: No    Attends Club or Organization Meetings: Never    Marital Status:    Housing Stability: Low Risk     Unable to Pay for Housing in the Last Year: No    Number of Places Lived in the Last Year: 1    Unstable Housing in the Last Year: No         Medications/Allergies: See med card    Vitals:    01/25/23 0841   BP: 115/65   Pulse: 64   Weight: 77.3 kg (170 lb 8.4 oz)   Height: 5' 8" (1.727 m)   PainSc:   9   PainLoc: Leg         Physical exam:  Gen: A and O x3, pleasant, well-groomed  Skin: No rashes or obvious lesions  HEENT: PERRLA, no obvious deformities on " ears or in canals. Trachea midline.  CVS: Regular rate and rhythm, normal palpable pulses.  Resp:No increased work of breathing, symmetrical chest rise.  Abdomen: Soft, NT/ND.  Musculoskeletal:  Walks with a forward leaning gait    Neuro:  Lower extremities: 5/5 strength bilaterally  Reflexes: Patellar 1+, Achilles 0+ bilaterally.  Sensory:  Intact and symmetrical to light touch and pinprick in L2-S1 dermatomes bilaterally.    Lumbar spine:  Lumbar spine:  Range of motion is mildly reduced with flexion, moderately reduced with extension with increased right thigh pain with extension.  Manuel's test causes no increased pain on either side.    Supine straight leg raise causes increased right leg pain.  Internal and external rotation of the hip causes no increased pain on either side.  Myofascial exam: No tenderness to palpation across lumbar paraspinous muscles.    Imaging:  10/12/21 Xray L-spine:  There is a levoscoliotic curvature of the thoracolumbar spine, apex at L1-L2.  There is multilevel degenerative change of the thoracolumbar spine in the form of marginal osteophyte formation and disc space narrowing.  Disc space narrowing and degenerative endplate change appear most pronounced L2-L3 and L3-L4.  There is a minimal grade I retrolisthesis of L2 on L3.  No pars defects.  No acute lumbar compression fracture noted radiographically.  The SI joints are unremarkable.  The incidentally observed soft tissues of the abdomen and pelvis are unremarkable.    5/10/16 MRI L-spine:  1.  Mild lumbar scoliosis.  2.  L1-2 minimal disc bulge.  3.  L2-3 moderate spinal stenosis from a combination of disc bulge and degenerative facet arthrosis.  There is moderate bilateral foraminal stenosis.  4.  L3-4 moderate spinal stenosis from a combination of disc bulge and degenerative facet arthrosis.  There is moderate right and severe left foraminal stenosis.  5.  L4-5 moderate spinal stenosis from a combination of disc bulge and  degenerative facet arthrosis.  There is moderate right and mild left foraminal stenosis.  6.  L5-S1 mild degenerative facet arthrosis.    10/30/2021 MRI lumbar spine  On the nonstandard  image there is a 12 degree levo rotoscoliotic curvature centered in the upper lumbar region.  There is diffuse disc desiccation with disc space loss most prominent at L2/L3 through L3/L4.  The conus terminates at L1 and has an unremarkable appearance.  Mixed but primarily fatty degenerative endplate changes are noted at L3/L4.  Schmorl's node formation involves the inferior endplate of L2 and there is adjacent edematous endplate reaction.  There is no evidence of abnormal fluid signal within the disc spaces.  The conus terminates at L1.  The lumbar vertebrae otherwise display normal signal, morphology and alignment.  The individual disc levels appears follows:     T10/T11: This area was not previously imaged but there does appear to be at least mild canal stenosis related to facet and ligamentous hypertrophy posteriorly.     T11/T12 and T12/L1: There is no evidence of disc protrusion, canal or foraminal stenosis.     L1/L2: Annular disc bulge combines with degenerative facet changes to produce mild canal and bilateral inferior foraminal narrowing.  The changes are worse on the right foramen related to a superimposed right posterolateral disc protrusion and narrowing of the foramen due to scoliosis.     L2/L3: Prominent annular disc bulge and osteophyte formation combined with canal ligamentous hypertrophy to produce prominent right and moderate left-sided foraminal narrowing.  Right posterolateral superimposed protrusion and a left posterolateral superimposed protrusion are suggested.  There is moderate central canal stenosis.  Degenerative facet changes are noted bilaterally.     L3/L4: Annular disc bulging is evident with a superimposed left posterolateral and lateral disc extrusion osteophyte formation producing left greater  than right foraminal narrowing.  There is mild central canal stenosis.     L4/L5: Annular disc bulge and osteophyte formation combine with a broad-based central disc extrusion and a large new right posterolateral canal presumed synovial cyst which measures 13 by 11 by 15 mm.  This produces in combination with the degenerative disc disease prominent canal stenosis.  The left to right posterior canal measures only 5 mm.  There is prominent left and prominent right foraminal narrowing due to disc bulging and facet disease.  The presumed synovial cyst was not seen previously.     L5/S1: Degenerative facet changes are noted bilaterally and there is annular disc bulging.     This report was flagged in Epic as abnormal.      Assessment:  The patient is a 72-year-old man with a history of hypertension, hyperlipidemia who presents in referral from Dr. Burleson for recurrent bilateral leg pain.    1. Lumbar radiculopathy        2. DDD (degenerative disc disease), lumbar  MRI Lumbar Spine W WO Cont      3. Routine adult health maintenance  Creatinine, serum            Plan:  1. The patient had about 1 year of almost complete relief following his lumbar surgery but is now experiencing radicular pain different than his previous pain.  I am going to update his lumbar spine MRI and we will call him with results and recommendations.

## 2023-02-01 NOTE — TELEPHONE ENCOUNTER
Spoke with pt and scheduled facet cyst aspiration for 02/17 and SHEILA for 03/10 went over instructions pt will call us if SHEILA is not needed\.

## 2023-02-17 ENCOUNTER — HOSPITAL ENCOUNTER (OUTPATIENT)
Dept: RADIOLOGY | Facility: HOSPITAL | Age: 73
Discharge: HOME | End: 2023-02-17
Attending: ANESTHESIOLOGY | Admitting: ANESTHESIOLOGY
Payer: MEDICARE

## 2023-02-17 ENCOUNTER — HOSPITAL ENCOUNTER (OUTPATIENT)
Facility: HOSPITAL | Age: 73
Discharge: HOME | End: 2023-02-17
Attending: ANESTHESIOLOGY | Admitting: ANESTHESIOLOGY
Payer: MEDICARE

## 2023-02-17 VITALS
RESPIRATION RATE: 16 BRPM | HEART RATE: 60 BPM | OXYGEN SATURATION: 96 % | SYSTOLIC BLOOD PRESSURE: 99 MMHG | HEIGHT: 68 IN | BODY MASS INDEX: 26.52 KG/M2 | TEMPERATURE: 97 F | DIASTOLIC BLOOD PRESSURE: 54 MMHG | WEIGHT: 175 LBS

## 2023-02-17 DIAGNOSIS — M54.16 LUMBAR RADICULOPATHY: ICD-10-CM

## 2023-02-17 DIAGNOSIS — M54.50 LOWER BACK PAIN: ICD-10-CM

## 2023-02-17 DIAGNOSIS — M71.38 SYNOVIAL CYST OF LUMBAR FACET JOINT: Primary | ICD-10-CM

## 2023-02-17 PROCEDURE — 64493 INJ PARAVERT F JNT L/S 1 LEV: CPT | Mod: PO | Performed by: ANESTHESIOLOGY

## 2023-02-17 PROCEDURE — 25500020 PHARM REV CODE 255: Mod: PO | Performed by: ANESTHESIOLOGY

## 2023-02-17 PROCEDURE — 64999 UNLISTED PX NERVOUS SYSTEM: CPT | Performed by: ANESTHESIOLOGY

## 2023-02-17 PROCEDURE — 63600175 PHARM REV CODE 636 W HCPCS: Mod: PO | Performed by: ANESTHESIOLOGY

## 2023-02-17 PROCEDURE — 25000003 PHARM REV CODE 250: Mod: PO | Performed by: ANESTHESIOLOGY

## 2023-02-17 PROCEDURE — 64999 PR ASPRIATION OF SYNOVIAL CYST OF FACET: ICD-10-PCS | Mod: RT,,, | Performed by: ANESTHESIOLOGY

## 2023-02-17 PROCEDURE — 99152 MOD SED SAME PHYS/QHP 5/>YRS: CPT | Mod: ,,, | Performed by: ANESTHESIOLOGY

## 2023-02-17 PROCEDURE — 76000 FLUOROSCOPY <1 HR PHYS/QHP: CPT | Mod: TC,PO

## 2023-02-17 PROCEDURE — 64999 UNLISTED PX NERVOUS SYSTEM: CPT | Mod: RT,,, | Performed by: ANESTHESIOLOGY

## 2023-02-17 PROCEDURE — 99152 PR MOD CONSCIOUS SEDATION, SAME PHYS, 5+ YRS, FIRST 15 MIN: ICD-10-PCS | Mod: ,,, | Performed by: ANESTHESIOLOGY

## 2023-02-17 RX ORDER — LIDOCAINE HYDROCHLORIDE 10 MG/ML
INJECTION INFILTRATION; PERINEURAL
Status: DISCONTINUED | OUTPATIENT
Start: 2023-02-17 | End: 2023-02-17 | Stop reason: HOSPADM

## 2023-02-17 RX ORDER — MIDAZOLAM HYDROCHLORIDE 1 MG/ML
INJECTION INTRAMUSCULAR; INTRAVENOUS
Status: DISCONTINUED | OUTPATIENT
Start: 2023-02-17 | End: 2023-02-17 | Stop reason: HOSPADM

## 2023-02-17 RX ORDER — SODIUM CHLORIDE, SODIUM LACTATE, POTASSIUM CHLORIDE, CALCIUM CHLORIDE 600; 310; 30; 20 MG/100ML; MG/100ML; MG/100ML; MG/100ML
INJECTION, SOLUTION INTRAVENOUS CONTINUOUS
Status: DISCONTINUED | OUTPATIENT
Start: 2023-02-17 | End: 2023-02-17 | Stop reason: HOSPADM

## 2023-02-17 RX ADMIN — SODIUM CHLORIDE, POTASSIUM CHLORIDE, SODIUM LACTATE AND CALCIUM CHLORIDE: 600; 310; 30; 20 INJECTION, SOLUTION INTRAVENOUS at 08:02

## 2023-02-17 NOTE — DISCHARGE INSTRUCTIONS
WOUND CARE    After Surgery    DO:  May shower in 48 hours.  Minimal activity for 48 hours.  May remove outer dressing in 48 hours. If steri-strips are present, leave them in place. If they get wet, pat them dry. Steri-strips will fall off on their own. Do not pull them off.  Advance diet as tolerated.  Resume home medications as prescribed.    DO NOT:  Do not soak in a tub or pool until directed by your physician.  Do not drive for 24 hours or while taking narcotic pain medication.  Do not take additional tylenol/acetaminophen while taking narcotic pain medication that contains tylenol/acetaminophen.     CALL PHYSICIAN FOR:  Redness, swelling or bleeding.  Fever greater than 101.  Drainage from the incision site that appears infected.  Persistent pain not relieved by pain medication.    RETURN APPOINTMENT: Call to schedule appointment in 10-14 days.    FOR EMERGENCIES: Contact your doctor at [phone].

## 2023-02-17 NOTE — DISCHARGE SUMMARY
[facility] - Surgery  Discharge Note  Short Stay    Procedure(s) (LRB):  BLOCK, facet cyst aspiration right side L2/3 (Right)      OUTCOME: Patient tolerated treatment/procedure well without complication and is now ready for discharge.    DISPOSITION: Home or Self Care    FINAL DIAGNOSIS:  Synovial cyst of lumbar facet joint    FOLLOWUP: In clinic    DISCHARGE INSTRUCTIONS:    Discharge Procedure Orders   Diet Adult Regular     No dressing needed     Notify your health care provider if you experience any of the following:  temperature >100.4     Activity as tolerated

## 2023-02-17 NOTE — OP NOTE
PROCEDURE DATE: 2/17/2023    PROCEDURE: Right side L2/3 facet joint aspiration under fluoroscopy.    Diagnosis: Synovial cyst of lumbar facet joint    PHYSICIAN: Khang Sanchez MD      LOCAL ANESTHETIC USED:   Lidocaine 1%, 3 ml per level.    SEDATION MEDICATIONS: 4mg versed, 25mcg fentanyl    ESTIMATED BLOOD LOSS:  none    COMPLICATIONS:  none    TECHNIQUE:   A time-out was taken to identify the patient and procedure side prior to starting the procedure.  Lying in a prone position, the patient was prepped and draped in the usual sterile fashion using ChloraPrep and fenestrated drape.  The levels were determined under fluoroscopic guidance in the AP view and then rotated into the oblique view to visualize the joint space.  Local anesthetic was given by raising a wheal at the skin and then anesthetizing a tract using a 25-gauge, 1.5inch needle.  A 3.5 in 22-gauge needle was introduced into the right L2/3 facet joint.  In a lateral view, the needle was advanced anteriorly while using negative pressure in order to aspirate any joint fluid. Approximately 0.7cc of straw colored fluid was aspirated. The needle was then removed. The patient tolerated the procedure well.    The patient was monitored after the procedure.  Patient was given post procedure and discharge instructions to follow at home. The patient was discharged in a stable condition    Event Time In   In Facility 0824   In Pre-Procedure 0831   Physician Available    Anesthesia Available    Pre-Op: Bedside Procedure Start    Pre-Op: Bedside Procedure Stop    Pre-Procedure Complete 0849   Out of Pre-Procedure    Anesthesia Start    Anesthesia Start Data Collection    Setup Start    Setup Complete    In Room 0907   Prep Start    Procedure Prep Complete    Procedure Start 0917   Procedure Closing    Emergence    Procedure Finish 0925   Sedation Start 0906   Scope In    Extent Reached    Scope Out    Sedation End 0927   Out of Room 0927   Cleanup Start    Cleanup  Complete    Cosmetic Start    Cosmetic Stop    Pain Mgmt In Room    Pain Mgmt Out Room    In Recovery    Anesthesia Finish    Bedside Procedure Start    Bedside Procedure Stop    Recovery Care Complete    Out of Recovery    To Phase II    In Phase II    Pain Mgmt Recovery Start    Pain Mgmt Recovery Stop    Obs Rec Start    Obs Rec Stop    Phase II Care Complete    Out of Phase II    Procedural Care Complete    Discharge    Pain Follow Up Needed    Pain Follow Up Complete      Moderate sedation was achieved with midazolam 4 mg and fentanyl 25 mcg.  Continuous monitoring of EKG, blood pressure and pulse oximetry was provided by a registered nurse during the entire course of the procedure under my supervision and recorded in the patient's medical record.   Total time for sedation was 21 minutes.

## 2023-03-01 ENCOUNTER — OFFICE VISIT (OUTPATIENT)
Dept: PRIMARY CARE CLINIC | Facility: CLINIC | Age: 73
End: 2023-03-01
Payer: MEDICARE

## 2023-03-01 VITALS
HEIGHT: 68 IN | WEIGHT: 179.44 LBS | HEART RATE: 73 BPM | RESPIRATION RATE: 20 BRPM | SYSTOLIC BLOOD PRESSURE: 100 MMHG | DIASTOLIC BLOOD PRESSURE: 66 MMHG | BODY MASS INDEX: 27.19 KG/M2 | OXYGEN SATURATION: 100 %

## 2023-03-01 DIAGNOSIS — I25.84 CORONARY ARTERY CALCIFICATION: Chronic | ICD-10-CM

## 2023-03-01 DIAGNOSIS — E78.5 HYPERLIPIDEMIA, UNSPECIFIED HYPERLIPIDEMIA TYPE: Chronic | ICD-10-CM

## 2023-03-01 DIAGNOSIS — I10 PRIMARY HYPERTENSION: Primary | Chronic | ICD-10-CM

## 2023-03-01 DIAGNOSIS — I25.10 CORONARY ARTERY CALCIFICATION: Chronic | ICD-10-CM

## 2023-03-01 DIAGNOSIS — G47.00 INSOMNIA, UNSPECIFIED TYPE: Chronic | ICD-10-CM

## 2023-03-01 DIAGNOSIS — I70.0 AORTIC CALCIFICATION: Chronic | ICD-10-CM

## 2023-03-01 DIAGNOSIS — F41.9 ANXIETY: Chronic | ICD-10-CM

## 2023-03-01 PROCEDURE — 99214 OFFICE O/P EST MOD 30 MIN: CPT | Mod: PBBFAC,PN | Performed by: FAMILY MEDICINE

## 2023-03-01 PROCEDURE — 99999 PR PBB SHADOW E&M-EST. PATIENT-LVL IV: ICD-10-PCS | Mod: PBBFAC,,, | Performed by: FAMILY MEDICINE

## 2023-03-01 PROCEDURE — 99214 OFFICE O/P EST MOD 30 MIN: CPT | Mod: S$PBB,,, | Performed by: FAMILY MEDICINE

## 2023-03-01 PROCEDURE — 99214 PR OFFICE/OUTPT VISIT, EST, LEVL IV, 30-39 MIN: ICD-10-PCS | Mod: S$PBB,,, | Performed by: FAMILY MEDICINE

## 2023-03-01 PROCEDURE — 99999 PR PBB SHADOW E&M-EST. PATIENT-LVL IV: CPT | Mod: PBBFAC,,, | Performed by: FAMILY MEDICINE

## 2023-03-01 RX ORDER — TADALAFIL 20 MG/1
TABLET ORAL
Qty: 10 TABLET | Refills: 3 | Status: SHIPPED | OUTPATIENT
Start: 2023-03-01

## 2023-03-01 RX ORDER — TADALAFIL 20 MG/1
TABLET ORAL
Qty: 10 TABLET | Refills: 3 | Status: SHIPPED | OUTPATIENT
Start: 2023-03-01 | End: 2023-03-01 | Stop reason: SDUPTHER

## 2023-03-01 NOTE — ASSESSMENT & PLAN NOTE
Patient does not report any unstable angina or worrisome symptoms.  Continue to treat risk factors including lipids and blood pressure.  Continue atorvastatin

## 2023-03-01 NOTE — ASSESSMENT & PLAN NOTE
noted on previous imaging.  Continue treat risk factors including lipids, hypertension.  Continue atorvastatin

## 2023-03-01 NOTE — PROGRESS NOTES
No see just try be respectful of your times have THIS DOCUMENT WAS MADE IN PART WITH VOICE RECOGNITION SOFTWARE.  OCCASIONALLY THIS SOFTWARE WILL MISINTERPRET WORDS OR PHRASES.      [facility] Provider App[facility]ment   [facility]       Patient ID: Vince Chadwick III is a 72 y.o. male.    ASSESSMENT/PLAN by Problem List:  Problem List Items Addressed This Visit       Hypertension - Primary (Chronic)     Stable, satisfactory.  Continue current medications         Aortic calcification (Chronic)     noted on previous imaging.  Continue treat risk factors including lipids, hypertension.  Continue atorvastatin         Coronary artery calcification (Chronic)     Patient does not report any unstable angina or worrisome symptoms.  Continue to treat risk factors including lipids and blood pressure.  Continue atorvastatin         Hyperlipidemia (Chronic)     Stable continue atorvastatin         Anxiety (Chronic)     This appears stable on Lexapro 20 mg.  Continue         Insomnia (Chronic)     Persistent.  He continues to do well on sonata 10 mg.  Will continue but encouraged him to try to use sparingly and use other methods to help improves sleep            Follow Up:  Three months    Subjective:     Chief Complaint   Patient presents with    Follow-up     I have reviewed the information entered by the ancillary staff regarding the chief complaint as well as the related history.    HPI    Patient is a/an 72 y.o.  male     Routine follow-up for multiple chronic conditions.  Everything appears stable.  See above    For complete problem list, past medical history, surgical history, social history, etc., see appropriate section in the electronic medical record    Review of Systems   HENT: Negative.     Respiratory: Negative.     Cardiovascular: Negative.    Gastrointestinal: Negative.    Genitourinary: Negative.    Musculoskeletal:  Positive for back pain.   Psychiatric/Behavioral:  Positive  "for sleep disturbance. Negative for suicidal ideas. The patient is not nervous/anxious.      Objective     Physical Exam  Constitutional:       General: He is not in acute distress.     Appearance: He is well-developed. He is not diaphoretic.   HENT:      Head: Normocephalic and atraumatic.   Eyes:      General: No scleral icterus.     Conjunctiva/sclera: Conjunctivae normal.   Cardiovascular:      Rate and Rhythm: Normal rate and regular rhythm.   Pulmonary:      Effort: Pulmonary effort is normal. No respiratory distress.   Neurological:      General: No focal deficit present.      Mental Status: He is alert and oriented to person, place, and time.      Coordination: Coordination normal.   Psychiatric:         Behavior: Behavior normal.     Vitals:    03/01/23 1055   BP: 100/66   BP Location: Right arm   Patient Position: Sitting   BP Method: Medium (Manual)   Pulse: 73   Resp: 20   SpO2: 100%   Weight: 81.4 kg (179 lb 7.3 oz)   Height: 5' 8" (1.727 m)       RECENT LABS:    Lab Results   Component Value Date    WBC 5.03 10/26/2022    HGB 14.5 10/26/2022    HCT 45.5 10/26/2022     10/26/2022    CHOL 142 10/26/2022    TRIG 68 10/26/2022    HDL 61 10/26/2022    ALT 7 (L) 10/26/2022    AST 17 10/26/2022     10/26/2022    K 4.2 10/26/2022     10/26/2022    CREATININE 1.0 01/25/2023    BUN 16 10/26/2022    CO2 26 10/26/2022    TSH 2.797 08/20/2019    PSA 2.9 10/26/2022    INR 0.9 01/19/2022       Results for orders placed or performed in visit on 01/25/23   Creatinine, serum   Result Value Ref Range    Creatinine 1.0 0.5 - 1.4 mg/dL    eGFR >60.0 >60 mL/min/1.73 m^2       "

## 2023-03-01 NOTE — ASSESSMENT & PLAN NOTE
Persistent.  He continues to do well on sonata 10 mg.  Will continue but encouraged him to try to use sparingly and use other methods to help improves sleep

## 2023-03-10 ENCOUNTER — HOSPITAL ENCOUNTER (OUTPATIENT)
Facility: HOSPITAL | Age: 73
Discharge: HOME | End: 2023-03-10
Attending: ANESTHESIOLOGY | Admitting: ANESTHESIOLOGY
Payer: MEDICARE

## 2023-03-10 ENCOUNTER — HOSPITAL ENCOUNTER (OUTPATIENT)
Dept: RADIOLOGY | Facility: HOSPITAL | Age: 73
Discharge: HOME | End: 2023-03-10
Attending: ANESTHESIOLOGY | Admitting: ANESTHESIOLOGY
Payer: MEDICARE

## 2023-03-10 DIAGNOSIS — M54.50 LOWER BACK PAIN: ICD-10-CM

## 2023-03-10 DIAGNOSIS — M54.16 LUMBAR RADICULOPATHY: ICD-10-CM

## 2023-03-10 PROCEDURE — 62323 PR INJ LUMBAR/SACRAL, W/IMAGING GUIDANCE: ICD-10-PCS | Mod: ,,, | Performed by: ANESTHESIOLOGY

## 2023-03-10 PROCEDURE — 25000003 PHARM REV CODE 250: Mod: PO | Performed by: ANESTHESIOLOGY

## 2023-03-10 PROCEDURE — 25500020 PHARM REV CODE 255: Mod: PO | Performed by: ANESTHESIOLOGY

## 2023-03-10 PROCEDURE — 62323 NJX INTERLAMINAR LMBR/SAC: CPT | Mod: PO | Performed by: ANESTHESIOLOGY

## 2023-03-10 PROCEDURE — 62323 NJX INTERLAMINAR LMBR/SAC: CPT | Mod: ,,, | Performed by: ANESTHESIOLOGY

## 2023-03-10 PROCEDURE — 76000 FLUOROSCOPY <1 HR PHYS/QHP: CPT | Mod: TC,PO

## 2023-03-10 PROCEDURE — 63600175 PHARM REV CODE 636 W HCPCS: Mod: PO | Performed by: ANESTHESIOLOGY

## 2023-03-10 RX ORDER — METHYLPREDNISOLONE ACETATE 80 MG/ML
INJECTION, SUSPENSION INTRA-ARTICULAR; INTRALESIONAL; INTRAMUSCULAR; SOFT TISSUE
Status: DISCONTINUED | OUTPATIENT
Start: 2023-03-10 | End: 2023-03-10 | Stop reason: HOSPADM

## 2023-03-10 RX ORDER — ALPRAZOLAM 0.5 MG/1
1 TABLET, ORALLY DISINTEGRATING ORAL ONCE AS NEEDED
Status: COMPLETED | OUTPATIENT
Start: 2023-03-10 | End: 2023-03-10

## 2023-03-10 RX ORDER — LIDOCAINE HYDROCHLORIDE 10 MG/ML
INJECTION, SOLUTION EPIDURAL; INFILTRATION; INTRACAUDAL; PERINEURAL
Status: DISCONTINUED | OUTPATIENT
Start: 2023-03-10 | End: 2023-03-10 | Stop reason: HOSPADM

## 2023-03-10 RX ADMIN — ALPRAZOLAM 1 MG: 0.5 TABLET, ORALLY DISINTEGRATING ORAL at 02:03

## 2023-03-10 NOTE — DISCHARGE SUMMARY
[facility] - [facility]  Discharge Note  Short Stay    Procedure(s) (LRB):  Injection-steroid-epidural-lumbar L2/3 (N/A)      OUTCOME: Patient tolerated treatment/procedure well without complication and is now ready for discharge.    DISPOSITION: Home or Self Care    FINAL DIAGNOSIS:  Lumbar radiculopathy    FOLLOWUP: In clinic    DISCHARGE INSTRUCTIONS:    Discharge Procedure Orders   Diet Adult Regular     No dressing needed     Notify your health care provider if you experience any of the following:  temperature >100.4     Activity as tolerated

## 2023-03-10 NOTE — H&P
CC: Back pain    HPI: The patient is a 71yo man with a history of lumbar radiculopathy here for L2/3 SHEILA. There are no major changes in history and physical from 1/25/23.    Past Medical History:   Diagnosis Date    ALLERGIC RHINITIS     Arthritis     Cataract     OU    Colon polyp     Diverticulitis     Diverticulosis     Hyperlipidemia     resolved    Hypertension     Irritable bowel syndrome     Lumbar disc disease        Past Surgical History:   Procedure Laterality Date    APPENDECTOMY      CIRCUMCISION      COLONOSCOPY  06/02/2011    [facility].    One 1 mm polyp in the sigmoid colon.  FRAGMENT OF NONNEOPLASTIC COLONIC MUCOSA.  Sigmoid diverticulosis.  Spasms c/w IBS.  repeat in 7-8 years    COLONOSCOPY  09/12/2006    Dr. Novak[place]    COLONOSCOPY N/A 5/13/2020    Procedure: COLONOSCOPY;  Surgeon: Davy Novak Jr., MD;  Location: [facility] END[facility];  Service: Endoscopy;  Laterality: N/A;    EPIDURAL STEROID INJECTION INTO LUMBAR SPINE N/A 11/15/2021    Procedure: Injection-steroid-epidural-lumbar L5/S1;  Surgeon: Khang Sanchez MD;  Location: [facility] OR;  Service: Pain Management;  Laterality: N/A;    EPIDURAL STEROID INJECTION INTO LUMBAR SPINE N/A 12/13/2021    Procedure: Injection-steroid-epidural-lumbar L5/S1;  Surgeon: Khang Sanchez MD;  Location: [facility] OR;  Service: Pain Management;  Laterality: N/A;    ESOPHAGOGASTRODUODENOSCOPY N/A 5/13/2020    Procedure: EGD (ESOPHAGOGASTRODUODENOSCOPY);  Surgeon: Davy Novak Jr., MD;  Location: [facility] END[facility];  Service: Endoscopy;  Laterality: N/A;    EYE SURGERY      FOOT SURGERY      x 2    INJECTION OF ANESTHETIC AGENT AROUND GANGLION IMPAR Right 2/17/2023    Procedure: BLOCK, facet cyst aspiration right side L2/3;  Surgeon: Khang Sanchez MD;  Location: [facility] OR;  Service: Pain Management;  Laterality: Right;    LAMINECTOMY USING MINIMALLY INVASIVE TECHNIQUE N/A 1/26/2022    Procedure: LAMINECTOMY, SPINE, MINIMALLY INVASIVE  RIGHT MIS APPROACH RESECTION  L4-5 SYNOVIAL CYST AND BILATERAL LAMINECTOMY/MEDIAL FACETECTOMY;  Surgeon: Penny Lobato MD;  Location: [facility] OR;  Service: Neurosurgery;  Laterality: N/A;    LUMBAR EPIDURAL INJECTION      PILONIDAL CYST DRAINAGE      s/p chalazion removal      OD    TONSILLECTOMY         Family History   Problem Relation Age of Onset    Glaucoma Father     Cataracts Father     Macular degeneration Father     Colon polyps Father     Glaucoma Paternal Uncle     Prostate cancer Paternal Uncle     Glaucoma Paternal Uncle     Cataracts Mother     Breast cancer Mother     Glaucoma Sister     Heart attack Paternal Grandfather 44    Colon cancer Neg Hx     Crohn's disease Neg Hx     Ulcerative colitis Neg Hx        Social History     Socioeconomic History    Marital status:    Occupational History    Occupation: retired [employer]   Tobacco Use    Smoking status: Never     Passive exposure: Never    Smokeless tobacco: Never   Substance and Sexual Activity    Alcohol use: No     Alcohol/week: 0.0 standard drinks    Drug use: No    Sexual activity: Yes     Social Determinants of Health     Financial Resource Strain: Low Risk     Difficulty of Paying Living Expenses: Not very hard   Food Insecurity: No Food Insecurity    Worried About Running Out of Food in the Last Year: Never true    Ran Out of Food in the Last Year: Never true   Transportation Needs: No Transportation Needs    Lack of Transportation (Medical): No    Lack of Transportation (Non-Medical): No   Physical Activity: Inactive    Days of Exercise per Week: 0 days    Minutes of Exercise per Session: 0 min   Stress: No Stress Concern Present    Feeling of Stress : Only a little   Social Connections: Unknown    Frequency of Communication with Friends and Family: More than three times a week    Frequency of Social Gatherings with Friends and Family: Once a week    Active Member of Clubs or Organizations: No    Attends Club or Organization Meetings: Never    Marital  "Status:    Housing Stability: Low Risk     Unable to Pay for Housing in the Last Year: No    Number of Places Lived in the Last Year: 1    Unstable Housing in the Last Year: No       Current Facility-Administered Medications   Medication Dose Route Frequency Provider Last Rate Last Admin    alprazolam ODT dissolvable tablet 1 mg  1 mg Oral Once PRN Khang Sanchez MD           Review of patient's allergies indicates:  No Known Allergies    Vitals:    03/07/23 1605 03/10/23 1258 03/10/23 1259   BP:   121/66   Pulse:  60    Resp:  16    Temp:  98 °F (36.7 °C)    SpO2:  99%    Weight: 79.4 kg (175 lb)     Height: 5' 8" (1.727 m)         ASA 2, Mallampati 2    REVIEW OF SYSTEMS:     GENERAL: No weight loss, malaise or fevers.  HEENT:  No recent changes in vision or hearing  NECK: Negative for lumps, no difficulty with swallowing.  RESPIRATORY: Negative for cough, wheezing or shortness of breath, patient denies any recent URI.  CARDIOVASCULAR: Negative for chest pain, leg swelling or palpitations.  GI: Negative for abdominal discomfort, blood in stools or black stools or change in bowel habits.  MUSCULOSKELETAL: See HPI.  SKIN: Negative for lesions, rash, and itching.  PSYCH: No suicidal or homicidal ideations, no current mood disturbances.  HEMATOLOGY/LYMPHOLOGY: Negative for prolonged bleeding, bruising easily or swollen nodes. Patient is not currently taking any anti-coagulants  ENDO: No history of diabetes or thyroid dysfunction  NEURO: No history of syncope, paralysis, seizures or tremors.All other reviewed and negative other than HPI.    Physical exam:  Gen: A and O x3, pleasant, well-groomed  Skin: No rashes or obvious lesions  HEENT: PERRLA, no obvious deformities on ears or in canals. No thyroid masses, trachea midline, no palpable lymph nodes in neck, axilla.  CVS: Regular rate and rhythm, normal S1 and S2, no murmurs.  Resp: Clear to auscultation bilaterally.  Abdomen: Soft, NT/ND, normal bowel " sounds present.  Musculoskeletal/Neuro: Moving all extremities    Assessment:  Lumbar radiculopathy  -     Case Request Operating Room: Injection-steroid-epidural-lumbar L2/3  -     Place in Outpatient; Standing  -     Vital signs; Standing  -     Verify informed consent; Standing  -     Notify physician ; Standing  -     Notify physician ; Standing  -     Notify physician (specify); Standing  -     Diet NPO; Standing  -     alprazolam ODT dissolvable tablet 1 mg    Other orders  -     IP VTE LOW RISK PATIENT; Standing

## 2023-03-10 NOTE — OP NOTE
PROCEDURE DATE: 3/10/2023    Lumbar Interlaminar Epidural Steroid Injection under Fluoroscopic Guidance, AP Approach.    Procedure:   Interlaminar epidural steroid injection at L2/3 under fluoroscopic guidance.    Diagnosis: lUMBAR Radiculopathy    pOSTOP DIAGNOSIS: sAME    Physician: Khang Sanchez M.D.    Medications injected:80 mg methylprednisone with 4 ml of preservative free NaCl    Local anesthetic injected:    Lidocaine 1% 4 ml total    Sedation Medications: none    Estimated blood loss:  none    Complications:  none    Technique:  Time-out taken to identify patient and procedure prior to starting the procedure.  With the patient laying in a prone position, the area was prepped and draped in the usual sterile fashion using ChloraPrep and a fenestrated drape.  After determining the target level with an AP fluoroscopic view, local anesthetic was given using a 25-gauge 1.5 inch needle by raising a wheal and then infiltrating toward the interlaminar entry space.  A 3.5inch 20-gauge Touhy needle was introduced under AP fluoroscopic guidance to the interlaminar space of L2/3. Once the trajectory was established, the needle was visualized in the lateral view and advanced using loss of resistance technique. Once in the desired position, omnipaque contrast was injected to confirm placement and there was no vascular uptake nor intrathecal spread.  The medication was then injected slowly. The patient tolerated the procedure well.      The patient was monitored after the procedure.   They were given post-procedure and discharge instructions to follow at home.  The patient was discharged in a stable condition.

## 2023-03-13 VITALS
HEART RATE: 65 BPM | TEMPERATURE: 98 F | OXYGEN SATURATION: 99 % | HEIGHT: 68 IN | BODY MASS INDEX: 26.52 KG/M2 | WEIGHT: 175 LBS | SYSTOLIC BLOOD PRESSURE: 138 MMHG | DIASTOLIC BLOOD PRESSURE: 75 MMHG | RESPIRATION RATE: 16 BRPM

## 2023-03-20 ENCOUNTER — PATIENT MESSAGE (OUTPATIENT)
Dept: PRIMARY CARE CLINIC | Facility: CLINIC | Age: 73
End: 2023-03-20
Payer: MEDICARE

## 2023-03-20 DIAGNOSIS — M54.16 LUMBAR RADICULAR PAIN: Primary | ICD-10-CM

## 2023-03-20 RX ORDER — TRAMADOL HYDROCHLORIDE 50 MG/1
50 TABLET ORAL EVERY 6 HOURS PRN
Qty: 14 TABLET | Refills: 0 | Status: SHIPPED | OUTPATIENT
Start: 2023-03-20 | End: 2023-04-04

## 2023-04-04 ENCOUNTER — OFFICE VISIT (OUTPATIENT)
Dept: PAIN MEDICINE | Facility: CLINIC | Age: 73
End: 2023-04-04
Payer: MEDICARE

## 2023-04-04 VITALS
BODY MASS INDEX: 26.18 KG/M2 | DIASTOLIC BLOOD PRESSURE: 72 MMHG | HEIGHT: 68 IN | WEIGHT: 172.75 LBS | SYSTOLIC BLOOD PRESSURE: 126 MMHG | HEART RATE: 76 BPM

## 2023-04-04 DIAGNOSIS — M48.061 SPINAL STENOSIS OF LUMBAR REGION, UNSPECIFIED WHETHER NEUROGENIC CLAUDICATION PRESENT: ICD-10-CM

## 2023-04-04 DIAGNOSIS — M54.16 LUMBAR RADICULOPATHY: Primary | ICD-10-CM

## 2023-04-04 DIAGNOSIS — M51.36 DDD (DEGENERATIVE DISC DISEASE), LUMBAR: ICD-10-CM

## 2023-04-04 PROCEDURE — 99214 OFFICE O/P EST MOD 30 MIN: CPT | Mod: S$PBB,,, | Performed by: PHYSICIAN ASSISTANT

## 2023-04-04 PROCEDURE — 99999 PR PBB SHADOW E&M-EST. PATIENT-LVL III: CPT | Mod: PBBFAC,,, | Performed by: PHYSICIAN ASSISTANT

## 2023-04-04 PROCEDURE — 99213 OFFICE O/P EST LOW 20 MIN: CPT | Mod: PBBFAC,PN | Performed by: PHYSICIAN ASSISTANT

## 2023-04-04 PROCEDURE — 99999 PR PBB SHADOW E&M-EST. PATIENT-LVL III: ICD-10-PCS | Mod: PBBFAC,,, | Performed by: PHYSICIAN ASSISTANT

## 2023-04-04 PROCEDURE — 99214 PR OFFICE/OUTPT VISIT, EST, LEVL IV, 30-39 MIN: ICD-10-PCS | Mod: S$PBB,,, | Performed by: PHYSICIAN ASSISTANT

## 2023-04-04 RX ORDER — GABAPENTIN 300 MG/1
600 CAPSULE ORAL 2 TIMES DAILY
Qty: 120 CAPSULE | Refills: 2 | Status: SHIPPED | OUTPATIENT
Start: 2023-04-04 | End: 2023-07-09

## 2023-04-04 NOTE — PROGRESS NOTES
This note was completed with dictation software and grammatical errors may exist.    CC:  Bilateral buttock and leg pain    HPI:  The patient is a 73-year-old man with a history of hypertension, hyperlipidemia who presents in referral from Dr. Burleson for recurrent bilateral leg pain.  He is status post right L2/3 facet joint cyst aspiration on 02/17/2023 and L2/3 interlaminar epidural steroid injection on 03/10/2023 with 0% relief.  He does feel that his symptoms are getting worse.  He describes pain in the right groin and right anterior thigh.  He states that his knee has gone numb and he feels like ice cold water is pouring down his shin.  He reports weakness and numbness in his right leg.  He denies incontinence.    He returns in follow-up today with right leg pain.  It has been just over a year since his last visit.  He underwent L4-L5 bilateral laminectomies and medial facetectomies and resection of right synovial cyst via a right-sided minimally invasive approach with Dr Lobato on 01/26/2022 with almost complete relief of his symptoms.  He states that about a month ago he developed right anterolateral thigh pain that radiates into his right knee.  This initially started with numbness in his right knee before the pain started.  He describes it as shooting, hot and sharp.  The pain is worse with walking and improved with sitting.  He denies having weakness or incontinence.    Pain intervention history: He is status post L3/4 interlaminar epidural steroid injection on 6/7/16 with about 40% relief.  He is status post L3/4 interlaminar epidural steroid injection on 7/14/16 with no additional relief.  He is status post left L3 and L4 transforaminal epidural steroid injection on 9/7/16 with 90% relief. He is status post L5/S1 interlaminar epidural steroid injection on 11/15/2021 with 50% relief. He is status post L5/S1 interlaminar epidural steroid injection on 12/13/2021 with relief of his left leg pain  but not relief of his right leg pain.   He is status post right L2/3 facet joint cyst aspiration on 02/17/2023 and L2/3 interlaminar epidural steroid injection on 03/10/2023 with 0% relief.      Spine surgeries: 1/26/22 L4-L5 bilateral laminectomies and medial facetectomies and resection of right synovial cyst via a right-sided minimally invasive approach with Dr Lobato    Antineuropathics: gabapentin 300 mg twice daily  NSAIDs: celebrex  Physical therapy:  He has done 6 weeks of physical therapy at [facility] prior to surgery  Antidepressants: Lexapro   Muscle relaxers: Zanaflex 2 mg  Opioids:  Antiplatelets/Anticoagulants:    ROS:  He reports back pain only.  Balance of review of systems is negative.    No results found for: LABA1C, HGBA1C    Lab Results   Component Value Date    WBC 5.03 10/26/2022    HGB 14.5 10/26/2022    HCT 45.5 10/26/2022    MCV 90 10/26/2022     10/26/2022             Past Medical History:   Diagnosis Date    ALLERGIC RHINITIS     Arthritis     Cataract     OU    Colon polyp     Diverticulitis     Diverticulosis     Hyperlipidemia     resolved    Hypertension     Irritable bowel syndrome     Lumbar disc disease        Past Surgical History:   Procedure Laterality Date    APPENDECTOMY      CIRCUMCISION      COLONOSCOPY  06/02/2011    [facility].    One 1 mm polyp in the sigmoid colon.  FRAGMENT OF NONNEOPLASTIC COLONIC MUCOSA.  Sigmoid diverticulosis.  Spasms c/w IBS.  repeat in 7-8 years    COLONOSCOPY  09/12/2006    Dr. Novak[place]    COLONOSCOPY N/A 5/13/2020    Procedure: COLONOSCOPY;  Surgeon: Davy Novak Jr., MD;  Location: [facility] ENDO;  Service: Endoscopy;  Laterality: N/A;    EPIDURAL STEROID INJECTION INTO LUMBAR SPINE N/A 11/15/2021    Procedure: Injection-steroid-epidural-lumbar L5/S1;  Surgeon: Khang Sanchez MD;  Location: [facility] OR;  Service: Pain Management;  Laterality: N/A;    EPIDURAL STEROID INJECTION INTO LUMBAR SPINE N/A 12/13/2021    Procedure:  Injection-steroid-epidural-lumbar L5/S1;  Surgeon: Khang Sanchez MD;  Location: [facility] OR;  Service: Pain Management;  Laterality: N/A;    EPIDURAL STEROID INJECTION INTO LUMBAR SPINE N/A 3/10/2023    Procedure: Injection-steroid-epidural-lumbar L2/3;  Surgeon: Khang Sanchez MD;  Location: [facility] OR;  Service: Pain Management;  Laterality: N/A;    ESOPHAGOGASTRODUODENOSCOPY N/A 5/13/2020    Procedure: EGD (ESOPHAGOGASTRODUODENOSCOPY);  Surgeon: Davy Novak Jr., MD;  Location: [facility] ENDO;  Service: Endoscopy;  Laterality: N/A;    EYE SURGERY      FOOT SURGERY      x 2    INJECTION OF ANESTHETIC AGENT AROUND GANGLION IMPAR Right 2/17/2023    Procedure: BLOCK, facet cyst aspiration right side L2/3;  Surgeon: Khang Sanchez MD;  Location: [facility] OR;  Service: Pain Management;  Laterality: Right;    LAMINECTOMY USING MINIMALLY INVASIVE TECHNIQUE N/A 1/26/2022    Procedure: LAMINECTOMY, SPINE, MINIMALLY INVASIVE  RIGHT MIS APPROACH RESECTION L4-5 SYNOVIAL CYST AND BILATERAL LAMINECTOMY/MEDIAL FACETECTOMY;  Surgeon: Penny Lobato MD;  Location: [facility] OR;  Service: Neurosurgery;  Laterality: N/A;    LUMBAR EPIDURAL INJECTION      PILONIDAL CYST DRAINAGE      s/p chalazion removal      OD    TONSILLECTOMY         Social History     Socioeconomic History    Marital status:    Occupational History    Occupation: retired [employer]   Tobacco Use    Smoking status: Never     Passive exposure: Never    Smokeless tobacco: Never   Substance and Sexual Activity    Alcohol use: No     Alcohol/week: 0.0 standard drinks    Drug use: No    Sexual activity: Yes     Social Determinants of Health     Financial Resource Strain: Low Risk     Difficulty of Paying Living Expenses: Not very hard   Food Insecurity: No Food Insecurity    Worried About Running Out of Food in the Last Year: Never true    Ran Out of Food in the Last Year: Never true   Transportation Needs: No Transportation Needs    Lack of  "Transportation (Medical): No    Lack of Transportation (Non-Medical): No   Physical Activity: Inactive    Days of Exercise per Week: 0 days    Minutes of Exercise per Session: 0 min   Stress: No Stress Concern Present    Feeling of Stress : Only a little   Social Connections: Unknown    Frequency of Communication with Friends and Family: More than three times a week    Frequency of Social Gatherings with Friends and Family: Once a week    Active Member of Clubs or Organizations: No    Attends Club or Organization Meetings: Never    Marital Status:    Housing Stability: Low Risk     Unable to Pay for Housing in the Last Year: No    Number of Places Lived in the Last Year: 1    Unstable Housing in the Last Year: No         Medications/Allergies: See med card    Vitals:    04/04/23 0831   BP: 126/72   Pulse: 76   Weight: 78.4 kg (172 lb 12 oz)   Height: 5' 8" (1.727 m)   PainSc:   8   PainLoc: Leg         Physical exam:  Gen: A and O x3, pleasant, well-groomed  Skin: No rashes or obvious lesions  HEENT: PERRLA, no obvious deformities on ears or in canals. Trachea midline.  CVS: Regular rate and rhythm, normal palpable pulses.  Resp:No increased work of breathing, symmetrical chest rise.  Abdomen: Soft, NT/ND.  Musculoskeletal:  Walks with a forward leaning gait    Neuro:  Lower extremities: 5/5 strength bilaterally  Reflexes: Patellar 1+, Achilles 0+ bilaterally.  Sensory:  Intact and symmetrical to light touch and pinprick in L2-S1 dermatomes bilaterally.    Lumbar spine:  Lumbar spine:  Range of motion is mildly reduced with flexion, moderately reduced with extension with increased right thigh pain with extension.  Manuel's test causes no increased pain on either side.    Supine straight leg raise causes increased right leg pain.  Internal and external rotation of the hip causes no increased pain on either side.  Myofascial exam: No tenderness to palpation across lumbar paraspinous " muscles.    Imaging:  10/12/21 Xray L-spine:  There is a levoscoliotic curvature of the thoracolumbar spine, apex at L1-L2.  There is multilevel degenerative change of the thoracolumbar spine in the form of marginal osteophyte formation and disc space narrowing.  Disc space narrowing and degenerative endplate change appear most pronounced L2-L3 and L3-L4.  There is a minimal grade I retrolisthesis of L2 on L3.  No pars defects.  No acute lumbar compression fracture noted radiographically.  The SI joints are unremarkable.  The incidentally observed soft tissues of the abdomen and pelvis are unremarkable.    5/10/16 MRI L-spine:  1.  Mild lumbar scoliosis.  2.  L1-2 minimal disc bulge.  3.  L2-3 moderate spinal stenosis from a combination of disc bulge and degenerative facet arthrosis.  There is moderate bilateral foraminal stenosis.  4.  L3-4 moderate spinal stenosis from a combination of disc bulge and degenerative facet arthrosis.  There is moderate right and severe left foraminal stenosis.  5.  L4-5 moderate spinal stenosis from a combination of disc bulge and degenerative facet arthrosis.  There is moderate right and mild left foraminal stenosis.  6.  L5-S1 mild degenerative facet arthrosis.    10/30/2021 MRI lumbar spine  On the nonstandard  image there is a 12 degree levo rotoscoliotic curvature centered in the upper lumbar region.  There is diffuse disc desiccation with disc space loss most prominent at L2/L3 through L3/L4.  The conus terminates at L1 and has an unremarkable appearance.  Mixed but primarily fatty degenerative endplate changes are noted at L3/L4.  Schmorl's node formation involves the inferior endplate of L2 and there is adjacent edematous endplate reaction.  There is no evidence of abnormal fluid signal within the disc spaces.  The conus terminates at L1.  The lumbar vertebrae otherwise display normal signal, morphology and alignment.  The individual disc levels appears follows:      T10/T11: This area was not previously imaged but there does appear to be at least mild canal stenosis related to facet and ligamentous hypertrophy posteriorly.     T11/T12 and T12/L1: There is no evidence of disc protrusion, canal or foraminal stenosis.     L1/L2: Annular disc bulge combines with degenerative facet changes to produce mild canal and bilateral inferior foraminal narrowing.  The changes are worse on the right foramen related to a superimposed right posterolateral disc protrusion and narrowing of the foramen due to scoliosis.     L2/L3: Prominent annular disc bulge and osteophyte formation combined with canal ligamentous hypertrophy to produce prominent right and moderate left-sided foraminal narrowing.  Right posterolateral superimposed protrusion and a left posterolateral superimposed protrusion are suggested.  There is moderate central canal stenosis.  Degenerative facet changes are noted bilaterally.     L3/L4: Annular disc bulging is evident with a superimposed left posterolateral and lateral disc extrusion osteophyte formation producing left greater than right foraminal narrowing.  There is mild central canal stenosis.     L4/L5: Annular disc bulge and osteophyte formation combine with a broad-based central disc extrusion and a large new right posterolateral canal presumed synovial cyst which measures 13 by 11 by 15 mm.  This produces in combination with the degenerative disc disease prominent canal stenosis.  The left to right posterior canal measures only 5 mm.  There is prominent left and prominent right foraminal narrowing due to disc bulging and facet disease.  The presumed synovial cyst was not seen previously.     L5/S1: Degenerative facet changes are noted bilaterally and there is annular disc bulging.     This report was flagged in Epic as abnormal.    01/30/2023 MRI lumbar spine with and without contrast   Morphology: Progressive endplate centered marrow edema asymmetric to the right  at L1-L2 in the setting of progressive degenerative disc height loss discussed in further detail below.  Chronic appearing Schmorl's nodes at L1-L2, L2-L3, and L3-L4 with shallow ventral osteophytes.  Marrow signal is otherwise within normal limits and vertebral body heights are preserved.  No fractures or suspicious lesions.     Alignment: Broad levocurvature.  Trace retrolisthesis of L2 on L3.     Cord: Normal in contour, caliber, and signal intensity.  Conus position is within normal limits.  No abnormal enhancement within the spinal canal.     Disc levels:     T12-L1: Within normal limits.  The spinal canal and foramina are patent.     L1-L2: Progressive severe degenerative disc height loss asymmetric to the right with disc bulging and osseous endplate spurring as well as mild to moderate facet arthrosis producing mild narrowing of the right subarticular recess and spinal canal and severe right foraminal narrowing.  Mild-to-moderate left foraminal narrowing.     L2-L3: Severe degenerative disc height loss with disc bulging and moderate bilateral facet arthrosis with ligamentum flavum thickening producing overall moderate circumferential spinal canal narrowing exacerbated by dorsal epidural lipomatosis.  There is also enlargement of a synovial cyst along the anterior/medial aspect of the right facet measuring 14 mm encroaching the right dorsal lateral thecal sac and subarticular recess.  Moderate to severe right and mild-to-moderate left foraminal narrowing.     L3-L4: Severe disc height loss with shallow disc bulging and moderate facet arthrosis as well as prominence of the dorsal epidural fat producing overall mild to moderate circumferential spinal canal narrowing and encroachment of both subarticular recesses as well as severe left and moderate right foraminal narrowing.     L4-L5: Interval right laminectomy changes and resection of a previously described large synovial cyst without evidence of recurrence.   Moderate to severe bilateral facet arthrosis and shallow disc bulging contributing to mild narrowing of the spinal canal and encroachment both subarticular recesses as well as moderate left and moderate to severe right foraminal narrowing.     L5-S1: Shallow disc bulging and moderate bilateral facet arthrosis.  The spinal canal is patent.  Moderate right and mild left foraminal narrowing related to disc bulging and osteophytic endplate spurring.     Other: Visualized paraspinal soft tissues are within normal limits.      Assessment:  The patient is a 73-year-old man with a history of hypertension, hyperlipidemia who presents in referral from Dr. Burleson for recurrent bilateral leg pain.    1. Lumbar radiculopathy        2. Spinal stenosis of lumbar region, unspecified whether neurogenic claudication present        3. DDD (degenerative disc disease), lumbar              Plan:  1. Since the patient did not have relief following the facet cyst aspiration and epidural steroid injection, I am going to have him follow-up with Dr. Lobato for further evaluation.  We reviewed his most recent lumbar spine MRI and discussed that he has a facet cyst protruding into the spinal canal L2/3 on the right side correlating with his symptoms.  2. In the meantime I will have him slowly increase gabapentin to 300 mg in the morning, 300 mg in the afternoon and 600 mg at night if tolerated.    3. Follow-up as needed.

## 2023-04-10 ENCOUNTER — TELEPHONE (OUTPATIENT)
Dept: NEUROSURGERY | Facility: CLINIC | Age: 73
End: 2023-04-10
Payer: MEDICARE

## 2023-04-10 ENCOUNTER — OFFICE VISIT (OUTPATIENT)
Dept: NEUROSURGERY | Facility: CLINIC | Age: 73
End: 2023-04-10
Payer: MEDICARE

## 2023-04-10 DIAGNOSIS — Z98.890 HISTORY OF LUMBAR LAMINECTOMY FOR SPINAL CORD DECOMPRESSION: Primary | ICD-10-CM

## 2023-04-10 DIAGNOSIS — M54.16 LUMBAR RADICULOPATHY: ICD-10-CM

## 2023-04-10 PROCEDURE — 99215 PR OFFICE/OUTPT VISIT, EST, LEVL V, 40-54 MIN: ICD-10-PCS | Mod: S$PBB,,, | Performed by: NEUROLOGICAL SURGERY

## 2023-04-10 PROCEDURE — 99215 OFFICE O/P EST HI 40 MIN: CPT | Mod: S$PBB,,, | Performed by: NEUROLOGICAL SURGERY

## 2023-04-10 NOTE — PROGRESS NOTES
I have seen the patient, reviewed the Advanced Practice Provider's history and physical, assessment and plan. I have personally interviewed and examined the patient at bedside and interpreted the relevant imaging and lab work and I agree with the findings. I personally performed the documented services, including the entirety of the medical decision making. See below for any additional comments.    R L2-L3 enlarging synovial cyst with associated intractable right L3 radiculopathy and hip flexion weakness on exam. He has failed multimodality conservative treatment, including aspiration.  He is requesting surgical decompression.     I offered him a right L2-L3 minimally invasive hemilaminectomy and medial facetectomy for removal of synovial cyst. I have discussed the risks, benefits and alternatives to the proposed operation in detail. All questions were answered. Risks discussed include but are not limited to: bleeding, infection, spinal fluid leak, injury to the nerves, weakness, numbness, paralysis, loss of bowel or bladder function, injury to the blood vessels, stroke, heart attack, blood clots, destabilization, no improvement or worsening of symptoms, cyst recurrence, need for more surgery including fusion, death. He understands that because the index level is at the apex of his degenerative scoliosis, there is an increased risk of scoliosis progression or destabilization. He wishes to proceed.     He will need dynamic XR to rule out instability and clearance from his PCP and Cardiologist.

## 2023-04-12 DIAGNOSIS — M54.16 LUMBAR RADICULOPATHY: Primary | ICD-10-CM

## 2023-04-12 DIAGNOSIS — R79.1 ABNORMAL COAGULATION PROFILE: ICD-10-CM

## 2023-04-12 RX ORDER — LIDOCAINE HYDROCHLORIDE 10 MG/ML
1 INJECTION, SOLUTION EPIDURAL; INFILTRATION; INTRACAUDAL; PERINEURAL ONCE
Status: CANCELLED | OUTPATIENT
Start: 2023-04-12 | End: 2023-04-12

## 2023-04-12 RX ORDER — SODIUM CHLORIDE, SODIUM LACTATE, POTASSIUM CHLORIDE, CALCIUM CHLORIDE 600; 310; 30; 20 MG/100ML; MG/100ML; MG/100ML; MG/100ML
INJECTION, SOLUTION INTRAVENOUS CONTINUOUS
Status: CANCELLED | OUTPATIENT
Start: 2023-04-12

## 2023-04-13 ENCOUNTER — TELEPHONE (OUTPATIENT)
Dept: NEUROSURGERY | Facility: CLINIC | Age: 73
End: 2023-04-13
Payer: MEDICARE

## 2023-04-13 NOTE — TELEPHONE ENCOUNTER
LOV: 3/1/23    Please let me know if pt needs to be seen again prior to this surgery or if he is ok to have surgical clearance.

## 2023-04-13 NOTE — TELEPHONE ENCOUNTER
Vince Chadwick will be having surgery on 4/26/23 with Dr. Lobato, Neurosurgeon, at [facility]. We are reaching out to obtain a surgical clearance from Dr. Burleson to ensure the safety of our patient. The surgery is L2-3 hemilaminectomy/medial facetectomy for removal of synovial cyst and will be under general anesthesia. This procedure typically lasts approximately 2 hours. We request that the patient hold all anticoagulant/antiinflammatory medications for 5-7 days prior to surgery. Please let us know if our office can be of further assistance.

## 2023-04-13 NOTE — TELEPHONE ENCOUNTER
Vince Chadwick will be having surgery on 4/26/23 with Dr. Lobato, Neurosurgeon, at [facility]. We are reaching out to obtain a surgical clearance from Dr. Lea to ensure the safety of our patient. The surgery is L2-3 hemilaminectomy/medial facetectomy for removal of synovial cyst and will be under general anesthesia. This procedure typically lasts approximately 2 hours. We request that the patient hold all anticoagulant/antiinflammatory medications for 5-7 days prior to surgery. Please let us know if our office can be of further assistance.

## 2023-04-13 NOTE — TELEPHONE ENCOUNTER
LM with pt that he needs to be seen by Dr. Vang for surgical clearance and that I scheduled him for an appt next week. If appt time doesn't work, please call back to reschedule.

## 2023-04-14 ENCOUNTER — TELEPHONE (OUTPATIENT)
Dept: NEUROSURGERY | Facility: CLINIC | Age: 73
End: 2023-04-14
Payer: MEDICARE

## 2023-04-14 NOTE — TELEPHONE ENCOUNTER
Called patient regarding upcoming surgery on 4/26/23 with Dr. Lobato. Pre and post-operative appointments reviewed. Patient aware of stopping all anti-coagulant, anti-inflammatory, anti-platelet medications for 1 week prior to surgery. Address confirmed and appointment reminder letter and post-operative instructions mailed to patient. Informed patient to call with any further questions. Patient verbalized understanding.

## 2023-04-20 ENCOUNTER — OFFICE VISIT (OUTPATIENT)
Dept: PRIMARY CARE CLINIC | Facility: CLINIC | Age: 73
End: 2023-04-20
Payer: MEDICARE

## 2023-04-20 VITALS
HEIGHT: 66 IN | RESPIRATION RATE: 17 BRPM | SYSTOLIC BLOOD PRESSURE: 126 MMHG | OXYGEN SATURATION: 96 % | BODY MASS INDEX: 28.38 KG/M2 | WEIGHT: 176.56 LBS | TEMPERATURE: 98 F | DIASTOLIC BLOOD PRESSURE: 78 MMHG | HEART RATE: 72 BPM

## 2023-04-20 DIAGNOSIS — M71.38 SYNOVIAL CYST OF LUMBAR FACET JOINT: ICD-10-CM

## 2023-04-20 DIAGNOSIS — F41.9 ANXIETY: Chronic | ICD-10-CM

## 2023-04-20 DIAGNOSIS — I10 PRIMARY HYPERTENSION: ICD-10-CM

## 2023-04-20 DIAGNOSIS — Z01.818 PREOP EXAMINATION: Primary | ICD-10-CM

## 2023-04-20 PROCEDURE — 99214 OFFICE O/P EST MOD 30 MIN: CPT | Mod: S$PBB,,, | Performed by: FAMILY MEDICINE

## 2023-04-20 PROCEDURE — 99999 PR PBB SHADOW E&M-EST. PATIENT-LVL IV: CPT | Mod: PBBFAC,,, | Performed by: FAMILY MEDICINE

## 2023-04-20 PROCEDURE — 99214 PR OFFICE/OUTPT VISIT, EST, LEVL IV, 30-39 MIN: ICD-10-PCS | Mod: S$PBB,,, | Performed by: FAMILY MEDICINE

## 2023-04-20 PROCEDURE — 99214 OFFICE O/P EST MOD 30 MIN: CPT | Mod: PBBFAC,PN | Performed by: FAMILY MEDICINE

## 2023-04-20 PROCEDURE — 99999 PR PBB SHADOW E&M-EST. PATIENT-LVL IV: ICD-10-PCS | Mod: PBBFAC,,, | Performed by: FAMILY MEDICINE

## 2023-04-20 NOTE — ASSESSMENT & PLAN NOTE
Satisfactory control.  Continue current medications.  Blood pressure is in a safe range to proceed with surgery.

## 2023-04-20 NOTE — ASSESSMENT & PLAN NOTE
Persistent anxiety.  He does remain on Lexapro and requires Sonata to help with sleep.  Perhaps mildly exacerbated by upcoming surgery but also has multiple family members living in the house, etc..  No emergent signs or symptoms.  Continue current medications, will re-evaluate after he recovers from surgery.

## 2023-04-20 NOTE — ASSESSMENT & PLAN NOTE
No contraindications for surgery.  He may proceed with surgery as planned under general anesthesia.  Notice will be sent to his neurosurgeon.

## 2023-04-20 NOTE — PROGRESS NOTES
THIS DOCUMENT WAS MADE IN PART WITH VOICE RECOGNITION SOFTWARE.  OCCASIONALLY THIS SOFTWARE WILL MISINTERPRET WORDS OR PHRASES.      [facility]       Patient ID: Vince Chadwick III is a 73 y.o. male.    ASSESSMENT/PLAN by Problem List:    1. Preop examination  Assessment & Plan:  No contraindications for surgery.  He may proceed with surgery as planned under general anesthesia.  Notice will be sent to his neurosurgeon.      2. Synovial cyst of lumbar facet joint    3. Primary hypertension  Assessment & Plan:  Satisfactory control.  Continue current medications.  Blood pressure is in a safe range to proceed with surgery.      4. Anxiety  Assessment & Plan:  Persistent anxiety.  He does remain on Lexapro and requires Sonata to help with sleep.  Perhaps mildly exacerbated by upcoming surgery but also has multiple family members living in the house, etc..  No emergent signs or symptoms.  Continue current medications, will re-evaluate after he recovers from surgery.           Follow Up:  As scheduled in June    Subjective:     Chief Complaint   Patient presents with    Pre-op Exam     I have reviewed the information entered by the ancillary staff regarding the chief complaint as well as the related history.    HPI    Patient is a/an 73 y.o.  male     He presents today for preop examination.  He has recurrence of a synovial cyst of the lumbar facet joint that is causing continuous pain and neurological symptoms.  He is scheduled for surgery on April 26.  He reports no recent illnesses or infections.  No acute cardiac or pulmonary symptoms.  See above for details.    For complete problem list, past medical history, surgical history, social history, etc., see appropriate section in the electronic medical record    Review of Systems   Constitutional:  Negative for chills and fever.   HENT: Negative.  Negative for trouble swallowing.    Respiratory:  Negative.     Cardiovascular: Negative.    Gastrointestinal: Negative.    Genitourinary:  Negative for dysuria and hematuria.   Musculoskeletal:  Positive for back pain.   Neurological:  Positive for numbness. Negative for dizziness, tremors and syncope.   Psychiatric/Behavioral:  Positive for sleep disturbance. The patient is nervous/anxious.      Objective     Physical Exam  Vitals reviewed.   Constitutional:       General: He is not in acute distress.     Appearance: Normal appearance. He is well-developed. He is not diaphoretic.   HENT:      Head: Normocephalic and atraumatic.      Right Ear: Tympanic membrane, ear canal and external ear normal. There is no impacted cerumen.      Left Ear: Tympanic membrane, ear canal and external ear normal. There is no impacted cerumen.      Mouth/Throat:      Pharynx: Oropharynx is clear. No oropharyngeal exudate.   Eyes:      General: No scleral icterus.        Right eye: No discharge.         Left eye: No discharge.      Conjunctiva/sclera: Conjunctivae normal.   Cardiovascular:      Rate and Rhythm: Normal rate and regular rhythm.      Heart sounds: Normal heart sounds. No murmur heard.    No gallop.   Pulmonary:      Effort: Pulmonary effort is normal. No respiratory distress.   Abdominal:      General: Abdomen is flat. Bowel sounds are normal. There is no distension.      Palpations: There is no mass.      Tenderness: There is no abdominal tenderness. There is no guarding or rebound.   Musculoskeletal:      Cervical back: Normal range of motion and neck supple.   Skin:     General: Skin is warm and dry.   Neurological:      Mental Status: He is alert and oriented to person, place, and time.      Deep Tendon Reflexes: Reflexes are normal and symmetric.      Comments: Ambulatory   Psychiatric:         Behavior: Behavior normal.     Vitals:    04/20/23 1019   BP: 126/78   BP Location: Left arm   Patient Position: Sitting   BP Method: Medium (Manual)   Pulse: 72   Resp: 17  "  Temp: 97.6 °F (36.4 °C)   TempSrc: Oral   SpO2: 96%   Weight: 80.1 kg (176 lb 9.4 oz)   Height: 5' 6" (1.676 m)       "

## 2023-04-25 ENCOUNTER — HOSPITAL ENCOUNTER (OUTPATIENT)
Dept: RADIOLOGY | Facility: HOSPITAL | Age: 73
Discharge: HOME | End: 2023-04-25
Attending: PHYSICIAN ASSISTANT
Payer: MEDICARE

## 2023-04-25 DIAGNOSIS — M54.16 LUMBAR RADICULOPATHY: ICD-10-CM

## 2023-04-25 DIAGNOSIS — Z98.890 HISTORY OF LUMBAR LAMINECTOMY FOR SPINAL CORD DECOMPRESSION: ICD-10-CM

## 2023-04-25 PROCEDURE — 72114 X-RAY EXAM L-S SPINE BENDING: CPT | Mod: 26,,, | Performed by: RADIOLOGY

## 2023-04-25 PROCEDURE — 72114 XR LUMBAR SPINE 5 VIEW WITH FLEX AND EXT: ICD-10-PCS | Mod: 26,,, | Performed by: RADIOLOGY

## 2023-04-25 PROCEDURE — 72114 X-RAY EXAM L-S SPINE BENDING: CPT | Mod: TC,FY,PO

## 2023-04-25 NOTE — PROGRESS NOTES
Neurosurgery History & Physical    Patient ID: Vince Chadwick III is a 73 y.o. male.    No chief complaint on file.      Review of Systems   Constitutional:  Negative for chills, diaphoresis, fatigue and fever.   HENT:  Negative for congestion, hearing loss, rhinorrhea and sore throat.    Eyes:  Negative for photophobia, pain, redness and visual disturbance.   Respiratory:  Negative for cough, chest tightness, shortness of breath and wheezing.    Cardiovascular:  Negative for chest pain, palpitations and leg swelling.   Gastrointestinal:  Negative for abdominal distention, abdominal pain, constipation, diarrhea, nausea and vomiting.   Genitourinary:  Negative for difficulty urinating, dysuria, frequency, hematuria and urgency.   Musculoskeletal:  Positive for arthralgias. Negative for gait problem, myalgias, neck pain and neck stiffness.   Skin:  Negative for pallor and rash.   Neurological:  Positive for numbness. Negative for dizziness, seizures, speech difficulty, weakness, light-headedness and headaches.   Psychiatric/Behavioral:  Negative for confusion, hallucinations and sleep disturbance.      Past Medical History:   Diagnosis Date    ALLERGIC RHINITIS     Arthritis     Cataract     OU    Colon polyp     Diverticulitis     Diverticulosis     Hyperlipidemia     resolved    Hypertension     Irritable bowel syndrome     Lumbar disc disease      Social History     Socioeconomic History    Marital status:    Occupational History    Occupation: retired [employer]   Tobacco Use    Smoking status: Never     Passive exposure: Never    Smokeless tobacco: Never   Substance and Sexual Activity    Alcohol use: No     Alcohol/week: 0.0 standard drinks    Drug use: No    Sexual activity: Yes     Social Determinants of Health     Financial Resource Strain: Low Risk     Difficulty of Paying Living Expenses: Not very hard   Food Insecurity: No Food Insecurity    Worried About Running Out of Food in the Last Year:  Never true    Ran Out of Food in the Last Year: Never true   Transportation Needs: No Transportation Needs    Lack of Transportation (Medical): No    Lack of Transportation (Non-Medical): No   Physical Activity: Inactive    Days of Exercise per Week: 0 days    Minutes of Exercise per Session: 0 min   Stress: No Stress Concern Present    Feeling of Stress : Only a little   Social Connections: Unknown    Frequency of Communication with Friends and Family: More than three times a week    Frequency of Social Gatherings with Friends and Family: Once a week    Active Member of Clubs or Organizations: No    Attends Club or Organization Meetings: Never    Marital Status:    Housing Stability: Low Risk     Unable to Pay for Housing in the Last Year: No    Number of Places Lived in the Last Year: 1    Unstable Housing in the Last Year: No     Family History   Problem Relation Age of Onset    Cataracts Mother     Breast cancer Mother     Glaucoma Father     Cataracts Father     Macular degeneration Father     Colon polyps Father     Glaucoma Sister     Glaucoma Paternal Uncle     Prostate cancer Paternal Uncle     Glaucoma Paternal Uncle     Heart attack Paternal Grandfather 44    Colon cancer Neg Hx     Crohn's disease Neg Hx     Ulcerative colitis Neg Hx      Review of patient's allergies indicates:  No Known Allergies    Current Outpatient Medications:     atorvastatin (LIPITOR) 10 MG tablet, Take 1 tablet (10 mg total) by mouth every evening., Disp: 90 tablet, Rfl: 1    benazepriL (LOTENSIN) 10 MG tablet, TAKE 1 TABLET BY MOUTH EVERY DAY (Patient taking differently: Take 10 mg by mouth nightly.), Disp: 90 tablet, Rfl: 3    chlorhexidine (PERIDEX) 0.12 % solution, Use as directed 10 mLs in the mouth or throat 2 (two) times daily., Disp: , Rfl:     dicyclomine (BENTYL) 20 mg tablet, Take 1 tablet (20 mg total) by mouth 4 (four) times daily before meals and nightly. (Patient taking differently: Take 20 mg by mouth  before meals and at bedtime as needed.), Disp: 720 tablet, Rfl: 1    EScitalopram oxalate (LEXAPRO) 20 MG tablet, TAKE 1 TABLET BY MOUTH EVERY DAY (Patient taking differently: Take 20 mg by mouth once daily.), Disp: 90 tablet, Rfl: 3    gabapentin (NEURONTIN) 300 MG capsule, Take 2 capsules (600 mg total) by mouth 2 (two) times daily., Disp: 120 capsule, Rfl: 2    mupirocin calcium (BACTROBAN NASAL NASL), 1 g by Nasal route 2 (two) times a day., Disp: , Rfl:     omeprazole (PRILOSEC) 40 MG capsule, TAKE 1 CAPSULE BY MOUTH BEFORE BREAKFAST. (Patient taking differently: Take 40 mg by mouth every morning.), Disp: 90 capsule, Rfl: 3    tadalafiL (CIALIS) 20 MG Tab, Take 1 tabley by mouth 30 minutes before sexual activity. Do not exceed 1 tablet every 72 hours (Patient taking differently: Take 20 mg by mouth as needed.), Disp: 10 tablet, Rfl: 3    tamsulosin (FLOMAX) 0.4 mg Cap, TAKE 1 CAPSULE BY MOUTH TWICE A DAY Strength: 0.4 mg (Patient taking differently: Take 0.8 mg by mouth nightly. TAKE 1 CAPSULE BY MOUTH TWICE A DAY Strength: 0.4 mg), Disp: 180 capsule, Rfl: 3    zaleplon (SONATA) 10 MG capsule, Take 1 capsule (10 mg total) by mouth nightly., Disp: 90 capsule, Rfl: 1  There were no vitals taken for this visit.      Neurologic Exam     Mental Status   Oriented to person, place, and time.   Oriented to person.   Oriented to place.   Oriented to time.   Follows 3 step commands.   Attention: normal. Concentration: normal.   Speech: speech is normal   Level of consciousness: alert  Knowledge: consistent with education.   Able to name object. Able to read. Able to repeat. Able to write. Normal comprehension.      Cranial Nerves      CN II   Visual acuity: normal  Right visual field deficit: none  Left visual field deficit: none      CN III, IV, VI   Pupils are equal, round, and reactive to light.  Right pupil: Size: 3 mm. Shape: regular. Reactivity: brisk. Consensual response: intact.   Left pupil: Size: 3 mm. Shape:  regular. Reactivity: brisk. Consensual response: intact.   CN III: no CN III palsy  CN VI: no CN VI palsy  Nystagmus: none   Diplopia: none  Ophthalmoparesis: none  Conjugate gaze: present     CN V   Right facial sensation deficit: none  Left facial sensation deficit: none     CN VII   Right facial weakness: none  Left facial weakness: none     CN VIII   Hearing: intact     CN IX, X   CN IX normal.   CN X normal.      CN XI   Right sternocleidomastoid strength: normal  Left sternocleidomastoid strength: normal  Right trapezius strength: normal  Left trapezius strength: normal     CN XII   Fasciculations: absent  Tongue deviation: none     Motor Exam   Muscle bulk: normal  Overall muscle tone: normal  Right arm pronator drift: absent  Left arm pronator drift: absent     Strength   Right deltoid: 5/5  Left deltoid: 5/5  Right biceps: 5/5  Left biceps: 5/5  Right triceps: 5/5  Left triceps: 5/5  Right wrist flexion: 5/5  Left wrist flexion: 5/5  Right wrist extension: 5/5  Left wrist extension: 5/5  Right interossei: 5/5  Left interossei: 5/5  Right iliopsoas: 4/5  Left iliopsoas: 5/5  Right quadriceps: 5/5  Left quadriceps: 5/5  Right hamstrin/5  Left hamstrin/5  Right anterior tibial: 5/5  Left anterior tibial: 5/5  Right posterior tibial: 5/5  Left posterior tibial: 5/5  Right peroneal: 5/5  Left peroneal: 5/5  Right gastroc: 5/5  Left gastroc: 5/5  Right EHL: 5/5  Left EHL: 5/5     Sensory Exam   Right arm light touch: normal  Left arm light touch: normal  Right leg light touch: numbness right L3 distribution  Left leg light touch: normal     Gait, Coordination, and Reflexes      Gait  Gait: normal      Coordination   Romberg: negative  Finger to nose coordination: normal  Heel to shin coordination: normal  Tandem walking coordination: normal     Tremor   Resting tremor: absent  Intention tremor: absent  Action tremor: absent     Reflexes   Right brachioradialis: 1+  Left brachioradialis: 1+  Right biceps:  1+  Left biceps: 1+  Right triceps: 1+  Left triceps: 1+  Right patellar: 2+  Left patellar: 2+  Right achilles: 0  Left achilles: 0  Right Ng: absent  Left Ng: absent  Right ankle clonus: absent  Left ankle clonus: absent  Right plantar: normal  Left plantar: normal       Physical Exam  Constitutional: Oriented to person, place, and time. Appears well-developed and well-nourished.   HENT:   Head: Normocephalic and atraumatic.   Eyes: Pupils are equal, round, and reactive to light.   Neck: Normal range of motion. Neck supple.   Cardiovascular: Normal rate.    Pulmonary/Chest: Effort normal.   Musculoskeletal: Normal range of motion. Exhibits no edema.   Neurological: Alert and oriented to person, place, and time. Normal Finger-Nose-Finger Test, a normal Heel to Shin Test, a normal Romberg Test and a normal Tandem Gait Test. Gait normal.   Reflex Scores:       Tricep reflexes are 1+ on the right side and 1+ on the left side.       Bicep reflexes are 1+ on the right side and 1+ on the left side.       Brachioradialis reflexes are 1+ on the right side and 1+ on the left side.       Patellar reflexes are 2+ on the right side and 2+ on the left side.       Achilles reflexes are 0 on the right side and 0 on the left side.  Skin: Skin is warm, dry and intact.   Psychiatric: Normal mood and affect. Speech is normal and behavior is normal. Judgment and thought content normal.   Nursing note and vitals reviewed.      Provider dictation:    The patient is a 73 year old male who presents for neurosurgical follow-up. He is s/p L4-L5 bilateral laminectomies and medial facetectomies and resection of right synovial cyst via a right-sided minimally invasive approach in 2022. He did well at that time with resolution of his pre-op symptoms. He presents to clinic today with reports of pain mainly in the right anterior thigh as well as numbness in the anterior/medial thigh which started in January. Updated imaging showed a  right L2-L3 enlarging synovial cyst. He underwent an SHEILA with pain management and reports worsening pain following the SHEILA shooting into the anterior aspect of the right lower extremity down to his ankle. The pain is limiting his activities.     On exam patient has right hip flexion weakness and numbness in the right L3 distribution.     The patient was offered a right L2-L3 minimally invasive hemilaminectomy and medial facetectomy for removal of synovial cyst. He wishes to proceed. He will need PCP and Cardiology clearance.       1. History of lumbar laminectomy for spinal cord decompression  X-Ray Lumbar Complete Including Flex And Ext      2. Lumbar radiculopathy  X-Ray Lumbar Complete Including Flex And Ext

## 2023-04-28 ENCOUNTER — TELEPHONE (OUTPATIENT)
Dept: CARDIOLOGY | Facility: CLINIC | Age: 73
End: 2023-04-28
Payer: MEDICARE

## 2023-05-02 ENCOUNTER — TELEPHONE (OUTPATIENT)
Dept: NEUROSURGERY | Facility: CLINIC | Age: 73
End: 2023-05-02
Payer: MEDICARE

## 2023-05-02 NOTE — TELEPHONE ENCOUNTER
----- Message from Eli Hammonds PA-C sent at 5/1/2023  4:11 PM CDT -----  Contact: Pt  Yes I would recommend seeing PCP to obtain UA/culture and starting abx  ----- Message -----  From: Genny Gruber LPN  Sent: 5/1/2023   4:04 PM CDT  To: Eli Hammonds PA-C    Spoke with patient.States he started yesterday with L flank pain. This morning he continued with L flank pain and pain on urination. Patient denies fever. Denies redness or drainage from surgical incision. Advised I would let provider know and that he may need to follow up with PCP.  ----- Message -----  From: Shannan Somers, Patient Care Assistant  Sent: 5/1/2023   9:40 AM CDT  To: Cheryle OVERTON Staff    Type: Needs Medical Advice    Who Called: Pt  Best Call Back Number: [phone]  Inquiry/Question: Pt is calling to speak with to the nurse in regard to UTI symptoms post op procedure. Please advise.  Thank you~

## 2023-05-02 NOTE — TELEPHONE ENCOUNTER
Called and spoke to patient. Patient continues to still have flank pain. Advised to follow up with PCP for possible U/A culture if indicated. Patient verbalized understanding.

## 2023-05-03 ENCOUNTER — TELEPHONE (OUTPATIENT)
Dept: CARDIOLOGY | Facility: CLINIC | Age: 73
End: 2023-05-03
Payer: MEDICARE

## 2023-05-05 ENCOUNTER — TELEPHONE (OUTPATIENT)
Dept: CARDIOLOGY | Facility: CLINIC | Age: 73
End: 2023-05-05
Payer: MEDICARE

## 2023-05-08 ENCOUNTER — CLINICAL SUPPORT (OUTPATIENT)
Dept: NEUROSURGERY | Facility: CLINIC | Age: 73
End: 2023-05-08
Payer: MEDICARE

## 2023-05-08 NOTE — PROGRESS NOTES
Wound check- Incision was clean, dry and intact. Starry strips were present and showing irritation from the glue. Removed starry strips, advise patient that if they see any changes like redness warm to touch, any drainage or fever, to let us know. But over all doing well and feeling great.

## 2023-05-23 ENCOUNTER — OFFICE VISIT (OUTPATIENT)
Dept: NEUROSURGERY | Facility: CLINIC | Age: 73
End: 2023-05-23
Payer: MEDICARE

## 2023-05-23 VITALS
RESPIRATION RATE: 18 BRPM | HEART RATE: 67 BPM | BODY MASS INDEX: 28.61 KG/M2 | WEIGHT: 178 LBS | SYSTOLIC BLOOD PRESSURE: 117 MMHG | HEIGHT: 66 IN | DIASTOLIC BLOOD PRESSURE: 74 MMHG

## 2023-05-23 DIAGNOSIS — M71.38 SYNOVIAL CYST OF LUMBAR FACET JOINT: Primary | ICD-10-CM

## 2023-05-23 PROCEDURE — 99024 PR POST-OP FOLLOW-UP VISIT: ICD-10-PCS | Mod: POP,,, | Performed by: NEUROLOGICAL SURGERY

## 2023-05-23 PROCEDURE — 99024 POSTOP FOLLOW-UP VISIT: CPT | Mod: POP,,, | Performed by: NEUROLOGICAL SURGERY

## 2023-05-23 NOTE — PROGRESS NOTES
Neurosurgery History & Physical    Patient ID: Vince Chadwick III is a 73 y.o. male.    Chief Complaint   Patient presents with    Follow-up     Patient present to clinic today as 4 week POV       Review of Systems   Constitutional:  Negative for chills, diaphoresis, fatigue and fever.   HENT:  Negative for congestion, hearing loss, rhinorrhea and sore throat.    Eyes:  Negative for photophobia, pain, redness and visual disturbance.   Respiratory:  Negative for cough, chest tightness, shortness of breath and wheezing.    Cardiovascular:  Negative for chest pain, palpitations and leg swelling.   Gastrointestinal:  Negative for abdominal distention, abdominal pain, constipation, diarrhea, nausea and vomiting.   Genitourinary:  Negative for difficulty urinating, dysuria, frequency, hematuria and urgency.   Musculoskeletal:  Positive for back pain. Negative for gait problem, myalgias, neck pain and neck stiffness.   Skin:  Negative for pallor and rash.   Neurological:  Negative for dizziness, seizures, speech difficulty, weakness, light-headedness, numbness and headaches.   Psychiatric/Behavioral:  Negative for confusion, hallucinations and sleep disturbance.      Past Medical History:   Diagnosis Date    ALLERGIC RHINITIS     Arthritis     Cataract     OU    Colon polyp     Diverticulitis     Diverticulosis     Hyperlipidemia     resolved    Hypertension     Irritable bowel syndrome     Lumbar disc disease      Social History     Socioeconomic History    Marital status:    Occupational History    Occupation: retired [employer]   Tobacco Use    Smoking status: Never     Passive exposure: Never    Smokeless tobacco: Never   Substance and Sexual Activity    Alcohol use: No     Alcohol/week: 0.0 standard drinks    Drug use: No    Sexual activity: Yes     Social Determinants of Health     Financial Resource Strain: Low Risk     Difficulty of Paying Living Expenses: Not very hard   Food Insecurity: No Food  Insecurity    Worried About Running Out of Food in the Last Year: Never true    Ran Out of Food in the Last Year: Never true   Transportation Needs: No Transportation Needs    Lack of Transportation (Medical): No    Lack of Transportation (Non-Medical): No   Physical Activity: Inactive    Days of Exercise per Week: 0 days    Minutes of Exercise per Session: 0 min   Stress: No Stress Concern Present    Feeling of Stress : Only a little   Social Connections: Unknown    Frequency of Communication with Friends and Family: More than three times a week    Frequency of Social Gatherings with Friends and Family: Once a week    Active Member of Clubs or Organizations: No    Attends Club or Organization Meetings: Never    Marital Status:    Housing Stability: Low Risk     Unable to Pay for Housing in the Last Year: No    Number of Places Lived in the Last Year: 1    Unstable Housing in the Last Year: No     Family History   Problem Relation Age of Onset    Cataracts Mother     Breast cancer Mother     Glaucoma Father     Cataracts Father     Macular degeneration Father     Colon polyps Father     Glaucoma Sister     Glaucoma Paternal Uncle     Prostate cancer Paternal Uncle     Glaucoma Paternal Uncle     Heart attack Paternal Grandfather 44    Colon cancer Neg Hx     Crohn's disease Neg Hx     Ulcerative colitis Neg Hx      Review of patient's allergies indicates:  No Known Allergies    Current Outpatient Medications:     atorvastatin (LIPITOR) 10 MG tablet, Take 1 tablet (10 mg total) by mouth every evening., Disp: 90 tablet, Rfl: 1    benazepriL (LOTENSIN) 10 MG tablet, TAKE 1 TABLET BY MOUTH EVERY DAY (Patient taking differently: Take 10 mg by mouth nightly.), Disp: 90 tablet, Rfl: 3    dicyclomine (BENTYL) 20 mg tablet, Take 1 tablet (20 mg total) by mouth 4 (four) times daily before meals and nightly. (Patient taking differently: Take 20 mg by mouth before meals and at bedtime as needed.), Disp: 720 tablet, Rfl:  "1    EScitalopram oxalate (LEXAPRO) 20 MG tablet, TAKE 1 TABLET BY MOUTH EVERY DAY (Patient taking differently: Take 20 mg by mouth once daily.), Disp: 90 tablet, Rfl: 3    gabapentin (NEURONTIN) 300 MG capsule, Take 2 capsules (600 mg total) by mouth 2 (two) times daily., Disp: 120 capsule, Rfl: 2    omeprazole (PRILOSEC) 40 MG capsule, TAKE 1 CAPSULE BY MOUTH BEFORE BREAKFAST. (Patient taking differently: Take 40 mg by mouth every morning.), Disp: 90 capsule, Rfl: 3    tadalafiL (CIALIS) 20 MG Tab, Take 1 tabley by mouth 30 minutes before sexual activity. Do not exceed 1 tablet every 72 hours (Patient taking differently: Take 20 mg by mouth as needed.), Disp: 10 tablet, Rfl: 3    tamsulosin (FLOMAX) 0.4 mg Cap, TAKE 1 CAPSULE BY MOUTH TWICE A DAY Strength: 0.4 mg (Patient taking differently: Take 0.8 mg by mouth nightly. TAKE 1 CAPSULE BY MOUTH TWICE A DAY Strength: 0.4 mg), Disp: 180 capsule, Rfl: 3    zaleplon (SONATA) 10 MG capsule, Take 1 capsule (10 mg total) by mouth nightly., Disp: 90 capsule, Rfl: 1    oxyCODONE-acetaminophen (PERCOCET) 7.5-325 mg per tablet, Take 1 tablet by mouth every 6 (six) hours as needed for Pain., Disp: 28 tablet, Rfl: 0  Blood pressure 117/74, pulse 67, resp. rate 18, height 5' 6" (1.676 m), weight 80.7 kg (178 lb).      Neurologic Exam     Mental Status   Oriented to person, place, and time.   Oriented to person.   Oriented to place.   Oriented to time.   Follows 3 step commands.   Attention: normal. Concentration: normal.   Speech: speech is normal   Level of consciousness: alert  Knowledge: consistent with education.   Able to name object. Able to read. Able to repeat. Able to write. Normal comprehension.      Cranial Nerves      CN II   Visual acuity: normal  Right visual field deficit: none  Left visual field deficit: none      CN III, IV, VI   Pupils are equal, round, and reactive to light.  Right pupil: Size: 3 mm. Shape: regular. Reactivity: brisk. Consensual response: " intact.   Left pupil: Size: 3 mm. Shape: regular. Reactivity: brisk. Consensual response: intact.   CN III: no CN III palsy  CN VI: no CN VI palsy  Nystagmus: none   Diplopia: none  Ophthalmoparesis: none  Conjugate gaze: present     CN V   Right facial sensation deficit: none  Left facial sensation deficit: none     CN VII   Right facial weakness: none  Left facial weakness: none     CN VIII   Hearing: intact     CN IX, X   CN IX normal.   CN X normal.      CN XI   Right sternocleidomastoid strength: normal  Left sternocleidomastoid strength: normal  Right trapezius strength: normal  Left trapezius strength: normal     CN XII   Fasciculations: absent  Tongue deviation: none     Motor Exam   Muscle bulk: normal  Overall muscle tone: normal  Right arm pronator drift: absent  Left arm pronator drift: absent     Strength   Right deltoid: 5/5  Left deltoid: 5/5  Right biceps: 5/5  Left biceps: 5/5  Right triceps: 5/5  Left triceps: 5/5  Right wrist flexion: 5/5  Left wrist flexion: 5/5  Right wrist extension: 5/5  Left wrist extension: 5/5  Right interossei: 5/5  Left interossei: 5/5  Right iliopsoas: 5/5  Left iliopsoas: 5/5  Right quadriceps: 5/5  Left quadriceps: 5/5  Right hamstrin/5  Left hamstrin/5  Right anterior tibial: 5/5  Left anterior tibial: 5/5  Right posterior tibial: 5/5  Left posterior tibial: 5/5  Right peroneal: 5/5  Left peroneal: 5/5  Right gastroc: 5/5  Left gastroc: 5/5  Right EHL: 5/5  Left EHL: 5/5     Sensory Exam   Right arm light touch: normal  Left arm light touch: normal  Right leg light touch: normal  Left leg light touch: normal     Gait, Coordination, and Reflexes      Gait  Gait: normal      Coordination   Romberg: negative  Finger to nose coordination: normal  Heel to shin coordination: normal  Tandem walking coordination: normal     Tremor   Resting tremor: absent  Intention tremor: absent  Action tremor: absent     Reflexes   Right brachioradialis: 1+  Left brachioradialis:  1+  Right biceps: 1+  Left biceps: 1+  Right triceps: 1+  Left triceps: 1+  Right patellar: 2+  Left patellar: 2+  Right achilles: 0  Left achilles: 0  Right Ng: absent  Left Ng: absent  Right ankle clonus: absent  Left ankle clonus: absent  Right plantar: normal  Left plantar: normal       Physical Exam  Constitutional: Oriented to person, place, and time. Appears well-developed and well-nourished.   HENT:   Head: Normocephalic and atraumatic.   Eyes: Pupils are equal, round, and reactive to light.   Neck: Normal range of motion. Neck supple.   Cardiovascular: Normal rate.    Pulmonary/Chest: Effort normal.   Musculoskeletal: Normal range of motion. Exhibits no edema.   Neurological: Alert and oriented to person, place, and time. Normal Finger-Nose-Finger Test, a normal Heel to Shin Test, a normal Romberg Test and a normal Tandem Gait Test. Gait normal.   Reflex Scores:       Tricep reflexes are 1+ on the right side and 1+ on the left side.       Bicep reflexes are 1+ on the right side and 1+ on the left side.       Brachioradialis reflexes are 1+ on the right side and 1+ on the left side.       Patellar reflexes are 2+ on the right side and 2+ on the left side.       Achilles reflexes are 0 on the right side and 0 on the left side.  Skin: Skin is warm, dry and intact.   Psychiatric: Normal mood and affect. Speech is normal and behavior is normal. Judgment and thought content normal.   Nursing note and vitals reviewed.      Provider dictation:  Doing very well. Complete resolution of RLE symptoms. Minimal residual low back pain. Is able to walk much longer than before surgery. Pleased with outcome.    Exam with resolution of weakness and numbness.    F/U as planned at 3 mo PO    1. Synovial cyst of lumbar facet joint

## 2023-05-25 ENCOUNTER — LAB VISIT (OUTPATIENT)
Dept: LAB | Facility: HOSPITAL | Age: 73
End: 2023-05-25
Attending: FAMILY MEDICINE
Payer: MEDICARE

## 2023-05-25 DIAGNOSIS — I10 PRIMARY HYPERTENSION: Chronic | ICD-10-CM

## 2023-05-25 DIAGNOSIS — E78.5 HYPERLIPIDEMIA, UNSPECIFIED HYPERLIPIDEMIA TYPE: Chronic | ICD-10-CM

## 2023-05-25 LAB
ALBUMIN SERPL BCP-MCNC: 4 G/DL (ref 3.5–5.2)
ALP SERPL-CCNC: 68 U/L (ref 55–135)
ALT SERPL W/O P-5'-P-CCNC: 6 U/L (ref 10–44)
ANION GAP SERPL CALC-SCNC: 9 MMOL/L (ref 8–16)
AST SERPL-CCNC: 16 U/L (ref 10–40)
BILIRUB SERPL-MCNC: 0.9 MG/DL (ref 0.1–1)
BUN SERPL-MCNC: 15 MG/DL (ref 8–23)
CALCIUM SERPL-MCNC: 9.5 MG/DL (ref 8.7–10.5)
CHLORIDE SERPL-SCNC: 105 MMOL/L (ref 95–110)
CHOLEST SERPL-MCNC: 151 MG/DL (ref 120–199)
CHOLEST/HDLC SERPL: 2.4 {RATIO} (ref 2–5)
CO2 SERPL-SCNC: 30 MMOL/L (ref 23–29)
CREAT SERPL-MCNC: 1 MG/DL (ref 0.5–1.4)
EST. GFR  (NO RACE VARIABLE): >60 ML/MIN/1.73 M^2
GLUCOSE SERPL-MCNC: 100 MG/DL (ref 70–110)
HDLC SERPL-MCNC: 64 MG/DL (ref 40–75)
HDLC SERPL: 42.4 % (ref 20–50)
LDLC SERPL CALC-MCNC: 73 MG/DL (ref 63–159)
NONHDLC SERPL-MCNC: 87 MG/DL
POTASSIUM SERPL-SCNC: 4.2 MMOL/L (ref 3.5–5.1)
PROT SERPL-MCNC: 6.8 G/DL (ref 6–8.4)
SODIUM SERPL-SCNC: 144 MMOL/L (ref 136–145)
TRIGL SERPL-MCNC: 70 MG/DL (ref 30–150)

## 2023-05-25 PROCEDURE — 80061 LIPID PANEL: CPT | Performed by: FAMILY MEDICINE

## 2023-05-25 PROCEDURE — 80053 COMPREHEN METABOLIC PANEL: CPT | Performed by: FAMILY MEDICINE

## 2023-05-25 PROCEDURE — 36415 COLL VENOUS BLD VENIPUNCTURE: CPT | Mod: PN | Performed by: FAMILY MEDICINE

## 2023-06-01 ENCOUNTER — OFFICE VISIT (OUTPATIENT)
Dept: PRIMARY CARE CLINIC | Facility: CLINIC | Age: 73
End: 2023-06-01
Payer: MEDICARE

## 2023-06-01 VITALS
DIASTOLIC BLOOD PRESSURE: 68 MMHG | HEIGHT: 67 IN | BODY MASS INDEX: 27.49 KG/M2 | WEIGHT: 175.13 LBS | TEMPERATURE: 98 F | HEART RATE: 85 BPM | OXYGEN SATURATION: 97 % | SYSTOLIC BLOOD PRESSURE: 124 MMHG | RESPIRATION RATE: 18 BRPM

## 2023-06-01 DIAGNOSIS — I10 PRIMARY HYPERTENSION: Primary | Chronic | ICD-10-CM

## 2023-06-01 DIAGNOSIS — H10.9 CONJUNCTIVITIS, UNSPECIFIED CONJUNCTIVITIS TYPE, UNSPECIFIED LATERALITY: ICD-10-CM

## 2023-06-01 DIAGNOSIS — F41.9 ANXIETY: Chronic | ICD-10-CM

## 2023-06-01 DIAGNOSIS — E78.5 HYPERLIPIDEMIA, UNSPECIFIED HYPERLIPIDEMIA TYPE: Chronic | ICD-10-CM

## 2023-06-01 DIAGNOSIS — G47.00 INSOMNIA, UNSPECIFIED TYPE: Chronic | ICD-10-CM

## 2023-06-01 PROCEDURE — 99214 PR OFFICE/OUTPT VISIT, EST, LEVL IV, 30-39 MIN: ICD-10-PCS | Mod: S$PBB,,, | Performed by: FAMILY MEDICINE

## 2023-06-01 PROCEDURE — 99999 PR PBB SHADOW E&M-EST. PATIENT-LVL III: ICD-10-PCS | Mod: PBBFAC,,, | Performed by: FAMILY MEDICINE

## 2023-06-01 PROCEDURE — 99213 OFFICE O/P EST LOW 20 MIN: CPT | Mod: PBBFAC,PN | Performed by: FAMILY MEDICINE

## 2023-06-01 PROCEDURE — 99999 PR PBB SHADOW E&M-EST. PATIENT-LVL III: CPT | Mod: PBBFAC,,, | Performed by: FAMILY MEDICINE

## 2023-06-01 PROCEDURE — 99214 OFFICE O/P EST MOD 30 MIN: CPT | Mod: S$PBB,,, | Performed by: FAMILY MEDICINE

## 2023-06-01 RX ORDER — TOBRAMYCIN 3 MG/ML
1 SOLUTION/ DROPS OPHTHALMIC EVERY 4 HOURS
Qty: 5 ML | Refills: 0 | Status: SHIPPED | OUTPATIENT
Start: 2023-06-01 | End: 2023-06-08

## 2023-06-01 NOTE — ASSESSMENT & PLAN NOTE
Ongoing stress.  Discussed in detail.  No severe signs or symptoms.  He does remain on Lexapro.  Discussed consultation to LCSW although he declined for now but will keep me informed if

## 2023-06-01 NOTE — PROGRESS NOTES
THIS DOCUMENT WAS MADE IN PART WITH VOICE RECOGNITION SOFTWARE.  OCCASIONALLY THIS SOFTWARE WILL MISINTERPRET WORDS OR PHRASES.      Primary Care Provider Appointment   [facility]       Patient ID: Vicne Chadwick III is a 73 y.o. male.    ASSESSMENT/PLAN by Problem List:    1. Primary hypertension  Assessment & Plan:  Stable and satisfactory.  Continue current medications      2. Hyperlipidemia, unspecified hyperlipidemia type  Assessment & Plan:  Mild increase but still reasonable.  Continue atorvastatin.  Gradually increase exercise and continue healthy nutrition      3. Anxiety  Assessment & Plan:  Ongoing stress.  Discussed in detail.  No severe signs or symptoms.  He does remain on Lexapro.  Discussed consultation to [facility] although he declined for now but will keep me informed if      4. Insomnia, unspecified type  Assessment & Plan:  Persistent but stable.  Continue sonata p.r.n.      5. Conjunctivitis, unspecified conjunctivitis type, unspecified laterality  Comments:  Tobramycin eyedrops, warm compresses.  If not improving let me know.  Orders:  -     tobramycin sulfate 0.3% (TOBREX) 0.3 % ophthalmic solution; Place 1 drop into both eyes every 4 (four) hours. for 7 days  Dispense: 5 mL; Refill: 0         Follow Up:  Three months    Subjective:     Chief Complaint   Patient presents with    Follow-up    Eye Problem     Bilateral burning of the eyes, itchiness, and redness, and watery eyes & Foreign body sensation.      I have reviewed the information entered by the ancillary staff regarding the chief complaint as well as the related history.    HPI    Patient is a/an 73 y.o.  male     Routine follow-up.  Blood pressure looks good.  Did well with recent back surgery.  Does report itchy watery eyes, some crusting.  No eye pain or vision.  May have caught something from his granddaughter.    For complete problem list, past medical history, surgical history, social history,  "etc., see appropriate section in the electronic medical record    Review of Systems   Constitutional:  Positive for activity change.   Eyes:  Positive for discharge and itching. Negative for photophobia.   Respiratory: Negative.     Gastrointestinal: Negative.    Genitourinary: Negative.    Psychiatric/Behavioral:  Negative for suicidal ideas. The patient is nervous/anxious.      Objective     Physical Exam  Vitals reviewed.   Constitutional:       General: He is not in acute distress.     Appearance: He is well-developed. He is not diaphoretic.   HENT:      Head: Normocephalic and atraumatic.   Eyes:      General: No scleral icterus.     Comments: Mild bilateral conjunctival injection, mild inflammation of the lids with crusting.   Cardiovascular:      Rate and Rhythm: Normal rate and regular rhythm.      Heart sounds: Normal heart sounds. No murmur heard.    No gallop.   Pulmonary:      Effort: Pulmonary effort is normal. No respiratory distress.   Musculoskeletal:      Cervical back: Normal range of motion and neck supple.   Skin:     General: Skin is warm and dry.   Neurological:      Mental Status: He is alert and oriented to person, place, and time.      Deep Tendon Reflexes: Reflexes are normal and symmetric.   Psychiatric:         Behavior: Behavior normal.     Vitals:    06/01/23 0938   BP: 124/68   BP Location: Left arm   Patient Position: Sitting   BP Method: Medium (Manual)   Pulse: 85   Resp: 18   Temp: 98.2 °F (36.8 °C)   TempSrc: Oral   SpO2: 97%   Weight: 79.5 kg (175 lb 2.5 oz)   Height: 5' 7" (1.702 m)       "

## 2023-06-01 NOTE — ASSESSMENT & PLAN NOTE
Mild increase but still reasonable.  Continue atorvastatin.  Gradually increase exercise and continue healthy nutrition

## 2023-06-20 ENCOUNTER — OFFICE VISIT (OUTPATIENT)
Dept: CARDIOLOGY | Facility: CLINIC | Age: 73
End: 2023-06-20
Payer: MEDICARE

## 2023-06-20 VITALS
WEIGHT: 175.5 LBS | RESPIRATION RATE: 18 BRPM | HEIGHT: 67 IN | DIASTOLIC BLOOD PRESSURE: 72 MMHG | SYSTOLIC BLOOD PRESSURE: 122 MMHG | BODY MASS INDEX: 27.55 KG/M2

## 2023-06-20 DIAGNOSIS — M71.38 SYNOVIAL CYST OF LUMBAR FACET JOINT: ICD-10-CM

## 2023-06-20 DIAGNOSIS — I25.84 CORONARY ARTERY CALCIFICATION: Chronic | ICD-10-CM

## 2023-06-20 DIAGNOSIS — I70.0 AORTIC CALCIFICATION: Chronic | ICD-10-CM

## 2023-06-20 DIAGNOSIS — E78.2 MIXED HYPERLIPIDEMIA: Chronic | ICD-10-CM

## 2023-06-20 DIAGNOSIS — I10 PRIMARY HYPERTENSION: Primary | Chronic | ICD-10-CM

## 2023-06-20 DIAGNOSIS — I25.10 CORONARY ARTERY CALCIFICATION: Chronic | ICD-10-CM

## 2023-06-20 PROCEDURE — 99999 PR PBB SHADOW E&M-EST. PATIENT-LVL III: CPT | Mod: PBBFAC,,, | Performed by: INTERNAL MEDICINE

## 2023-06-20 PROCEDURE — 99214 OFFICE O/P EST MOD 30 MIN: CPT | Mod: S$PBB,,, | Performed by: INTERNAL MEDICINE

## 2023-06-20 PROCEDURE — 99214 PR OFFICE/OUTPT VISIT, EST, LEVL IV, 30-39 MIN: ICD-10-PCS | Mod: S$PBB,,, | Performed by: INTERNAL MEDICINE

## 2023-06-20 PROCEDURE — 99999 PR PBB SHADOW E&M-EST. PATIENT-LVL III: ICD-10-PCS | Mod: PBBFAC,,, | Performed by: INTERNAL MEDICINE

## 2023-06-20 PROCEDURE — 99213 OFFICE O/P EST LOW 20 MIN: CPT | Mod: PBBFAC,PO | Performed by: INTERNAL MEDICINE

## 2023-06-20 NOTE — PROGRESS NOTES
Subjective:    Patient ID:  Vince Chadwick III is a 73 y.o. male patient here for evaluation Follow-up (1 year f/u- no concerns)      History of Present Illness:  Follow-up.  Status post back surgery, cyst, neurologically improved and stable    No angina no dyspnea no arrhythmia.  Noninvasive cardiac screening 2/20 2-nuclear study and normal echo.  History of abnormal baseline EKG with right bundle-branch block.  CTA of the abdomen and pelvis with aortic calcification, no aneurysm.    Cardiac review of systems otherwise unremarkable.             Review of patient's allergies indicates:  No Known Allergies    Past Medical History:   Diagnosis Date    ALLERGIC RHINITIS     Arthritis     Cataract     OU    Colon polyp     Diverticulitis     Diverticulosis     Hyperlipidemia     resolved    Hypertension     Irritable bowel syndrome     Lumbar disc disease      Past Surgical History:   Procedure Laterality Date    APPENDECTOMY      CIRCUMCISION      COLONOSCOPY  06/02/2011    KARLA.    One 1 mm polyp in the sigmoid colon.  FRAGMENT OF NONNEOPLASTIC COLONIC MUCOSA.  Sigmoid diverticulosis.  Spasms c/w IBS.  repeat in 7-8 years    COLONOSCOPY  09/12/2006    Dr. Novak, [place]    COLONOSCOPY N/A 5/13/2020    Procedure: COLONOSCOPY;  Surgeon: Davy Novak Jr., MD;  Location: [facility];  Service: Endoscopy;  Laterality: N/A;    EPIDURAL STEROID INJECTION INTO LUMBAR SPINE N/A 11/15/2021    Procedure: Injection-steroid-epidural-lumbar L5/S1;  Surgeon: Khang Sanchez MD;  Location: [facility] OR;  Service: Pain Management;  Laterality: N/A;    EPIDURAL STEROID INJECTION INTO LUMBAR SPINE N/A 12/13/2021    Procedure: Injection-steroid-epidural-lumbar L5/S1;  Surgeon: Khang Sanchez MD;  Location: [facility] OR;  Service: Pain Management;  Laterality: N/A;    EPIDURAL STEROID INJECTION INTO LUMBAR SPINE N/A 3/10/2023    Procedure: Injection-steroid-epidural-lumbar L2/3;  Surgeon: Khang Sanchez MD;  Location: [facility]  OR;  Service: Pain Management;  Laterality: N/A;    ESOPHAGOGASTRODUODENOSCOPY N/A 5/13/2020    Procedure: EGD (ESOPHAGOGASTRODUODENOSCOPY);  Surgeon: Davy Novak Jr., MD;  Location: [facility] ENDO;  Service: Endoscopy;  Laterality: N/A;    EYE SURGERY      FOOT SURGERY      x 2    INJECTION OF ANESTHETIC AGENT AROUND GANGLION IMPAR Right 2/17/2023    Procedure: BLOCK, facet cyst aspiration right side L2/3;  Surgeon: Khang Sanchez MD;  Location: [facility] OR;  Service: Pain Management;  Laterality: Right;    LAMINECTOMY USING MINIMALLY INVASIVE TECHNIQUE N/A 1/26/2022    Procedure: LAMINECTOMY, SPINE, MINIMALLY INVASIVE  RIGHT MIS APPROACH RESECTION L4-5 SYNOVIAL CYST AND BILATERAL LAMINECTOMY/MEDIAL FACETECTOMY;  Surgeon: Penny Lobato MD;  Location: [facility] OR;  Service: Neurosurgery;  Laterality: N/A;    LAMINECTOMY USING MINIMALLY INVASIVE TECHNIQUE N/A 4/26/2023    Procedure: LAMINECTOMY, SPINE, MINIMALLY INVASIVE    RIGHT L2-L3 MIS HEMILAMINECTOMY/MEDIAL FACETECTOMY FOR REMOVAL SYNOVIAL CYST;  Surgeon: Penny Lobato MD;  Location: [facility] OR;  Service: Neurosurgery;  Laterality: N/A;    LUMBAR EPIDURAL INJECTION      PILONIDAL CYST DRAINAGE      s/p chalazion removal      OD    TONSILLECTOMY       Social History     Tobacco Use    Smoking status: Never     Passive exposure: Never    Smokeless tobacco: Never   Substance Use Topics    Alcohol use: No     Alcohol/week: 0.0 standard drinks    Drug use: No        Review of Systems:    As noted in HPI in addition      REVIEW OF SYSTEMS  Review of Systems   Constitutional: Negative for decreased appetite, diaphoresis, night sweats, weight gain and weight loss.   HENT:  Negative for nosebleeds and odynophagia.    Eyes:  Negative for double vision and photophobia.   Cardiovascular:  Negative for chest pain, claudication, cyanosis, dyspnea on exertion, irregular heartbeat, leg swelling, near-syncope, orthopnea, palpitations, paroxysmal nocturnal dyspnea and  syncope.   Respiratory:  Negative for cough, hemoptysis, shortness of breath and wheezing.    Hematologic/Lymphatic: Negative for adenopathy.   Skin:  Negative for flushing, skin cancer and suspicious lesions.   Musculoskeletal:  Negative for gout, myalgias and neck pain.   Gastrointestinal:  Negative for abdominal pain, heartburn, hematemesis and hematochezia.   Genitourinary:  Negative for bladder incontinence, hesitancy and nocturia.   Neurological:  Negative for focal weakness, headaches, light-headedness and paresthesias.   Psychiatric/Behavioral:  Negative for memory loss and substance abuse.             Objective:        Vitals:    06/20/23 1337   BP: 122/72   Resp: 18       Lab Results   Component Value Date    WBC 7.83 04/18/2023    HGB 15.0 04/18/2023    HCT 47.2 04/18/2023     04/18/2023    CHOL 151 05/25/2023    TRIG 70 05/25/2023    HDL 64 05/25/2023    ALT 6 (L) 05/25/2023    AST 16 05/25/2023     05/25/2023    K 4.2 05/25/2023     05/25/2023    CREATININE 1.0 05/25/2023    BUN 15 05/25/2023    CO2 30 (H) 05/25/2023    TSH 2.797 08/20/2019    PSA 2.9 10/26/2022    INR 1.0 04/18/2023        ECHOCARDIOGRAM RESULTS  Results for orders placed in visit on 02/22/22    Echo    Interpretation Summary  · Concentric remodeling and normal systolic function.  · The estimated ejection fraction is 65%.  · Grade I left ventricular diastolic dysfunction.  · Normal right ventricular size with normal right ventricular systolic function.  · Intermediate central venous pressure (8 mmHg).  · The estimated PA systolic pressure is 24 mmHg.        CURRENT/PREVIOUS VISIT EKG  Results for orders placed or performed during the hospital encounter of 04/18/23   EKG 12-LEAD    Collection Time: 04/18/23  1:17 PM    Narrative    Test Reason : Z01.818,    Vent. Rate : 077 BPM     Atrial Rate : 077 BPM     P-R Int : 148 ms          QRS Dur : 104 ms      QT Int : 402 ms       P-R-T Axes : 068 071 032 degrees     QTc  Int : 454 ms    Normal sinus rhythm  Incomplete right bundle branch block  Borderline Abnormal ECG  When compared with ECG of 02-MAR-2022 08:37,  No significant change was found  Confirmed by Clive Estrella MD ([id number]) on 4/19/2023 10:57:54 PM    Referred By: TIANA HENNESSY           Confirmed By:Clive Estrella MD     No valid procedures specified.   Results for orders placed during the hospital encounter of 02/22/22    Nuclear Stress - Cardiology Interpreted    Interpretation Summary    Normal myocardial perfusion scan. There is no evidence of myocardial ischemia or infarction.    The EKG portion of this study is negative for ischemia.    No valid procedures specified.    PHYSICAL EXAM  CONSTITUTIONAL: Well built, well nourished in no apparent distress  NECK: no carotid bruit, no JVD  LUNGS: CTA  CHEST WALL: no tenderness,  HEART: regular rate and rhythm, S1, S2 normal, no murmur, click, rub or gallop   ABDOMEN: soft, non-tender; bowel sounds normal; no masses,  no organomegaly  EXTREMITIES: Extremities normal, no edema, no calf tenderness noted  NEURO: AAO X 3    I HAVE REVIEWED :    The vital signs, nurses notes, and all the pertinent radiology and labs.         Current Outpatient Medications   Medication Instructions    atorvastatin (LIPITOR) 10 mg, Oral, Nightly    benazepriL (LOTENSIN) 10 MG tablet TAKE 1 TABLET BY MOUTH EVERY DAY    dicyclomine (BENTYL) 20 mg, Oral, Before meals & nightly    EScitalopram oxalate (LEXAPRO) 20 MG tablet TAKE 1 TABLET BY MOUTH EVERY DAY    gabapentin (NEURONTIN) 600 mg, Oral, 2 times daily    omeprazole (PRILOSEC) 40 MG capsule TAKE 1 CAPSULE BY MOUTH BEFORE BREAKFAST.    tadalafiL (CIALIS) 20 MG Tab Take 1 tabley by mouth 30 minutes before sexual activity. Do not exceed 1 tablet every 72 hours    tamsulosin (FLOMAX) 0.4 mg Cap TAKE 1 CAPSULE BY MOUTH TWICE A DAY Strength: 0.4 mg    zaleplon (SONATA) 10 mg, Oral, Nightly          Assessment:   Hypertension,  dyslipidemia.  Negative noninvasive cardiac assessment 2/22.  Status post back surgery for synovial cyst, compression symptoms, currently asymptomatic, good result surgery.  Aortic atherosclerotic disease without aneurysm noted on routine CT of the abdomen and pelvis.      Plan:   Meds reviewed and reconciled.  Recommend no changes.  Risk factor modification.  Follow up 6 months.          No follow-ups on file.

## 2023-07-04 DIAGNOSIS — E78.2 MIXED HYPERLIPIDEMIA: Chronic | ICD-10-CM

## 2023-07-04 DIAGNOSIS — I10 PRIMARY HYPERTENSION: Primary | ICD-10-CM

## 2023-07-04 NOTE — TELEPHONE ENCOUNTER
No care due was identified.  [facility] Embedded Care Due Messages. Reference number: [id number].   7/04/2023 10:52:59 AM CDT

## 2023-07-05 RX ORDER — BENAZEPRIL HYDROCHLORIDE 10 MG/1
10 TABLET ORAL DAILY
Qty: 90 TABLET | Refills: 1 | Status: SHIPPED | OUTPATIENT
Start: 2023-07-05 | End: 2024-01-09 | Stop reason: SDUPTHER

## 2023-07-05 RX ORDER — ATORVASTATIN CALCIUM 10 MG/1
10 TABLET, FILM COATED ORAL NIGHTLY
Qty: 90 TABLET | Refills: 1 | Status: SHIPPED | OUTPATIENT
Start: 2023-07-05 | End: 2024-01-09 | Stop reason: SDUPTHER

## 2023-07-18 ENCOUNTER — OFFICE VISIT (OUTPATIENT)
Dept: FAMILY MEDICINE | Facility: CLINIC | Age: 73
End: 2023-07-18
Payer: MEDICARE

## 2023-07-18 ENCOUNTER — TELEPHONE (OUTPATIENT)
Dept: FAMILY MEDICINE | Facility: CLINIC | Age: 73
End: 2023-07-18

## 2023-07-18 VITALS
SYSTOLIC BLOOD PRESSURE: 122 MMHG | BODY MASS INDEX: 26.1 KG/M2 | HEART RATE: 72 BPM | WEIGHT: 172.19 LBS | DIASTOLIC BLOOD PRESSURE: 78 MMHG | OXYGEN SATURATION: 96 % | HEIGHT: 68 IN

## 2023-07-18 DIAGNOSIS — I25.10 CORONARY ARTERY CALCIFICATION: Chronic | ICD-10-CM

## 2023-07-18 DIAGNOSIS — I25.84 CORONARY ARTERY CALCIFICATION: Chronic | ICD-10-CM

## 2023-07-18 DIAGNOSIS — F33.1 MODERATE EPISODE OF RECURRENT MAJOR DEPRESSIVE DISORDER: ICD-10-CM

## 2023-07-18 DIAGNOSIS — M51.36 DDD (DEGENERATIVE DISC DISEASE), LUMBAR: ICD-10-CM

## 2023-07-18 DIAGNOSIS — N40.0 BENIGN PROSTATIC HYPERPLASIA, UNSPECIFIED WHETHER LOWER URINARY TRACT SYMPTOMS PRESENT: ICD-10-CM

## 2023-07-18 DIAGNOSIS — F41.9 ANXIETY: Chronic | ICD-10-CM

## 2023-07-18 DIAGNOSIS — E78.2 MIXED HYPERLIPIDEMIA: Chronic | ICD-10-CM

## 2023-07-18 DIAGNOSIS — I70.0 AORTIC CALCIFICATION: Chronic | ICD-10-CM

## 2023-07-18 DIAGNOSIS — I10 PRIMARY HYPERTENSION: Chronic | ICD-10-CM

## 2023-07-18 DIAGNOSIS — Z00.00 ENCOUNTER FOR PREVENTIVE HEALTH EXAMINATION: Primary | ICD-10-CM

## 2023-07-18 PROBLEM — Z01.818 PREOP EXAMINATION: Status: RESOLVED | Noted: 2023-04-20 | Resolved: 2023-07-18

## 2023-07-18 PROBLEM — F33.9 RECURRENT MAJOR DEPRESSIVE DISORDER: Status: ACTIVE | Noted: 2023-07-18

## 2023-07-18 PROBLEM — F32.9 REACTIVE DEPRESSION: Status: ACTIVE | Noted: 2023-07-18

## 2023-07-18 PROCEDURE — 99999 PR PBB SHADOW E&M-EST. PATIENT-LVL IV: ICD-10-PCS | Mod: PBBFAC,,, | Performed by: NURSE PRACTITIONER

## 2023-07-18 PROCEDURE — 99214 OFFICE O/P EST MOD 30 MIN: CPT | Mod: PBBFAC,PO | Performed by: NURSE PRACTITIONER

## 2023-07-18 PROCEDURE — G0439 PPPS, SUBSEQ VISIT: HCPCS | Mod: ,,, | Performed by: NURSE PRACTITIONER

## 2023-07-18 PROCEDURE — G0439 PR MEDICARE ANNUAL WELLNESS SUBSEQUENT VISIT: ICD-10-PCS | Mod: ,,, | Performed by: NURSE PRACTITIONER

## 2023-07-18 PROCEDURE — 99999 PR PBB SHADOW E&M-EST. PATIENT-LVL IV: CPT | Mod: PBBFAC,,, | Performed by: NURSE PRACTITIONER

## 2023-07-18 NOTE — TELEPHONE ENCOUNTER
Good morning,   I saw  in clinic today. He seems to be having a difficult time with depression and anxiety. I recommended therapy and possibly some medication management, but he wanted to discuss with . He is scheduled to see him in September, but I think it would be better if sooner. Can he be scheduled in 2 weeks or so?  Thank you,   Radha

## 2023-07-18 NOTE — PATIENT INSTRUCTIONS
Counseling and Referral of Other Preventative  (Italic type indicates deductible and co-insurance are waived)    Patient Name: Vince Chadwick  Today's Date: 7/18/2023    Health Maintenance       Date Due Completion Date    Aspirin/Antiplatelet Therapy Never done ---    Shingles Vaccine (1 of 2) Never done ---    COVID-19 Vaccine (4 - Pfizer series) 02/01/2022 12/7/2021    Influenza Vaccine (1) 09/01/2023 9/24/2022    Lipid Panel 05/25/2024 5/25/2023    High Dose Statin 07/09/2024 7/9/2023    TETANUS VACCINE 09/22/2025 9/22/2015    Colorectal Cancer Screening 05/13/2027 5/13/2020    Override on 6/2/2011: Done (legay documents, repeat in 8 years)        No orders of the defined types were placed in this encounter.      The following information is provided to all patients.  This information is to help you find resources for any of the problems found today that may be affecting your health:                Living healthy guide: www.[facility].[place].gov      Understanding Diabetes: www.diabetes.org      Eating healthy: www.cdc.gov/healthyweight      CDC home safety checklist: www.cdc.gov/steadi/patient.html      Agency on Aging: www.goea.[place].gov      Alcoholics anonymous (AA): www.aa.org      Physical Activity: www.brittanie.nih.gov/af5bhqx      Tobacco use: www.quitwithusla.org

## 2023-07-18 NOTE — PROGRESS NOTES
"  Vince Chadwick presented for a  Medicare AWV and comprehensive Health Risk Assessment today. The following components were reviewed and updated:    Medical history  Family History  Social history  Allergies and Current Medications  Health Risk Assessment  Health Maintenance  Care Team     ** See Completed Assessments for Annual Wellness Visit within the encounter summary.**     The following assessments were completed:  Living Situation  CAGE  Depression Screening  Timed Get Up and Go  Whisper Test  Cognitive Function Screening      Nutrition Screening  ADL Screening  PAQ Screening    Review for Opioid Screening: Pt does not have Rx for Opioids  Review for Substance Use Disorders: Patient does not use substance      Vitals:    07/18/23 1003   BP: 122/78   BP Location: Left arm   Patient Position: Sitting   BP Method: Medium (Manual)   Pulse: 72   SpO2: 96%   Weight: 78.1 kg (172 lb 2.9 oz)   Height: 5' 8" (1.727 m)     Body mass index is 26.18 kg/m².  Physical Exam  Vitals reviewed.   Constitutional:       Appearance: Normal appearance.   Cardiovascular:      Rate and Rhythm: Normal rate and regular rhythm.      Pulses: Normal pulses.      Heart sounds: Normal heart sounds.   Pulmonary:      Effort: Pulmonary effort is normal. No respiratory distress.      Breath sounds: Normal breath sounds.   Skin:     General: Skin is warm and dry.   Neurological:      Mental Status: He is alert and oriented to person, place, and time.   Psychiatric:         Attention and Perception: Attention normal.         Mood and Affect: Mood is anxious and depressed.         Speech: Speech normal.         Behavior: Behavior is hyperactive.         Thought Content: Thought content normal. Thought content does not include homicidal or suicidal ideation. Thought content does not include homicidal or suicidal plan.         Cognition and Memory: Cognition normal.         Judgment: Judgment normal.     Diagnoses and health risks identified today " and associated recommendations/orders:    1. Encounter for preventive health examination  Reviewed and discussed health maintenance.      2. Primary hypertension  Stable- continue current treatment and follow up routinely with PCP     3. Mixed hyperlipidemia  Stable- continue current treatment and follow up routinely with PCP     4. Coronary artery calcification  Stable- continue current treatment and follow up routinely with PCP     5. Aortic calcification  Stable- continue current treatment and follow up routinely with PCP     6. Benign prostatic hyperplasia, unspecified whether lower urinary tract symptoms present  Stable- continue current treatment and follow up routinely with PCP     7. DDD (degenerative disc disease), lumbar  Stable- continue current treatment and follow up routinely with PCP     8. Anxiety  Uncontrolled-  Lengthy discussion on treatment options. Ie. Therapy, medication management, supportive groups, gym memberships.   Pt declined at this time, but is willing to consider once he speaks with his pcp  Continue lexapro daily for now  Follow up with pcp in 2 weeks requested. RTC sooner if needed    9. Moderate episode of recurrent major depressive disorder  Uncontrolled-  Lengthy discussion on treatment options. Ie. Therapy, medication management, supportive groups, gym memberships.   Pt declined at this time, but is willing to consider once he speaks with his pcp  Continue lexapro daily for now  Follow up with pcp in 2 weeks requested. RTC sooner if needed    Provided Vince with a 5-10 year written screening schedule and personal prevention plan. Recommendations were developed using the USPSTF age appropriate recommendations. Education, counseling, and referrals were provided as needed. After Visit Summary printed and given to patient which includes a list of additional screenings\tests needed.    Radha Huang NP

## 2023-07-24 ENCOUNTER — OFFICE VISIT (OUTPATIENT)
Dept: NEUROSURGERY | Facility: CLINIC | Age: 73
End: 2023-07-24
Payer: MEDICARE

## 2023-07-24 VITALS
HEIGHT: 68 IN | DIASTOLIC BLOOD PRESSURE: 61 MMHG | SYSTOLIC BLOOD PRESSURE: 107 MMHG | RESPIRATION RATE: 18 BRPM | BODY MASS INDEX: 26.1 KG/M2 | HEART RATE: 88 BPM | WEIGHT: 172.19 LBS

## 2023-07-24 DIAGNOSIS — Z98.890 H/O OF HEMILAMINECTOMY: Primary | ICD-10-CM

## 2023-07-24 PROCEDURE — 99024 POSTOP FOLLOW-UP VISIT: CPT | Mod: POP,,, | Performed by: NEUROLOGICAL SURGERY

## 2023-07-24 PROCEDURE — 99213 OFFICE O/P EST LOW 20 MIN: CPT | Mod: PBBFAC,PN | Performed by: NEUROLOGICAL SURGERY

## 2023-07-24 PROCEDURE — 99999 PR PBB SHADOW E&M-EST. PATIENT-LVL III: ICD-10-PCS | Mod: PBBFAC,,, | Performed by: NEUROLOGICAL SURGERY

## 2023-07-24 PROCEDURE — 99024 PR POST-OP FOLLOW-UP VISIT: ICD-10-PCS | Mod: POP,,, | Performed by: NEUROLOGICAL SURGERY

## 2023-07-24 PROCEDURE — 99999 PR PBB SHADOW E&M-EST. PATIENT-LVL III: CPT | Mod: PBBFAC,,, | Performed by: NEUROLOGICAL SURGERY

## 2023-07-24 NOTE — PROGRESS NOTES
Neurosurgery History and Physical    Patient ID: Vince Chadwick III is a 73 y.o. male.    Chief Complaint   Patient presents with    Post-op Evaluation     3 month POV, L2-3 hemilaminectomy.      Patient is a 73 year old male who presents today for a 3 month post op visit he is now s/p L2-3 hemilaminectomy and resection of synovial cyst doing very well. He has no issues walking long distances as he did preoperatively. He denies any radicular leg pain, numbness, weakness or burning. He has a cold sensation on the inside of his right leg occasionally, but this is only mild. He still has axial back pain, but this is no worse after surgery. He overall is very pleased with his outcome.     Review of Systems   Musculoskeletal:  Positive for back pain. Negative for gait problem.   Neurological:  Negative for weakness and numbness.   All other systems reviewed and are negative.    Past Medical History:   Diagnosis Date    ALLERGIC RHINITIS     Arthritis     Cataract     OU    Colon polyp     Diverticulitis     Diverticulosis     Hyperlipidemia     resolved    Hypertension     Irritable bowel syndrome     Lumbar disc disease      Social History     Socioeconomic History    Marital status:    Occupational History    Occupation: retired [employer]   Tobacco Use    Smoking status: Never     Passive exposure: Never    Smokeless tobacco: Never   Substance and Sexual Activity    Alcohol use: No     Alcohol/week: 0.0 standard drinks    Drug use: No    Sexual activity: Yes     Social Determinants of Health     Financial Resource Strain: Medium Risk    Difficulty of Paying Living Expenses: Somewhat hard   Food Insecurity: No Food Insecurity    Worried About Running Out of Food in the Last Year: Never true    Ran Out of Food in the Last Year: Never true   Transportation Needs: No Transportation Needs    Lack of Transportation (Medical): No    Lack of Transportation (Non-Medical): No   Physical Activity: Inactive    Days of  Exercise per Week: 0 days    Minutes of Exercise per Session: 0 min   Stress: Stress Concern Present    Feeling of Stress : To some extent   Social Connections: Unknown    Frequency of Communication with Friends and Family: More than three times a week    Frequency of Social Gatherings with Friends and Family: Never    Active Member of Clubs or Organizations: No    Attends Club or Organization Meetings: Never    Marital Status:    Housing Stability: Low Risk     Unable to Pay for Housing in the Last Year: No    Number of Places Lived in the Last Year: 1    Unstable Housing in the Last Year: No     Family History   Problem Relation Age of Onset    Cataracts Mother     Breast cancer Mother     Glaucoma Father     Cataracts Father     Macular degeneration Father     Colon polyps Father     Glaucoma Sister     Glaucoma Paternal Uncle     Prostate cancer Paternal Uncle     Glaucoma Paternal Uncle     Heart attack Paternal Grandfather 44    Colon cancer Neg Hx     Crohn's disease Neg Hx     Ulcerative colitis Neg Hx      Review of patient's allergies indicates:  No Known Allergies    Current Outpatient Medications:     atorvastatin (LIPITOR) 10 MG tablet, Take 1 tablet (10 mg total) by mouth every evening., Disp: 90 tablet, Rfl: 1    benazepriL (LOTENSIN) 10 MG tablet, Take 1 tablet (10 mg total) by mouth once daily., Disp: 90 tablet, Rfl: 1    EScitalopram oxalate (LEXAPRO) 20 MG tablet, TAKE 1 TABLET BY MOUTH EVERY DAY (Patient taking differently: Take 20 mg by mouth once daily.), Disp: 90 tablet, Rfl: 3    tamsulosin (FLOMAX) 0.4 mg Cap, TAKE 1 CAPSULE BY MOUTH TWICE A DAY Strength: 0.4 mg (Patient taking differently: Take 0.8 mg by mouth nightly. TAKE 1 CAPSULE BY MOUTH TWICE A DAY Strength: 0.4 mg), Disp: 180 capsule, Rfl: 3    zaleplon (SONATA) 10 MG capsule, Take 1 capsule (10 mg total) by mouth nightly., Disp: 90 capsule, Rfl: 1    famotidine (PEPCID) 20 MG tablet, Take 1 tablet (20 mg total) by mouth 2  "(two) times daily. for 5 days (Patient not taking: Reported on 7/18/2023), Disp: 10 tablet, Rfl: 0    promethazine-dextromethorphan (PROMETHAZINE-DM) 6.25-15 mg/5 mL Syrp, Take 10 mLs by mouth every 6 (six) hours as needed (nausea). (Patient not taking: Reported on 7/18/2023), Disp: 118 mL, Rfl: 0    tadalafiL (CIALIS) 20 MG Tab, Take 1 tabley by mouth 30 minutes before sexual activity. Do not exceed 1 tablet every 72 hours (Patient not taking: Reported on 7/24/2023), Disp: 10 tablet, Rfl: 3  Blood pressure 107/61, pulse 88, resp. rate 18, height 5' 8" (1.727 m), weight 78.1 kg (172 lb 2.9 oz).      Neurologic Exam     Mental Status   Oriented to person, place, and time.   Attention: normal. Concentration: normal.   Speech: speech is normal   Level of consciousness: alert  Knowledge: good.     Cranial Nerves     CN III, IV, VI   Extraocular motions are normal.     CN VII   Facial expression full, symmetric.     Motor Exam   Muscle bulk: normal  Overall muscle tone: normal    Strength   Right deltoid: 5/5  Left deltoid: 5/5  Right biceps: 5/5  Left biceps: 5/5  Right triceps: 5/5  Left triceps: 5/5  Right iliopsoas: 5/5  Left iliopsoas: 5/5  Right quadriceps: 5/5  Left quadriceps: 5/5  Right anterior tibial: 5/5  Left anterior tibial: 5/5  Right posterior tibial: 5/5  Left posterior tibial: 5/5  Right peroneal: 5/5  Left peroneal: 5/5  Right gastroc: 5/5  Left gastroc: 5/5    Sensory Exam   Light touch normal.     Gait, Coordination, and Reflexes     Gait  Gait: normal    Tremor   Resting tremor: absent  Intention tremor: absent    Reflexes   Right brachioradialis: 1+  Left brachioradialis: 1+  Right biceps: 1+  Left biceps: 1+  Right triceps: 1+  Left triceps: 1+  Right patellar: 2+  Left patellar: 2+  Right achilles: 0  Left achilles: 0    Physical Exam  Eyes:      Extraocular Movements: EOM normal.   Neurological:      Mental Status: He is oriented to person, place, and time.      Gait: Gait is intact.      Deep " "Tendon Reflexes:      Reflex Scores:       Tricep reflexes are 1+ on the right side and 1+ on the left side.       Bicep reflexes are 1+ on the right side and 1+ on the left side.       Brachioradialis reflexes are 1+ on the right side and 1+ on the left side.       Patellar reflexes are 2+ on the right side and 2+ on the left side.       Achilles reflexes are 0 on the right side and 0 on the left side.  Psychiatric:         Speech: Speech normal.     Incisional scar is well healed.     Vital Signs  Pulse: 88  Resp: 18  BP: 107/61  BP Location: Left arm  Patient Position: Sitting  Pain Score:   1  Pain Loc: Back  Height and Weight  Height: 5' 8" (172.7 cm)  Weight: 78.1 kg (172 lb 2.9 oz)  BSA (Calculated - sq m): 1.94 sq meters  BMI (Calculated): 26.2  Weight in (lb) to have BMI = 25: 164.1]    Provider dictation:  Patient is a 73 year old male who presents 3 months s/p L2-3 hemilaminectomy and resection of synovial cyst doing very well.   He has complete resolution of his symptoms. He will return to clinic as needed at this time.     Visit Diagnosis:  H/O of hemilaminectomy        "

## 2023-07-28 ENCOUNTER — SOCIAL WORK (OUTPATIENT)
Dept: PRIMARY CARE CLINIC | Facility: CLINIC | Age: 73
End: 2023-07-28

## 2023-07-28 ENCOUNTER — OFFICE VISIT (OUTPATIENT)
Dept: PRIMARY CARE CLINIC | Facility: CLINIC | Age: 73
End: 2023-07-28
Payer: MEDICARE

## 2023-07-28 VITALS
WEIGHT: 172.06 LBS | DIASTOLIC BLOOD PRESSURE: 68 MMHG | HEIGHT: 68 IN | BODY MASS INDEX: 26.08 KG/M2 | RESPIRATION RATE: 18 BRPM | TEMPERATURE: 98 F | OXYGEN SATURATION: 96 % | SYSTOLIC BLOOD PRESSURE: 110 MMHG | HEART RATE: 67 BPM

## 2023-07-28 DIAGNOSIS — F41.9 ANXIETY: Primary | ICD-10-CM

## 2023-07-28 PROCEDURE — 99999 PR PBB SHADOW E&M-EST. PATIENT-LVL IV: CPT | Mod: PBBFAC,,, | Performed by: FAMILY MEDICINE

## 2023-07-28 PROCEDURE — 99999 PR PBB SHADOW E&M-EST. PATIENT-LVL IV: ICD-10-PCS | Mod: PBBFAC,,, | Performed by: FAMILY MEDICINE

## 2023-07-28 PROCEDURE — 99214 OFFICE O/P EST MOD 30 MIN: CPT | Mod: PBBFAC,PN | Performed by: FAMILY MEDICINE

## 2023-07-28 PROCEDURE — 99214 OFFICE O/P EST MOD 30 MIN: CPT | Mod: S$PBB,,, | Performed by: FAMILY MEDICINE

## 2023-07-28 PROCEDURE — 99214 PR OFFICE/OUTPT VISIT, EST, LEVL IV, 30-39 MIN: ICD-10-PCS | Mod: S$PBB,,, | Performed by: FAMILY MEDICINE

## 2023-07-28 NOTE — PROGRESS NOTES
THIS DOCUMENT WAS MADE IN PART WITH VOICE RECOGNITION SOFTWARE.  OCCASIONALLY THIS SOFTWARE WILL MISINTERPRET WORDS OR PHRASES.      Primary Care Provider Appointment   [facility]       Patient ID: Vince Chadwick III is a 73 y.o. male.    ASSESSMENT/PLAN by Problem List:    1. Anxiety  -     Ambulatory referral/consult to [facility] Behavioral [facility]; Future; Expected date: 08/04/2023     Discussed current stress and symptoms.  Will continue Lexapro.  I considered adding Wellbutrin however he reports that many years ago he had significant elevations in blood pressure.  He is interested in some therapy so I did place a referral to value based primary care behavioral health and Gali introduced herself today and will set up ongoing therapy.    Follow Up:  September as scheduled, call if needed sooner    Thirty minutes of total time spent on the encounter, time includes face to face time, and some or all of the following: review of chart, lab, imaging, consultant notes, ER, hospital, documentation, care coordination, etc.      Advance Care Planning     Date: 07/28/2023    Living Will  Power of   I initiated the process of voluntary advance care planning today and explained the importance of this process to the patient.  I introduced the concept of advance directives to the patient, as well. Then the patient received detailed information about the importance of designating a Health Care Power of  (HCPOA). He was also instructed to communicate with this person about their wishes for future healthcare, should he become sick and lose decision-making capacity. The patient has not previously appointed a HCPOA. After our discussion, the patient has decided to complete a HCPOA and has appointed his daughter and significant other, health care agent:  His wife Irlanda followed by his daughter      Discussed advanced care planning topics as well as designating  a healthcare representative.  Information and pamphlets given for him to review and complete.  Or return with additional questions if needed.             Subjective:     Chief Complaint   Patient presents with    Anxiety     I have reviewed the information entered by the ancillary staff regarding the chief complaint as well as the related history.    HPI    Patient is a/an 73 y.o.  male     Patient is here today to talk about increased anxiety and some mild depression symptoms.  There is a lot of stress going on mostly related to family, some financial stress.  Feeling more anxious and mildly depressed at times.  Remains on Lexapro 20 mg.    For complete problem list, past medical history, surgical history, social history, etc., see appropriate section in the electronic medical record    Review of Systems   Psychiatric/Behavioral:  Positive for dysphoric mood and sleep disturbance. Negative for suicidal ideas. The patient is nervous/anxious.      Objective     Physical Exam  Constitutional:       General: He is not in acute distress.     Appearance: He is well-developed. He is not diaphoretic.   HENT:      Head: Normocephalic and atraumatic.   Eyes:      General: No scleral icterus.     Conjunctiva/sclera: Conjunctivae normal.   Pulmonary:      Effort: Pulmonary effort is normal. No respiratory distress.   Neurological:      General: No focal deficit present.      Mental Status: He is alert and oriented to person, place, and time.      Coordination: Coordination normal.   Psychiatric:         Mood and Affect: Mood normal.         Behavior: Behavior normal.         Thought Content: Thought content normal. Thought content does not include suicidal ideation. Thought content does not include suicidal plan.         Judgment: Judgment normal.     Vitals:    07/28/23 0940   BP: 110/68   BP Location: Left arm   Patient Position: Sitting   BP Method: Medium (Manual)   Pulse: 67   Resp: 18   Temp: 98 °F (36.7 °C)   TempSrc:  "Oral   SpO2: 96%   Weight: 78 kg (172 lb 1.1 oz)   Height: 5' 8" (1.727 m)       "

## 2023-07-28 NOTE — PROGRESS NOTES
"Patient is presenting for scheduled appointment with MD. He is being referred to clinician secondary to hx of anxiety and situational stresses. Clinician met with client regarding interest in receiving individual therapy. Patient verbalized agreement and is scheduled to return on  8/2/2023 at 1:00     Pt c/o px with his children "dumping on me." He acknowledged taking care of others at his own expense. One daughter and 17 y/o grand daughter live with pt and his wife. Pt reported a hx of playing the drums. He admitted playing again is not in plan for [facility].   "

## 2023-08-02 ENCOUNTER — CLINICAL SUPPORT (OUTPATIENT)
Dept: PRIMARY CARE CLINIC | Facility: CLINIC | Age: 73
End: 2023-08-02
Payer: MEDICARE

## 2023-08-02 DIAGNOSIS — F41.1 GAD (GENERALIZED ANXIETY DISORDER): ICD-10-CM

## 2023-08-02 DIAGNOSIS — F32.A DEPRESSIVE DISORDER: Primary | ICD-10-CM

## 2023-08-02 PROCEDURE — 99499 NO LOS: ICD-10-PCS | Mod: S$PBB,,, | Performed by: SOCIAL WORKER

## 2023-08-02 PROCEDURE — 99499 UNLISTED E&M SERVICE: CPT | Mod: S$PBB,,, | Performed by: SOCIAL WORKER
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
## 2023-08-09 ENCOUNTER — CLINICAL SUPPORT (OUTPATIENT)
Dept: PRIMARY CARE CLINIC | Facility: CLINIC | Age: 73
End: 2023-08-09
Payer: MEDICARE

## 2023-08-09 DIAGNOSIS — F41.1 GAD (GENERALIZED ANXIETY DISORDER): Primary | ICD-10-CM

## 2023-08-09 DIAGNOSIS — F32.A DEPRESSIVE DISORDER: ICD-10-CM

## 2023-08-09 PROCEDURE — 99499 UNLISTED E&M SERVICE: CPT | Mod: S$PBB,,, | Performed by: SOCIAL WORKER

## 2023-08-09 PROCEDURE — 99499 NO LOS: ICD-10-PCS | Mod: S$PBB,,, | Performed by: SOCIAL WORKER

## 2023-08-09 NOTE — PROGRESS NOTES
"Individual Psychotherapy (PhD/LCSW)    2023    Site:  [place] [id number]      Chief complaint/reason for encounter: anxiety     Mood check: scale of:0 best, 10 worst. Patient rated:  Depression at - unable to assess   Anxiety at - unable to assess     Risk parameters:  Patient reports no suicidal ideation  Patient reports no homicidal ideation  Patient reports no self-injurious behavior  Patient reports no violent behavior    [place]:  N/A - first session since initial BHI    Review of home assignment:   N/A - first session since initial BHI       [place]:  Goals  More history     History of present condition/content of session:   Pt provided more his history. Pt had 3 sisters, one by his mother from a prior marriage (), and one 3 years older, and 3 years older. Pt has 3 children, one son, and 2 daughters. He has 2 grandchildren, aged 5 y/o is on the spectrum, and 17 y/o.    Pt spent time describing life at home. He reluctantly acknowledged this his wife has unrealistic expectations of him, and often does not give him validation. He admitted he is often overwhelmed, and "I'm not happy." He noted a hx of not paying attention, and instead, day dreaming. He admitted that he get immobilized, and procrastinates. He stated his super power is "strategic planning."  Pt noted he like politic and his wife hates it. He stated he is watching documentaries by Timoteo Roth on the Civil War, And WWII.     Therapeutic Intervention:   Pt was assessed for present condition and area of clinical concern. He was provided with supportive therapy. Patient was educated on CBT (Cognitive Model): the connection between thought, mood, and behavior and the ability to change behavior and mood by examining thoughts. The pt.'s distorted schema and self talk were reviewed. The precipitants of certain feelings and behaviors were also explored with the patient. Active Listening was used as he described more of his history.     Treatment " plan:  Target symptoms: depression, lack of focus, anxiety   Why chosen therapy is appropriate versus another modality: relevant to diagnosis  Outcome monitoring methods: checklist/rating scale  Therapeutic intervention type:  CBT and ACT      Patient's response to intervention:  The patient's response to intervention is accepting.    Progress toward goals and other mental status changes:  The patient's progress toward goals is  N/A - working on identifying goals .    Diagnosis:   Hx of OCPD ?  Generalized Anxiety Disorder   Depressive Disorder     Plan:  individual psychotherapy    Return to clinic: 1 week - pt was given handout on Cognitive Model. He was encouraged to identify goals for therapy.     Length of Service (minutes): 60

## 2023-08-16 ENCOUNTER — CLINICAL SUPPORT (OUTPATIENT)
Dept: PRIMARY CARE CLINIC | Facility: CLINIC | Age: 73
End: 2023-08-16
Payer: MEDICARE

## 2023-08-16 DIAGNOSIS — F32.A DEPRESSIVE DISORDER: ICD-10-CM

## 2023-08-16 DIAGNOSIS — F41.1 GAD (GENERALIZED ANXIETY DISORDER): Primary | ICD-10-CM

## 2023-08-16 PROCEDURE — 99499 NO LOS: ICD-10-PCS | Mod: S$PBB,,, | Performed by: SOCIAL WORKER

## 2023-08-16 PROCEDURE — 99499 UNLISTED E&M SERVICE: CPT | Mod: S$PBB,,, | Performed by: SOCIAL WORKER

## 2023-08-16 NOTE — PROGRESS NOTES
"Individual Psychotherapy (PhD/LCSW)    8/16/2023    Site:  [place] [address]      Chief complaint/reason for encounter: anxiety     Mood check: scale of:0 best, 10 worst. Patient rated:  Depression at -  did not assess   Anxiety at - did not assess     Risk parameters:  Patient reports no suicidal ideation  Patient reports no homicidal ideation  Patient reports no self-injurious behavior  Patient reports no violent behavior    Bridge:  Did not assess     Review of home assignment:   Pt admitted he is undecided about goals for therapy.       [place]:  Goals  Source of thoughts     History of present condition/content of session:   Clinician spent time exploring pt's sources of thoughts. And continued spend time obtaining further information and his perception of  life at home. In the last session, he reluctantly acknowledged this his wife has unrealistic expectations of him, and often does not give him validation. He admitted he is often overwhelmed, and "I'm not happy." He noted a hx of not paying attention, and instead, day dreaming.  This session began with explanation to pt that: how you growing up in any environment affects how he sees himself and the world. This was discussed in relation to alcoholic family. He identified his role in the family was responsible one, the one who took care of everything (this becomes evident and likely is the cause of taking care of others at his own expense). He said both parents drank, but his mother had the bx changes. Pt talked about some of his father's experience during WWII. This was used to exemplify how thoughts, affect mood, affect bx - that other's bx make sense when you learn what they are thinking.    Topic segued to his wifes' early childhood experiences. When pointed out to him that some of her controlling bx are typical of children who grew up with adversity. He verbalized understanding that cleaning is her means of control.  He verbalized understanding that he can't " "change his wife but he can change how he reacts to her. He feels trapped now [self identified the best time of his life was in high school playing in the bad.] His wife's hx was used to explain Emotional Mind + Reasonable Mind =  Wise Mind.     He reported a hx of being "proactive at work and reactive at home." He stated "I don't like me very much. I have been conditioned. I don't like me, I'm selfish." He did not define selfish. Discussion was held being Selfish vs Narcissitic.       Pertinent history:   Pt had 3 sisters, one by his mother from a prior marriage (), and one 3 years older, and another 3 years older. Pt has 3 children, one son, and 2 daughters. He has 2 grandchildren, aged 5 y/o is on the spectrum, and 15 y/o.    Therapeutic Intervention:   Pt was assessed for present condition and area of clinical concern. He was provided with supportive therapy. Patient was educated on CBT (Cognitive Model): the connection between thought, mood, and behavior and the ability to change behavior and mood by examining thoughts. The pt.'s distorted schema and self talk were reviewed. The precipitants of certain feelings and behaviors were also explored with the patient. Active Listening was used as he described more of his history. Dunn Mind was explained.  Clinician played 2 songs for pt on I Pod to help him de - stress from the session.     Treatment plan:  Target symptoms: depression, lack of focus, anxiety   Why chosen therapy is appropriate versus another modality: relevant to diagnosis  Outcome monitoring methods: checklist/rating scale  Therapeutic intervention type:  CBT and ACT      Patient's response to intervention:  The patient's response to intervention is accepting.    Progress toward goals and other mental status changes:  The patient's progress toward goals is  N/A - working on identifying goals .    Diagnosis:   Hx of OCPD ?        Generalized Anxiety Disorder        Depressive Disorder "     Plan:  individual psychotherapy     Return to clinic: 2 weeks - 8/28/2023 at 1:00. pt was given handout on Cognitive Model. He was encouraged to identify goals for therapy.     Length of Service (minutes): 60

## 2023-08-28 ENCOUNTER — CLINICAL SUPPORT (OUTPATIENT)
Dept: PRIMARY CARE CLINIC | Facility: CLINIC | Age: 73
End: 2023-08-28
Payer: MEDICARE

## 2023-08-28 DIAGNOSIS — F41.1 GAD (GENERALIZED ANXIETY DISORDER): ICD-10-CM

## 2023-08-28 DIAGNOSIS — F32.A DEPRESSIVE DISORDER: Primary | ICD-10-CM

## 2023-08-28 PROCEDURE — 99499 UNLISTED E&M SERVICE: CPT | Mod: S$PBB,,, | Performed by: SOCIAL WORKER

## 2023-08-28 PROCEDURE — 99499 NO LOS: ICD-10-PCS | Mod: S$PBB,,, | Performed by: SOCIAL WORKER

## 2023-08-28 NOTE — PROGRESS NOTES
"Individual Psychotherapy (PhD/LCSW)    8/28/2023    [facility]      Chief complaint/reason for encounter: anxiety     Mood check: scale of:0 best, 10 worst. Patient rated:  Depression at -  1 or 2   Anxiety at - 1 or 2     Risk parameters:  Patient reports no suicidal ideation  Patient reports no homicidal ideation  Patient reports no self-injurious behavior  Patient reports no violent behavior    Bridge:  He noted that he thought about the CBT model, and how it makes sense. He verbalized understanding  "I am my own worst enemy."     Review of home assignment:   N/A       [place]:  Financial   Source of thoughts   Goals for therapy     History of present condition/content of session:   Pt began the session explaining his financial situation. He noted his biggest source of anxiety is not having enough money. He related his hx of times in his life when he d didn't have enough, and used good problem solving skills to find a job or another job. Currently, he said that he and his wife took out a second mortgage to do some house repairs. He reported over the years, giving his children thousands of dollars when they were in financial trouble. His daughter who  lives with them contributes to the household.  But said he recently had to seen his other daughter money so that she would not be evicted. He had given his son money, who is in graduate school. Ran up a very large amount of credit card helping the kids.   A lot of his 401K, except for the annuity, is gone. He said "I can't do this anymore."  He said he had told him, "I can't help."     Pt verbalized understanding that he thinks the way he does for a reason. Thoughts are rooted in experiences. He talked about a memory of his father calling him "useless." Mainly because he did not like to do household fix it projects. He noted that is is reminiscent of his wife. He said his wife is very task oriented And expects things to be done right away.  He said "I hate " "working for my wife." He said same as being called useless and bullied by teachers in school, "I took a hit on my self esteem.:   Time was spent exploring his wife's thought process - unrealistic expectations lead to low self esteem, self doubt, and becoming a people pleaser. Once pointed out to him, his friend Dominick with whom he played in bands with in high school, gave him validation.     Mindfulness was reviewed. Including not living on the past regrets or hurts or living in the future with anxiety. Dunn Mind was introduced in the last session. He identified using it in the last couple of days. Such as with helping his daughter in [place].Pt was encouraged to understand that changes will happen very gradually over time. Goal is to learn to set healthy boundaries with others. He admitted he is very sensative.      Pertinent history:  [place]. He has a role of being the responsible one in his early family. [place] was in [place] [employer]. He has a hx of working for the [employer]. Pt had 3 sisters, one by his mother from a prior marriage (), and one 3 years older, and another 3 years younger. Pt has 3 children, one son, and 2 daughters. He has 2 grandchildren, aged 7 y/o is on the spectrum, and 17 y/o. He said both parents drank, but his mother had the bx changes. Pt talked about some of his father's experience during WWII.Self identified the best time of his life was in high school playing in the band.    Therapeutic Intervention:   Pt was assessed for present condition and area of clinical concern. He was provided with supportive therapy. Reviewed CBT (Cognitive Model): the connection between thought, mood, and behavior and the ability to change behavior and mood by examining thoughts. The pt.'s distorted schema and self talk were reviewed. The precipitants of certain feelings and behaviors were also explored " with the patient. Active Listening was used as he described more of his history. Reviewed information on Mindfulness. Pt was given positive reinforcement for identified areas of growth.       Treatment plan:  Target symptoms: depression, lack of focus, anxiety   Why chosen therapy is appropriate versus another modality: relevant to diagnosis  Outcome monitoring methods: checklist/rating scale  Therapeutic intervention type:  CBT and ACT      Patient's response to intervention:  The patient's response to intervention is accepting.    Progress toward goals and other mental status changes:  The patient's progress toward goals is  N/A - working on identifying goals .    Clinician suggested goals:  Long term goals:    Improve self esteem and self worth     Short term goals:  -  Learn assertive communication skills  - Set healthy boundaries  - Learn to say no to other's request if he is not comfortable complying with the request  -   Diagnosis:   Hx of OCPD ?        Generalized Anxiety Disorder        Depressive Disorder     Plan:  individual psychotherapy     Return to clinic: 2 weeks - 9/11/2023 at 1:00. Pt was  given a copy of Mindfulness handout in today's session. Clinician will follow up next session introducing invalidation.     Length of Service (minutes): 60

## 2023-09-05 ENCOUNTER — OFFICE VISIT (OUTPATIENT)
Dept: PRIMARY CARE CLINIC | Facility: CLINIC | Age: 73
End: 2023-09-05
Payer: MEDICARE

## 2023-09-05 VITALS
TEMPERATURE: 97 F | WEIGHT: 171.44 LBS | BODY MASS INDEX: 26.91 KG/M2 | DIASTOLIC BLOOD PRESSURE: 76 MMHG | HEART RATE: 70 BPM | HEIGHT: 67 IN | SYSTOLIC BLOOD PRESSURE: 118 MMHG | RESPIRATION RATE: 18 BRPM | OXYGEN SATURATION: 96 %

## 2023-09-05 DIAGNOSIS — F41.9 ANXIETY: Chronic | ICD-10-CM

## 2023-09-05 DIAGNOSIS — G47.00 INSOMNIA, UNSPECIFIED TYPE: Chronic | ICD-10-CM

## 2023-09-05 DIAGNOSIS — I10 PRIMARY HYPERTENSION: Primary | Chronic | ICD-10-CM

## 2023-09-05 PROCEDURE — 99214 OFFICE O/P EST MOD 30 MIN: CPT | Mod: S$PBB,,, | Performed by: FAMILY MEDICINE

## 2023-09-05 PROCEDURE — 99214 OFFICE O/P EST MOD 30 MIN: CPT | Mod: PBBFAC,PN | Performed by: FAMILY MEDICINE

## 2023-09-05 PROCEDURE — 99214 PR OFFICE/OUTPT VISIT, EST, LEVL IV, 30-39 MIN: ICD-10-PCS | Mod: S$PBB,,, | Performed by: FAMILY MEDICINE

## 2023-09-05 PROCEDURE — 99999 PR PBB SHADOW E&M-EST. PATIENT-LVL IV: CPT | Mod: PBBFAC,,, | Performed by: FAMILY MEDICINE

## 2023-09-05 PROCEDURE — 99999 PR PBB SHADOW E&M-EST. PATIENT-LVL IV: ICD-10-PCS | Mod: PBBFAC,,, | Performed by: FAMILY MEDICINE

## 2023-09-05 NOTE — ASSESSMENT & PLAN NOTE
He does report that there has been some improvement in his anxiety.  He continues to consult with Gali for counseling.  Continue Lexapro as well.

## 2023-09-05 NOTE — ASSESSMENT & PLAN NOTE
Blood pressure remains stable and satisfactory.  Continue current medications.  Start increasing exercise.

## 2023-09-05 NOTE — PROGRESS NOTES
THIS DOCUMENT WAS MADE IN PART WITH VOICE RECOGNITION SOFTWARE.  OCCASIONALLY THIS SOFTWARE WILL MISINTERPRET WORDS OR PHRASES.      Primary Care Provider Appointment   [facility] Luis Torres       Patient ID: Vince Chadwick III is a 73 y.o. male.    ASSESSMENT/PLAN by Problem List:    1. Primary hypertension  Assessment & Plan:  Blood pressure remains stable and satisfactory.  Continue current medications.  Start increasing exercise.      2. Insomnia, unspecified type  Assessment & Plan:  Persistent but he is doing well on sonata.  Continue to work on this and anxiety      3. Anxiety  Assessment & Plan:  He does report that there has been some improvement in his anxiety.  He continues to consult with Gali for counseling.  Continue Lexapro as well.           Follow Up:  Three months      Health Maintenance         Date Due Completion Date    Aspirin/Antiplatelet Therapy Never done ---    Shingles Vaccine (1 of 2) Never done ---    COVID-19 Vaccine (4 - Pfizer series) 02/01/2022 12/7/2021    Influenza Vaccine (1) 09/01/2023 9/24/2022    Lipid Panel 05/25/2024 5/25/2023    High Dose Statin 07/28/2024 7/28/2023    TETANUS VACCINE 09/22/2025 9/22/2015    Colorectal Cancer Screening 05/13/2027 5/13/2020    Override on 6/2/2011: Done (legay documents, repeat in 8 years)        Discussed vaccinations.  Recommend Shingrix.  Recommend late September or early October getting his flu shot along with the anticipated new COVID shot    Subjective:     Chief Complaint   Patient presents with    Follow-up     I have reviewed the information entered by the ancillary staff regarding the chief complaint as well as the related history.    HPI    Patient is a/an 73 y.o.  male     Routine follow-up.  Doing well.  Diarrhea, ER in July, better now  Remains in counseling and this has helped his anxiety.  See above for all details addressed today    For complete problem list, past medical history, surgical  "history, social history, etc., see appropriate section in the electronic medical record    Review of Systems   Respiratory: Negative.     Cardiovascular: Negative.    Gastrointestinal: Negative.    Genitourinary: Negative.    Musculoskeletal: Negative.        Objective     Physical Exam  Vitals reviewed.   Constitutional:       General: He is not in acute distress.     Appearance: He is well-developed. He is not diaphoretic.   HENT:      Head: Normocephalic and atraumatic.      Right Ear: Tympanic membrane, ear canal and external ear normal. There is no impacted cerumen.      Left Ear: Tympanic membrane, ear canal and external ear normal. There is no impacted cerumen.      Mouth/Throat:      Pharynx: Oropharynx is clear. No oropharyngeal exudate.   Eyes:      General: No scleral icterus.     Conjunctiva/sclera: Conjunctivae normal.   Cardiovascular:      Rate and Rhythm: Normal rate and regular rhythm.      Heart sounds: Normal heart sounds. No murmur heard.     No gallop.   Pulmonary:      Effort: Pulmonary effort is normal. No respiratory distress.      Breath sounds: No wheezing or rales.   Musculoskeletal:      Cervical back: Normal range of motion and neck supple.   Skin:     General: Skin is warm and dry.   Neurological:      Mental Status: He is alert and oriented to person, place, and time.      Deep Tendon Reflexes: Reflexes are normal and symmetric.   Psychiatric:         Behavior: Behavior normal.       Vitals:    09/05/23 1014   BP: 118/76   BP Location: Right arm   Patient Position: Sitting   BP Method: Medium (Manual)   Pulse: 70   Resp: 18   Temp: 97.4 °F (36.3 °C)   TempSrc: Oral   SpO2: 96%   Weight: 77.7 kg (171 lb 6.5 oz)   Height: 5' 7" (1.702 m)       "

## 2023-09-11 ENCOUNTER — CLINICAL SUPPORT (OUTPATIENT)
Dept: PRIMARY CARE CLINIC | Facility: CLINIC | Age: 73
End: 2023-09-11
Payer: MEDICARE

## 2023-09-11 DIAGNOSIS — F41.1 GAD (GENERALIZED ANXIETY DISORDER): Primary | ICD-10-CM

## 2023-09-11 PROCEDURE — 99499 UNLISTED E&M SERVICE: CPT | Mod: S$PBB,,, | Performed by: SOCIAL WORKER

## 2023-09-11 PROCEDURE — 99499 NO LOS: ICD-10-PCS | Mod: S$PBB,,, | Performed by: SOCIAL WORKER
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
## 2023-09-19 ENCOUNTER — PATIENT MESSAGE (OUTPATIENT)
Dept: PRIMARY CARE CLINIC | Facility: CLINIC | Age: 73
End: 2023-09-19
Payer: MEDICARE

## 2023-09-19 DIAGNOSIS — J30.9 ALLERGIC RHINITIS, UNSPECIFIED SEASONALITY, UNSPECIFIED TRIGGER: ICD-10-CM

## 2023-09-19 RX ORDER — AZELASTINE 1 MG/ML
SPRAY, METERED NASAL
Qty: 90 ML | Refills: 3 | Status: CANCELLED | OUTPATIENT
Start: 2023-09-19

## 2023-09-19 RX ORDER — AZELASTINE 1 MG/ML
SPRAY, METERED NASAL
Qty: 10 ML | Refills: 2 | Status: SHIPPED | OUTPATIENT
Start: 2023-09-19 | End: 2023-12-20

## 2023-09-23 NOTE — TELEPHONE ENCOUNTER
No care due was identified.  [facility] Embedded Care Due Messages. Reference number: [id number].   9/23/2023 10:05:48 AM CDT

## 2023-09-25 ENCOUNTER — CLINICAL SUPPORT (OUTPATIENT)
Dept: PRIMARY CARE CLINIC | Facility: CLINIC | Age: 73
End: 2023-09-25
Payer: MEDICARE

## 2023-09-25 DIAGNOSIS — F41.1 GAD (GENERALIZED ANXIETY DISORDER): Primary | ICD-10-CM

## 2023-09-25 DIAGNOSIS — Z63.9 FAMILY DISTRESS: ICD-10-CM

## 2023-09-25 PROCEDURE — 99499 UNLISTED E&M SERVICE: CPT | Mod: S$PBB,,, | Performed by: SOCIAL WORKER

## 2023-09-25 PROCEDURE — 99499 NO LOS: ICD-10-PCS | Mod: S$PBB,,, | Performed by: SOCIAL WORKER

## 2023-09-25 RX ORDER — ESCITALOPRAM OXALATE 20 MG/1
20 TABLET ORAL DAILY
Qty: 90 TABLET | Refills: 3 | Status: SHIPPED | OUTPATIENT
Start: 2023-09-25

## 2023-09-25 SDOH — SOCIAL DETERMINANTS OF HEALTH (SDOH): PROBLEM RELATED TO PRIMARY SUPPORT GROUP, UNSPECIFIED: Z63.9
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
## 2023-10-04 DIAGNOSIS — N40.1 BENIGN NON-NODULAR PROSTATIC HYPERPLASIA WITH LOWER URINARY TRACT SYMPTOMS: ICD-10-CM

## 2023-10-04 RX ORDER — TAMSULOSIN HYDROCHLORIDE 0.4 MG/1
0.8 CAPSULE ORAL DAILY
Qty: 180 CAPSULE | Refills: 3 | Status: SHIPPED | OUTPATIENT
Start: 2023-10-04

## 2023-10-04 NOTE — TELEPHONE ENCOUNTER
No care due was identified.  [facility] Embedded Care Due Messages. Reference number: [id number].   10/04/2023 8:30:04 AM CDT

## 2023-10-04 NOTE — TELEPHONE ENCOUNTER
Refill Routing Note   Medication(s) are not appropriate for processing by [facility] for the following reason(s):      Clarification of medication (Rx) details    [facility] action(s):  Defer Care Due:  None identified     Medication Therapy Plan: PT. REPORTED TAKING 0.8 MG NIGHTLY      Appointments  past 12m or future 3m with PCP    Date Provider   Last Visit   9/5/2023 Karan Burleson MD   Next Visit   12/13/2023 Karan Burleson MD   ED visits in past 90 days: 2        Note composed:9:27 AM 10/04/2023

## 2023-10-05 ENCOUNTER — PATIENT MESSAGE (OUTPATIENT)
Dept: PRIMARY CARE CLINIC | Facility: CLINIC | Age: 73
End: 2023-10-05
Payer: MEDICARE

## 2023-11-03 ENCOUNTER — IMMUNIZATION (OUTPATIENT)
Dept: FAMILY MEDICINE | Facility: CLINIC | Age: 73
End: 2023-11-03
Payer: MEDICARE

## 2023-11-03 PROCEDURE — 99999PBSHW FLU VACCINE - QUADRIVALENT - ADJUVANTED: Mod: PBBFAC,,,

## 2023-11-03 PROCEDURE — G0008 ADMIN INFLUENZA VIRUS VAC: HCPCS | Mod: PBBFAC,PN

## 2023-11-03 PROCEDURE — 99999PBSHW FLU VACCINE - QUADRIVALENT - ADJUVANTED: ICD-10-PCS | Mod: PBBFAC,,,

## 2023-11-06 RX ORDER — ZALEPLON 10 MG/1
10 CAPSULE ORAL NIGHTLY
Qty: 90 CAPSULE | Refills: 1 | Status: SHIPPED | OUTPATIENT
Start: 2023-11-06

## 2023-12-04 RX ORDER — GABAPENTIN 300 MG/1
CAPSULE ORAL
Qty: 60 CAPSULE | Refills: 2 | Status: SHIPPED | OUTPATIENT
Start: 2023-12-04

## 2023-12-20 RX ORDER — AZELASTINE 1 MG/ML
SPRAY, METERED NASAL
Qty: 90 ML | Refills: 2 | Status: SHIPPED | OUTPATIENT
Start: 2023-12-20

## 2023-12-20 NOTE — TELEPHONE ENCOUNTER
Refill Decision Note   Vince Laniekyle  is requesting a refill authorization.  Brief Assessment and Rationale for Refill:  Approve     Medication Therapy Plan:         Comments:     Note composed:10:23 AM 12/20/2023

## 2023-12-20 NOTE — TELEPHONE ENCOUNTER
No care due was identified.  [facility] Embedded Care Due Messages. Reference number: [id number].   12/20/2023 3:43:37 AM CST

## 2024-01-09 DIAGNOSIS — E78.2 MIXED HYPERLIPIDEMIA: Chronic | ICD-10-CM

## 2024-01-09 DIAGNOSIS — I10 PRIMARY HYPERTENSION: ICD-10-CM

## 2024-01-09 RX ORDER — BENAZEPRIL HYDROCHLORIDE 10 MG/1
10 TABLET ORAL DAILY
Qty: 90 TABLET | Refills: 1 | Status: SHIPPED | OUTPATIENT
Start: 2024-01-09

## 2024-01-09 RX ORDER — ATORVASTATIN CALCIUM 10 MG/1
10 TABLET, FILM COATED ORAL NIGHTLY
Qty: 90 TABLET | Refills: 1 | Status: SHIPPED | OUTPATIENT
Start: 2024-01-09

## 2024-01-09 NOTE — TELEPHONE ENCOUNTER
No care due was identified.  [facility] Embedded Care Due Messages. Reference number: [id number].   1/09/2024 8:34:32 AM CST

## 2024-02-07 ENCOUNTER — TELEPHONE (OUTPATIENT)
Dept: PRIMARY CARE CLINIC | Facility: CLINIC | Age: 74
End: 2024-02-07
Payer: MEDICARE

## 2024-02-08 ENCOUNTER — TELEPHONE (OUTPATIENT)
Dept: PRIMARY CARE CLINIC | Facility: CLINIC | Age: 74
End: 2024-02-08
Payer: MEDICARE

## 2024-02-08 NOTE — TELEPHONE ENCOUNTER
Spoke with pt, he reports that he is doing better with taking the meds that were prescribed to him by the ER provider.  Educated pt on foods to avoid (spicy foods, fried foods, caffeine) and foods/drinks that pt can eat (whole grains, fruits, vegetables, fish, lean meats, non-carbonated drinks, and caffeine-free drinks).  Pt verbalized understanding and is scheduled for HFU on 2/12 at 1120 with Jhoana.

## 2024-02-12 ENCOUNTER — OFFICE VISIT (OUTPATIENT)
Dept: PRIMARY CARE CLINIC | Facility: CLINIC | Age: 74
End: 2024-02-12
Payer: MEDICARE

## 2024-02-12 VITALS
OXYGEN SATURATION: 97 % | BODY MASS INDEX: 26.25 KG/M2 | HEART RATE: 67 BPM | DIASTOLIC BLOOD PRESSURE: 62 MMHG | HEIGHT: 67 IN | WEIGHT: 167.25 LBS | SYSTOLIC BLOOD PRESSURE: 110 MMHG

## 2024-02-12 DIAGNOSIS — Z09 FOLLOW-UP EXAM: Primary | ICD-10-CM

## 2024-02-12 DIAGNOSIS — F33.1 MODERATE EPISODE OF RECURRENT MAJOR DEPRESSIVE DISORDER: ICD-10-CM

## 2024-02-12 DIAGNOSIS — I70.0 AORTIC CALCIFICATION: ICD-10-CM

## 2024-02-12 DIAGNOSIS — I10 PRIMARY HYPERTENSION: Chronic | ICD-10-CM

## 2024-02-12 DIAGNOSIS — K29.70 GASTRITIS, PRESENCE OF BLEEDING UNSPECIFIED, UNSPECIFIED CHRONICITY, UNSPECIFIED GASTRITIS TYPE: ICD-10-CM

## 2024-02-12 DIAGNOSIS — E78.2 MIXED HYPERLIPIDEMIA: Chronic | ICD-10-CM

## 2024-02-12 DIAGNOSIS — M51.9 LUMBAR DISC DISEASE: ICD-10-CM

## 2024-02-12 PROCEDURE — 99999 PR PBB SHADOW E&M-EST. PATIENT-LVL IV: CPT | Mod: PBBFAC,,, | Performed by: PHYSICIAN ASSISTANT

## 2024-02-12 PROCEDURE — 99214 OFFICE O/P EST MOD 30 MIN: CPT | Mod: PBBFAC,PN | Performed by: PHYSICIAN ASSISTANT

## 2024-02-12 PROCEDURE — 99215 OFFICE O/P EST HI 40 MIN: CPT | Mod: S$PBB,,, | Performed by: PHYSICIAN ASSISTANT

## 2024-02-12 NOTE — PROGRESS NOTES
"Subjective:      Patient ID: Vince Chadwick III is a 73 y.o. male.    Vitals:  height is 5' 7" (1.702 m) and weight is 75.8 kg (167 lb 3.5 oz). His blood pressure is 110/62 and his pulse is 67. His oxygen saturation is 97%.     Chief Complaint: Follow-up (Patient presents for HFU for gastritis 2/6/24.)    ED Follow Up   Pt is here to follow up for recent ED visit.   The patient was seen after experiencing Upper abdominal pain, nausea and vomiting.    Upon arrival to the emergency room, labs and imaging ordered. US of abdomen and xray of chest unremarkable. Labs revealed mildly elevated glucose, mildly elevated bilirubin. Negative Troponin and BNP.    Discharge instructions to patient were start below listed medications.  Changes to medications after discharge include Start taking Famotidine and Carafate. Follow up with GI.  Needs to follow up with the following specialists GI.     Since discharge, the pt has Been taking Famotidine and Carafate. Is feeling much better. No more nausea and vomiting.     I have personally reviewed pertinent imaging and labs which show as above.  HPI    Constitution: Negative for fever.   Gastrointestinal:  Negative for nausea and vomiting.      Objective:     Physical Exam    Assessment:     1. Follow-up exam    2. Gastritis, presence of bleeding unspecified, unspecified chronicity, unspecified gastritis type    3. Moderate episode of recurrent major depressive disorder    4. Aortic calcification    5. Lumbar disc disease    6. Primary hypertension    7. Mixed hyperlipidemia        Plan:       Follow-up exam    As far as ER visit goes, patient is feeling much better since visit.  Likely episode of viral gastritis.  His wife is also having similar symptoms at that time.  Has been taking medications as prescribed.  No longer having any nausea or vomiting.  Finish medications as prescribed.  Contact me if needs any further refills.    Patient missed his 3 month follow-up in December.  " Reviewed problems listed below.  Will have patient follow up with Dr. Burleson for a six-month follow-up in May with labs.    Gastritis, presence of bleeding unspecified, unspecified chronicity, unspecified gastritis type    Stable, as above.    Moderate episode of recurrent major depressive disorder    Patient currently on 20 mg Lexapro.  He was seeing our  Gali prior to [facility].  He had a few episodes of being down last week due to hospital visit, other than that depression has been stable.  He does have some anxiety related to his children.  Mentioned a daughter with bipolar as well as some financial strains due to all of his children.  Advised follow-up with Gali and will re-evaluate at next visit to see if any changes in medication need to be made.    Aortic calcification    Stable, patient is on Lipitor 10 mg.    Lumbar disc disease    Stable.  As of today patient states he has not having any back problems or radiculopathy.    Primary hypertension    Stable.  Blood pressure looks great today.  States his blood pressures at home have been within normal limits.  Continue on benazepril.  -     Comprehensive Metabolic Panel; Future; Expected date: 02/12/2024    Mixed hyperlipidemia    Stable.  Will be due for lipids at next three-month follow-up.  Continue atorvastatin as prescribed.  -     LIPID PANEL; Future; Expected date: 02/12/2024    My total time spent on this encounter was over 40 minutes minutes which included  the following activities: preparing to see the patient, performing a medically appropriate and/or evaluation, counseling and educating the patient and family/caregiver, ordering medications, tests, or procedures, referring and communicating with other healthcare providers, documenting clinical information in the electronic or other health record. This time is independent and non-overlapping.

## 2024-02-12 NOTE — Clinical Note
I think he fell off over holidays. Would benefit from seeing you. Seems to have a lot of stress related to financial issues

## 2024-02-19 ENCOUNTER — TELEPHONE (OUTPATIENT)
Dept: PRIMARY CARE CLINIC | Facility: CLINIC | Age: 74
End: 2024-02-19
Payer: MEDICARE

## 2024-02-19 NOTE — TELEPHONE ENCOUNTER
Clinician received a referral from PA to follow up with pt. Attempt was made, but there was no answer. Voice mail was left asking him to return the call.

## 2024-03-08 ENCOUNTER — PATIENT OUTREACH (OUTPATIENT)
Dept: PRIMARY CARE CLINIC | Facility: CLINIC | Age: 74
End: 2024-03-08
Payer: MEDICARE

## 2024-03-19 ENCOUNTER — OFFICE VISIT (OUTPATIENT)
Dept: GASTROENTEROLOGY | Facility: CLINIC | Age: 74
End: 2024-03-19
Payer: MEDICARE

## 2024-03-19 VITALS — BODY MASS INDEX: 27.23 KG/M2 | HEIGHT: 67 IN | WEIGHT: 173.5 LBS

## 2024-03-19 DIAGNOSIS — R12 HEARTBURN: ICD-10-CM

## 2024-03-19 DIAGNOSIS — R10.13 EPIGASTRIC PAIN: Primary | ICD-10-CM

## 2024-03-19 DIAGNOSIS — R11.0 NAUSEA: ICD-10-CM

## 2024-03-19 DIAGNOSIS — K58.0 IRRITABLE BOWEL SYNDROME WITH DIARRHEA: ICD-10-CM

## 2024-03-19 PROCEDURE — 99214 OFFICE O/P EST MOD 30 MIN: CPT | Mod: S$PBB,,, | Performed by: NURSE PRACTITIONER

## 2024-03-19 PROCEDURE — 99999 PR PBB SHADOW E&M-EST. PATIENT-LVL III: CPT | Mod: PBBFAC,,, | Performed by: NURSE PRACTITIONER

## 2024-03-19 PROCEDURE — 99213 OFFICE O/P EST LOW 20 MIN: CPT | Mod: PBBFAC,PO | Performed by: NURSE PRACTITIONER

## 2024-03-19 RX ORDER — SUCRALFATE 1 G/1
1 TABLET ORAL
Qty: 120 TABLET | Refills: 0 | Status: SHIPPED | OUTPATIENT
Start: 2024-03-19 | End: 2025-03-19

## 2024-03-19 RX ORDER — OMEPRAZOLE 40 MG/1
40 CAPSULE, DELAYED RELEASE ORAL EVERY MORNING
Qty: 90 CAPSULE | Refills: 1 | Status: SHIPPED | OUTPATIENT
Start: 2024-03-19

## 2024-03-19 RX ORDER — OMEPRAZOLE 40 MG/1
40 CAPSULE, DELAYED RELEASE ORAL EVERY MORNING
COMMUNITY
Start: 2024-03-09 | End: 2024-03-19 | Stop reason: SDUPTHER

## 2024-03-19 RX ORDER — DICYCLOMINE HYDROCHLORIDE 10 MG/1
10 CAPSULE ORAL 4 TIMES DAILY PRN
COMMUNITY
End: 2024-03-19 | Stop reason: SDUPTHER

## 2024-03-19 RX ORDER — DICYCLOMINE HYDROCHLORIDE 10 MG/1
10 CAPSULE ORAL
Qty: 120 CAPSULE | Refills: 0 | Status: SHIPPED | OUTPATIENT
Start: 2024-03-19 | End: 2024-04-16

## 2024-03-19 RX ORDER — FAMOTIDINE 20 MG/1
20 TABLET, FILM COATED ORAL 2 TIMES DAILY
Qty: 60 TABLET | Refills: 0 | Status: SHIPPED | OUTPATIENT
Start: 2024-03-19 | End: 2024-04-18

## 2024-03-19 NOTE — PROGRESS NOTES
Subjective:       Patient ID: Vince Chadwick III is a 73 y.o. male Body mass index is 27.17 kg/m².    Chief Complaint: Heartburn    Established patient of Dr. Novak & myself (last in 2020).    Abdominal Pain  This is a recurrent problem. The current episode started more than 1 month ago (started with abdominal pain on 2/5/2024, went to the urgent care for it & sent to the ER for it). The problem occurs intermittently. The problem has been rapidly improving. The pain is located in the epigastric region (& mid chest (when it first started)). The pain is at a severity of 0/10 (currently). The pain is mild. The quality of the pain is burning. Associated symptoms include diarrhea (chronic intermittent, unchanged; resolves with imodium PRN; bowel movements are currently once-twice daily of formed stool; triggered by stress) and nausea (intermittent, occurs when abdominal pain occurs; PAST TREATMENT: phenergan). Pertinent negatives include no anorexia, belching, constipation, dysuria, fever, flatus, frequency, hematochezia, melena, vomiting or weight loss. Exacerbated by: stress. He has tried proton pump inhibitors and H2 blockers (prilosec 40 mg once daily, carafate 1 gram QID PRN- taking once a day, pepcid 20 mg once daily; bentyl 10 mg PRN; PAST: protonix, metamucil) for the symptoms. Prior diagnostic workup includes CT scan, GI consult, lower endoscopy, upper endoscopy and ultrasound. His past medical history is significant for abdominal surgery, GERD (occurs a couple of times a week) and irritable bowel syndrome. There is no history of Crohn's disease, pancreatitis, PUD or ulcerative colitis.     Review of Systems   Constitutional:  Negative for appetite change, chills, fatigue, fever and weight loss.        Reports he takes Excedrin PRN; denies any other NSAID use   HENT:  Negative for sore throat and trouble swallowing.    Respiratory:  Negative for cough, choking and shortness of breath.    Cardiovascular:   Negative for chest pain (has resolved).   Gastrointestinal:  Positive for abdominal pain, diarrhea (chronic intermittent, unchanged; resolves with imodium PRN; bowel movements are currently once-twice daily of formed stool; triggered by stress) and nausea (intermittent, occurs when abdominal pain occurs; PAST TREATMENT: phenergan). Negative for anal bleeding, anorexia, blood in stool, constipation, flatus, hematochezia, melena, rectal pain and vomiting.   Genitourinary:  Negative for difficulty urinating, dysuria, flank pain, frequency and urgency.   Neurological:  Negative for dizziness and weakness.       Past Medical History:   Diagnosis Date    ALLERGIC RHINITIS     Arthritis     Cataract     OU    Colon polyp     Diverticulitis     Diverticulosis     Gastritis 02/06/2024    GERD (gastroesophageal reflux disease)     Hyperlipidemia     resolved    Hypertension     Irritable bowel syndrome     Lumbar disc disease      Past Surgical History:   Procedure Laterality Date    APPENDECTOMY      CIRCUMCISION      COLONOSCOPY  06/02/2011    [facility].    One 1 mm polyp in the sigmoid colon.  FRAGMENT OF NONNEOPLASTIC COLONIC MUCOSA.  Sigmoid diverticulosis.  Spasms c/w IBS.  repeat in 7-8 years    COLONOSCOPY  09/12/2006    Dr. Novak[place]    COLONOSCOPY N/A 05/13/2020    Procedure: COLONOSCOPY;  Surgeon: Davy Novak Jr., MD;  Location: [facility];  Service: Endoscopy;  Laterality: N/A; repeat in 7 years for screening    EPIDURAL STEROID INJECTION INTO LUMBAR SPINE N/A 11/15/2021    Procedure: Injection-steroid-epidural-lumbar L5/S1;  Surgeon: Khang Sanchez MD;  Location: [facility] OR;  Service: Pain Management;  Laterality: N/A;    EPIDURAL STEROID INJECTION INTO LUMBAR SPINE N/A 12/13/2021    Procedure: Injection-steroid-epidural-lumbar L5/S1;  Surgeon: Khang Sanchez MD;  Location: [facility] OR;  Service: Pain Management;  Laterality: N/A;    EPIDURAL STEROID INJECTION INTO LUMBAR SPINE N/A 03/10/2023     Procedure: Injection-steroid-epidural-lumbar L2/3;  Surgeon: Khang Sanchez MD;  Location: [facility] OR;  Service: Pain Management;  Laterality: N/A;    ESOPHAGOGASTRODUODENOSCOPY N/A 05/13/2020    Procedure: EGD (ESOPHAGOGASTRODUODENOSCOPY);  Surgeon: Davy Novak Jr., MD;  Location: [facility] ENDO;  Service: Endoscopy;  Laterality: N/A;    EYE SURGERY      FOOT SURGERY      x 2    INJECTION OF ANESTHETIC AGENT AROUND GANGLION IMPAR Right 02/17/2023    Procedure: BLOCK, facet cyst aspiration right side L2/3;  Surgeon: Khang Sanchez MD;  Location: [facility] OR;  Service: Pain Management;  Laterality: Right;    LAMINECTOMY USING MINIMALLY INVASIVE TECHNIQUE N/A 01/26/2022    Procedure: LAMINECTOMY, SPINE, MINIMALLY INVASIVE  RIGHT MIS APPROACH RESECTION L4-5 SYNOVIAL CYST AND BILATERAL LAMINECTOMY/MEDIAL FACETECTOMY;  Surgeon: Penny Lobato MD;  Location: [facility] OR;  Service: Neurosurgery;  Laterality: N/A;    LAMINECTOMY USING MINIMALLY INVASIVE TECHNIQUE N/A 04/26/2023    Procedure: LAMINECTOMY, SPINE, MINIMALLY INVASIVE    RIGHT L2-L3 MIS HEMILAMINECTOMY/MEDIAL FACETECTOMY FOR REMOVAL SYNOVIAL CYST;  Surgeon: Penny Lobato MD;  Location: [facility] OR;  Service: Neurosurgery;  Laterality: N/A;    LUMBAR EPIDURAL INJECTION      PILONIDAL CYST DRAINAGE      s/p chalazion removal      OD    TONSILLECTOMY       Family History   Problem Relation Age of Onset    Cataracts Mother     Breast cancer Mother     Glaucoma Father     Cataracts Father     Macular degeneration Father     Colon polyps Father     Glaucoma Sister     Glaucoma Paternal Uncle     Prostate cancer Paternal Uncle     Glaucoma Paternal Uncle     Heart attack Paternal Grandfather 44    Colon cancer Neg Hx     Crohn's disease Neg Hx     Ulcerative colitis Neg Hx      Wt Readings from Last 10 Encounters:   03/19/24 78.7 kg (173 lb 8 oz)   02/12/24 75.8 kg (167 lb 3.5 oz)   02/06/24 76.2 kg (168 lb)   09/05/23 77.7 kg (171 lb 6.5 oz)   07/28/23 78 kg  (172 lb 1.1 oz)   07/24/23 78.1 kg (172 lb 2.9 oz)   07/18/23 78.1 kg (172 lb 2.9 oz)   07/11/23 80.7 kg (178 lb)   07/09/23 77.6 kg (171 lb 1.2 oz)   06/20/23 79.6 kg (175 lb 7.8 oz)     Lab Results   Component Value Date    WBC 11.46 02/06/2024    HGB 14.6 02/06/2024    HCT 43.6 02/06/2024    MCV 85 02/06/2024     02/06/2024     CMP  Sodium   Date Value Ref Range Status   02/06/2024 137 136 - 145 mmol/L Final     Potassium   Date Value Ref Range Status   02/06/2024 4.1 3.5 - 5.1 mmol/L Final     Comment:     Anion Gap reference range revised on 4/28/2023     Chloride   Date Value Ref Range Status   02/06/2024 103 95 - 110 mmol/L Final     CO2   Date Value Ref Range Status   02/06/2024 24 22 - 31 mmol/L Final     Glucose   Date Value Ref Range Status   02/06/2024 128 (H) 70 - 110 mg/dL Final     Comment:     The ADA recommends the following guidelines for fasting glucose:    Normal:       less than 100 mg/dL    Prediabetes:  100 mg/dL to 125 mg/dL    Diabetes:     126 mg/dL or higher       BUN   Date Value Ref Range Status   02/06/2024 16 9 - 21 mg/dL Final     Creatinine   Date Value Ref Range Status   02/06/2024 0.82 0.50 - 1.40 mg/dL Final     Calcium   Date Value Ref Range Status   02/06/2024 9.3 8.4 - 10.2 mg/dL Final     Total Protein   Date Value Ref Range Status   02/06/2024 7.7 6.0 - 8.4 g/dL Final     Albumin   Date Value Ref Range Status   02/06/2024 4.6 3.5 - 5.2 g/dL Final     Total Bilirubin   Date Value Ref Range Status   02/06/2024 1.6 (H) 0.2 - 1.3 mg/dL Final     Alkaline Phosphatase   Date Value Ref Range Status   02/06/2024 89 38 - 145 U/L Final     AST   Date Value Ref Range Status   02/06/2024 30 17 - 59 U/L Final     ALT   Date Value Ref Range Status   02/06/2024 16 0 - 50 U/L Final     Anion Gap   Date Value Ref Range Status   02/06/2024 10 5 - 12 mmol/L Final     Comment:     Anion Gap reference range revised on 4/28/2023     eGFR if    Date Value Ref Range Status    03/02/2022 >60 >60 mL/min/1.73 m^2 Final     eGFR if non    Date Value Ref Range Status   03/02/2022 >60 >60 mL/min/1.73 m^2 Final     Comment:     Calculation used to obtain the estimated glomerular filtration  rate (eGFR) is the CKD-EPI equation.        Lab Results   Component Value Date    LIPASERES 78 02/06/2024     Lab Results   Component Value Date    TSH 2.797 08/20/2019 5/13/2020 and 1/13/2020 stool studies reviewed (acidic stools & few leukocytes)    Reviewed prior medical records including 2/6/2024 limited abdominal ultrasound and ER visit note; 7/11/2023 ct abdomen pelvis & endoscopy history (see surgical history).    Objective:      Physical Exam  Vitals and nursing note reviewed.   Constitutional:       General: He is not in acute distress.     Appearance: Normal appearance. He is well-developed. He is not diaphoretic.   HENT:      Mouth/Throat:      Lips: Pink. No lesions.      Mouth: Mucous membranes are moist. No oral lesions.      Tongue: No lesions.      Pharynx: Oropharynx is clear. No pharyngeal swelling or posterior oropharyngeal erythema.   Eyes:      General: No scleral icterus.     Conjunctiva/sclera: Conjunctivae normal.   Pulmonary:      Effort: Pulmonary effort is normal. No respiratory distress.      Breath sounds: Normal breath sounds. No wheezing.   Abdominal:      General: Bowel sounds are normal. There is no distension or abdominal bruit.      Palpations: Abdomen is soft. Abdomen is not rigid. There is no mass.      Tenderness: There is no abdominal tenderness. There is no guarding or rebound. Negative signs include Cherry's sign and McBurney's sign.   Skin:     General: Skin is warm and dry.      Coloration: Skin is not jaundiced or pale.      Findings: No erythema or rash.   Neurological:      Mental Status: He is alert and oriented to person, place, and time.   Psychiatric:         Behavior: Behavior normal.         Thought Content: Thought content normal.          Judgment: Judgment normal.         Assessment:       1. Epigastric pain    2. Heartburn    3. Nausea    4. Irritable bowel syndrome with diarrhea          Plan:       Epigastric pain  -   REFILL AND TAKE AS PRESCRIBED  sucralfate (CARAFATE) 1 gram tablet; Take 1 tablet (1 g total) by mouth 4 (four) times daily before meals and nightly.  Dispense: 120 tablet; Refill: 0  -  REFILLS AND TAKE AS PRESCRIBED   famotidine (PEPCID) 20 MG tablet; Take 1 tablet (20 mg total) by mouth 2 (two) times daily.  Dispense: 60 tablet; Refill: 0  -  REFILL   omeprazole (PRILOSEC) 40 MG capsule; Take 1 capsule (40 mg total) by mouth every morning.  Dispense: 90 capsule; Refill: 1  -   REFILL  dicyclomine (BENTYL) 10 MG capsule; Take 1 capsule (10 mg total) by mouth before meals and at bedtime as needed (abdominal cramping/pain).  Dispense: 120 capsule; Refill: 0  - schedule EGD, discussed procedure with patient, including risks and benefits, patient verbalized understanding  - avoid/minimize use of NSAIDs- since they can cause GI upset, bleeding and/or ulcers. If NSAID must be taken, recommend take with food.    Heartburn  -  REFILL AND TAKE AS PRESCRIBED    sucralfate (CARAFATE) 1 gram tablet; Take 1 tablet (1 g total) by mouth 4 (four) times daily before meals and nightly.  Dispense: 120 tablet; Refill: 0  -   REFILL AND TAKE AS PRESCRIBED   famotidine (PEPCID) 20 MG tablet; Take 1 tablet (20 mg total) by mouth 2 (two) times daily.  Dispense: 60 tablet; Refill: 0  -  REFILL   omeprazole (PRILOSEC) 40 MG capsule; Take 1 capsule (40 mg total) by mouth every morning.  Dispense: 90 capsule; Refill: 1  - schedule EGD, discussed procedure with patient, including risks and benefits, patient verbalized understanding    Nausea  - schedule EGD, discussed procedure with patient, including risks and benefits, patient verbalized understanding    Irritable bowel syndrome with diarrhea  -  REFILL   dicyclomine (BENTYL) 10 MG capsule; Take 1  capsule (10 mg total) by mouth before meals and at bedtime as needed (abdominal cramping/pain).  Dispense: 120 capsule; Refill: 0  - recommend OTC probiotic, such as Florastor or Culturelle, as directed  - avoid lactose  - informed patient can take imodium as directed PRN but advised to take only sparingly, patient verbalized understanding    Follow up in about 1 month (around 4/19/2024), or if symptoms worsen or fail to improve.      If no improvement in symptoms or symptoms worsen, call/follow-up at clinic or go to ER.      41 minutes of total time spent on the encounter, which includes face to face time and non-face to face time preparing to see the patient (e.g., review of tests), Obtaining and/or reviewing separately obtained history, Documenting clinical information in the electronic or other health record, Independently interpreting results (not separately reported) and communicating results to the patient/family/caregiver, or Care coordination (not separately reported).

## 2024-03-19 NOTE — PATIENT INSTRUCTIONS
Acid Reflux and GERD in Adults Discharge Instructions   About this topic   GERD stands for gastroesophageal reflux disease. It is sometimes called reflux or acid reflux. Acid reflux happens when your stomach acid backs up into your esophagus, the tube that carries your food from your mouth to your stomach. This can be uncomfortable. You may have stomach or chest pain (heartburn), trouble swallowing, or an upset stomach. Some people have a cough or sore throat.  Most of the time, you can use over-the-counter medicines to help with this problem.       What care is needed at home?   Ask your doctor what you need to do when you go home. Make sure you ask questions if you do not understand what the doctor says.  Raise the head of your bed by 6 to 8 inches (15 to 20 cm). Use wood or rubber blocks under 2 legs or try a foam wedge under your mattress. Just sleeping with your head raised on pillows is not enough.  Avoid beer, wine, and mixed drinks and avoid caffeine.  Keep a healthy weight. If you are too heavy, lose weight.  If you smoke, try to quit. Your doctor or nurse can help.  Keep a diary of your signs. Write down what you had to eat before you had reflux. This will help you learn which foods cause you problems. For some people, they need to avoid coffee, chocolate, alcohol, spicy or fatty foods, or peppermint.  Avoid eating for 2 to 3 hours before bedtime. Lying down after you eat can make reflux worse.  Avoid belts and clothes that are too tight.  What follow-up care is needed?   Your doctor may ask you to make visits to the office to check on your progress. Be sure to keep these visits.  What drugs may be needed?   The doctor may order drugs to:  Relieve heartburn  Prevent reflux  Lessen acid production  Heal the esophageal lining  Will physical activity be limited?   Your physical activities will not be limited.  What problems could happen?   Asthma  Precancerous changes in the food pipe  Long-term cough  Dental  problems  Higher risk of cancer of the food pipe. This is esophageal cancer.  Narrowing of the food pipe. This is a stricture.  Open sore in the food pipe. This is an ulcer.  When do I need to call the doctor?   You have signs of a heart attack, which may include:  Severe chest pain, pressure, or discomfort with:  Breathing trouble, sweating, upset stomach, or cold, clammy skin.  Pain in your arms, back, or jaw.  Worse pain with activity like walking up stairs.  Fast or irregular heartbeat.  Feeling dizzy, faint, or weak.  You have sudden, severe belly pain or the belly pain is constant.  You have blood in the undigested food and acid that comes up, or stool that looks red, black, or like tar.  You feel like your food gets stuck or you have pain when you swallow.  You lose weight when you are not trying to.  You choke when you are eating.  Your reflux is very bad, very frequent, or not helped by over-the-counter medicines.  You keep throwing up.  Teach Back: Helping You Understand   The Teach Back Method helps you understand the information we are giving you. After you talk with the staff, tell them in your own words what you learned. This helps to make sure the staff has described each thing clearly. It also helps to explain things that may have been confusing. Before going home, make sure you can do these:  I can tell you about my condition.  I can tell you what changes I need to make with my eating habits to ease the reflux.  I can tell you what I will do if I am throwing up fluid that looks like blood or coffee grounds.  Where can I learn more?   American Academy of [employer]icians  https://familydoctor.org/condition/refluxacid-reflux/   [facility]HS Choices  https://www.nhs.uk/conditions/heartburn-and-acid-reflux/   Last Reviewed Date   2021-06-09  Consumer Information Use and Disclaimer   This information is not specific medical advice and does not replace information you receive from your health care provider. This  is only a brief summary of general information. It does NOT include all information about conditions, illnesses, injuries, tests, procedures, treatments, therapies, discharge instructions or life-style choices that may apply to you. You must talk with your health care provider for complete information about your health and treatment options. This information should not be used to decide whether or not to accept your health care providers advice, instructions or recommendations. Only your health care provider has the knowledge and training to provide advice that is right for you.  Copyright   Copyright © 2021 [employer] and its affiliates and/or licensors. All rights reserved.

## 2024-03-20 ENCOUNTER — PATIENT MESSAGE (OUTPATIENT)
Dept: PRIMARY CARE CLINIC | Facility: CLINIC | Age: 74
End: 2024-03-20
Payer: MEDICARE

## 2024-04-14 DIAGNOSIS — R10.13 EPIGASTRIC PAIN: ICD-10-CM

## 2024-04-14 DIAGNOSIS — K58.0 IRRITABLE BOWEL SYNDROME WITH DIARRHEA: ICD-10-CM

## 2024-04-16 RX ORDER — DICYCLOMINE HYDROCHLORIDE 10 MG/1
10 CAPSULE ORAL
Qty: 120 CAPSULE | Refills: 1 | Status: SHIPPED | OUTPATIENT
Start: 2024-04-16 | End: 2024-05-17

## 2024-04-29 ENCOUNTER — LAB VISIT (OUTPATIENT)
Dept: LAB | Facility: HOSPITAL | Age: 74
End: 2024-04-29
Attending: FAMILY MEDICINE
Payer: MEDICARE

## 2024-04-29 DIAGNOSIS — E78.2 MIXED HYPERLIPIDEMIA: Chronic | ICD-10-CM

## 2024-04-29 DIAGNOSIS — I10 PRIMARY HYPERTENSION: Chronic | ICD-10-CM

## 2024-04-29 LAB
ALBUMIN SERPL BCP-MCNC: 3.8 G/DL (ref 3.5–5.2)
ALP SERPL-CCNC: 67 U/L (ref 55–135)
ALT SERPL W/O P-5'-P-CCNC: 5 U/L (ref 10–44)
ANION GAP SERPL CALC-SCNC: 8 MMOL/L (ref 8–16)
AST SERPL-CCNC: 17 U/L (ref 10–40)
BILIRUB SERPL-MCNC: 0.8 MG/DL (ref 0.1–1)
BUN SERPL-MCNC: 19 MG/DL (ref 8–23)
CALCIUM SERPL-MCNC: 9 MG/DL (ref 8.7–10.5)
CHLORIDE SERPL-SCNC: 104 MMOL/L (ref 95–110)
CHOLEST SERPL-MCNC: 138 MG/DL (ref 120–199)
CHOLEST/HDLC SERPL: 2.3 {RATIO} (ref 2–5)
CO2 SERPL-SCNC: 28 MMOL/L (ref 23–29)
CREAT SERPL-MCNC: 1 MG/DL (ref 0.5–1.4)
EST. GFR  (NO RACE VARIABLE): >60 ML/MIN/1.73 M^2
GLUCOSE SERPL-MCNC: 94 MG/DL (ref 70–110)
HDLC SERPL-MCNC: 60 MG/DL (ref 40–75)
HDLC SERPL: 43.5 % (ref 20–50)
LDLC SERPL CALC-MCNC: 65 MG/DL (ref 63–159)
NONHDLC SERPL-MCNC: 78 MG/DL
POTASSIUM SERPL-SCNC: 4.3 MMOL/L (ref 3.5–5.1)
PROT SERPL-MCNC: 6.4 G/DL (ref 6–8.4)
SODIUM SERPL-SCNC: 140 MMOL/L (ref 136–145)
TRIGL SERPL-MCNC: 65 MG/DL (ref 30–150)

## 2024-04-29 PROCEDURE — 80061 LIPID PANEL: CPT | Performed by: PHYSICIAN ASSISTANT

## 2024-04-29 PROCEDURE — 80053 COMPREHEN METABOLIC PANEL: CPT | Performed by: PHYSICIAN ASSISTANT

## 2024-04-29 PROCEDURE — 36415 COLL VENOUS BLD VENIPUNCTURE: CPT | Mod: PN | Performed by: PHYSICIAN ASSISTANT

## 2024-04-30 ENCOUNTER — TELEPHONE (OUTPATIENT)
Dept: GASTROENTEROLOGY | Facility: CLINIC | Age: 74
End: 2024-04-30
Payer: MEDICARE

## 2024-04-30 ENCOUNTER — SOCIAL WORK (OUTPATIENT)
Dept: PRIMARY CARE CLINIC | Facility: CLINIC | Age: 74
End: 2024-04-30
Payer: MEDICARE

## 2024-04-30 PROCEDURE — 99499 UNLISTED E&M SERVICE: CPT | Mod: S$PBB,,, | Performed by: SOCIAL WORKER

## 2024-04-30 NOTE — TELEPHONE ENCOUNTER
Returned call to the patient, patient notified of the need to reschedule his EGD as Dr. Novak is not going to be in town on 06/18/2024, procedure rescheduled with the patient for a sooner availability, patient verbalized understanding of this.

## 2024-04-30 NOTE — TELEPHONE ENCOUNTER
Attempted to reach the patient to notify him that his egd on 06/18/2024 will need to be rescheduled as Dr. Novak will not be in town that date, left message asking for a call back to the clinic as there some available dates in May, clinic number provided.

## 2024-04-30 NOTE — TELEPHONE ENCOUNTER
----- Message from Kelly Arrieta sent at 4/30/2024 11:44 AM CDT -----  Regarding: EGD  Type:  Needs Medical Advice    Who Called:Pt    Would the patient rather a call back or a response via [facility]? Call back    Best Call Back Number: [phone]    Additional Information: Pt have upcoming EGD and would like to reschedule appt. Thank you

## 2024-04-30 NOTE — PROGRESS NOTES
Pt was last seen by clinician on 9/25/2023. He has not responded to telephone messages or messages in the portal to rescheduled cancelled appointment. Pt will be removed from [facility] program.

## 2024-05-02 ENCOUNTER — OFFICE VISIT (OUTPATIENT)
Dept: PRIMARY CARE CLINIC | Facility: CLINIC | Age: 74
End: 2024-05-02
Payer: MEDICARE

## 2024-05-02 VITALS
SYSTOLIC BLOOD PRESSURE: 120 MMHG | DIASTOLIC BLOOD PRESSURE: 78 MMHG | RESPIRATION RATE: 18 BRPM | OXYGEN SATURATION: 96 % | BODY MASS INDEX: 26.83 KG/M2 | WEIGHT: 171.31 LBS | TEMPERATURE: 98 F | HEART RATE: 77 BPM

## 2024-05-02 DIAGNOSIS — I10 PRIMARY HYPERTENSION: Primary | Chronic | ICD-10-CM

## 2024-05-02 DIAGNOSIS — E78.2 MIXED HYPERLIPIDEMIA: Chronic | ICD-10-CM

## 2024-05-02 DIAGNOSIS — F41.9 ANXIETY: Chronic | ICD-10-CM

## 2024-05-02 DIAGNOSIS — G47.00 INSOMNIA, UNSPECIFIED TYPE: Chronic | ICD-10-CM

## 2024-05-02 PROCEDURE — 99214 OFFICE O/P EST MOD 30 MIN: CPT | Mod: PBBFAC,PN | Performed by: FAMILY MEDICINE

## 2024-05-02 PROCEDURE — 99999 PR PBB SHADOW E&M-EST. PATIENT-LVL IV: CPT | Mod: PBBFAC,,, | Performed by: FAMILY MEDICINE

## 2024-05-02 PROCEDURE — 99214 OFFICE O/P EST MOD 30 MIN: CPT | Mod: S$PBB,,, | Performed by: FAMILY MEDICINE

## 2024-05-02 NOTE — ASSESSMENT & PLAN NOTE
Stopped Sonata, sleeping fairly well.  I am happy with his decision and glad he is doing well not relying on sleeping aids.

## 2024-05-02 NOTE — PROGRESS NOTES
Primary Care Provider Appointment   [facility]       Patient ID: Vince Chadwick III is a 74 y.o. male.    ASSESSMENT/PLAN by Problem List:    1. Primary hypertension  Assessment & Plan:  Stable and satisfactory.  Continue current medication.  Healthy nutrition and needs more regular exercise      2. Mixed hyperlipidemia  Assessment & Plan:  Continue atorvastatin.  Continue to work on improved nutrition and more exercise      3. Anxiety  Assessment & Plan:  Remains on Lexapro but there are still multiple external pressures, family, financial, and other.  Discussed counseling is available.  Also discussed that his ADD is a contributing factor to his anxiety.  Stimulants would not be recommended but did discuss considering Strattera.  He would like to think about this.      4. Insomnia, unspecified type  Assessment & Plan:  Stopped Sonata, sleeping fairly well.  I am happy with his decision and glad he is doing well not relying on sleeping aids.          Follow Up:  Three months    Subjective:     Chief Complaint   Patient presents with    Follow-up     I have reviewed the information entered by the ancillary staff regarding the chief complaint as well as the related history.    HPI    Patient is a/an 74 y.o.  male     Follow-up multiple topics.  See above for details addressed today    For complete problem list, past medical history, surgical history, social history, etc., see appropriate section in the electronic medical record    Review of Systems   Constitutional:  Negative for activity change and unexpected weight change.   Respiratory: Negative.  Negative for shortness of breath.    Cardiovascular: Negative.  Negative for chest pain.   Gastrointestinal: Negative.    Musculoskeletal: Negative.    Neurological: Negative.    Psychiatric/Behavioral:  Positive for decreased concentration. The patient is nervous/anxious.        Objective     Physical Exam  Vitals reviewed.    Constitutional:       General: He is not in acute distress.     Appearance: He is well-developed. He is not diaphoretic.   HENT:      Head: Normocephalic and atraumatic.   Eyes:      General: No scleral icterus.     Conjunctiva/sclera: Conjunctivae normal.   Cardiovascular:      Rate and Rhythm: Normal rate and regular rhythm.      Heart sounds: Normal heart sounds. No murmur heard.     No gallop.   Pulmonary:      Effort: Pulmonary effort is normal. No respiratory distress.   Musculoskeletal:      Cervical back: Normal range of motion and neck supple.   Skin:     General: Skin is warm and dry.   Neurological:      Mental Status: He is alert and oriented to person, place, and time.      Deep Tendon Reflexes: Reflexes are normal and symmetric.   Psychiatric:         Behavior: Behavior normal.       Vitals:    05/02/24 1016   BP: 120/78   BP Location: Right arm   Patient Position: Sitting   BP Method: Medium (Manual)   Pulse: 77   Resp: 18   Temp: 98.2 °F (36.8 °C)   TempSrc: Oral   SpO2: 96%   Weight: 77.7 kg (171 lb 4.8 oz)           THIS DOCUMENT WAS MADE IN PART WITH VOICE RECOGNITION SOFTWARE.  OCCASIONALLY THIS SOFTWARE WILL MISINTERPRET WORDS OR PHRASES.

## 2024-05-05 NOTE — ASSESSMENT & PLAN NOTE
Stable and satisfactory.  Continue current medication.  Healthy nutrition and needs more regular exercise

## 2024-05-05 NOTE — ASSESSMENT & PLAN NOTE
Remains on Lexapro but there are still multiple external pressures, family, financial, and other.  Discussed counseling is available, encouraged him to consider resuming.  Also discussed that his ADD is a contributing factor to his anxiety.  Stimulants would not be recommended but did discuss considering Strattera.  He would like to think about this.  Discussed the positive depression screen but he feels this is more related to anxiety and attention.  Will monitor and assist when he is ready

## 2024-05-07 ENCOUNTER — TELEPHONE (OUTPATIENT)
Dept: GASTROENTEROLOGY | Facility: CLINIC | Age: 74
End: 2024-05-07
Payer: MEDICARE

## 2024-05-07 NOTE — TELEPHONE ENCOUNTER
----- Message from Michael Hudson sent at 5/7/2024  9:01 AM CDT -----  Type: Needs Medical Advice  Best Call Back Number: [phone]  Additional Information: pt stated he would like to cancel pts procedure appt please call back to advise asap thanks!

## 2024-05-07 NOTE — TELEPHONE ENCOUNTER
Called and spoke with the patient, patient states that he needs to reschedule his EGD due to a dental issue, procedure rescheduled with the patient, patient verbalized understanding of this.

## 2024-05-16 DIAGNOSIS — R10.13 EPIGASTRIC PAIN: ICD-10-CM

## 2024-05-16 DIAGNOSIS — K58.0 IRRITABLE BOWEL SYNDROME WITH DIARRHEA: ICD-10-CM

## 2024-05-17 RX ORDER — DICYCLOMINE HYDROCHLORIDE 10 MG/1
10 CAPSULE ORAL
Qty: 200 CAPSULE | Refills: 0 | Status: SHIPPED | OUTPATIENT
Start: 2024-05-17 | End: 2025-05-17

## 2024-07-10 DIAGNOSIS — E78.2 MIXED HYPERLIPIDEMIA: Chronic | ICD-10-CM

## 2024-07-10 DIAGNOSIS — I10 PRIMARY HYPERTENSION: ICD-10-CM

## 2024-07-10 RX ORDER — ATORVASTATIN CALCIUM 10 MG/1
10 TABLET, FILM COATED ORAL NIGHTLY
Qty: 90 TABLET | Refills: 1 | Status: SHIPPED | OUTPATIENT
Start: 2024-07-10

## 2024-07-10 RX ORDER — BENAZEPRIL HYDROCHLORIDE 10 MG/1
10 TABLET ORAL DAILY
Qty: 90 TABLET | Refills: 1 | Status: SHIPPED | OUTPATIENT
Start: 2024-07-10

## 2024-07-22 DIAGNOSIS — R10.13 EPIGASTRIC PAIN: ICD-10-CM

## 2024-07-22 DIAGNOSIS — R12 HEARTBURN: ICD-10-CM

## 2024-07-23 RX ORDER — FAMOTIDINE 20 MG/1
20 TABLET, FILM COATED ORAL 2 TIMES DAILY
Qty: 180 TABLET | Refills: 3 | Status: SHIPPED | OUTPATIENT
Start: 2024-07-23

## 2024-07-30 DIAGNOSIS — Z00.00 ENCOUNTER FOR MEDICARE ANNUAL WELLNESS EXAM: ICD-10-CM

## 2024-08-02 ENCOUNTER — OFFICE VISIT (OUTPATIENT)
Dept: PRIMARY CARE CLINIC | Facility: CLINIC | Age: 74
End: 2024-08-02
Payer: MEDICARE

## 2024-08-02 VITALS
SYSTOLIC BLOOD PRESSURE: 98 MMHG | DIASTOLIC BLOOD PRESSURE: 62 MMHG | BODY MASS INDEX: 26.64 KG/M2 | HEIGHT: 67 IN | OXYGEN SATURATION: 98 % | TEMPERATURE: 99 F | WEIGHT: 169.75 LBS | HEART RATE: 72 BPM

## 2024-08-02 DIAGNOSIS — S50.812A CAT SCRATCH OF FOREARM, LEFT, INITIAL ENCOUNTER: ICD-10-CM

## 2024-08-02 DIAGNOSIS — I10 PRIMARY HYPERTENSION: Primary | Chronic | ICD-10-CM
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
## 2024-08-02 DIAGNOSIS — F41.9 ANXIETY: Chronic | ICD-10-CM

## 2024-08-02 DIAGNOSIS — E78.2 MIXED HYPERLIPIDEMIA: Chronic | ICD-10-CM

## 2024-08-02 PROCEDURE — 99215 OFFICE O/P EST HI 40 MIN: CPT | Mod: PBBFAC,PN | Performed by: FAMILY MEDICINE

## 2024-08-02 PROCEDURE — 99999 PR PBB SHADOW E&M-EST. PATIENT-LVL V: CPT | Mod: PBBFAC,,, | Performed by: FAMILY MEDICINE

## 2024-08-02 RX ORDER — DULOXETIN HYDROCHLORIDE 30 MG/1
30 CAPSULE, DELAYED RELEASE ORAL DAILY
Qty: 30 CAPSULE | Refills: 1 | Status: SHIPPED | OUTPATIENT
Start: 2024-08-02 | End: 2025-08-02

## 2024-08-02 RX ORDER — ESCITALOPRAM OXALATE 10 MG/1
10 TABLET ORAL DAILY
Start: 2024-08-02

## 2024-08-02 NOTE — ASSESSMENT & PLAN NOTE
Patient does have ongoing stress, anxiety as well as attention deficit disorder.  Patient on Lexapro 20 mg.  He did consult with Gali but did not follow back as recommended.  I do think we need to make some changes.  Will try Cymbalta, start 30 mg daily while reducing Lexapro to 10 mg.  After few weeks if he tolerates this will discontinue Lexapro and increase Cymbalta to 60 mg.  I have also encouraged him to call Gali back to reschedule some counseling.

## 2024-08-02 NOTE — PROGRESS NOTES
Primary Care Provider Appointment   [facility]       Patient ID: Vince Chadwick III is a 74 y.o. male.    ASSESSMENT/PLAN by Problem List:    1. Primary hypertension  Assessment & Plan:  Blood pressure is under satisfactory control.  We will continue current medications.      2. Mixed hyperlipidemia  Assessment & Plan:  Remains on atorvastatin 10 mg.  LDL 65 on current treatment.      3. Cat scratch of forearm, left, initial encounter  Comments:  No sign of infection.  He will continue to monitor and report any changes.    4. Anxiety  Assessment & Plan:  Patient does have ongoing stress, anxiety as well as attention deficit disorder.  Patient on Lexapro 20 mg.  He did consult with Gali but did not follow back as recommended.  I do think we need to make some changes.  Will try Cymbalta, start 30 mg daily while reducing Lexapro to 10 mg.  After few weeks if he tolerates this will discontinue Lexapro and increase Cymbalta to 60 mg.  I have also encouraged him to call Gali back to reschedule some counseling.      Other orders  -     DULoxetine (CYMBALTA) 30 MG capsule; Take 1 capsule (30 mg total) by mouth once daily.  Dispense: 30 capsule; Refill: 1  -     EScitalopram oxalate (LEXAPRO) 10 MG tablet; Take 1 tablet (10 mg total) by mouth once daily.         Follow Up:  Six weeks      Subjective:     Chief Complaint   Patient presents with    Follow-up     3 month follow up visit     I have reviewed the information entered by the ancillary staff regarding the chief complaint as well as the related history.    HPI    Patient is a/an 74 y.o.  male       Stress, affects GI symptoms, has been since childhood,   ADD symptoms, children with financial difficulties.    Bentyl helps, on lexapro 20 mg, did not tolerate Wellbutrin, had significant increase in blood pressure    For complete problem list, past medical history, surgical history, social history, etc., see appropriate  "section in the electronic medical record    Review of Systems   Constitutional:  Negative for activity change and unexpected weight change.   HENT:  Positive for hearing loss. Negative for rhinorrhea and trouble swallowing.    Eyes:  Negative for discharge and visual disturbance.   Respiratory:  Negative for chest tightness and wheezing.    Cardiovascular:  Negative for chest pain and palpitations.   Gastrointestinal:  Positive for diarrhea and vomiting. Negative for blood in stool and constipation.   Endocrine: Negative for polydipsia and polyuria.   Genitourinary:  Negative for difficulty urinating, hematuria and urgency.   Musculoskeletal:  Negative for arthralgias, joint swelling and neck pain.   Neurological:  Negative for weakness and headaches.   Psychiatric/Behavioral:  Negative for confusion and dysphoric mood.        Objective     Physical Exam  Constitutional:       General: He is not in acute distress.     Appearance: He is well-developed. He is not diaphoretic.   HENT:      Head: Normocephalic and atraumatic.   Eyes:      General: No scleral icterus.     Conjunctiva/sclera: Conjunctivae normal.   Pulmonary:      Effort: Pulmonary effort is normal. No respiratory distress.   Neurological:      General: No focal deficit present.      Mental Status: He is alert and oriented to person, place, and time.      Coordination: Coordination normal.   Psychiatric:         Mood and Affect: Mood normal.         Behavior: Behavior normal.         Thought Content: Thought content normal.         Judgment: Judgment normal.       Vitals:    08/02/24 1110   BP: 98/62   Pulse: 72   Temp: 98.8 °F (37.1 °C)   TempSrc: Oral   SpO2: 98%   Weight: 77 kg (169 lb 12.1 oz)   Height: 5' 7" (1.702 m)           THIS DOCUMENT WAS MADE IN PART WITH VOICE RECOGNITION SOFTWARE.  OCCASIONALLY THIS SOFTWARE WILL MISINTERPRET WORDS OR PHRASES.    "

## 2024-08-05 ENCOUNTER — PATIENT OUTREACH (OUTPATIENT)
Dept: ADMINISTRATIVE | Facility: HOSPITAL | Age: 74
End: 2024-08-05
Payer: MEDICARE

## 2024-08-12 ENCOUNTER — ANESTHESIA EVENT (OUTPATIENT)
Dept: ENDOSCOPY | Facility: HOSPITAL | Age: 74
End: 2024-08-12
Payer: MEDICARE

## 2024-08-23 ENCOUNTER — ANESTHESIA (OUTPATIENT)
Dept: ENDOSCOPY | Facility: HOSPITAL | Age: 74
End: 2024-08-23
Payer: MEDICARE

## 2024-08-23 ENCOUNTER — HOSPITAL ENCOUNTER (OUTPATIENT)
Facility: HOSPITAL | Age: 74
Discharge: HOME | End: 2024-08-23
Attending: INTERNAL MEDICINE | Admitting: FAMILY MEDICINE
Payer: MEDICARE

## 2024-08-23 VITALS
OXYGEN SATURATION: 97 % | HEIGHT: 68 IN | BODY MASS INDEX: 24.55 KG/M2 | HEART RATE: 62 BPM | RESPIRATION RATE: 14 BRPM | TEMPERATURE: 98 F | DIASTOLIC BLOOD PRESSURE: 68 MMHG | SYSTOLIC BLOOD PRESSURE: 124 MMHG | WEIGHT: 162 LBS

## 2024-08-23 DIAGNOSIS — K21.9 GASTROESOPHAGEAL REFLUX DISEASE, UNSPECIFIED WHETHER ESOPHAGITIS PRESENT: ICD-10-CM

## 2024-08-23 PROBLEM — R10.13 EPIGASTRIC PAIN: Status: ACTIVE | Noted: 2024-08-23

## 2024-08-23 PROCEDURE — 37000008 HC ANESTHESIA 1ST 15 MINUTES: Mod: PO | Performed by: INTERNAL MEDICINE

## 2024-08-23 PROCEDURE — D9220A PRA ANESTHESIA: Mod: ANES,,, | Performed by: ANESTHESIOLOGY

## 2024-08-23 PROCEDURE — 63600175 PHARM REV CODE 636 W HCPCS: Mod: PO | Performed by: INTERNAL MEDICINE

## 2024-08-23 PROCEDURE — 88342 IMHCHEM/IMCYTCHM 1ST ANTB: CPT | Mod: 26,,, | Performed by: STUDENT IN AN ORGANIZED HEALTH CARE EDUCATION/TRAINING PROGRAM

## 2024-08-23 PROCEDURE — 37000009 HC ANESTHESIA EA ADD 15 MINS: Mod: PO | Performed by: INTERNAL MEDICINE

## 2024-08-23 PROCEDURE — 25000003 PHARM REV CODE 250: Mod: PO | Performed by: NURSE ANESTHETIST, CERTIFIED REGISTERED

## 2024-08-23 PROCEDURE — 88342 IMHCHEM/IMCYTCHM 1ST ANTB: CPT | Mod: PO | Performed by: STUDENT IN AN ORGANIZED HEALTH CARE EDUCATION/TRAINING PROGRAM

## 2024-08-23 PROCEDURE — 88305 TISSUE EXAM BY PATHOLOGIST: CPT | Mod: PO | Performed by: STUDENT IN AN ORGANIZED HEALTH CARE EDUCATION/TRAINING PROGRAM

## 2024-08-23 PROCEDURE — 27201012 HC FORCEPS, HOT/COLD, DISP: Mod: PO | Performed by: INTERNAL MEDICINE

## 2024-08-23 PROCEDURE — D9220A PRA ANESTHESIA: Mod: CRNA,,, | Performed by: NURSE ANESTHETIST, CERTIFIED REGISTERED

## 2024-08-23 PROCEDURE — 43239 EGD BIOPSY SINGLE/MULTIPLE: CPT | Mod: PO | Performed by: INTERNAL MEDICINE

## 2024-08-23 PROCEDURE — 88305 TISSUE EXAM BY PATHOLOGIST: CPT | Mod: 26,,, | Performed by: STUDENT IN AN ORGANIZED HEALTH CARE EDUCATION/TRAINING PROGRAM

## 2024-08-23 PROCEDURE — 43239 EGD BIOPSY SINGLE/MULTIPLE: CPT | Mod: ,,, | Performed by: INTERNAL MEDICINE

## 2024-08-23 RX ORDER — LIDOCAINE HYDROCHLORIDE 20 MG/ML
INJECTION INTRAVENOUS
Status: DISCONTINUED | OUTPATIENT
Start: 2024-08-23 | End: 2024-08-23

## 2024-08-23 RX ORDER — SODIUM CHLORIDE 0.9 % (FLUSH) 0.9 %
10 SYRINGE (ML) INJECTION
Status: DISCONTINUED | OUTPATIENT
Start: 2024-08-23 | End: 2024-08-23 | Stop reason: HOSPADM

## 2024-08-23 RX ORDER — PROPOFOL 10 MG/ML
VIAL (ML) INTRAVENOUS
Status: DISCONTINUED | OUTPATIENT
Start: 2024-08-23 | End: 2024-08-23

## 2024-08-23 RX ORDER — SODIUM CHLORIDE, SODIUM LACTATE, POTASSIUM CHLORIDE, CALCIUM CHLORIDE 600; 310; 30; 20 MG/100ML; MG/100ML; MG/100ML; MG/100ML
INJECTION, SOLUTION INTRAVENOUS CONTINUOUS
Status: DISCONTINUED | OUTPATIENT
Start: 2024-08-23 | End: 2024-08-23 | Stop reason: HOSPADM

## 2024-08-23 RX ADMIN — Medication 50 MG: at 09:08

## 2024-08-23 RX ADMIN — Medication 150 MG: at 09:08

## 2024-08-23 RX ADMIN — SODIUM CHLORIDE, POTASSIUM CHLORIDE, SODIUM LACTATE AND CALCIUM CHLORIDE: 600; 310; 30; 20 INJECTION, SOLUTION INTRAVENOUS at 08:08

## 2024-08-23 RX ADMIN — LIDOCAINE HYDROCHLORIDE 75 MG: 20 INJECTION INTRAVENOUS at 09:08

## 2024-08-23 NOTE — ANESTHESIA PREPROCEDURE EVALUATION
08/23/2024  Vince Chadwick III is a 74 y.o., male.      Pre-op Assessment          Review of Systems  Cardiovascular:     Hypertension   CAD                  Coronary Artery Disease:                            Hypertension         Hepatic/GI:     GERD      Gerd          Musculoskeletal:  Arthritis        Arthritis          Neurological:    Neuromuscular Disease,           Arthritis                         Neuromuscular Disease   Psych:  Psychiatric History                  Physical Exam  General: Well nourished        Anesthesia Plan  Type of Anesthesia, risks & benefits discussed:    Anesthesia Type: Gen Natural Airway  Intra-op Monitoring Plan: Standard ASA Monitors  Induction:  IV  Informed Consent: Informed consent signed with the Patient and all parties understand the risks and agree with anesthesia plan.  All questions answered.   ASA Score: 2  Day of Surgery Review of History & Physical: H&P Update referred to the surgeon/provider.    Ready For Surgery From Anesthesia Perspective.     .

## 2024-08-23 NOTE — DISCHARGE INSTRUCTIONS
Recovery After Procedural Sedation (Adult)   You have been given medicine by vein to make you sleep during your surgery. This may have included both a pain medicine and sleeping medicine. Most of the effects have worn off. But you may still have some drowsiness for the next 6 to 8 hours.  Home care  Follow these guidelines when you get home:  For the next 8 hours, you should be watched by a responsible adult. This person should make sure your condition is not getting worse.  Don't drink any alcohol for the next 24 hours.  Don't drive, operate dangerous machinery, or make important business or personal decisions during the next 24 hours.  To prevent injury or falls, use caution when standing and walking for at least 24 hours after your procedure.  Note: Your healthcare provider may tell you not to take any medicine by mouth for pain or sleep in the next 4 hours. These medicines may react with the medicines you were given in the hospital. This could cause a much stronger response than usual.  Follow-up care  Follow up with your healthcare provider if you are not alert and back to your usual level of activity within 12 hours.  When to seek medical advice  Call your healthcare provider right away if any of these occur:  Drowsiness gets worse  Weakness or dizziness gets worse  Repeated vomiting  You can't be awakened  Fever  New rash  [employer] last reviewed this educational content on 9/1/2019  © 6521-4832 [employer]. [address] [place] [address], [place] [address]. All rights reserved. This information is not intended as a substitute for professional medical care. Always follow your healthcare professional's instructions         Tips to Control Acid Reflux    To control acid reflux, youll need to make some basic diet and lifestyle changes. The simple steps outlined below may be all youll need to ease discomfort.  Watch what you eat  Avoid fatty foods and spicy foods.  Eat fewer acidic foods, such as citrus  and tomato-based foods. These can increase symptoms.  Limit drinking alcohol, caffeine, and fizzy beverages. All increase acid reflux.  Try limiting chocolate, peppermint, and spearmint. These can worsen acid reflux in some people.  Watch when you eat  Avoid lying down for 3 hours after eating.  Do not snack before going to bed.  Raise your head  Raising your head and upper body by 4 to 6 inches helps limit reflux when youre lying down. Put blocks under the head of your bed frame to raise it.  Other changes  Lose weight, if you need to  Dont exercise near bedtime  Avoid tight-fitting clothes  Limit aspirin and ibuprofen  Stop smoking   Date Last Reviewed: 7/1/2016  © 5413-4824 [employer]. [address] [place] [address], [place] [address]. All rights reserved. This information is not intended as a substitute for professional medical care. Always follow your healthcare professional's instructions.         High-Fiber Diet  Fiber is in fruits, vegetables, cereals, and grains. Fiber passes through your body undigested. A high-fiber diet helps food move through your intestinal tract. The added bulk is helpful in preventing constipation. In people with diverticulosis, fiber helps clean out the pouches along the colon wall. It also prevents new pouches from forming. A high-fiber diet reduces the risk of colon cancer. It also lowers blood cholesterol and prevents high blood sugar in people with diabetes.    The fiber-rich foods listed below should be part of your diet. If you are not used to high-fiber foods, start with 1 or 2 foods from this list. Every 3 to 4 days add a new one to your diet. Do this until you are eating 4 high-fiber foods per day. This should give you 20 to 35 grams of fiber a day. It is also important to drink a lot of water when you are on this diet. You should have 6 to 8 glasses of water a day. Water makes the fiber swell and increases the benefit.  Foods high in dietary fiber  The following  foods are high in dietary fiber:  Breads. Breads made with 100% whole-wheat flour; iván, wheat, or rye crackers; whole-grain tortillas, bran muffins.  Cereals. Whole-grain and bran cereals with bran (shredded wheat, wheat flakes, raisin bran, corn bran); oatmeal, rolled oats, granola, and brown rice.  Fruits. Fresh fruits and their edible skins (pears, prunes, raisins, berries, apples, and apricots); bananas, citrus fruit, mangoes, pineapple; and prune juice.  Nuts. Any nuts and seeds.  Vegetables. Best served raw or lightly cooked. All types, especially: green peas, celery, eggplant, potatoes, spinach, broccoli, [place] sprouts, winter squash, carrots, cauliflower, soybeans, lentils, and fresh and dried beans of all kinds.  Other. Popcorn, any spices.  Date Last Reviewed: 8/1/2016  © 4329-6248 [employer]. [address] [place] [address]. All rights reserved. This information is not intended as a substitute for professional medical care. Always follow your healthcare professional's instructions.

## 2024-08-23 NOTE — TRANSFER OF CARE
"Anesthesia Transfer of Care Note    Patient: Vince Chadwick III    Procedure(s) Performed: Procedure(s) (LRB):  EGD (ESOPHAGOGASTRODUODENOSCOPY) (N/A)    Patient location: PACU    Anesthesia Type: general    Transport from OR: Transported from OR on room air with adequate spontaneous ventilation    Post pain: adequate analgesia    Post assessment: no apparent anesthetic complications    Post vital signs: stable    Level of consciousness: awake and sedated    Nausea/Vomiting: no nausea/vomiting    Complications: none    Transfer of care protocol was followed      Last vitals: Visit Vitals  /62 (BP Location: Left arm, Patient Position: Sitting)   Pulse 60   Temp 36.3 °C (97.3 °F) (Skin)   Resp 18   Ht 5' 8" (1.727 m)   Wt 73.5 kg (162 lb)   SpO2 96%   BMI 24.63 kg/m²     "

## 2024-08-23 NOTE — PROVATION PATIENT INSTRUCTIONS
Discharge Summary/Instructions after an Endoscopic Procedure  Patient Name: Vince Chadwick  Patient MRN: [id number]  Patient YOB: 1950 Friday, August 23, 2024  Davy Novak MD  Dear patient,  As a result of recent federal legislation (The Federal Cures Act), you may   receive lab or pathology results from your procedure in your [facility]   account before your physician is able to contact you. Your physician or   their representative will relay the results to you with their   recommendations at their soonest availability.  Thank you,  RESTRICTIONS:  During your procedure today, you received medications for sedation.  These   medications may affect your judgment, balance and coordination.  Therefore,   for 24 hours, you have the following restrictions:   - DO NOT drive a car, operate machinery, make legal/financial decisions,   sign important papers or drink alcohol.    ACTIVITY:  Today: no heavy lifting, straining or running due to procedural   sedation/anesthesia.  The following day: return to full activity including work.  DIET:  Eat and drink normally unless instructed otherwise.     TREATMENT FOR COMMON SIDE EFFECTS:  - Mild abdominal pain, nausea, belching, bloating or excessive gas:  rest,   eat lightly and use a heating pad.  - Sore Throat: treat with throat lozenges and/or gargle with warm salt   water.  - Because air was used during the procedure, expelling large amounts of air   from your rectum or belching is normal.  - If a bowel prep was taken, you may not have a bowel movement for 1-3 days.    This is normal.  SYMPTOMS TO WATCH FOR AND REPORT TO YOUR PHYSICIAN:  1. Abdominal pain or bloating, other than gas cramps.  2. Chest pain.  3. Back pain.  4. Signs of infection such as: chills or fever occurring within 24 hours   after the procedure.  5. Rectal bleeding, which would show as bright red, maroon, or black stools.   (A tablespoon of blood from the rectum is not serious, especially  if   hemorrhoids are present.)  6. Vomiting.  7. Weakness or dizziness.  GO DIRECTLY TO THE NEAREST EMERGENCY ROOM IF YOU HAVE ANY OF THE FOLLOWING:      Difficulty breathing              Chills and/or fever over 101 F   Persistent vomiting and/or vomiting blood   Severe abdominal pain   Severe chest pain   Black, tarry stools   Bleeding- more than one tablespoon   Any other symptom or condition that you feel may need urgent attention  Your doctor recommends these additional instructions:  If any biopsies were taken, your doctors clinic will contact you in 1 to 2   weeks with any results.  Follow an antireflux regimen.  This includes:       - Do not lie down for at least 3 to 4 hours after meals.        - Raise the head of the bed 4 to 6 inches.        - Decrease excess weight.        - Avoid citrus juices and other acidic foods, alcohol, chocolate, mints,   coffee and other caffeinated beverages, carbonated beverages, fatty and   fried foods.        - Avoid tight-fitting clothing.        - Avoid cigarettes and other tobacco products.   Continue your present medications.   Take Prilosec (omeprazole) 40 mg by mouth once a day.   Take Pepcid (famotidine) 20 mg by mouth twice a day.   Take Carafate (sucralfate) tablets 1 gram by mouth four times a day as   needed.  For questions, problems or results please call your physician - Davy Novak MD at Work:  [phone].  EMERGENCY PHONE NUMBER: [phone], LAB RESULTS: [phone]  IF A COMPLICATION OR EMERGENCY SITUATION ARISES AND YOU ARE UNABLE TO REACH   YOUR PHYSICIAN - GO DIRECTLY TO THE EMERGENCY ROOM.  ___________________________________________  Nurse Signature  ___________________________________________  Patient/Designated Responsible Party Signature  Davy Novak MD  8/23/2024 10:03:46 AM  This report has been verified and signed electronically.  Dear patient,  As a result of recent federal legislation (The Federal Cures Act), you may    receive lab or pathology results from your procedure in your [facility]   account before your physician is able to contact you. Your physician or   their representative will relay the results to you with their   recommendations at their soonest availability.  Thank you.  PROVATION

## 2024-08-23 NOTE — H&P
History & Physical - Short Stay  Gastroenterology      SUBJECTIVE:     Procedure: Gastroscopy    Chief Complaint/Indication for Procedure:  GERD.  Epig pain.  Heartburn.    History of Present Illness:  See recent GI OV note:  Office Visit   3/19/2024  [facility]       Martha Plunkett FNP  Gastroenterology Epigastric pain +3 more  Dx Heartburn; Referred by Self, Aaareferral  Reason for Visit     Progress Notes    Martha Plunkett FNP at 3/19/2024 11:00 AM    Status: Addendum   Expand All Collapse All  Subjective:         Subjective  Patient ID: Vince Chadwick III is a 73 y.o. male Body mass index is 27.17 kg/m².     Chief Complaint: Heartburn     Established patient of Dr. Novak & myself (last in 2020).     Abdominal Pain  This is a recurrent problem. The current episode started more than 1 month ago (started with abdominal pain on 2/5/2024, went to the urgent care for it & sent to the ER for it). The problem occurs intermittently. The problem has been rapidly improving. The pain is located in the epigastric region (& mid chest (when it first started)). The pain is at a severity of 0/10 (currently). The pain is mild. The quality of the pain is burning. Associated symptoms include diarrhea (chronic intermittent, unchanged; resolves with imodium PRN; bowel movements are currently once-twice daily of formed stool; triggered by stress) and nausea (intermittent, occurs when abdominal pain occurs; PAST TREATMENT: phenergan). Pertinent negatives include no anorexia, belching, constipation, dysuria, fever, flatus, frequency, hematochezia, melena, vomiting or weight loss. Exacerbated by: stress. He has tried proton pump inhibitors and H2 blockers (prilosec 40 mg once daily, carafate 1 gram QID PRN- taking once a day, pepcid 20 mg once daily; bentyl 10 mg PRN; PAST: protonix, metamucil) for the symptoms. Prior diagnostic workup includes CT scan, GI consult, lower endoscopy, upper endoscopy and  ultrasound. His past medical history is significant for abdominal surgery, GERD (occurs a couple of times a week) and irritable bowel syndrome. There is no history of Crohn's disease, pancreatitis, PUD or ulcerative colitis.      Review of Systems   Constitutional:  Negative for appetite change, chills, fatigue, fever and weight loss.        Reports he takes Excedrin PRN; denies any other NSAID use   HENT:  Negative for sore throat and trouble swallowing.    Respiratory:  Negative for cough, choking and shortness of breath.    Cardiovascular:  Negative for chest pain (has resolved).   Gastrointestinal:  Positive for abdominal pain, diarrhea (chronic intermittent, unchanged; resolves with imodium PRN; bowel movements are currently once-twice daily of formed stool; triggered by stress) and nausea (intermittent, occurs when abdominal pain occurs; PAST TREATMENT: phenergan). Negative for anal bleeding, anorexia, blood in stool, constipation, flatus, hematochezia, melena, rectal pain and vomiting.       Assessment:      Assessment  1. Epigastric pain    2. Heartburn    3. Nausea    4. Irritable bowel syndrome with diarrhea             Plan:      Plan  Epigastric pain  -   REFILL AND TAKE AS PRESCRIBED  sucralfate (CARAFATE) 1 gram tablet; Take 1 tablet (1 g total) by mouth 4 (four) times daily before meals and nightly.  Dispense: 120 tablet; Refill: 0  -  REFILLS AND TAKE AS PRESCRIBED   famotidine (PEPCID) 20 MG tablet; Take 1 tablet (20 mg total) by mouth 2 (two) times daily.  Dispense: 60 tablet; Refill: 0  -  REFILL   omeprazole (PRILOSEC) 40 MG capsule; Take 1 capsule (40 mg total) by mouth every morning.  Dispense: 90 capsule; Refill: 1  -   REFILL  dicyclomine (BENTYL) 10 MG capsule; Take 1 capsule (10 mg total) by mouth before meals and at bedtime as needed (abdominal cramping/pain).  Dispense: 120 capsule; Refill: 0  - schedule EGD, discussed procedure with patient, including risks and benefits, patient  verbalized understanding  - avoid/minimize use of NSAIDs- since they can cause GI upset, bleeding and/or ulcers. If NSAID must be taken, recommend take with food.     Heartburn  -  REFILL AND TAKE AS PRESCRIBED    sucralfate (CARAFATE) 1 gram tablet; Take 1 tablet (1 g total) by mouth 4 (four) times daily before meals and nightly.  Dispense: 120 tablet; Refill: 0  -   REFILL AND TAKE AS PRESCRIBED   famotidine (PEPCID) 20 MG tablet; Take 1 tablet (20 mg total) by mouth 2 (two) times daily.  Dispense: 60 tablet; Refill: 0  -  REFILL   omeprazole (PRILOSEC) 40 MG capsule; Take 1 capsule (40 mg total) by mouth every morning.  Dispense: 90 capsule; Refill: 1  - schedule EGD, discussed procedure with patient, including risks and benefits, patient verbalized understanding     Nausea  - schedule EGD, discussed procedure with patient, including risks and benefits, patient verbalized understanding     Irritable bowel syndrome with diarrhea  -  REFILL   dicyclomine (BENTYL) 10 MG capsule; Take 1 capsule (10 mg total) by mouth before meals and at bedtime as needed (abdominal cramping/pain).  Dispense: 120 capsule; Refill: 0  - recommend OTC probiotic, such as Florastor or Culturelle, as directed  - avoid lactose  - informed patient can take imodium as directed PRN but advised to take only sparingly, patient verbalized understanding     Follow up in about 1 month (around 4/19/2024), or if symptoms worsen or fail to improve.             See last Upper GI endoscopy 5/13/2020  Indications:         Iron deficiency anemia, Heme positive stool   Comorbidities       Hypertension, Hyperlipidemia   Providers:           Davy Novak MD   Impression:          - Normal oropharynx.                        - Normal upper third of esophagus and middle third                        of esophagus.                        - LA Grade A reflux esophagitis. Biopsied.                        - Z-line regular, 40 cm from the incisors.                         - Normal cardia.                        - Gastritis/antritis. Biopsied.                        - Normal stomach otherwise.                        - Normal pylorus.                        - Erythematous duodenopathy. Biopsied.                        - Normal examined duodenum otherwise.   Recommendation:      - Perform a colonoscopy as previously scheduled.                        - Discharge patient to home after that.                        - Await pathology and CLOtest results.                        - Follow an antireflux regimen.                        - Use Protonix (pantoprazole) 40 mg PO daily.                        - Call the G.I. clinic in 2 weeks for reports (if                        you haven't heard from us sooner) [phone].                        - Return to GI clinic in 3 months, with repeat CBC then.   Davy Novak MD   5/13/2020     1.  FRAGMENTS OF BENIGN SQUAMOUS AND GASTRIC TYPE MUCOSA WITH NO ACTIVE   INFLAMMATION, EVIDENCE OF SPECIALIZED EPITHELIUM OR DYSPLASIA IDENTIFIED.   FRAGMENTS OF SQUAMOUS MUCOSA ARE HYPERPLASTIC AND SHOW APPROXIMATELY 25   EOSINOPHILS PER HIGH-POWER FIELD.   2.  FRAGMENTS OF NONNEOPLASTIC GASTRIC MUCOSA WITH NO ACTIVE INFLAMMATION,   EVIDENCE OF H PYLORI OR DYSPLASIA IDENTIFIED.   3.  FRAGMENTS OF NONNEOPLASTIC GASTRIC MUCOSA WITH NO ACTIVE INFLAMMATION,   EVIDENCE OF H PYLORI OR DYSPLASIA IDENTIFIED.   4.  FRAGMENTS OF NONNEOPLASTIC SMALL INTESTINAL MUCOSA WITH NO ACTIVE   INFLAMMATION, ULCERATION OR DYSPLASIA IDENTIFIED.           PTA Medications   Medication Sig    atorvastatin (LIPITOR) 10 MG tablet Take 1 tablet (10 mg total) by mouth every evening.    azelastine (ASTELIN) 137 mcg (0.1 %) nasal spray INSTILL 2 SPRAYS IN EACH NOSTRIL TWICE A DAY    benazepriL (LOTENSIN) 10 MG tablet Take 1 tablet (10 mg total) by mouth once daily.    dicyclomine (BENTYL) 10 MG capsule TAKE 1 CAPSULE (10 MG TOTAL) BY MOUTH BEFORE MEALS AND AT BEDTIME AS NEEDED  (ABDOMINAL CRAMPING/PAIN).    DULoxetine (CYMBALTA) 30 MG capsule Take 1 capsule (30 mg total) by mouth once daily.    famotidine (PEPCID) 20 MG tablet TAKE 1 TABLET BY MOUTH TWICE A DAY    gabapentin (NEURONTIN) 300 MG capsule TAKE 1 CAPSULE BY MOUTH TWICE A DAY    omeprazole (PRILOSEC) 40 MG capsule Take 1 capsule (40 mg total) by mouth every morning.    sucralfate (CARAFATE) 1 gram tablet Take 1 tablet (1 g total) by mouth 4 (four) times daily before meals and nightly.    tamsulosin (FLOMAX) 0.4 mg Cap Take 2 capsules (0.8 mg total) by mouth once daily. TAKE 1 CAPSULE BY MOUTH TWICE A DAY    EScitalopram oxalate (LEXAPRO) 10 MG tablet Take 1 tablet (10 mg total) by mouth once daily.    tadalafiL (CIALIS) 20 MG Tab Take 1 tabley by mouth 30 minutes before sexual activity. Do not exceed 1 tablet every 72 hours       Review of patient's allergies indicates:   Allergen Reactions    Wellbutrin [bupropion hcl] Other (See Comments)     Elevated BP        Past Medical History:   Diagnosis Date    ALLERGIC RHINITIS     Arthritis     Cataract     OU    Colon polyp     Diverticulitis     Diverticulosis     Gastritis 02/06/2024    GERD (gastroesophageal reflux disease)     Hyperlipidemia     resolved    Hypertension     Irritable bowel syndrome     Lumbar disc disease      Past Surgical History:   Procedure Laterality Date    APPENDECTOMY      CIRCUMCISION      COLONOSCOPY  06/02/2011    [facility].    One 1 mm polyp in the sigmoid colon.  FRAGMENT OF NONNEOPLASTIC COLONIC MUCOSA.  Sigmoid diverticulosis.  Spasms c/w IBS.  repeat in 7-8 years    COLONOSCOPY  09/12/2006    Dr. Novak[place]    COLONOSCOPY N/A 05/13/2020    Procedure: COLONOSCOPY;  Surgeon: Davy Novak Jr., MD;  Location: [facility];  Service: Endoscopy;  Laterality: N/A; repeat in 7 years for screening    EPIDURAL STEROID INJECTION INTO LUMBAR SPINE N/A 11/15/2021    Procedure: Injection-steroid-epidural-lumbar L5/S1;  Surgeon: Khang Sanchez MD;   Location: [facility] OR;  Service: Pain Management;  Laterality: N/A;    EPIDURAL STEROID INJECTION INTO LUMBAR SPINE N/A 12/13/2021    Procedure: Injection-steroid-epidural-lumbar L5/S1;  Surgeon: Khang Sanchez MD;  Location: [facility] OR;  Service: Pain Management;  Laterality: N/A;    EPIDURAL STEROID INJECTION INTO LUMBAR SPINE N/A 03/10/2023    Procedure: Injection-steroid-epidural-lumbar L2/3;  Surgeon: Khang Sanchez MD;  Location: [facility] OR;  Service: Pain Management;  Laterality: N/A;    ESOPHAGOGASTRODUODENOSCOPY N/A 05/13/2020    Procedure: EGD (ESOPHAGOGASTRODUODENOSCOPY);  Surgeon: Davy Novak Jr., MD;  Location: [facility];  Service: Endoscopy;  Laterality: N/A;    EYE SURGERY      FOOT SURGERY      x 2    INJECTION OF ANESTHETIC AGENT AROUND GANGLION IMPAR Right 02/17/2023    Procedure: BLOCK, facet cyst aspiration right side L2/3;  Surgeon: Khang Sanchez MD;  Location: [facility] OR;  Service: Pain Management;  Laterality: Right;    LAMINECTOMY USING MINIMALLY INVASIVE TECHNIQUE N/A 01/26/2022    Procedure: LAMINECTOMY, SPINE, MINIMALLY INVASIVE  RIGHT MIS APPROACH RESECTION L4-5 SYNOVIAL CYST AND BILATERAL LAMINECTOMY/MEDIAL FACETECTOMY;  Surgeon: Penny Lobato MD;  Location: [facility] OR;  Service: Neurosurgery;  Laterality: N/A;    LAMINECTOMY USING MINIMALLY INVASIVE TECHNIQUE N/A 04/26/2023    Procedure: LAMINECTOMY, SPINE, MINIMALLY INVASIVE    RIGHT L2-L3 MIS HEMILAMINECTOMY/MEDIAL FACETECTOMY FOR REMOVAL SYNOVIAL CYST;  Surgeon: Penny Lobato MD;  Location: [facility] OR;  Service: Neurosurgery;  Laterality: N/A;    LUMBAR EPIDURAL INJECTION      PILONIDAL CYST DRAINAGE      s/p chalazion removal      OD    TONSILLECTOMY       Family History   Problem Relation Name Age of Onset    Cataracts Mother      Breast cancer Mother      Glaucoma Father      Cataracts Father      Macular degeneration Father      Colon polyps Father      Glaucoma Sister      Glaucoma Paternal Uncle      Prostate  "cancer Paternal Uncle      Glaucoma Paternal Uncle      Heart attack Paternal Grandfather  44    Colon cancer Neg Hx      Crohn's disease Neg Hx      Ulcerative colitis Neg Hx       Social History     Tobacco Use    Smoking status: Never     Passive exposure: Never    Smokeless tobacco: Never   Substance Use Topics    Alcohol use: No     Alcohol/week: 0.0 standard drinks of alcohol    Drug use: No         OBJECTIVE:     Vital Signs (Most Recent)  Temp: 97.3 °F (36.3 °C) (08/23/24 0809)  Pulse: 60 (08/23/24 0809)  Resp: 18 (08/23/24 0809)  BP: 118/62 (08/23/24 0809)  SpO2: 96 % (08/23/24 0809)    Physical Exam:  : Ht: 5' 8" (172.7 cm)   Wt: 73.5 kg   BMI: 24.63 kg/m² .                                                       GENERAL:  Comfortable, in no acute distress.                                 HEENT EXAM:  Nonicteric.  No adenopathy.  Oropharynx is clear.               NECK:  Supple.                                                               LUNGS:  Clear.                                                               CARDIAC:  Regular rate and rhythm.  S1, S2.  No murmur.                      ABDOMEN:  Soft, positive bowel sounds, nontender.  No hepatosplenomegaly or masses.  No rebound or guarding.                                             EXTREMITIES:  No edema.     MENTAL STATUS:  Alert and oriented.    ASSESSMENT/PLAN:     Assessment:  GERD.  Epig pain.  Heartburn.    Plan: Gastroscopy    Anesthesia Plan:   MAC / General Anaesthesia    ASA Grade: ASA 2 - Patient with mild systemic disease with no functional limitations    MALLAMPATI SCORE: I (soft palate, uvula, fauces, and tonsillar pillars visible)    "

## 2024-08-23 NOTE — BRIEF OP NOTE
Discharge Note  Short Stay      SUMMARY     Admit Date: 8/23/2024    Attending Physician: Davy Novak Jr., MD     Discharge Physician: Davy Novak Jr., MD    Discharge Date: 8/23/2024 10:05 AM    Final Diagnosis: Epigastric pain [R10.13]  Nausea [R11.0]      Impression:            - Normal oropharynx.                          - Normal larynx.                          - Normal cricopharyngeus.                          - Normal esophagus.                          - Z-line regular, 40 cm from the incisors.                          - Patulous lower esophageal sphincter (NERD?).                          - Non-erosive esophageal reflux (NERD) disease(?).                          - Normal cardia.                          - Normal gastric fundus and gastric body.                          - Normal antrum and prepyloric region of the                          stomach. Biopsied.                          - Normal pylorus.                          - Normal examined duodenum.   Recommendation:        - Discharge patient to home.                          - Follow an antireflux regimen.                          - Continue present medications.                          - Use Prilosec (omeprazole) 40 mg PO daily.                          - Use Pepcid (famotidine) 20 mg PO BID.                          - Use sucralfate tablets 1 gram PO QID PRN.                          - Call the G.I. clinic in 2 weeks for reports (if                          you haven't heard from us sooner) [phone].   Davy Novak MD   8/23/2024       Disposition: HOME OR SELF CARE    Patient Instructions:   Current Discharge Medication List        CONTINUE these medications which have NOT CHANGED    Details   atorvastatin (LIPITOR) 10 MG tablet Take 1 tablet (10 mg total) by mouth every evening.  Qty: 90 tablet, Refills: 1    Associated Diagnoses: Mixed hyperlipidemia      azelastine (ASTELIN) 137 mcg (0.1 %) nasal spray INSTILL 2 SPRAYS IN  EACH NOSTRIL TWICE A DAY  Qty: 90 mL, Refills: 2      benazepriL (LOTENSIN) 10 MG tablet Take 1 tablet (10 mg total) by mouth once daily.  Qty: 90 tablet, Refills: 1    Comments: .  Associated Diagnoses: Primary hypertension      dicyclomine (BENTYL) 10 MG capsule TAKE 1 CAPSULE (10 MG TOTAL) BY MOUTH BEFORE MEALS AND AT BEDTIME AS NEEDED (ABDOMINAL CRAMPING/PAIN).  Qty: 200 capsule, Refills: 0    Associated Diagnoses: Epigastric pain; Irritable bowel syndrome with diarrhea      DULoxetine (CYMBALTA) 30 MG capsule Take 1 capsule (30 mg total) by mouth once daily.  Qty: 30 capsule, Refills: 1      famotidine (PEPCID) 20 MG tablet TAKE 1 TABLET BY MOUTH TWICE A DAY  Qty: 180 tablet, Refills: 3    Associated Diagnoses: Epigastric pain; Heartburn      gabapentin (NEURONTIN) 300 MG capsule TAKE 1 CAPSULE BY MOUTH TWICE A DAY  Qty: 60 capsule, Refills: 2      omeprazole (PRILOSEC) 40 MG capsule Take 1 capsule (40 mg total) by mouth every morning.  Qty: 90 capsule, Refills: 1    Associated Diagnoses: Epigastric pain; Heartburn      sucralfate (CARAFATE) 1 gram tablet Take 1 tablet (1 g total) by mouth 4 (four) times daily before meals and nightly.  Qty: 120 tablet, Refills: 0    Associated Diagnoses: Epigastric pain; Heartburn      tamsulosin (FLOMAX) 0.4 mg Cap Take 2 capsules (0.8 mg total) by mouth once daily. TAKE 1 CAPSULE BY MOUTH TWICE A DAY  Qty: 180 capsule, Refills: 3    Associated Diagnoses: Benign non-nodular prostatic hyperplasia with lower urinary tract symptoms      tadalafiL (CIALIS) 20 MG Tab Take 1 tabley by mouth 30 minutes before sexual activity. Do not exceed 1 tablet every 72 hours  Qty: 10 tablet, Refills: 3           STOP taking these medications       EScitalopram oxalate (LEXAPRO) 10 MG tablet Comments:   Reason for Stopping:               Discharge Procedure Orders (must include Diet, Follow-up, Activity)    Follow Up:  Follow up with PCP as per your routine.  Please follow an anti reflux diet  and a high fiber diet.  Activity as tolerated.    No driving day of procedure.

## 2024-08-26 RX ORDER — DULOXETIN HYDROCHLORIDE 30 MG/1
30 CAPSULE, DELAYED RELEASE ORAL DAILY
Qty: 30 CAPSULE | Refills: 1 | Status: SHIPPED | OUTPATIENT
Start: 2024-08-26 | End: 2025-08-26

## 2024-08-26 NOTE — ANESTHESIA POSTPROCEDURE EVALUATION
Anesthesia Post Evaluation    Patient: Vince Chadwick III    Procedure(s) Performed: Procedure(s) (LRB):  EGD (ESOPHAGOGASTRODUODENOSCOPY) (N/A)    Final Anesthesia Type: general      Patient location during evaluation: PACU  Patient participation: Yes- Able to Participate  Level of consciousness: awake and alert  Post-procedure vital signs: reviewed and stable  Pain management: adequate  Airway patency: patent    PONV status at discharge: No PONV  Anesthetic complications: no      Cardiovascular status: blood pressure returned to baseline  Respiratory status: unassisted and room air  Hydration status: euvolemic  Follow-up not needed.              Vitals Value Taken Time   /68 08/23/24 1020   Temp 36.7 °C (98 °F) 08/23/24 0951   Pulse 62 08/23/24 1020   Resp 14 08/23/24 1020   SpO2 97 % 08/23/24 1020         Event Time   Out of Recovery 10:35:53         Pain/Cecilia Score: No data recorded
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
## 2024-08-28 LAB
FINAL PATHOLOGIC DIAGNOSIS: NORMAL
GROSS: NORMAL
Lab: NORMAL
MICROSCOPIC EXAM: NORMAL

## 2024-09-04 ENCOUNTER — TELEPHONE (OUTPATIENT)
Dept: GASTROENTEROLOGY | Facility: CLINIC | Age: 74
End: 2024-09-04
Payer: MEDICARE

## 2024-09-04 NOTE — TELEPHONE ENCOUNTER
----- Message from Davy Novak Jr., MD sent at 9/1/2024 11:36 PM CDT -----  Please let patient know.   The stomach biopsies came out fine.  And no evidence of the bacteria.  Rec remains the same.  Take meds as directed.

## 2024-09-04 NOTE — TELEPHONE ENCOUNTER
Procedure results from Dr. Novak sent to patient portal, see below,        Per Dr. Coburn, Davy ESCAMILLA Jr., MD [facility] ESCAMILLA Jr[facility] Staff  Please let patient know.   The stomach biopsies came out fine.  And no evidence of the bacteria.  Rec remains the same.  Take meds as directed.

## 2024-09-06 ENCOUNTER — PATIENT MESSAGE (OUTPATIENT)
Dept: PRIMARY CARE CLINIC | Facility: CLINIC | Age: 74
End: 2024-09-06
Payer: MEDICARE

## 2024-09-06 NOTE — TELEPHONE ENCOUNTER
If his current side effects as he stated include agitation, irritability, and trouble sleeping, it might be best to simply stop it right away since he is on a relatively low dose and has only been on it for a few weeks.      However if the side effects are mild and he wants to be cautious then we could reduce to every other day for about five doses and then stop.

## 2024-09-26 ENCOUNTER — OFFICE VISIT (OUTPATIENT)
Dept: PRIMARY CARE CLINIC | Facility: CLINIC | Age: 74
End: 2024-09-26
Payer: MEDICARE

## 2024-09-26 VITALS
SYSTOLIC BLOOD PRESSURE: 120 MMHG | OXYGEN SATURATION: 99 % | DIASTOLIC BLOOD PRESSURE: 74 MMHG | HEIGHT: 68 IN | HEART RATE: 75 BPM | WEIGHT: 159.63 LBS | BODY MASS INDEX: 24.19 KG/M2

## 2024-09-26 DIAGNOSIS — F41.9 ANXIETY: Primary | Chronic | ICD-10-CM

## 2024-09-26 DIAGNOSIS — Z12.5 PROSTATE CANCER SCREENING: ICD-10-CM

## 2024-09-26 DIAGNOSIS — H91.90 HEARING LOSS, UNSPECIFIED HEARING LOSS TYPE, UNSPECIFIED LATERALITY: ICD-10-CM

## 2024-09-26 PROCEDURE — 99215 OFFICE O/P EST HI 40 MIN: CPT | Mod: PBBFAC,PN | Performed by: FAMILY MEDICINE

## 2024-09-26 PROCEDURE — 99999 PR PBB SHADOW E&M-EST. PATIENT-LVL V: CPT | Mod: PBBFAC,,, | Performed by: FAMILY MEDICINE

## 2024-09-26 PROCEDURE — 99214 OFFICE O/P EST MOD 30 MIN: CPT | Mod: S$PBB,,, | Performed by: FAMILY MEDICINE

## 2024-09-26 NOTE — PROGRESS NOTES
Primary Care Provider Appointment   [facility], [facility]       Patient ID: Vince Chadwick III is a 74 y.o. male.    ASSESSMENT/PLAN by Problem List:    1. Anxiety  Assessment & Plan:  Following up with anxiety.  Patient was not having adequate control of symptoms.  So we began transitioning from Lexapro to Cymbalta.  He returns today to see how this changes progressing and whether we can wean off Lexapro and increase the Cymbalta.    He did not tolerate cymbalta, but then stopped lexapro as well.  Did have some withdrawal.  States but 'now I feel like a human again', does not want to restart.    Not willing to resume therapy either    He feel he is doing well and will monitor.  Felt lexapro was suppressing emotions and feelings      2. Prostate cancer screening  Assessment & Plan:  He inquired about PSA, discussed current recommendations including USPSTF, he will review further and let me know if he wants to order PSA next time           Follow Up:  2-3 months    Thirty-four minutes of total time spent on the encounter, time includes face to face time, and some or all of the following: review of chart, lab, imaging, consultant notes, ER, hospital, documentation, care coordination, etc.    Advance Care Planning     Date: 09/26/2024    Power of   I initiated the process of voluntary advance care planning today and explained the importance of this process to the patient.  I introduced the concept of advance directives to the patient, as well. Then the patient received detailed information about the importance of designating a Health Care Power of  (HCPOA). He was also instructed to communicate with this person about their wishes for future healthcare, should he become sick and lose decision-making capacity. The patient has not previously appointed a HCPOA. After our discussion, the patient has decided to complete a HCPOA and has appointed his significant other, health  care agent:  wife  & health care agent number:  see chart . I encouraged him to communicate with this person about their wishes for future healthcare, should he become sick and lose decision-making capacity.                   Subjective:     Chief Complaint   Patient presents with    Weight Loss    Labs Only     Needs psa     I have reviewed the information entered by the ancillary staff regarding the chief complaint as well as the related history.    HPI    Patient is a/an 74 y.o.  male     Follow-up anxiety.  Mild depression symptoms.  Patient has been on Lexapro for awhile but felt it was not helping.  We began transitioning over to Cymbalta.  However did not tolerate the Cymbalta and stopped it but also stopped his Lexapro abruptly.  He did experience some withdrawal symptoms and mood fluctuations but feels that has resolved.  He feels that overall he is doing better.  Looking back he does not like the way he felt on Lexapro, felt it was blunting his emotions too much.  At this time he does not wish to resume anything and thinks he will do fine.  Also declines to go back to cognitive behavioral therapy.  He denies any severe depression symptoms or suicidal thoughts.    For complete problem list, past medical history, surgical history, social history, etc., see appropriate section in the electronic medical record    Review of Systems   Constitutional:  Positive for unexpected weight change. Negative for activity change.   HENT:  Positive for rhinorrhea. Negative for hearing loss and trouble swallowing.    Eyes:  Negative for discharge and visual disturbance.   Respiratory:  Negative for chest tightness and wheezing.    Cardiovascular:  Negative for chest pain and palpitations.   Gastrointestinal:  Positive for diarrhea and vomiting. Negative for blood in stool and constipation.   Endocrine: Negative for polydipsia and polyuria.   Genitourinary:  Negative for difficulty urinating, hematuria and urgency.  "  Musculoskeletal:  Negative for arthralgias, joint swelling and neck pain.   Neurological:  Negative for weakness and headaches.   Psychiatric/Behavioral:  Positive for dysphoric mood. Negative for confusion.        Objective     Physical Exam  Constitutional:       General: He is not in acute distress.     Appearance: He is well-developed. He is not ill-appearing.   HENT:      Head: Normocephalic and atraumatic.   Eyes:      General: No scleral icterus.     Conjunctiva/sclera: Conjunctivae normal.   Pulmonary:      Effort: Pulmonary effort is normal. No respiratory distress.   Neurological:      General: No focal deficit present.      Mental Status: He is alert and oriented to person, place, and time.      Coordination: Coordination normal.   Psychiatric:         Mood and Affect: Mood normal.         Behavior: Behavior normal.         Thought Content: Thought content normal.         Judgment: Judgment normal.     She is she is a funny person she strong will  Vitals:    09/26/24 1001   BP: 120/74   BP Location: Right arm   Patient Position: Sitting   BP Method: Small (Manual)   Pulse: 75   SpO2: 99%   Weight: 72.4 kg (159 lb 9.8 oz)   Height: 5' 8" (1.727 m)           THIS DOCUMENT WAS MADE IN PART WITH VOICE RECOGNITION SOFTWARE.  OCCASIONALLY THIS SOFTWARE WILL MISINTERPRET WORDS OR PHRASES.    "

## 2024-09-26 NOTE — ASSESSMENT & PLAN NOTE
He inquired about PSA, discussed current recommendations including USPSTF, he will review further and let me know if he wants to order PSA next time

## 2024-09-26 NOTE — ASSESSMENT & PLAN NOTE
Following up with anxiety.  Patient was not having adequate control of symptoms.  So we began transitioning from Lexapro to Cymbalta.  He returns today to see how this changes progressing and whether we can wean off Lexapro and increase the Cymbalta.  He did not tolerate cymbalta, but then stopped lexapro as well.  Did have some withdrawal.  States but 'now I feel like a human again', does not want to restart.  Not willing to resume therapy either  He feel he is doing well and will monitor.  Felt lexapro was suppressing emotions and feelings  He feels he is continuing to improve and does not need additional help at this time but will monitor very closely we will see him back in 2-3 months.  If there is any significant change though he will reach out to

## 2024-10-07 ENCOUNTER — IMMUNIZATION (OUTPATIENT)
Dept: FAMILY MEDICINE | Facility: CLINIC | Age: 74
End: 2024-10-07
Payer: MEDICARE

## 2024-10-07 DIAGNOSIS — Z23 NEED FOR VACCINATION: Primary | ICD-10-CM

## 2024-10-07 PROCEDURE — 90653 IIV ADJUVANT VACCINE IM: CPT | Mod: PBBFAC,PN

## 2024-10-07 PROCEDURE — G0008 ADMIN INFLUENZA VIRUS VAC: HCPCS | Mod: PBBFAC,PN

## 2024-10-07 PROCEDURE — 99999PBSHW FLU VACCINE - ADJUVANTED: Mod: PBBFAC,,,

## 2024-11-01 ENCOUNTER — PATIENT MESSAGE (OUTPATIENT)
Dept: PRIMARY CARE CLINIC | Facility: CLINIC | Age: 74
End: 2024-11-01
Payer: MEDICARE

## 2024-11-01 RX ORDER — CITALOPRAM 20 MG/1
20 TABLET, FILM COATED ORAL DAILY
Qty: 30 TABLET | Refills: 11 | Status: CANCELLED | OUTPATIENT
Start: 2024-11-01 | End: 2025-11-01

## 2024-11-01 RX ORDER — ESCITALOPRAM OXALATE 20 MG/1
20 TABLET ORAL DAILY
Qty: 90 TABLET | Refills: 1 | Status: SHIPPED | OUTPATIENT
Start: 2024-11-01

## 2024-11-01 NOTE — TELEPHONE ENCOUNTER
----- Message from Agora Shopping sent at 11/1/2024 10:40 AM CDT -----  Type:  Needs Medical Advice    Who Called: the patient  Symptoms (please be specific):  How long has patient had these symptoms:    Pharmacy name and phone #:        [id number] - [place] - [address]  [address]  [place] [address]  Phone: [phone] Fax: [phone]    90 day    Would the patient rather a call back or a response via [facility]? call back/[facility]  Best Call Back Number: [phone]   Additional Information: Pt states he would like to speak to staff in regards to rx es citalopram 20 mg  Please advise  Thanks

## 2024-11-04 DIAGNOSIS — R10.13 EPIGASTRIC PAIN: ICD-10-CM

## 2024-11-04 DIAGNOSIS — R12 HEARTBURN: ICD-10-CM

## 2024-11-04 NOTE — TELEPHONE ENCOUNTER
Chest pain left arm pains Pain x3 days Took nitroglycerin last night Denies falls or injuries Emergency Department Nursing Plan of Care The Nursing Plan of Care is developed from the Nursing assessment and Emergency Department Attending provider initial evaluation. The plan of care may be reviewed in the ED Provider note. The Plan of Care was developed with the following considerations:  
Patient / Family readiness to learn indicated by:verbalized understanding Persons(s) to be included in education: patient Barriers to Learning/Limitations:No 
 
Signed Nir Barnhart RN   
3/30/2019   10:02 AM 
 
 
 
 
 Last visit 03/19/2024

## 2024-11-05 RX ORDER — SUCRALFATE 1 G/1
TABLET ORAL
Qty: 120 TABLET | Refills: 0 | Status: SHIPPED | OUTPATIENT
Start: 2024-11-05

## 2024-11-21 RX ORDER — GABAPENTIN 300 MG/1
CAPSULE ORAL
Qty: 60 CAPSULE | Refills: 2 | OUTPATIENT
Start: 2024-11-21

## 2024-11-22 NOTE — TELEPHONE ENCOUNTER
Patient scheduled to see Dr. Mariano next week    Class I (easy) - visualization of the soft palate, fauces, uvula, and both anterior and posterior pillars

## 2024-12-19 DIAGNOSIS — R12 HEARTBURN: ICD-10-CM

## 2024-12-19 DIAGNOSIS — R10.13 EPIGASTRIC PAIN: ICD-10-CM

## 2024-12-22 RX ORDER — SUCRALFATE 1 G/1
TABLET ORAL
Qty: 120 TABLET | Refills: 11 | Status: SHIPPED | OUTPATIENT
Start: 2024-12-22

## 2025-01-02 ENCOUNTER — PATIENT MESSAGE (OUTPATIENT)
Dept: RESEARCH | Facility: HOSPITAL | Age: 75
End: 2025-01-02
Payer: MEDICARE

## 2025-01-07 ENCOUNTER — OFFICE VISIT (OUTPATIENT)
Dept: PRIMARY CARE CLINIC | Facility: CLINIC | Age: 75
End: 2025-01-07
Payer: MEDICARE

## 2025-01-07 VITALS
BODY MASS INDEX: 25.35 KG/M2 | OXYGEN SATURATION: 99 % | WEIGHT: 167.25 LBS | SYSTOLIC BLOOD PRESSURE: 124 MMHG | DIASTOLIC BLOOD PRESSURE: 74 MMHG | HEIGHT: 68 IN | HEART RATE: 76 BPM

## 2025-01-07 DIAGNOSIS — I25.10 CORONARY ARTERY CALCIFICATION: Chronic | ICD-10-CM

## 2025-01-07 DIAGNOSIS — F33.1 MODERATE EPISODE OF RECURRENT MAJOR DEPRESSIVE DISORDER: ICD-10-CM

## 2025-01-07 DIAGNOSIS — I70.0 AORTIC CALCIFICATION: Chronic | ICD-10-CM

## 2025-01-07 DIAGNOSIS — I10 PRIMARY HYPERTENSION: Primary | Chronic | ICD-10-CM

## 2025-01-07 DIAGNOSIS — Z12.5 SPECIAL SCREENING EXAMINATION FOR NEOPLASM OF PROSTATE: ICD-10-CM

## 2025-01-07 DIAGNOSIS — E78.2 MIXED HYPERLIPIDEMIA: Chronic | ICD-10-CM

## 2025-01-07 PROCEDURE — 99214 OFFICE O/P EST MOD 30 MIN: CPT | Mod: S$PBB,,, | Performed by: FAMILY MEDICINE

## 2025-01-07 PROCEDURE — 99214 OFFICE O/P EST MOD 30 MIN: CPT | Mod: PBBFAC,PN | Performed by: FAMILY MEDICINE

## 2025-01-07 PROCEDURE — 99999 PR PBB SHADOW E&M-EST. PATIENT-LVL IV: CPT | Mod: PBBFAC,,, | Performed by: FAMILY MEDICINE

## 2025-01-07 RX ORDER — ATORVASTATIN CALCIUM 10 MG/1
10 TABLET, FILM COATED ORAL NIGHTLY
Qty: 90 TABLET | Refills: 1 | Status: SHIPPED | OUTPATIENT
Start: 2025-01-07

## 2025-01-07 RX ORDER — BENAZEPRIL HYDROCHLORIDE 10 MG/1
10 TABLET ORAL DAILY
Qty: 90 TABLET | Refills: 1 | Status: SHIPPED | OUTPATIENT
Start: 2025-01-07

## 2025-01-07 NOTE — PROGRESS NOTES
Primary Care Provider Appointment   [facility]       Patient ID: Vince Chadwick III is a 74 y.o. male.    ASSESSMENT/PLAN by Problem List:    Assessment & Plan    IMPRESSION:    Anxiety/depression  - Explained the mechanism of action for Lexapro, focusing on its effects on serotonin levels and reuptake.  - Discussed potential withdrawal effects when discontinuing Lexapro.  - Continued Lexapro at current dosage.  - Noted the patient's history of anxiety and depression, triggered by various stresses including family issues.  - Observed improvement in symptoms after restarting Lexapro  - Evaluated the effectiveness of Lexapro in treating the patient's anxiety and depression.  - Assessed the patient's current state and discussed the need to continue medication due to ongoing stress.  - Outlined a potential future plan for tapering off the medication when appropriate but not now and not without further discussion with me    HYPERTENSION:  - Noted that hypertension is under good control.  Continue current medications    HYPERLIPIDEMIA:  - Continued atorvastatin 10 mg for cholesterol management.  - Noted that cholesterol levels were good at last check.  - Ordered cholesterol panel in 3 months and refilled atorvastatin 10 mg prescription.    HEALTH:  - Acknowledged patient's concern about memory issues and potential dementia.  - Recommend practicing brain exercises to maintain cognitive function and increasing physical exercise.  - Discussed debate around PSA testing utility.  - PSA test ordered.    CARE PLANNING:  - Provided information on living will and healthcare power of , explaining their purposes and how to complete them.  - Vince to review and complete these documents.    VIRAL RESPIRATORY INFECTION  Patient has mild upper respiratory symptoms, lung exam was clear.  Still he will watch for any signs of worsening infection.  Treat symptomatically with  over-the-counter products report if worsening    Follow-up  - Follow up in 2-3 months for lab work review.  - Vince to contact the office if any new concerns arise before the next scheduled visit.         ORDERS, CODING, ADDITIONAL DOCUMENTATION RELATED TO THIS ENCOUNTER:  1. Primary hypertension  -     benazepriL (LOTENSIN) 10 MG tablet; Take 1 tablet (10 mg total) by mouth once daily.  Dispense: 90 tablet; Refill: 1  -     CBC Auto Differential; Future; Expected date: 01/07/2025    2. Mixed hyperlipidemia  -     atorvastatin (LIPITOR) 10 MG tablet; Take 1 tablet (10 mg total) by mouth every evening.  Dispense: 90 tablet; Refill: 1  -     Comprehensive Metabolic Panel; Future; Expected date: 01/07/2025  -     Lipid Panel; Future; Expected date: 01/07/2025    3. Aortic calcification    4. Coronary artery calcification    5. Special screening examination for neoplasm of prostate  -     PSA, Screening; Future; Expected date: 01/07/2025    6. Moderate episode of recurrent major depressive disorder         Advance Care Planning     Date: 01/07/2025    Living Will  During this visit, I engaged the patient  in the voluntary advance care planning process.  The patient and I reviewed the role for advance directives and their purpose in directing future healthcare if the patient's unable to speak for him/herself.  At this point in time, the patient does have full decision-making capacity.  We discussed different extreme health states that he could experience, and reviewed what kind of medical care he would want in those situations.  The patient communicated that if he were comatose and had little chance of a meaningful recovery, he would not want machines/life-sustaining treatments used. In addition to the above preference, other important end-of-life issues for the patient include  n/a .         Power of   I initiated the process of voluntary advance care planning today and explained the importance of this process to  the patient.  I introduced the concept of advance directives to the patient, as well. Then the patient received detailed information about the importance of designating a Health Care Power of  (HCPOA). He was also instructed to communicate with this person about their wishes for future healthcare, should he become sick and lose decision-making capacity. The patient has not previously appointed a HCPOA. After our discussion, the patient has decided to complete a HCPOA and has appointed his daughter and significant other, health care agent:  [place], wife, daughter Ria  & health care agent number:  see chart . I encouraged him to communicate with this person about their wishes for future healthcare, should he become sick and lose decision-making capacity.      A total of 5 min was spent on advance care planning, goals of care discussion, emotional support, formulating and communicating prognosis and exploring burden/benefit of various approaches of treatment. This discussion occurred on a fully voluntary basis with the verbal consent of the patient and/or family.             Subjective:       HPI    Patient is a/an 74 y.o.  male     For complete problem list, past medical history, surgical history, social history, etc., see appropriate section in the electronic medical record    History of Present Illness    CHIEF COMPLAINT:  Vince presents today for follow-up regarding anxiety and depression management.    ANXIETY AND DEPRESSION:  He continues Lexapro with good symptom control and is satisfied with current dosage. He previously experienced withdrawal effects when discontinuing Lexapro.    CURRENT SYMPTOMS:  He reports congestion and mild shortness of breath. He notes a slight elevation in baseline temperature from 97.6°F to 98.8°F but denies fever.    MEDICATIONS:  He continues atorvastatin 10 mg for cholesterol and Lexapro for anxiety/depression.    EXERCISE:  He reports no current exercise routine.  But  plans to start      ROS:  General: -fever  ENT: +nasal congestion  Respiratory: +shortness of breath       Review of Systems   Constitutional:  Negative for activity change and unexpected weight change.   HENT:  Positive for rhinorrhea. Negative for hearing loss and trouble swallowing.    Eyes:  Negative for discharge and visual disturbance.   Respiratory:  Negative for chest tightness and wheezing.    Cardiovascular:  Negative for chest pain and palpitations.   Gastrointestinal:  Positive for diarrhea and vomiting. Negative for blood in stool and constipation.        GI symptoms resolved   Endocrine: Negative for polydipsia and polyuria.   Genitourinary:  Positive for urgency. Negative for difficulty urinating and hematuria.   Musculoskeletal:  Positive for arthralgias. Negative for joint swelling and neck pain.   Neurological:  Negative for weakness and headaches.   Psychiatric/Behavioral:  Negative for confusion and dysphoric mood.        Objective     Physical Exam  Vitals reviewed.   Constitutional:       General: He is not in acute distress.     Appearance: He is well-developed. He is not toxic-appearing or diaphoretic.   HENT:      Head: Normocephalic and atraumatic.      Right Ear: External ear normal.      Left Ear: External ear normal.      Nose: Congestion present.      Mouth/Throat:      Pharynx: Oropharynx is clear. No oropharyngeal exudate or posterior oropharyngeal erythema.   Eyes:      General: No scleral icterus.        Right eye: No discharge.         Left eye: No discharge.      Conjunctiva/sclera: Conjunctivae normal.      Pupils: Pupils are equal, round, and reactive to light.   Neck:      Thyroid: No thyromegaly.      Trachea: No tracheal deviation.   Cardiovascular:      Rate and Rhythm: Normal rate and regular rhythm.      Heart sounds: Normal heart sounds. No murmur heard.  Pulmonary:      Effort: Pulmonary effort is normal. No respiratory distress.      Breath sounds: Normal breath sounds.  "No wheezing or rales.   Musculoskeletal:      Cervical back: Normal range of motion and neck supple. No rigidity.   Lymphadenopathy:      Cervical: No cervical adenopathy.   Skin:     General: Skin is warm and dry.      Findings: No rash.   Neurological:      Mental Status: He is alert and oriented to person, place, and time.   Psychiatric:         Behavior: Behavior normal.       Vitals:    01/07/25 1017   BP: 124/74   BP Location: Left arm   Patient Position: Sitting   Pulse: 76   SpO2: 99%   Weight: 75.8 kg (167 lb 3.5 oz)   Height: 5' 8" (1.727 m)     Physical Exam                This note was generated with the assistance of ambient listening technology. Verbal consent was obtained by the patient and accompanying visitor(s) for the recording of patient appointment to facilitate this note. I attest to having reviewed and edited the generated note for accuracy, though some syntax or spelling errors may persist. Please contact the author of this note for any clarification.  Parts of the note were also generated with voice recognition software.  Occasionally this software will misinterpreted words or phrases    "

## 2025-01-09 DIAGNOSIS — K58.0 IRRITABLE BOWEL SYNDROME WITH DIARRHEA: ICD-10-CM

## 2025-01-09 DIAGNOSIS — R10.13 EPIGASTRIC PAIN: ICD-10-CM

## 2025-01-09 DIAGNOSIS — R12 HEARTBURN: ICD-10-CM

## 2025-01-09 RX ORDER — DICYCLOMINE HYDROCHLORIDE 10 MG/1
10 CAPSULE ORAL
Qty: 120 CAPSULE | Refills: 0 | Status: SHIPPED | OUTPATIENT
Start: 2025-01-09 | End: 2026-01-09

## 2025-01-09 RX ORDER — OMEPRAZOLE 40 MG/1
40 CAPSULE, DELAYED RELEASE ORAL EVERY MORNING
Qty: 90 CAPSULE | Refills: 0 | Status: SHIPPED | OUTPATIENT
Start: 2025-01-09

## 2025-01-27 ENCOUNTER — TELEPHONE (OUTPATIENT)
Dept: PRIMARY CARE CLINIC | Facility: CLINIC | Age: 75
End: 2025-01-27
Payer: MEDICARE

## 2025-01-27 ENCOUNTER — PATIENT OUTREACH (OUTPATIENT)
Dept: ADMINISTRATIVE | Facility: CLINIC | Age: 75
End: 2025-01-27
Payer: MEDICARE

## 2025-01-27 ENCOUNTER — TELEPHONE (OUTPATIENT)
Dept: GASTROENTEROLOGY | Facility: CLINIC | Age: 75
End: 2025-01-27
Payer: MEDICARE

## 2025-01-27 RX ORDER — CIPROFLOXACIN 500 MG/1
1 TABLET ORAL 2 TIMES DAILY
COMMUNITY
Start: 2025-01-23 | End: 2025-01-28

## 2025-01-27 RX ORDER — METRONIDAZOLE 500 MG/1
500 TABLET ORAL EVERY 8 HOURS
COMMUNITY
Start: 2025-01-23 | End: 2025-01-28

## 2025-01-27 RX ORDER — AMOXICILLIN AND CLAVULANATE POTASSIUM 875; 125 MG/1; MG/1
1 TABLET, FILM COATED ORAL 2 TIMES DAILY
Qty: 20 TABLET | Refills: 0 | Status: SHIPPED | OUTPATIENT
Start: 2025-01-27 | End: 2025-01-28

## 2025-01-27 NOTE — TELEPHONE ENCOUNTER
Pt reports that he was vomiting the Cipro and Flagyl.  He reports that he has tried taking them, but that he is not tolerating them at all.  Pt is concerned that he feels that he may need additional IV ABX (received 3 days of IV ABX in the hospital).      Pt is scheduled to see Martha Plunkett NP tomorrow.  Pt will call back if he has any additional concerns.

## 2025-01-27 NOTE — TELEPHONE ENCOUNTER
----- Message from Ewa sent at 1/27/2025  8:22 AM CST -----  Regarding: pt advice - antibiotics  [phone] - call back ; admitted at the [facility] for diverticulitis  & ecoli was discharged last Thursday was given antibiotics but he stopped taking it bec it's giving him issues; he wanted to know if he can start taking it again . I already set him up for a hospital follow up.      Ewa

## 2025-01-27 NOTE — TELEPHONE ENCOUNTER
Returned pt phone call, pt stated he was in the hospital last week for a couple of different infections, including diverticulitis. Pt stated he was receiving IV antibiotic and was sent home with Cipro and Flagyl. Pt stated he stopped taking the antibiotics due to them making him sick and would throw up. He was wanting to know what other antibiotic he can take. Advised we will need to schedule an office visit. Pt stated he does not want to wait a week to be seen. Scheduled pt for 1/28/2024.

## 2025-01-27 NOTE — TELEPHONE ENCOUNTER
If he was on Cipro and Flagyl specifically for the diverticulitis and does not tolerate these, I can send in a prescription for Augmentin.  If he is having rapidly deteriorating symptoms then I would suggest going to the emergency room.    Augmentin sent in.  He should still keep follow-up with Gastroenterology.

## 2025-01-27 NOTE — TELEPHONE ENCOUNTER
----- Message from  sent at 1/27/2025  7:53 AM CST -----  Regarding: requesting different rx  Contact: pt  Type:  Needs Medical Advice    Who Called: pt    Pharmacy name and phone #:    [id number] - [place] - [address]  [address]  [place] [address]  Phone: [phone] Fax: [phone]    Would the patient rather a call back or a response via [facility]? Call back    Best Call Back Number: [phone]    Additional Information:  pt was in hospital. Discharged thurs.  Pt was given anti biotics and keeps vomitting them up.  Requesting a different solution.  Maybe an injection?

## 2025-01-27 NOTE — TELEPHONE ENCOUNTER
Spoke with pt, discussed your message in its entirety.  Pt reports that he does not have rapidly deteriorating symptoms.  Informed pt about his Rx of Augmentin and that he should take this medication every 12 hours until the full course is completed.  Additionally, I encouraged pt to take this medication with food.  Encouraged pt to take a probiotic as well and that it would be best taken on an empty stomach, pt verbalized understanding to all.

## 2025-01-27 NOTE — PROGRESS NOTES
C3 nurse spoke with Vince Chadwick III for a TCC post hospital discharge follow up call. The patient has a scheduled HOS appointment with BRANDON Black (GI) on 1/28/25 @ 8:00am, and Karan Burleson MD (PCP) on 2/4/25 @ 1:20pm.

## 2025-01-27 NOTE — PROGRESS NOTES
Subjective:       Patient ID: Vince Chadwick III is a 74 y.o. male Body mass index is 25.14 kg/m².    Chief Complaint: Follow-up (Hospital f/u)    Established patient of Dr. Novak & myself.    Patient was recently hospitalized at [facility] for diverticulitis. Reports he can not tolerate Cipro and flagyl oral antibiotics. They cause nausea and vomiting. He took them for 2 days after hospital discharge (last dose on 1/25/2025). His PCP prescribed him Augmentin yesterday to take in place of the Cipro and flagyl. Patient reports he has not picked up antibiotic yet because in the past Augmentin caused upset stomach. Patient is asking if he can get IV antibiotics outpatient. Patient reports he was also diagnosed with e.coli while hospitalized.    Abdominal Pain  Episode onset: started the vomitign and abdominal pain on 1/20/2025 after eating grits; had prior episode of diverticulitis ~10 years ago. The problem occurs daily. The problem has been waxing and waning (was improving in the hospital with the IV antibiotics. patient reports he is not able to tolerate oral antibiotics due to vomiting; worsening since not taking antibiotics). The pain is located in the LLQ (& mid chest (when it first started)). The pain is at a severity of 3/10 (currently). The pain is mild. Quality: throbbing. Associated symptoms include diarrhea (chronic; bowel movements are currently a few times a day of loose stools), a fever (intermittent, low grade up to 99F; denies currently), nausea (intermittent, PAST TREATMENT: phenergan, zofran in the hospital worked well) and weight loss (losing weight since hosiptalization). Pertinent negatives include no anorexia, belching, constipation, dysuria, flatus, frequency, hematochezia, melena or vomiting (only when he takes the antibiotics). Exacerbated by: stress. He has tried proton pump inhibitors and H2 blockers (prilosec 40 mg once daily, carafate 1 gram QID PRN- taking once a day, pepcid 20  mg once daily; bentyl 10 mg PRN; PAST: protonix, metamucil) for the symptoms. Prior diagnostic workup includes CT scan, GI consult, lower endoscopy, upper endoscopy and ultrasound. His past medical history is significant for abdominal surgery, GERD (occurs a couple of times a week since not taking carafate due to antibiotic use) and irritable bowel syndrome. There is no history of Crohn's disease, pancreatitis, PUD or ulcerative colitis.     Review of Systems   Constitutional:  Positive for appetite change (decreased appetite. reports not eating much. eating some soup), fever (intermittent, low grade up to 99F; denies currently) and weight loss (losing weight since hosiptalization). Negative for chills and fatigue.   HENT:  Negative for sore throat and trouble swallowing.    Respiratory:  Negative for cough, choking and shortness of breath.    Cardiovascular:  Negative for chest pain (has resolved).   Gastrointestinal:  Positive for abdominal pain, diarrhea (chronic; bowel movements are currently a few times a day of loose stools) and nausea (intermittent, PAST TREATMENT: phenergan, zofran in the hospital worked well). Negative for anal bleeding, anorexia, blood in stool, constipation, flatus, hematochezia, melena, rectal pain and vomiting (only when he takes the antibiotics).   Genitourinary:  Negative for difficulty urinating, dysuria, flank pain, frequency and urgency.   Neurological:  Negative for dizziness and weakness.       Past Medical History:   Diagnosis Date    ALLERGIC RHINITIS     Arthritis     Cataract     OU    Colon polyp     Diverticulitis     Diverticulosis     Gastritis 02/06/2024    GERD (gastroesophageal reflux disease)     Hyperlipidemia     resolved    Hypertension     Irritable bowel syndrome     Lumbar disc disease      Past Surgical History:   Procedure Laterality Date    APPENDECTOMY      CIRCUMCISION      COLONOSCOPY  06/02/2011    [facility].    One 1 mm polyp in the sigmoid colon.  FRAGMENT  OF NONNEOPLASTIC COLONIC MUCOSA.  Sigmoid diverticulosis.  Spasms c/w IBS.  repeat in 7-8 years    COLONOSCOPY  09/12/2006    Dr. [facility]ak[facility]    COLONOSCOPY N/A 05/13/2020    Procedure: COLONOSCOPY;  Surgeon: Davy Novak Jr., MD;  [facility];  Service: Endoscopy;  Laterality: N/A; repeat in 7 years for screening    EPIDURAL STEROID INJECTION INTO LUMBAR SPINE N/A 11/15/2021    Procedure: Injection-steroid-epidural-lumbar L5/S1;  Surgeon: Khang Sanchez MD;  Location: [facility];  Service: Pain Management;  Laterality: N/A;    EPIDURAL STEROID INJECTION INTO LUMBAR SPINE N/A 12/13/2021    Procedure: Injection-steroid-epidural-lumbar L5/S1;  Surgeon: Khang Sanchez MD;  Location: [facility];  Service: Pain Management;  Laterality: N/A;    EPIDURAL STEROID INJECTION INTO LUMBAR SPINE N/A 03/10/2023    Procedure: Injection-steroid-epidural-lumbar L2/3;  Surgeon: Khang Sanchez MD;  Location: [facility];  Service: Pain Management;  Laterality: N/A;    ESOPHAGOGASTRODUODENOSCOPY N/A 05/13/2020    Procedure: EGD (ESOPHAGOGASTRODUODENOSCOPY);  Surgeon: Davy Novak Jr., MD;  [facility];  Service: Endoscopy;  Laterality: N/A;    ESOPHAGOGASTRODUODENOSCOPY N/A 8/23/2024    Procedure: EGD (ESOPHAGOGASTRODUODENOSCOPY);  Surgeon: Davy Novak Jr., MD;  [facility];  Service: Endoscopy;  Laterality: N/A;    EYE SURGERY      FOOT SURGERY      x 2    INJECTION OF ANESTHETIC AGENT AROUND GANGLION IMPAR Right 02/17/2023    Procedure: BLOCK, facet cyst aspiration right side L2/3;  Surgeon: Khang Sanchez MD;  Location: [facility];  Service: Pain Management;  Laterality: Right;    LAMINECTOMY USING MINIMALLY INVASIVE TECHNIQUE N/A 01/26/2022    Procedure: LAMINECTOMY, SPINE, MINIMALLY INVASIVE  RIGHT MIS APPROACH RESECTION L4-5 SYNOVIAL CYST AND BILATERAL LAMINECTOMY/MEDIAL FACETECTOMY;  Surgeon: Penny Lobato MD;  Location: [facility];  Service: Neurosurgery;  Laterality: N/A;     LAMINECTOMY USING MINIMALLY INVASIVE TECHNIQUE N/A 04/26/2023    Procedure: LAMINECTOMY, SPINE, MINIMALLY INVASIVE    RIGHT L2-L3 MIS HEMILAMINECTOMY/MEDIAL FACETECTOMY FOR REMOVAL SYNOVIAL CYST;  Surgeon: Penny Lobato MD;  Location: [facility];  Service: Neurosurgery;  Laterality: N/A;    LUMBAR EPIDURAL INJECTION      PILONIDAL CYST DRAINAGE      s/p chalazion removal      OD    TONSILLECTOMY       Family History   Problem Relation Name Age of Onset    Cataracts Mother      Breast cancer Mother      Glaucoma Father      Cataracts Father      Macular degeneration Father      Colon polyps Father      Glaucoma Sister      Glaucoma Paternal Uncle      Prostate cancer Paternal Uncle      Glaucoma Paternal Uncle      Heart attack Paternal Grandfather  44    Colon cancer Neg Hx      Crohn's disease Neg Hx      Ulcerative colitis Neg Hx       Wt Readings from Last 10 Encounters:   01/28/25 73.9 kg (162 lb 14.7 oz)   01/07/25 75.8 kg (167 lb 3.5 oz)   09/26/24 72.4 kg (159 lb 9.8 oz)   08/23/24 73.5 kg (162 lb)   08/02/24 77 kg (169 lb 12.1 oz)   05/02/24 77.7 kg (171 lb 4.8 oz)   03/19/24 78.7 kg (173 lb 8 oz)   02/12/24 75.8 kg (167 lb 3.5 oz)   02/06/24 76.2 kg (168 lb)   09/05/23 77.7 kg (171 lb 6.5 oz)     Lab Results   Component Value Date    WBC 10-25 (A) 01/20/2025    HGB 14.6 02/06/2024    HCT 43.6 02/06/2024    MCV 85 02/06/2024     02/06/2024     CMP  Sodium   Date Value Ref Range Status   04/29/2024 140 136 - 145 mmol/L Final     Potassium   Date Value Ref Range Status   04/29/2024 4.3 3.5 - 5.1 mmol/L Final     Chloride   Date Value Ref Range Status   04/29/2024 104 95 - 110 mmol/L Final     CO2   Date Value Ref Range Status   04/29/2024 28 23 - 29 mmol/L Final     Glucose   Date Value Ref Range Status   04/29/2024 94 70 - 110 mg/dL Final     BUN   Date Value Ref Range Status   04/29/2024 19 8 - 23 mg/dL Final     Creatinine   Date Value Ref Range Status   04/29/2024 1.0 0.5 - 1.4 mg/dL Final      Calcium   Date Value Ref Range Status   04/29/2024 9.0 8.7 - 10.5 mg/dL Final     Total Protein   Date Value Ref Range Status   04/29/2024 6.4 6.0 - 8.4 g/dL Final     Albumin   Date Value Ref Range Status   04/29/2024 3.8 3.5 - 5.2 g/dL Final     Total Bilirubin   Date Value Ref Range Status   04/29/2024 0.8 0.1 - 1.0 mg/dL Final     Comment:     For infants and newborns, interpretation of results should be based  on gestational age, weight and in agreement with clinical  observations.    Premature Infant recommended reference ranges:  Up to 24 hours.............<8.0 mg/dL  Up to 48 hours............<12.0 mg/dL  3-5 days..................<15.0 mg/dL  6-29 days.................<15.0 mg/dL       Alkaline Phosphatase   Date Value Ref Range Status   04/29/2024 67 55 - 135 U/L Final     AST   Date Value Ref Range Status   04/29/2024 17 10 - 40 U/L Final     ALT   Date Value Ref Range Status   04/29/2024 5 (L) 10 - 44 U/L Final     Anion Gap   Date Value Ref Range Status   04/29/2024 8 8 - 16 mmol/L Final     eGFR if    Date Value Ref Range Status   03/02/2022 >60 >60 mL/min/1.73 m^2 Final     eGFR if non    Date Value Ref Range Status   03/02/2022 >60 >60 mL/min/1.73 m^2 Final     Comment:     Calculation used to obtain the estimated glomerular filtration  rate (eGFR) is the CKD-EPI equation.        Lab Results   Component Value Date    LIPASERES 78 02/06/2024 1/20/2025 lipase WNL    Lab Results   Component Value Date    TSH 2.797 08/20/2019 1/23/2025 GI panel by PCR  5/13/2020 and 1/13/2020 stool studies reviewed (acidic stools & few leukocytes)    Reviewed prior medical records including 1/20/2025 CT abdomen pelvis without contrast and hospital visit note in care everywhere; 2/6/2024 limited abdominal ultrasound; 7/11/2023 ct abdomen pelvis with contrast; & endoscopy history (see surgical history).    Objective:      Physical Exam  Vitals and nursing note reviewed.    Constitutional:       General: He is not in acute distress.     Appearance: Normal appearance. He is well-developed. He is not diaphoretic.   HENT:      Mouth/Throat:      Lips: Pink. No lesions.      Mouth: Mucous membranes are moist. No oral lesions.      Tongue: No lesions.      Pharynx: Oropharynx is clear. No pharyngeal swelling or posterior oropharyngeal erythema.   Eyes:      General: No scleral icterus.     Conjunctiva/sclera: Conjunctivae normal.   Pulmonary:      Effort: Pulmonary effort is normal. No respiratory distress.      Breath sounds: Normal breath sounds. No wheezing.   Abdominal:      General: Bowel sounds are normal. There is no distension or abdominal bruit.      Palpations: Abdomen is soft. Abdomen is not rigid. There is no mass.      Tenderness: There is abdominal tenderness (mild) in the right lower quadrant, suprapubic area and left lower quadrant. There is no guarding or rebound.   Skin:     General: Skin is warm and dry.      Coloration: Skin is not jaundiced or pale.      Findings: No erythema or rash.   Neurological:      Mental Status: He is alert and oriented to person, place, and time.   Psychiatric:         Behavior: Behavior normal.         Thought Content: Thought content normal.         Cognition and Memory: Memory is impaired (reports poor short term memory since hospital discharge).         Judgment: Judgment normal.         Assessment:       1. Hospital discharge follow-up    2. Diverticulitis of large intestine without perforation or abscess, unspecified bleeding status    3. Lower abdominal pain    4. Diarrhea of presumed infectious origin    5. E. coli gastroenteritis    6. Nausea    7. Heartburn    8. Impaired memory        Plan:       Hospital discharge follow-up  Recommend follow-up with Primary Care Provider for continued evaluation and management.    Diverticulitis of large intestine without perforation or abscess, unspecified bleeding status & Lower abdominal  pain  - discussed with patient about treatment options, discussed that our facility does not provide outpatient IV antibiotics, if this is needed, recommend hospitalization. Patient reports he would like to try a different course of oral antibiotics at this time. Informed patient that if he is not able to tolerate the oral antibiotics or the abdominal pain does not improve/worsen, he needs to go to the ER for further evaluation and management; patient verbalized understanding  -     CT Abdomen Pelvis With IV Contrast Routine Oral Contrast; Future; Expected date: 01/28/2025  -     Creatinine, serum; Future; Expected date: 01/28/2025  -     CBC Without Differential; Future; Expected date: 01/28/2025  - DISCONTINUE CIPRO, FLAGYL, & AUGMENTIN DUE TO PATIENT NOT ABLE TO TOLERATE  -  START   moxifloxacin (AVELOX) 400 mg tablet; Take 1 tablet (400 mg total) by mouth once daily. for 10 days  Dispense: 10 tablet; Refill: 0  -   CONTINUE  dicyclomine (BENTYL) 10 MG capsule; Take 1 capsule (10 mg total) by mouth before meals and at bedtime as needed (abdominal cramping/pain).  - avoid/minimize use of NSAIDs- since they can cause GI upset, bleeding and/or ulcers. If NSAID must be taken, recommend take with food.  - will discuss about scheduling follow-up colonoscopy at follow-up appointment in 1 week  - discussed the diagnosis of diverticulosis and diverticulitis, advised to continue a low fiber diet for the next 4 weeks and then slowly increase fiber in diet. Advised a low fiber diet is recommended for diverticulitis (for about 4 weeks), but to prevent diverticulitis, high fiber diet is recommended.  -Recommended high fiber diet after episode has completely resolved. Recommended daily exercise, adequate water intake (six 8-oz glasses of water daily), and high fiber diet. OTC fiber supplements are recommended if diet does not reach daily fiber goal (25 grams daily), such as Metamucil, Citrucel, or FiberCon (take as directed,  separate from other oral medications by >2 hours).  - instructed patient that if symptoms do not improve or if worsen, he need to go to the ER for further evaluation & management    Nausea  - RESTART    ondansetron (ZOFRAN-ODT) 4 MG TbDL; Take 1 tablet (4 mg total) by mouth 3 (three) times daily as needed (nausea).  Dispense: 45 tablet; Refill: 1  -     CT Abdomen Pelvis With IV Contrast Routine Oral Contrast; Future; Expected date: 01/28/2025    Heartburn  -  CONTINUE  famotidine (PEPCID) 20 MG tablet; Take 1 tablet (20 mg total) by mouth 2 (two) times daily.  -  CONTINUE   omeprazole (PRILOSEC) 40 MG capsule; Take 1 capsule (40 mg total) by mouth every morning.    Diarrhea of presumed infectious origin & E. coli gastroenteritis  -     Clostridium difficile EIA; Future; Expected date: 01/28/2025  -     CT Abdomen Pelvis With IV Contrast Routine Oral Contrast; Future; Expected date: 01/28/2025  - stay well hydrated  - recommend OTC probiotic, such as Florastor or Culturelle, taken as directed on packaging  - avoid lactose, alcohol, & caffeine  - avoid known triggers    Follow up in about 1 week (around 2/4/2025), or if symptoms worsen or fail to improve.    If no improvement in symptoms or symptoms worsen, call/follow-up at clinic or go to ER.      45 minutes of total time spent on the encounter, which includes face to face time and non-face to face time preparing to see the patient (e.g., review of tests), Obtaining and/or reviewing separately obtained history, Documenting clinical information in the electronic or other health record, Independently interpreting results (not separately reported) and communicating results to the patient/family/caregiver, or Care coordination (not separately reported).    signed out AMA earlier today, now back with worsening shortness of breath.  BNP 40K, trop elevated but downward trending.  rejected from ICU placed on soft-tele by Dr. Davies.  Admit non-ccu tele.

## 2025-01-27 NOTE — TELEPHONE ENCOUNTER
I would highly recommend that he complete the antibiotics that were prescribed unless he was having significant side effects or a true allergic reaction.  If just not tolerable then there maybe other options but he was placed on what was best given his response, cultures, etc. in the hospital

## 2025-01-27 NOTE — TELEPHONE ENCOUNTER
Called pt to assess what issues the medications were giving him.  LM requesting that pt call me back to discuss.     metroNIDAZOLE (FLAGYL) 500 MG tablet   Indications: Diverticulitis, Acute UTI Take 1 tablet by mouth 3 (three) times daily for 7 days 21 tablet    01/23/2025 01/30/2025   ciprofloxacin HCl (CIPRO) 500 MG tablet   Indications: Diverticulitis, Acute UTI Take 1 tablet by mouth 2 (two) times daily for 7 days         Please advise if ok for pt to resume these meds.

## 2025-01-28 ENCOUNTER — OFFICE VISIT (OUTPATIENT)
Dept: GASTROENTEROLOGY | Facility: CLINIC | Age: 75
End: 2025-01-28
Payer: MEDICARE

## 2025-01-28 VITALS — BODY MASS INDEX: 24.7 KG/M2 | HEIGHT: 68 IN | WEIGHT: 162.94 LBS

## 2025-01-28 DIAGNOSIS — R19.7 DIARRHEA OF PRESUMED INFECTIOUS ORIGIN: ICD-10-CM

## 2025-01-28 DIAGNOSIS — R10.30 LOWER ABDOMINAL PAIN: ICD-10-CM

## 2025-01-28 DIAGNOSIS — A04.4 E. COLI GASTROENTERITIS: ICD-10-CM

## 2025-01-28 DIAGNOSIS — R41.3 IMPAIRED MEMORY: ICD-10-CM

## 2025-01-28 DIAGNOSIS — Z09 HOSPITAL DISCHARGE FOLLOW-UP: Primary | ICD-10-CM

## 2025-01-28 DIAGNOSIS — R11.0 NAUSEA: ICD-10-CM

## 2025-01-28 DIAGNOSIS — R12 HEARTBURN: ICD-10-CM

## 2025-01-28 DIAGNOSIS — K57.32 DIVERTICULITIS OF LARGE INTESTINE WITHOUT PERFORATION OR ABSCESS, UNSPECIFIED BLEEDING STATUS: ICD-10-CM

## 2025-01-28 PROCEDURE — 99999 PR PBB SHADOW E&M-EST. PATIENT-LVL IV: CPT | Mod: PBBFAC,,, | Performed by: NURSE PRACTITIONER

## 2025-01-28 PROCEDURE — 99215 OFFICE O/P EST HI 40 MIN: CPT | Mod: S$PBB,,, | Performed by: NURSE PRACTITIONER

## 2025-01-28 PROCEDURE — 99214 OFFICE O/P EST MOD 30 MIN: CPT | Mod: PBBFAC,PO | Performed by: NURSE PRACTITIONER

## 2025-01-28 RX ORDER — ONDANSETRON 4 MG/1
4 TABLET, ORALLY DISINTEGRATING ORAL 3 TIMES DAILY PRN
Qty: 45 TABLET | Refills: 1 | Status: SHIPPED | OUTPATIENT
Start: 2025-01-28

## 2025-01-28 RX ORDER — MOXIFLOXACIN HYDROCHLORIDE 400 MG/1
400 TABLET ORAL DAILY
Qty: 10 TABLET | Refills: 0 | Status: SHIPPED | OUTPATIENT
Start: 2025-01-28 | End: 2025-02-07

## 2025-01-28 NOTE — PATIENT INSTRUCTIONS
"   Diverticulitis   The Basics   Written by the doctors and editors at [facility]   What is diverticulitis? -- Diverticulitis is a disorder that can cause belly pain, fever, and problems with bowel movements.  The food we eat travels from the stomach through a long tube called the intestine. The last part of that tube is the colon (figure 1). The wall of the bowel becomes weak and pushes outward. They form balloon-like pouches called diverticula or "tics." When you have hard stool, you strain to have a bowel movement. This raises the pressure in the bowel and causes pouches or bulges to form. Most often, they do not cause a problem. Many people who have these pouches have no symptoms. If they become infected, you have diverticulitis. If you have both bleeding and infection it is diverticular disease.  What are the symptoms of diverticulitis? -- The most common symptom of diverticulitis is pain, which is usually in the lower part of the belly. Other symptoms can include:  Fever  Constipation  Diarrhea  Nausea and vomiting  Is there a test for diverticulitis? -- Yes. There are a few tests your doctor can do to find out if you have diverticulitis. But tests are not always needed. If you do have a test, you might have a:  CT scan - A CT scan is a kind of imaging test. Imaging tests create pictures of the inside of your body.  Abdominal ultrasound - This test uses sound waves to create pictures of your intestines.  How is diverticulitis treated? -- Diverticulitis is typically treated with antibiotics. You might also need to go on a clear liquid diet for a short time. If you only have mild symptoms, this might be all the treatment you need.   But if you have severe symptoms, or if you get a fever, you might need to stay in the hospital. There, you can get fluids and antibiotics through a thin tube that goes into your vein, called an "IV." That way you can stop eating and drinking until you get better.  If you have a serious " "infection, the doctor might put a tube into your belly to drain the infection. In very bad cases, people need surgery to remove the part of the colon that is affected.  A few months after your infection has been treated, your doctor might recommend that you have a procedure called a colonoscopy (figure 2). During a colonoscopy, the doctor can look directly inside your colon to get an idea of the number of diverticula in your colon and to find out where they are. At the same time, they can check for signs of cancer.  Should I change my diet if I have had diverticulitis? -- If you have had diverticulosis, it's a good idea to eat a lot of fiber. Good sources of fiber include fruits, oats, beans, peas, and green leafy vegetables. When you have diverticulitis and if you do not already eat fiber-rich foods, wait until after your symptoms get better to start a high fiber diet. Recommend low fiber diet until symptoms have resolved. See below for Low Fiber Diet information.  You do not need to avoid seeds, nuts, popcorn, or other similar foods.  All topics are updated as new evidence becomes available and our peer review process is complete.  This topic retrieved from [place] on: Sep 21, 2021.  Topic 86715 Version 10.0  Release: 29.4.2 - C29.263  © 2021 UpToDate, Inc. and/or its affiliates. All rights reserved.  figure 1: Digestive system     This drawing shows the organs in the body that process food. Together these organs are called "the digestive system," or "digestive tract." As food travels through this system, the body absorbs nutrients and water.  Graphic 81290 Version 4.0    figure 2: Colonoscopy     During a colonoscopy, you lie on your side and the doctor puts a thin tube with a camera into your anus (from behind). Then the doctor advances the tube into the rectum and colon. The camera sends pictures from inside your colon to a television screen.  Graphic 55379 Version 6.0    Consumer Information Use and " Disclaimer   This information is not specific medical advice and does not replace information you receive from your health care provider. This is only a brief summary of general information. It does NOT include all information about conditions, illnesses, injuries, tests, procedures, treatments, therapies, discharge instructions or life-style choices that may apply to you. You must talk with your health care provider for complete information about your health and treatment options. This information should not be used to decide whether or not to accept your health care provider's advice, instructions or recommendations. Only your health care provider has the knowledge and training to provide advice that is right for you. The use of this information is governed by the [employer] End User License Agreement, available at [email or web address]://www.[employer]/[email or web address]/solutions/[employer]/about/lorenza.The use of [employer] content is governed by the [employer] Terms of Use. ©2021 UpToDate, Inc. All rights reserved.  Copyright   © 2021 UpToDate, Inc. and/or its affiliates. All rights reserved.     Low Fiber Diet   About this topic   A low fiber diet contains foods that are low in fiber and easy to digest. This makes it easier for your body to break down the food you eat. A low fiber diet means you are eating less than 13 grams of fiber a day. A low fiber diet will cause less pressure on your bowels and may help soothe your GI tract. You will also have fewer bowel movements.       When is this diet used?   Talk to your doctor before you start a low fiber diet. You should only use a low fiber diet for a short time until your signs improve. Then, once you are able to increase your fiber, slowly add foods back into your diet one at a time. A low fiber diet may also need extra nutrients if it is used over a longer period of time. Talk with your doctor about adding vitamin supplements to your diet.  Who should use this diet?   This diet may be helpful  for people with:  Diverticulitis or inflammatory bowel disease, like Crohn's or ulcerative colitis, during times of flare-up  Gastroparesis  Short periods of bowel cramping or loose stools  Narrowing of the bowel  Rectal bleeding  Some kinds of surgery, like hemorrhoidectomy, colostomy, ileostomy, or abdominal surgery  What foods are good to eat?   Meats and proteins like:  Tender, well cooked meats  Chicken  Fish  Eggs  Smooth nut butters  Tofu  Breads and grains with less than 2 grams of fiber per serving like:  White bread  Crackers  [place] crackers  Flour or corn tortillas  White rice, well-cooked  White pasta  English muffins  Cream of wheat or rice  Grits  Cold or hot cereals made from white flour, such as puffed rice or corn flakes  Milk products like:  Milk  Cheese  Yogurt without nuts, fruit, and granola  Kefir  Ice cream  Sherbet  Fortified nondairy milks: [place], cashew, coconut, or rice  Fruits and vegetables like:  Bananas  Applesauce  Melons without seeds  Fruit or vegetable juice without pulp  Soft, canned, or well-cooked fruits or vegetables without seeds, skins, or membranes  Tomato sauce  Mashed potatoes without skin  What foods should be limited or avoided?   Meats and proteins like:  [place] nut butters  Dried peas, beans, and lentils  Nuts or seeds  Fried meat, poultry, and fish  Steak, pork chops, and other meats that are fatty or have gristle  Seafood with a tough or rubbery texture, such as shrimp  Luncheon meats, such as bologna and salami  Sausage, whiting, and hot dogs  Hummus  Breads and grains like:  Whole wheat breads, tortillas, crackers, and cereals  Brown rice  Quinoa, kasha, and barley  Breads or crackers with nuts or seeds  Whole grain and high-fiber cereals, including oatmeal, bran flakes, and shredded wheat  Whole wheat pasta  Popcorn  Milk products like:  Yogurt with added fruit, nuts, and granola  Fruits and vegetables like:  Raw or dried fruits or vegetables  Undercooked fruits  or vegetables  Pineapple  Salads  Broccoli  Cauliflower  Corn  Potatoes with skin  Leafy greens like kale or tito greens  Dried fruit  Fruit or vegetable juice with pulp  Helpful tips   Sample Daily Menu   Breakfast Lunch Dinner   1/2 cup (120 mL) orange juice with no pulp Tuna fish sandwich on white bread 3 ounces (90 grams) broiled fish or chicken   1 cup (240 grams) cereal, like cheerios or corn flakes 1 cup (240 mL) tomato soup with saltine crackers 1/2 cup (120 grams) steamed vegetables, like squash, green beans, or carrots   1/2 cup (120 mL) low-fat milk 1/2 cup (120 grams) white rice or mashed potatoes, or medium baked potato (no skin) 1/2 cup (120 grams) white rice or mashed potatoes, or medium baked potato (no skin)   1 piece white toast 1 piece white or rye bread 1 piece white or rye bread   1 teaspoon (5 grams) margarine or butter 1 teaspoon (5 grams) margarine or butter 1 teaspoon (5 grams) margarine or butter   poached egg or egg substitute banana 1/2 cup (120 mL) applesauce   Coffee or tea, with sugar and nondairy creamer 1/2 cup (120 mL) juice (apple, tomato) Coffee or tea, with sugar and nondairy creamer   Use breads and grains made from refined flour, pasta, and white rice. Do not use whole grain products.  Stay away from seeds, nuts, and raw or dried fruits. Also avoid raisins, berries, and foods that have these things in them.  Stay away from fresh fruits and vegetables that contain skins, seeds, and membranes.  Stay away from juices with pulp.  Remove skin from fruits and vegetables before cooking.  Stay away from prunes and prune juice.  Stay away from dried beans and lentils.  Limit milk and milk products if they make loose stools worse. Do not drink milk if you are lactose intolerant.  When you add fiber back into your diet be sure to:  Check with your doctor first.  Add fiber in slowly, like one meal per day.  Let your body adjust before you add in more fiber.  Stay hydrated to help move  fiber in your bowels.  Last Reviewed Date   2021-09-23  Consumer Information Use and Disclaimer   This information is not specific medical advice and does not replace information you receive from your health care provider. This is only a brief summary of general information. It does NOT include all information about conditions, illnesses, injuries, tests, procedures, treatments, therapies, discharge instructions or life-style choices that may apply to you. You must talk with your health care provider for complete information about your health and treatment options. This information should not be used to decide whether or not to accept your health care providers advice, instructions or recommendations. Only your health care provider has the knowledge and training to provide advice that is right for you.  Copyright   Copyright © 2021 UpToDate, Inc. and its affiliates and/or licensors. All rights reserved. Acid Reflux and GERD in Adults Discharge Instructions   About this topic   GERD stands for gastroesophageal reflux disease. It is sometimes called reflux or acid reflux. Acid reflux happens when your stomach acid backs up into your esophagus, the tube that carries your food from your mouth to your stomach. This can be uncomfortable. You may have stomach or chest pain (heartburn), trouble swallowing, or an upset stomach. Some people have a cough or sore throat.  Most of the time, you can use over-the-counter medicines to help with this problem.       What care is needed at home?   Ask your doctor what you need to do when you go home. Make sure you ask questions if you do not understand what the doctor says.  Raise the head of your bed by 6 to 8 inches (15 to 20 cm). Use wood or rubber blocks under 2 legs or try a foam wedge under your mattress. Just sleeping with your head raised on pillows is not enough.  Avoid beer, wine, and mixed drinks and avoid caffeine.  Keep a healthy weight. If you are too heavy, lose  weight.  If you smoke, try to quit. Your doctor or nurse can help.  Keep a diary of your signs. Write down what you had to eat before you had reflux. This will help you learn which foods cause you problems. For some people, they need to avoid coffee, chocolate, alcohol, spicy or fatty foods, or peppermint.  Avoid eating for 2 to 3 hours before bedtime. Lying down after you eat can make reflux worse.  Avoid belts and clothes that are too tight.  What follow-up care is needed?   Your doctor may ask you to make visits to the office to check on your progress. Be sure to keep these visits.  What drugs may be needed?   The doctor may order drugs to:  Relieve heartburn  Prevent reflux  Lessen acid production  Heal the esophageal lining  Will physical activity be limited?   Your physical activities will not be limited.  What problems could happen?   Asthma  Precancerous changes in the food pipe  Long-term cough  Dental problems  Higher risk of cancer of the food pipe. This is esophageal cancer.  Narrowing of the food pipe. This is a stricture.  Open sore in the food pipe. This is an ulcer.  When do I need to call the doctor?   You have signs of a heart attack, which may include:  Severe chest pain, pressure, or discomfort with:  Breathing trouble, sweating, upset stomach, or cold, clammy skin.  Pain in your arms, back, or jaw.  Worse pain with activity like walking up stairs.  Fast or irregular heartbeat.  Feeling dizzy, faint, or weak.  You have sudden, severe belly pain or the belly pain is constant.  You have blood in the undigested food and acid that comes up, or stool that looks red, black, or like tar.  You feel like your food gets stuck or you have pain when you swallow.  You lose weight when you are not trying to.  You choke when you are eating.  Your reflux is very bad, very frequent, or not helped by over-the-counter medicines.  You keep throwing up.  Teach Back: Helping You Understand   The Teach Back Method  helps you understand the information we are giving you. After you talk with the staff, tell them in your own words what you learned. This helps to make sure the staff has described each thing clearly. It also helps to explain things that may have been confusing. Before going home, make sure you can do these:  I can tell you about my condition.  I can tell you what changes I need to make with my eating habits to ease the reflux.  I can tell you what I will do if I am throwing up fluid that looks like blood or coffee grounds.  Where can I learn more?   American Academy of Family Physicians  https://[email or web address]/condition/refluxacid-[email or web address]   [facility] Choices  https://www.nhs.uk/conditions/heartburn-and-acid-[email or web address]   Last Reviewed Date   2021-06-09  Consumer Information Use and Disclaimer   This information is not specific medical advice and does not replace information you receive from your health care provider. This is only a brief summary of general information. It does NOT include all information about conditions, illnesses, injuries, tests, procedures, treatments, therapies, discharge instructions or life-style choices that may apply to you. You must talk with your health care provider for complete information about your health and treatment options. This information should not be used to decide whether or not to accept your health care providers advice, instructions or recommendations. Only your health care provider has the knowledge and training to provide advice that is right for you.  Copyright   Copyright © 2021 UpToDate, Inc. and its affiliates and/or licensors. All rights reserved.

## 2025-01-29 ENCOUNTER — HOSPITAL ENCOUNTER (OUTPATIENT)
Dept: RADIOLOGY | Facility: HOSPITAL | Age: 75
Discharge: HOME | End: 2025-01-29
Attending: NURSE PRACTITIONER
Payer: MEDICARE

## 2025-01-29 DIAGNOSIS — R19.7 DIARRHEA OF PRESUMED INFECTIOUS ORIGIN: ICD-10-CM

## 2025-01-29 DIAGNOSIS — R10.30 LOWER ABDOMINAL PAIN: ICD-10-CM

## 2025-01-29 DIAGNOSIS — R11.0 NAUSEA: ICD-10-CM

## 2025-01-29 DIAGNOSIS — K57.32 DIVERTICULITIS OF LARGE INTESTINE WITHOUT PERFORATION OR ABSCESS, UNSPECIFIED BLEEDING STATUS: ICD-10-CM

## 2025-01-29 DIAGNOSIS — A04.4 E. COLI GASTROENTERITIS: ICD-10-CM

## 2025-01-29 PROCEDURE — 25500020 PHARM REV CODE 255: Mod: PO | Performed by: NURSE PRACTITIONER

## 2025-01-29 PROCEDURE — 74177 CT ABD & PELVIS W/CONTRAST: CPT | Mod: 26,,, | Performed by: RADIOLOGY

## 2025-01-29 PROCEDURE — A9698 NON-RAD CONTRAST MATERIALNOC: HCPCS | Mod: PO | Performed by: NURSE PRACTITIONER

## 2025-01-29 PROCEDURE — 74177 CT ABD & PELVIS W/CONTRAST: CPT | Mod: TC,PO

## 2025-01-29 RX ADMIN — BARIUM SULFATE 900 ML: 20 SUSPENSION ORAL at 03:01

## 2025-01-29 RX ADMIN — IOHEXOL 75 ML: 350 INJECTION, SOLUTION INTRAVENOUS at 03:01

## 2025-01-30 ENCOUNTER — TELEPHONE (OUTPATIENT)
Dept: PRIMARY CARE CLINIC | Facility: CLINIC | Age: 75
End: 2025-01-30
Payer: MEDICARE

## 2025-01-30 NOTE — TELEPHONE ENCOUNTER
Called pt to see how he was doing following dx of diverticulitis.  Pt reports that he saw GI on 1/28 and was started on Avelox.  Augmentin was never started.  Pt reports that he is feeling much better today.  Pt scheduled for a HFU next week with you and has another GI f/u next week as well.

## 2025-02-01 DIAGNOSIS — R10.13 EPIGASTRIC PAIN: ICD-10-CM

## 2025-02-01 DIAGNOSIS — K58.0 IRRITABLE BOWEL SYNDROME WITH DIARRHEA: ICD-10-CM

## 2025-02-03 ENCOUNTER — PATIENT MESSAGE (OUTPATIENT)
Dept: GASTROENTEROLOGY | Facility: CLINIC | Age: 75
End: 2025-02-03
Payer: MEDICARE

## 2025-02-03 ENCOUNTER — TELEPHONE (OUTPATIENT)
Dept: PRIMARY CARE CLINIC | Facility: CLINIC | Age: 75
End: 2025-02-03
Payer: MEDICARE

## 2025-02-03 DIAGNOSIS — K80.20 CALCULUS OF GALLBLADDER WITHOUT CHOLECYSTITIS WITHOUT OBSTRUCTION: Primary | ICD-10-CM

## 2025-02-03 DIAGNOSIS — K57.32 DIVERTICULITIS OF LARGE INTESTINE WITHOUT PERFORATION OR ABSCESS, UNSPECIFIED BLEEDING STATUS: ICD-10-CM

## 2025-02-03 RX ORDER — DICYCLOMINE HYDROCHLORIDE 10 MG/1
CAPSULE ORAL
Qty: 360 CAPSULE | Refills: 1 | OUTPATIENT
Start: 2025-02-03

## 2025-02-03 NOTE — TELEPHONE ENCOUNTER
----- Message from Ewa sent at 2/3/2025  9:15 AM CST -----  Regarding: \[facility]\"" f/  [phone] - call back ;  need to cancel appointment tomorrow; he was readmitted at the [facility] for same reason diveculitis.      Ewa

## 2025-02-05 DIAGNOSIS — R10.13 EPIGASTRIC PAIN: ICD-10-CM

## 2025-02-05 DIAGNOSIS — K58.0 IRRITABLE BOWEL SYNDROME WITH DIARRHEA: ICD-10-CM

## 2025-02-06 RX ORDER — DICYCLOMINE HYDROCHLORIDE 10 MG/1
CAPSULE ORAL
Qty: 120 CAPSULE | Refills: 0 | OUTPATIENT
Start: 2025-02-06

## 2025-02-11 ENCOUNTER — OFFICE VISIT (OUTPATIENT)
Dept: GASTROENTEROLOGY | Facility: CLINIC | Age: 75
End: 2025-02-11
Payer: MEDICARE

## 2025-02-11 ENCOUNTER — OFFICE VISIT (OUTPATIENT)
Dept: PRIMARY CARE CLINIC | Facility: CLINIC | Age: 75
End: 2025-02-11
Payer: MEDICARE

## 2025-02-11 VITALS — WEIGHT: 160.06 LBS | HEIGHT: 68 IN | BODY MASS INDEX: 24.26 KG/M2

## 2025-02-11 VITALS
HEART RATE: 72 BPM | OXYGEN SATURATION: 97 % | WEIGHT: 160.25 LBS | SYSTOLIC BLOOD PRESSURE: 128 MMHG | DIASTOLIC BLOOD PRESSURE: 74 MMHG | BODY MASS INDEX: 24.29 KG/M2 | HEIGHT: 68 IN

## 2025-02-11 DIAGNOSIS — R39.12 BENIGN PROSTATIC HYPERPLASIA WITH WEAK URINARY STREAM: ICD-10-CM

## 2025-02-11 DIAGNOSIS — K57.32 DIVERTICULITIS OF LARGE INTESTINE WITHOUT PERFORATION OR ABSCESS WITHOUT BLEEDING: Primary | ICD-10-CM

## 2025-02-11 DIAGNOSIS — R10.32 LLQ ABDOMINAL PAIN: ICD-10-CM

## 2025-02-11 DIAGNOSIS — R19.7 DIARRHEA, UNSPECIFIED TYPE: ICD-10-CM

## 2025-02-11 DIAGNOSIS — Z87.19 HISTORY OF GASTROESOPHAGEAL REFLUX (GERD): ICD-10-CM

## 2025-02-11 DIAGNOSIS — K58.0 IRRITABLE BOWEL SYNDROME WITH DIARRHEA: ICD-10-CM

## 2025-02-11 DIAGNOSIS — Z09 HOSPITAL DISCHARGE FOLLOW-UP: Primary | ICD-10-CM

## 2025-02-11 DIAGNOSIS — Z12.5 ENCOUNTER FOR SCREENING FOR MALIGNANT NEOPLASM OF PROSTATE: ICD-10-CM

## 2025-02-11 DIAGNOSIS — K57.32 DIVERTICULITIS OF LARGE INTESTINE WITHOUT PERFORATION OR ABSCESS WITHOUT BLEEDING: ICD-10-CM

## 2025-02-11 DIAGNOSIS — N40.1 BENIGN PROSTATIC HYPERPLASIA WITH WEAK URINARY STREAM: ICD-10-CM

## 2025-02-11 DIAGNOSIS — R11.0 NAUSEA: ICD-10-CM

## 2025-02-11 PROCEDURE — 99214 OFFICE O/P EST MOD 30 MIN: CPT | Mod: S$PBB,,, | Performed by: NURSE PRACTITIONER

## 2025-02-11 PROCEDURE — 99999 PR PBB SHADOW E&M-EST. PATIENT-LVL III: CPT | Mod: PBBFAC,,, | Performed by: NURSE PRACTITIONER

## 2025-02-11 PROCEDURE — 99213 OFFICE O/P EST LOW 20 MIN: CPT | Mod: PBBFAC,27,PO | Performed by: NURSE PRACTITIONER

## 2025-02-11 PROCEDURE — 99999 PR PBB SHADOW E&M-EST. PATIENT-LVL V: CPT | Mod: PBBFAC,,, | Performed by: FAMILY MEDICINE

## 2025-02-11 PROCEDURE — 99215 OFFICE O/P EST HI 40 MIN: CPT | Mod: PBBFAC,PN | Performed by: FAMILY MEDICINE

## 2025-02-11 PROCEDURE — 99214 OFFICE O/P EST MOD 30 MIN: CPT | Mod: S$PBB,,, | Performed by: FAMILY MEDICINE

## 2025-02-11 RX ORDER — AMOXICILLIN AND CLAVULANATE POTASSIUM 875; 125 MG/1; MG/1
TABLET, FILM COATED ORAL
COMMUNITY
Start: 2025-02-03 | End: 2025-02-13

## 2025-02-11 RX ORDER — TAMSULOSIN HYDROCHLORIDE 0.4 MG/1
1 CAPSULE ORAL DAILY
COMMUNITY
Start: 2025-02-04 | End: 2025-02-12

## 2025-02-11 RX ORDER — PANTOPRAZOLE SODIUM 40 MG/1
40 TABLET, DELAYED RELEASE ORAL
COMMUNITY
Start: 2025-02-04 | End: 2025-02-13

## 2025-02-11 RX ORDER — DICYCLOMINE HYDROCHLORIDE 10 MG/1
10 CAPSULE ORAL
Qty: 120 CAPSULE | Refills: 1 | Status: SHIPPED | OUTPATIENT
Start: 2025-02-11 | End: 2026-02-11

## 2025-02-11 RX ORDER — SELENIUM 50 MCG
1 TABLET ORAL DAILY
COMMUNITY
Start: 2025-02-04 | End: 2025-03-06

## 2025-02-11 NOTE — PROGRESS NOTES
"Subjective:       Patient ID: Vince Chadwick III is a 74 y.o. male Body mass index is 24.7 kg/m².    Chief Complaint: Follow-up and Abdominal Pain (3/10 pain  left lower quad, 2 hospital visits per patient)    Established patient of Dr. Novak & myself.    Patient reports after our last visit on 1/28/2025 he was taking avelox as prescribed and symptoms were improving. Had repeat CT scan on 1/29/2025 which showed diverticulitis had resolved. Symptoms recurred on 1/31/2025,went to the [facility] for it and was admitted. Patient is here for follow-up.    Reviewed hospital discharge summary: "Admit date: 2/1/2025  Discharge date: 2/3/25  Admitting Physician: Jose Lacy MD  Discharge Provider: FEDERICA Sue  Admission Diagnoses: Diverticulitis [K57.92]  Abdominal pain, unspecified abdominal location [R10.9]  Discharge Diagnoses: diverticulitis, htn, hld, anxiety, YONI, BPH  Admission Condition: poor  Discharged Condition: fair  Indication for Admission: divertisulitis  Hospital Course: Patient is a 74 y.o. male with a hx of arthritis, diverticulitis (last colonoscopy was 2020 with diverticulitis, polyps benign), EGD August last with w no abnormal findings per patient, he has had around 10 episodes of diverticulitis over the last 5 years per patient and wife, BPH, anxiety, HTN, HLD, presents with left lower quadrant pain that started yesterday. He was admitted and started iv fluids and iv antibitoics. He was started on oral antx and did very well without any n/v/d and pain has resolved. He is sent home on augmentin, probiotics, carafate, ppi, zofran. He is instructed to follow up with gi and colorectal surgeon." & "Recent admission 1/2-1/23 on cipro and flagyl , sent home with cipro/flagy , unable to tolerate po , PCP prescribed augment and gi prescribed moxi   Worsening llq abd pain yesterday along with flank pain   Ct shows continues descending colon diverticulitis w/o complications   Will continue iv zosyn " "  GI pcr , previously had ecoli present   Iv fluids, clear liquid diet   Serial abdominal exams , consult ID as indicated   Expect 48 hrs of iv antx   Continue ppi, carafate, probiotics, and bentyl   UA pending"    PRIOR HISTORY: Patient was recently hospitalized at [facility] for diverticulitis. Reports he can not tolerate Cipro and flagyl oral antibiotics. They cause nausea and vomiting. He took them for 2 days after hospital discharge (last dose on 1/25/2025). His PCP prescribed him Augmentin yesterday to take in place of the Cipro and flagyl. Patient reports he has not picked up antibiotic yet because in the past Augmentin caused upset stomach. Patient is asking if he can get IV antibiotics outpatient. Patient reports he was also diagnosed with e.coli while hospitalized.    Abdominal Pain  Episode onset: started 1/20/2025. The problem occurs daily. The problem has been gradually improving (improving since hospital discharge, and tolerating augmentin with taking zofran with it). The pain is located in the LLQ (& mid chest (when it first started)). The pain is at a severity of 3/10 (currently). The pain is mild. Quality: throbbing. Associated symptoms include diarrhea (chronic; bowel movements are currently a few times a day of loose stools), nausea (intermittent, zofran 4 mg PRN; PAST TREATMENT: phenergan) and weight loss (lost weight since hosiptalizations). Pertinent negatives include no anorexia, belching, constipation, dysuria, fever, flatus, frequency, hematochezia, melena or vomiting. Exacerbated by: stress. He has tried proton pump inhibitors and H2 blockers (bentyl 10 mg QID PRN- taking it every 4-6 hours; PAST: protonix, metamucil) for the symptoms. Prior diagnostic workup includes CT scan, GI consult, lower endoscopy, upper endoscopy and ultrasound. His past medical history is significant for abdominal surgery, GERD (controlled with taking prilosec 40 mg once daily & pepcid 20 mg BID; not taking " carafate due to antibiotic use) and irritable bowel syndrome. There is no history of Crohn's disease, pancreatitis, PUD or ulcerative colitis.     Review of Systems   Constitutional:  Positive for appetite change (has improved) and weight loss (lost weight since hosiptalizations). Negative for chills, fatigue and fever.   HENT:  Negative for sore throat and trouble swallowing.    Respiratory:  Negative for cough, choking and shortness of breath.    Cardiovascular:  Negative for chest pain (has resolved).   Gastrointestinal:  Positive for abdominal pain, diarrhea (chronic; bowel movements are currently a few times a day of loose stools) and nausea (intermittent, zofran 4 mg PRN; PAST TREATMENT: phenergan). Negative for anal bleeding, anorexia, blood in stool, constipation, flatus, hematochezia, melena, rectal pain and vomiting.   Genitourinary:  Negative for difficulty urinating, dysuria, flank pain, frequency and urgency.   Neurological:  Negative for dizziness and weakness.       Past Medical History:   Diagnosis Date    ALLERGIC RHINITIS     Arthritis     Cataract     OU    Colon polyp     Diverticulitis     Diverticulosis     Gastritis 02/06/2024    GERD (gastroesophageal reflux disease)     Hyperlipidemia     resolved    Hypertension     Irritable bowel syndrome     Lumbar disc disease      Past Surgical History:   Procedure Laterality Date    APPENDECTOMY      CIRCUMCISION      COLONOSCOPY  06/02/2011    [facility].    One 1 mm polyp in the sigmoid colon.  FRAGMENT OF NONNEOPLASTIC COLONIC MUCOSA.  Sigmoid diverticulosis.  Spasms c/w IBS.  repeat in 7-8 years    COLONOSCOPY  09/12/2006    Dr. [place]ak[place]    COLONOSCOPY N/A 05/13/2020    Procedure: COLONOSCOPY;  Surgeon: Davy Novak Jr., MD;  Location: [facility];  Service: Endoscopy;  Laterality: N/A; repeat in 7 years for screening    EPIDURAL STEROID INJECTION INTO LUMBAR SPINE N/A 11/15/2021    Procedure: Injection-steroid-epidural-lumbar L5/S1;   Surgeon: Khang Sanchez MD;  Location: [facility];  Service: Pain Management;  Laterality: N/A;    EPIDURAL STEROID INJECTION INTO LUMBAR SPINE N/A 12/13/2021    Procedure: Injection-steroid-epidural-lumbar L5/S1;  Surgeon: Khang Sanchez MD;  Location: [facility] OR;  Service: Pain Management;  Laterality: N/A;    EPIDURAL STEROID INJECTION INTO LUMBAR SPINE N/A 03/10/2023    Procedure: Injection-steroid-epidural-lumbar L2/3;  Surgeon: Khang Sanchez MD;  Location: [facility];  Service: Pain Management;  Laterality: N/A;    ESOPHAGOGASTRODUODENOSCOPY N/A 05/13/2020    Procedure: EGD (ESOPHAGOGASTRODUODENOSCOPY);  Surgeon: Davy Novak Jr., MD;  Location: [facility];  Service: Endoscopy;  Laterality: N/A;    ESOPHAGOGASTRODUODENOSCOPY N/A 8/23/2024    Procedure: EGD (ESOPHAGOGASTRODUODENOSCOPY);  Surgeon: Davy Novak Jr., MD;  Location: [facility];  Service: Endoscopy;  Laterality: N/A;    EYE SURGERY      FOOT SURGERY      x 2    INJECTION OF ANESTHETIC AGENT AROUND GANGLION IMPAR Right 02/17/2023    Procedure: BLOCK, facet cyst aspiration right side L2/3;  Surgeon: Khang Sanchez MD;  Location: [facility];  Service: Pain Management;  Laterality: Right;    LAMINECTOMY USING MINIMALLY INVASIVE TECHNIQUE N/A 01/26/2022    Procedure: LAMINECTOMY, SPINE, MINIMALLY INVASIVE  RIGHT MIS APPROACH RESECTION L4-5 SYNOVIAL CYST AND BILATERAL LAMINECTOMY/MEDIAL FACETECTOMY;  Surgeon: Penny Lobato MD;  Location: [facility];  Service: Neurosurgery;  Laterality: N/A;    LAMINECTOMY USING MINIMALLY INVASIVE TECHNIQUE N/A 04/26/2023    Procedure: LAMINECTOMY, SPINE, MINIMALLY INVASIVE    RIGHT L2-L3 MIS HEMILAMINECTOMY/MEDIAL FACETECTOMY FOR REMOVAL SYNOVIAL CYST;  Surgeon: Penny Lobato MD;  Location: [facility];  Service: Neurosurgery;  Laterality: N/A;    LUMBAR EPIDURAL INJECTION      PILONIDAL CYST DRAINAGE      s/p chalazion removal      OD    TONSILLECTOMY       Family History   Problem Relation Name  Age of Onset    Cataracts Mother      Breast cancer Mother      Glaucoma Father      Cataracts Father      Macular degeneration Father      Colon polyps Father      Glaucoma Sister      Glaucoma Paternal Uncle      Prostate cancer Paternal Uncle      Glaucoma Paternal Uncle      Heart attack Paternal Grandfather  44    Colon cancer Neg Hx      Crohn's disease Neg Hx      Ulcerative colitis Neg Hx       Social History     Tobacco Use    Smoking status: Never     Passive exposure: Never    Smokeless tobacco: Never   Substance Use Topics    Alcohol use: No     Alcohol/week: 0.0 standard drinks of alcohol    Drug use: No     Wt Readings from Last 10 Encounters:   02/12/25 72.6 kg (160 lb 0.9 oz)   02/11/25 72.7 kg (160 lb 4.4 oz)   02/11/25 72.6 kg (160 lb 0.9 oz)   01/28/25 73.9 kg (162 lb 14.7 oz)   01/07/25 75.8 kg (167 lb 3.5 oz)   09/26/24 72.4 kg (159 lb 9.8 oz)   08/23/24 73.5 kg (162 lb)   08/02/24 77 kg (169 lb 12.1 oz)   05/02/24 77.7 kg (171 lb 4.8 oz)   03/19/24 78.7 kg (173 lb 8 oz)     Lab Results   Component Value Date    WBC 10-25 (A) 01/20/2025    HGB 14.6 02/06/2024    HCT 43.6 02/06/2024    MCV 85 02/06/2024     02/06/2024     CMP  Sodium   Date Value Ref Range Status   02/03/2025 142 136 - 145 mmol/L Final   04/29/2024 140 136 - 145 mmol/L Final     Potassium   Date Value Ref Range Status   02/03/2025 3.7 3.5 - 5.1 mmol/L Final   04/29/2024 4.3 3.5 - 5.1 mmol/L Final     Chloride   Date Value Ref Range Status   04/29/2024 104 95 - 110 mmol/L Final     CO2   Date Value Ref Range Status   04/29/2024 28 23 - 29 mmol/L Final     Carbon Dioxide   Date Value Ref Range Status   02/03/2025 28 21 - 32 mmol/L Final     Glucose   Date Value Ref Range Status   04/29/2024 94 70 - 110 mg/dL Final     BUN   Date Value Ref Range Status   02/03/2025 7.0 7.0 - 18.0 mg/dL Final   04/29/2024 19 8 - 23 mg/dL Final     Creatinine   Date Value Ref Range Status   02/03/2025 0.99 (H) 0.51 - 0.95 mg/dL Final    01/29/2025 1.1 0.5 - 1.4 mg/dL Final     Calcium   Date Value Ref Range Status   02/03/2025 8.7 8.5 - 10.1 mg/dL Final   04/29/2024 9.0 8.7 - 10.5 mg/dL Final     Total Protein   Date Value Ref Range Status   04/29/2024 6.4 6.0 - 8.4 g/dL Final     Albumin   Date Value Ref Range Status   02/03/2025 3.0 (L) 3.4 - 5.0 g/dL Final   04/29/2024 3.8 3.5 - 5.2 g/dL Final     Total Bilirubin   Date Value Ref Range Status   02/03/2025 0.8 0.2 - 1.3 mg/dL Final   04/29/2024 0.8 0.1 - 1.0 mg/dL Final     Comment:     For infants and newborns, interpretation of results should be based  on gestational age, weight and in agreement with clinical  observations.    Premature Infant recommended reference ranges:  Up to 24 hours.............<8.0 mg/dL  Up to 48 hours............<12.0 mg/dL  3-5 days..................<15.0 mg/dL  6-29 days.................<15.0 mg/dL       Alkaline Phosphatase   Date Value Ref Range Status   04/29/2024 67 55 - 135 U/L Final     AST   Date Value Ref Range Status   02/03/2025 31 15 - 37 U/L Final   04/29/2024 17 10 - 40 U/L Final     ALT   Date Value Ref Range Status   02/03/2025 16 13 - 61 U/L Final   04/29/2024 5 (L) 10 - 44 U/L Final     Anion Gap   Date Value Ref Range Status   04/29/2024 8 8 - 16 mmol/L Final     eGFR if    Date Value Ref Range Status   03/02/2022 >60 >60 mL/min/1.73 m^2 Final     eGFR if non    Date Value Ref Range Status   03/02/2022 >60 >60 mL/min/1.73 m^2 Final     Comment:     Calculation used to obtain the estimated glomerular filtration  rate (eGFR) is the CKD-EPI equation.        Lab Results   Component Value Date    LIPASERES 78 02/06/2024 1/20/2025 lipase WNL    Lab Results   Component Value Date    TSH 2.797 08/20/2019 1/23/2025 GI panel by PCR  5/13/2020 and 1/13/2020 stool studies reviewed (acidic stools & few leukocytes)    Reviewed prior medical records including 2/1/2025 and 1/20/2025 CT scans of abdomen pelvis without  contrast; 1/29/2025 CT abdomen pelvis with IV contrast; 2/6/2024 limited abdominal ultrasound; & endoscopy history (see surgical history).    Objective:      Physical Exam  Vitals and nursing note reviewed.   Constitutional:       General: He is not in acute distress.     Appearance: Normal appearance. He is well-developed. He is not diaphoretic.   HENT:      Mouth/Throat:      Lips: Pink. No lesions.      Mouth: Mucous membranes are moist. No oral lesions.      Tongue: No lesions.      Pharynx: Oropharynx is clear. No pharyngeal swelling or posterior oropharyngeal erythema.   Eyes:      General: No scleral icterus.     Conjunctiva/sclera: Conjunctivae normal.   Pulmonary:      Effort: Pulmonary effort is normal. No respiratory distress.      Breath sounds: Normal breath sounds. No wheezing.   Abdominal:      General: Bowel sounds are normal. There is no distension or abdominal bruit.      Palpations: Abdomen is soft. Abdomen is not rigid. There is no mass.      Tenderness: There is abdominal tenderness (mild) in the left lower quadrant. There is no guarding or rebound. Negative signs include Cherry's sign and McBurney's sign.   Skin:     General: Skin is warm and dry.      Coloration: Skin is not jaundiced or pale.      Findings: No erythema or rash.   Neurological:      Mental Status: He is alert and oriented to person, place, and time.   Psychiatric:         Mood and Affect: Mood and affect normal.         Behavior: Behavior normal.         Thought Content: Thought content normal.         Cognition and Memory: Memory is not impaired.         Judgment: Judgment normal.         Assessment:       1. Hospital discharge follow-up    2. Diverticulitis of large intestine without perforation or abscess without bleeding    3. LLQ abdominal pain    4. Irritable bowel syndrome with diarrhea    5. Diarrhea, unspecified type    6. History of gastroesophageal reflux (GERD)    7. Nausea          Plan:       Hospital discharge  follow-up, Diverticulitis of large intestine without perforation or abscess without bleeding, & LLQ abdominal pain  -     referral placed to colorectal surgery previously; staff to assist patient with scheduling appointment.  - complete Augmentin prescription as directed  -   CONTINUE  dicyclomine (BENTYL) 10 MG capsule; Take 1 capsule (10 mg total) by mouth before meals and at bedtime as needed (abdominal cramping/pain).  - avoid/minimize use of NSAIDs- since they can cause GI upset, bleeding and/or ulcers. If NSAID must be taken, recommend take with food.  - recommend seeing colorectal surgery to discuss treatment options and to discuss about lower endoscopy; patient verbalized understanding  - discussed the diagnosis of diverticulosis and diverticulitis, advised to continue a low fiber diet for the next 4 weeks and then slowly increase fiber in diet. Advised a low fiber diet is recommended for diverticulitis (for about 4 weeks), but to prevent diverticulitis, high fiber diet is recommended.  -Recommended high fiber diet after episode has completely resolved. Recommended daily exercise, adequate water intake (six 8-oz glasses of water daily), and high fiber diet. OTC fiber supplements are recommended if diet does not reach daily fiber goal (25 grams daily), such as Metamucil, Citrucel, or FiberCon (take as directed, separate from other oral medications by >2 hours).  - instructed patient that if symptoms do not improve or if worsen, he need to go to the ER for further evaluation & management    Irritable bowel syndrome with diarrhea & Diarrhea, unspecified type  -  REFILL   dicyclomine (BENTYL) 10 MG capsule; Take 1 capsule (10 mg total) by mouth before meals and at bedtime as needed (abdominal cramping/pain).  Dispense: 120 capsule; Refill: 1  -     Stool Exam-Ova,Cysts,Parasites; Future; Expected date: 02/11/2025  -     Giardia / Cryptosporidum, EIA; Future; Expected date: 02/11/2025  -     WBC, Stool; Future;  Expected date: 02/11/2025  -     Stool culture; Future; Expected date: 02/11/2025  -     Clostridium difficile EIA; Future; Expected date: 02/11/2025  - stay well hydrated  - recommend OTC probiotic, such as Florastor or Culturelle, taken as directed on packaging  - avoid lactose, alcohol, & caffeine  - avoid known triggers    History of gastroesophageal reflux (GERD)  -  CONTINUE  famotidine (PEPCID) 20 MG tablet; Take 1 tablet (20 mg total) by mouth 2 (two) times daily.  -  CONTINUE   omeprazole (PRILOSEC) 40 MG capsule; Take 1 capsule (40 mg total) by mouth every morning.    Nausea  - CONTINUE ZOFRAN PRN AS DIRECTED FOR NAUSEA    Follow up in about 1 month (around 3/11/2025), or if symptoms worsen or fail to improve.    If no improvement in symptoms or symptoms worsen, call/follow-up at clinic or go to ER.      31 minutes of total time spent on the encounter, which includes face to face time and non-face to face time preparing to see the patient (e.g., review of tests), Obtaining and/or reviewing separately obtained history, Documenting clinical information in the electronic or other health record, Independently interpreting results (not separately reported) and communicating results to the patient/family/caregiver, or Care coordination (not separately reported).

## 2025-02-11 NOTE — PROGRESS NOTES
"  [facility] Care Provider Appointment   [facility]       Patient ID: Vince Chadwick III is a 74 y.o. male.    ASSESSMENT/PLAN by Problem List:    Assessment & Plan    IMPRESSION:  - Reviewed recent hospitalizations for diverticulitis, including recurrent episodes and antibiotic treatments  - Assessed current symptoms and recovery, noting improvement with Augmentin and Zofran  - Considered surgical intervention due to multiple diverticulitis episodes (10 in 5 years by his report)  - Evaluated abdominal exam, noting improvement  - Reviewed CT findings, including enlarged prostate and bladder wall thickening  - Will check PSA due to enlarged prostate on imaging    DIVERTICULITIS:  - Monitored the patient's condition, noting two recent episodes of diverticulitis, with the first occurring around January 20th and the second shortly after.  - Mr. Chadwick reports having had 10 episodes in the last 5 years, experiencing severe pain, vomiting, and diarrhea during attacks.  - Evaluated the patient's condition through a CT scan, which showed diverticulitis without perforation or abscess.  - Mr. Chadwick's abdomen is slightly sore but not severe.  - Bowel sounds are quiet., minimally tender in the left mid lower abdomen  , no peritoneal signs  He will complete Augmentin, follow with General surgery as scheduled    ENLARGED PROSTATE:  - Monitored the patient's urinary difficulties, noting issues when Tamsulosin (Flomax) is missed.  - Mr. Chadwick describes the urinary stream as "pretty lame".  - Evaluated the prostate through CT, which shows enlargement with irregular size and shape.  - The bladder wall appears thickened, l  - Discussed enlarged prostate and its effects on urinary function with the patient.  - Continued Tamsulosin (Flomax) for enlarged prostate symptoms.  - Ordered a PSA test to check PSA levels.  - Referred the patient to urology for further evaluation of the enlarged " prostate.    OTHER INSTRUCTIONS:  - Educated the patient that E. coli is a normal gut bacteria that can cause urinary tract infections.         CODING, ORDERS, AND ADDITIONAL DOCUMENTATION RELATED TO THIS ENCOUNTER:  (note that the diagnoses and clinical summaries above were generated with the assistance of [employer] software and represents my assessment and plan.  This software does not always generate the precise nor most specific diagnosis codes.  Therefore the specific diagnosis codes and orders for billing purposes are detailed below along with any additional documentation if needed)      1. Diverticulitis of large intestine without perforation or abscess without bleeding    2. Benign prostatic hyperplasia with weak urinary stream  Overview:  Dx updated per 2019 IMO Load    Orders:  -     PSA, Screening; Future; Expected date: 02/11/2025  -     Ambulatory referral/consult to Urology; Future; Expected date: 02/18/2025    3. Encounter for screening for malignant neoplasm of prostate  -     PSA, Screening; Future; Expected date: 02/11/2025           Follow Up:  Regularly scheduled follow-up in a few months as scheduled, notify us sooner if any changes or concerns        Subjective:       HPI    Patient is a/an 74 y.o.  male     For complete problem list, past medical history, surgical history, social history, etc., see appropriate section in the electronic medical record    History of Present Illness    CHIEF COMPLAINT:  Mr. Chadwick presents today for follow-up of recent hospitalization for diverticulitis    DIVERTICULITIS:  He reports a history of multiple diverticulitis episodes, with 10 occurrences in the last five years. He had two recent hospitalizations, with the first occurring around January 20th. After initial discharge, he was unable to tolerate the prescribed antibiotics and discontinued them. A subsequent attempt with Augmentin resulted in worsening symptoms, leading to a second hospitalization.  "CT during second admission confirmed diverticulitis without perforation or abscess. He is currently taking Augmentin with Zofran for nausea management, with 3-4 days remaining in the course. He reports diarrhea, rating it as "7 on the diarrhea chart" and denies blood in stool.    WEIGHT:  He reports a 7-pound weight loss since fall, decreasing from 167 to 160 lbs.    GENITOURINARY:  CT revealed an enlarged prostate. He has not had a PSA check in two years. He reports asymmetric testicle size that has been present for some time, with one testicle being noticeably larger but soft in consistency. He denies any hard lumps in the testicles. He experiences weak urinary stream and difficulty urinating when Tamsulosin is missed.      ROS:  General: +weight loss  Gastrointestinal: +diarrhea, -blood in stool  Genitourinary: +difficulty urinating  Male Genitourinary: -testicular pain       Review of Systems   Constitutional:  Negative for chills and fever.   Respiratory: Negative.  Negative for shortness of breath.    Cardiovascular: Negative.    Gastrointestinal:  Positive for abdominal pain, diarrhea and nausea. Negative for blood in stool and vomiting.   Genitourinary: Negative.        Objective     Physical Exam  Vitals reviewed.   Constitutional:       General: He is not in acute distress.     Appearance: He is well-developed. He is not diaphoretic.   HENT:      Head: Normocephalic and atraumatic.   Eyes:      General: No scleral icterus.     Conjunctiva/sclera: Conjunctivae normal.   Cardiovascular:      Rate and Rhythm: Normal rate and regular rhythm.      Heart sounds: Normal heart sounds. No murmur heard.     No gallop.   Pulmonary:      Effort: Pulmonary effort is normal. No respiratory distress.      Breath sounds: No wheezing.   Abdominal:      Comments: Abdomen soft and flat.  Bowel sounds are present though somewhat quiet.  Mild tenderness in the left lower just to the left of midline.  There is no guarding or " "rebound tenderness there is no distention   Musculoskeletal:      Cervical back: Normal range of motion and neck supple.   Skin:     General: Skin is warm and dry.   Neurological:      Mental Status: He is alert and oriented to person, place, and time.      Deep Tendon Reflexes: Reflexes are normal and symmetric.   Psychiatric:         Behavior: Behavior normal.       Vitals:    02/11/25 1312   BP: 128/74   BP Location: Right arm   Patient Position: Sitting   Pulse: 72   SpO2: 97%   Weight: 72.7 kg (160 lb 4.4 oz)   Height: 5' 7.5" (1.715 m)     Physical Exam                This note was generated with the assistance of ambient listening technology. Verbal consent was obtained by the patient and accompanying visitor(s) for the recording of patient appointment to facilitate this note. I attest to having reviewed and edited the generated note for accuracy, though some syntax or spelling errors may persist. Please contact the author of this note for any clarification.  Parts of the note were also generated with voice recognition software.  Occasionally this software will misinterpreted words or phrases    "

## 2025-02-12 ENCOUNTER — LAB VISIT (OUTPATIENT)
Dept: LAB | Facility: HOSPITAL | Age: 75
End: 2025-02-12
Attending: FAMILY MEDICINE
Payer: MEDICARE

## 2025-02-12 ENCOUNTER — OFFICE VISIT (OUTPATIENT)
Dept: SURGERY | Facility: CLINIC | Age: 75
End: 2025-02-12
Payer: MEDICARE

## 2025-02-12 VITALS
SYSTOLIC BLOOD PRESSURE: 123 MMHG | HEIGHT: 68 IN | BODY MASS INDEX: 24.26 KG/M2 | TEMPERATURE: 97 F | WEIGHT: 160.06 LBS | HEART RATE: 88 BPM | DIASTOLIC BLOOD PRESSURE: 72 MMHG

## 2025-02-12 DIAGNOSIS — Z12.5 ENCOUNTER FOR SCREENING FOR MALIGNANT NEOPLASM OF PROSTATE: ICD-10-CM

## 2025-02-12 DIAGNOSIS — N40.1 BENIGN PROSTATIC HYPERPLASIA WITH WEAK URINARY STREAM: ICD-10-CM

## 2025-02-12 DIAGNOSIS — R39.12 BENIGN PROSTATIC HYPERPLASIA WITH WEAK URINARY STREAM: ICD-10-CM

## 2025-02-12 DIAGNOSIS — K57.92 DIVERTICULITIS: Primary | ICD-10-CM

## 2025-02-12 LAB — COMPLEXED PSA SERPL-MCNC: 5.8 NG/ML (ref 0–4)

## 2025-02-12 PROCEDURE — 36415 COLL VENOUS BLD VENIPUNCTURE: CPT | Mod: PO | Performed by: FAMILY MEDICINE

## 2025-02-12 PROCEDURE — 84153 ASSAY OF PSA TOTAL: CPT | Performed by: FAMILY MEDICINE

## 2025-02-12 PROCEDURE — 99213 OFFICE O/P EST LOW 20 MIN: CPT | Mod: PBBFAC,PO | Performed by: SURGERY

## 2025-02-12 PROCEDURE — 99999 PR PBB SHADOW E&M-EST. PATIENT-LVL III: CPT | Mod: PBBFAC,,, | Performed by: SURGERY

## 2025-02-12 NOTE — PROGRESS NOTES
Subjective     Patient ID: Vince Chadwick III is a 74 y.o. male.    Chief Complaint: Consult (Diverticulitis)    HPI  this is a pleasant 74-year-old gentleman who was referred to me for evaluation of recurrent diverticulitis.  Patient notes that he has had multiple bouts of diverticulitis over the last 5-6 years.  He states he has had over 10 events have occurred since that time.  All events has been minor relatively speaking.  More recently he did require hospitalization 2 separate times over 2 week period secondary to a persistent smoldering case.  This was in January of this year.  Currently he is feeling much better.  He has never required IR drainage or interventional procedures.  His last colonoscopy was in 2020.  No significant abdominal surgical history  Review of Systems   Constitutional:  Negative for activity change.   Cardiovascular:  Negative for chest pain.   Gastrointestinal:  Positive for abdominal pain. Negative for abdominal distention, nausea and vomiting.          Objective     Physical Exam  Vitals reviewed.   Cardiovascular:      Rate and Rhythm: Normal rate.      Pulses: Normal pulses.   Pulmonary:      Effort: Pulmonary effort is normal.   Abdominal:      General: Abdomen is flat. There is no distension.      Tenderness: There is no abdominal tenderness.      Hernia: No hernia is present.   Musculoskeletal:         General: Normal range of motion.   Neurological:      Mental Status: He is alert.   Psychiatric:         Mood and Affect: Mood normal.         Behavior: Behavior normal.     Multiple CT scans reviewed.  Most recent CT scan from January 29, 2025 does demonstrate resolution of previous diverticulitis.  There was diverticulosis noted.  Multiple CT scans were reviewed dating back to 2018 with periodic bouts of diverticulitis relatively mild in nature in the descending sigmoid colon junction.       Assessment and Plan     Diverticulitis          Lengthy discussion with the patient.   Reviewed pros and cons of surgical resection.  Explained to him that I do suspect he most likely has had a smoldering case of diverticulitis.  I would tend to favor conservative management as he has had no complicated bouts of diverticulitis.  Did discuss with him however that if he is having more frequent recurring bouts of diverticulitis that are interfering with his quality of life may need to consider surgical resection at a time convenient to him.  He expresses understanding.  He to prefers observation at the present time.  He will follow up with me p.r.n.         No follow-ups on file.

## 2025-02-13 ENCOUNTER — LAB VISIT (OUTPATIENT)
Dept: LAB | Facility: HOSPITAL | Age: 75
End: 2025-02-13
Attending: NURSE PRACTITIONER
Payer: MEDICARE

## 2025-02-13 DIAGNOSIS — R19.7 DIARRHEA, UNSPECIFIED TYPE: ICD-10-CM

## 2025-02-13 PROCEDURE — 87045 FECES CULTURE AEROBIC BACT: CPT | Performed by: NURSE PRACTITIONER

## 2025-02-13 PROCEDURE — 87046 STOOL CULTR AEROBIC BACT EA: CPT | Performed by: NURSE PRACTITIONER

## 2025-02-13 PROCEDURE — 87329 GIARDIA AG IA: CPT | Performed by: NURSE PRACTITIONER

## 2025-02-13 PROCEDURE — 89055 LEUKOCYTE ASSESSMENT FECAL: CPT | Performed by: NURSE PRACTITIONER

## 2025-02-13 PROCEDURE — 87324 CLOSTRIDIUM AG IA: CPT | Performed by: NURSE PRACTITIONER

## 2025-02-13 PROCEDURE — 87427 SHIGA-LIKE TOXIN AG IA: CPT | Performed by: NURSE PRACTITIONER

## 2025-02-13 PROCEDURE — 87449 NOS EACH ORGANISM AG IA: CPT | Mod: 91 | Performed by: NURSE PRACTITIONER

## 2025-02-14 LAB
C DIFF GDH STL QL: NEGATIVE
C DIFF TOX A+B STL QL IA: NEGATIVE
CRYPTOSP AG STL QL IA: NEGATIVE
G LAMBLIA AG STL QL IA: NEGATIVE
WBC #/AREA STL HPF: ABNORMAL /[HPF]

## 2025-02-17 LAB — BACTERIA STL CULT: NORMAL

## 2025-02-18 ENCOUNTER — OFFICE VISIT (OUTPATIENT)
Dept: UROLOGY | Facility: CLINIC | Age: 75
End: 2025-02-18
Payer: MEDICARE

## 2025-02-18 VITALS — BODY MASS INDEX: 23.99 KG/M2 | WEIGHT: 158.31 LBS | HEIGHT: 68 IN

## 2025-02-18 DIAGNOSIS — N40.1 BENIGN PROSTATIC HYPERPLASIA WITH WEAK URINARY STREAM: ICD-10-CM

## 2025-02-18 DIAGNOSIS — R97.20 ELEVATED PSA: Primary | ICD-10-CM

## 2025-02-18 DIAGNOSIS — R39.12 BENIGN PROSTATIC HYPERPLASIA WITH WEAK URINARY STREAM: ICD-10-CM

## 2025-02-18 LAB — POC RESIDUAL URINE VOLUME: 13 ML (ref 0–100)

## 2025-02-18 PROCEDURE — 99213 OFFICE O/P EST LOW 20 MIN: CPT | Mod: PBBFAC,PO

## 2025-02-18 PROCEDURE — 51798 US URINE CAPACITY MEASURE: CPT | Mod: PBBFAC,PO

## 2025-02-18 NOTE — PROGRESS NOTES
[facility] Note  Staff: LANCE Carrillo    PCP: MD Sarah Beth    Chief Complaint: BPH, Elevated PSA    Subjective:        HPI: Vince Chadwick III is a 74 y.o. male NEW PATIENT presents today for evaluation of BPH and elevated PSA. BPH has been a long problem for him and he is currently taking tamsulosin 0.8mg daily. He complains of a weaker and slower stream and urgency. We discussed all treatment options including adding finasteride or further work up for surgical outlet procedures. He denies dysuria, hematuria, fever, flank pain, and straining to urinate.     His most recent PSA is from 2025 at 5.8. Prior PSA from 10/26/2022 was 2.9. He does have a family history of prostate cancer. He was also recently treated for diverticulitis, which may have affected his PSA. We discussed PSA and its limitations. We discussed further evaluation with prostate MRI versus prostate needle biopsy.     Questions asked the pt during ov today:  Urgency: yes, urge incontinence? no  NTF: 2x night  Dysuria: No  Gross Hematuria:No  Straining:No, Hesistancy:Yes , Intermittency:Yes }, Weak stream:Yes     Last PSA Screening:   Lab Results   Component Value Date    PSA 5.8 (H) 2025    PSA 2.9 10/26/2022    PSA 1.9 2020       AUA SS Today:  16  Feeling of ICBE:  4  Frequency:  1  Intermittency:  2  Urgency:  2  Weak urine stream:  4  Strainin  Nocturia:  2    PVR by bladder scan performed by MA today:  13 mL    REVIEW OF SYSTEMS:  Review of Systems   Constitutional: Negative.  Negative for chills and fever.   Gastrointestinal: Negative.  Negative for abdominal pain, constipation, diarrhea, nausea and vomiting.   Genitourinary: Negative.  Negative for dysuria, flank pain, frequency, hematuria and urgency.   Musculoskeletal: Negative.  Negative for back pain.       PMHx:  Past Medical History:   Diagnosis Date    ALLERGIC RHINITIS     Arthritis     Cataract     OU    Colon polyp      Diverticulitis     Diverticulosis     Gastritis 02/06/2024    GERD (gastroesophageal reflux disease)     Hyperlipidemia     resolved    Hypertension     Irritable bowel syndrome     Lumbar disc disease        PSHx:  Past Surgical History:   Procedure Laterality Date    APPENDECTOMY      CIRCUMCISION      COLONOSCOPY  06/02/2011    [facility]LA.    One 1 mm polyp in the sigmoid colon.  FRAGMENT OF NONNEOPLASTIC COLONIC MUCOSA.  Sigmoid diverticulosis.  Spasms c/w IBS.  repeat in 7-8 years    COLONOSCOPY  09/12/2006    Dr. Novak[place]    COLONOSCOPY N/A 05/13/2020    Procedure: COLONOSCOPY;  Surgeon: Davy Novak Jr., MD;  Location: [facility];  Service: Endoscopy;  Laterality: N/A; repeat in 7 years for screening    EPIDURAL STEROID INJECTION INTO LUMBAR SPINE N/A 11/15/2021    Procedure: Injection-steroid-epidural-lumbar L5/S1;  Surgeon: Khang Sanchez MD;  Location: [facility] OR;  Service: Pain Management;  Laterality: N/A;    EPIDURAL STEROID INJECTION INTO LUMBAR SPINE N/A 12/13/2021    Procedure: Injection-steroid-epidural-lumbar L5/S1;  Surgeon: Khang Sanchez MD;  Location: [facility] OR;  Service: Pain Management;  Laterality: N/A;    EPIDURAL STEROID INJECTION INTO LUMBAR SPINE N/A 03/10/2023    Procedure: Injection-steroid-epidural-lumbar L2/3;  Surgeon: Khang Sanchez MD;  Location: [facility] OR;  Service: Pain Management;  Laterality: N/A;    ESOPHAGOGASTRODUODENOSCOPY N/A 05/13/2020    Procedure: EGD (ESOPHAGOGASTRODUODENOSCOPY);  Surgeon: Davy Novak Jr., MD;  Location: [facility];  Service: Endoscopy;  Laterality: N/A;    ESOPHAGOGASTRODUODENOSCOPY N/A 8/23/2024    Procedure: EGD (ESOPHAGOGASTRODUODENOSCOPY);  Surgeon: Davy Novak Jr., MD;  Location: [facility];  Service: Endoscopy;  Laterality: N/A;    EYE SURGERY      FOOT SURGERY      x 2    INJECTION OF ANESTHETIC AGENT AROUND GANGLION IMPAR Right 02/17/2023    Procedure: BLOCK, facet cyst aspiration right side L2/3;  Surgeon: Khang MARTINEZ  MD Daniel;  Location: [facility] OR;  Service: Pain Management;  Laterality: Right;    LAMINECTOMY USING MINIMALLY INVASIVE TECHNIQUE N/A 01/26/2022    Procedure: LAMINECTOMY, SPINE, MINIMALLY INVASIVE  RIGHT MIS APPROACH RESECTION L4-5 SYNOVIAL CYST AND BILATERAL LAMINECTOMY/MEDIAL FACETECTOMY;  Surgeon: Penny Lobato MD;  Location: [facility] OR;  Service: Neurosurgery;  Laterality: N/A;    LAMINECTOMY USING MINIMALLY INVASIVE TECHNIQUE N/A 04/26/2023    Procedure: LAMINECTOMY, SPINE, MINIMALLY INVASIVE    RIGHT L2-L3 MIS HEMILAMINECTOMY/MEDIAL FACETECTOMY FOR REMOVAL SYNOVIAL CYST;  Surgeon: Penny Lobato MD;  Location: [facility] OR;  Service: Neurosurgery;  Laterality: N/A;    LUMBAR EPIDURAL INJECTION      PILONIDAL CYST DRAINAGE      s/p chalazion removal      OD    TONSILLECTOMY         Fam Hx:   malignancies: Yes - paternal uncle prostate cancer    kidney stones: No     Soc Hx:  , lives in [place]    Allergies:  Wellbutrin [bupropion hcl]    Medications: reviewed     Objective:   There were no vitals filed for this visit.    Physical Exam  Constitutional:       Appearance: Normal appearance.   Pulmonary:      Effort: Pulmonary effort is normal.   Abdominal:      General: There is no distension.      Palpations: Abdomen is soft.      Tenderness: There is no abdominal tenderness.   Musculoskeletal:         General: Normal range of motion.      Cervical back: Normal range of motion.   Skin:     General: Skin is warm.   Neurological:      Mental Status: He is oriented to person, place, and time.   Psychiatric:         Mood and Affect: Mood normal.         Behavior: Behavior normal.             Assessment:       1. Elevated PSA    2. Benign prostatic hyperplasia with weak urinary stream          Plan:     Prostate MRI ordered and scheduled for further evaluation  Continue flomax 0.8mg daily as prescribed    F/u per plan    [facility]: LANCE Robins

## 2025-02-21 LAB — O+P STL MICRO: NORMAL

## 2025-02-24 DIAGNOSIS — Z00.00 ENCOUNTER FOR MEDICARE ANNUAL WELLNESS EXAM: ICD-10-CM

## 2025-03-05 DIAGNOSIS — N40.1 BENIGN NON-NODULAR PROSTATIC HYPERPLASIA WITH LOWER URINARY TRACT SYMPTOMS: ICD-10-CM

## 2025-03-06 RX ORDER — TAMSULOSIN HYDROCHLORIDE 0.4 MG/1
0.8 CAPSULE ORAL DAILY
Qty: 180 CAPSULE | Refills: 0 | Status: SHIPPED | OUTPATIENT
Start: 2025-03-06

## 2025-03-12 ENCOUNTER — TELEPHONE (OUTPATIENT)
Dept: UROLOGY | Facility: CLINIC | Age: 75
End: 2025-03-12
Payer: MEDICARE

## 2025-03-12 ENCOUNTER — HOSPITAL ENCOUNTER (OUTPATIENT)
Dept: RADIOLOGY | Facility: HOSPITAL | Age: 75
Discharge: HOME | End: 2025-03-12
Payer: MEDICARE

## 2025-03-12 ENCOUNTER — RESULTS FOLLOW-UP (OUTPATIENT)
Dept: UROLOGY | Facility: CLINIC | Age: 75
End: 2025-03-12

## 2025-03-12 DIAGNOSIS — R97.20 ELEVATED PSA: ICD-10-CM

## 2025-03-12 PROCEDURE — 72197 MRI PELVIS W/O & W/DYE: CPT | Mod: TC,PO

## 2025-03-12 PROCEDURE — A9585 GADOBUTROL INJECTION: HCPCS | Mod: PO

## 2025-03-12 PROCEDURE — 25500020 PHARM REV CODE 255: Mod: PO

## 2025-03-12 PROCEDURE — 72197 MRI PELVIS W/O & W/DYE: CPT | Mod: 26,,, | Performed by: STUDENT IN AN ORGANIZED HEALTH CARE EDUCATION/TRAINING PROGRAM

## 2025-03-12 RX ORDER — GADOBUTROL 604.72 MG/ML
7 INJECTION INTRAVENOUS
Status: COMPLETED | OUTPATIENT
Start: 2025-03-12 | End: 2025-03-12

## 2025-03-12 RX ADMIN — GADOBUTROL 7 ML: 604.72 INJECTION INTRAVENOUS at 01:03

## 2025-03-12 NOTE — TELEPHONE ENCOUNTER
----- Message from Anabela Davila NP sent at 3/12/2025  3:47 PM CDT -----  Please call pt and let him know there were no concerning lesions seen on his prostate MRI, great news. He does have an enlarged prostate which is likely the cause. Recommend yearly PSA through PCP  ----- Message -----  From: Interface, Rad Results In  Sent: 3/12/2025   3:13 PM CDT  To: Anabela Davila NP

## 2025-04-06 DIAGNOSIS — R10.13 EPIGASTRIC PAIN: ICD-10-CM

## 2025-04-06 DIAGNOSIS — R12 HEARTBURN: ICD-10-CM

## 2025-04-07 ENCOUNTER — LAB VISIT (OUTPATIENT)
Dept: LAB | Facility: HOSPITAL | Age: 75
End: 2025-04-07
Attending: FAMILY MEDICINE
Payer: MEDICARE

## 2025-04-07 DIAGNOSIS — E78.2 MIXED HYPERLIPIDEMIA: Chronic | ICD-10-CM

## 2025-04-07 DIAGNOSIS — Z12.5 SPECIAL SCREENING EXAMINATION FOR NEOPLASM OF PROSTATE: ICD-10-CM

## 2025-04-07 DIAGNOSIS — I10 PRIMARY HYPERTENSION: ICD-10-CM

## 2025-04-07 LAB
ABSOLUTE EOSINOPHIL (OHS): 0.17 K/UL
ABSOLUTE MONOCYTE (OHS): 0.62 K/UL (ref 0.3–1)
ABSOLUTE NEUTROPHIL COUNT (OHS): 5.5 K/UL (ref 1.8–7.7)
ALBUMIN SERPL BCP-MCNC: 3.7 G/DL (ref 3.5–5.2)
ALP SERPL-CCNC: 76 UNIT/L (ref 40–150)
ALT SERPL W/O P-5'-P-CCNC: 6 UNIT/L (ref 10–44)
ANION GAP (OHS): 7 MMOL/L (ref 8–16)
AST SERPL-CCNC: 15 UNIT/L (ref 11–45)
BASOPHILS # BLD AUTO: 0.05 K/UL
BASOPHILS NFR BLD AUTO: 0.7 %
BILIRUB SERPL-MCNC: 1 MG/DL (ref 0.1–1)
BUN SERPL-MCNC: 14 MG/DL (ref 8–23)
CALCIUM SERPL-MCNC: 9.4 MG/DL (ref 8.7–10.5)
CHLORIDE SERPL-SCNC: 104 MMOL/L (ref 95–110)
CHOLEST SERPL-MCNC: 143 MG/DL (ref 120–199)
CHOLEST/HDLC SERPL: 2.4 {RATIO} (ref 2–5)
CO2 SERPL-SCNC: 30 MMOL/L (ref 23–29)
CREAT SERPL-MCNC: 0.9 MG/DL (ref 0.5–1.4)
ERYTHROCYTE [DISTWIDTH] IN BLOOD BY AUTOMATED COUNT: 14.7 % (ref 11.5–14.5)
GFR SERPLBLD CREATININE-BSD FMLA CKD-EPI: >60 ML/MIN/1.73/M2
GLUCOSE SERPL-MCNC: 101 MG/DL (ref 70–110)
HCT VFR BLD AUTO: 46.2 % (ref 40–54)
HDLC SERPL-MCNC: 60 MG/DL (ref 40–75)
HDLC SERPL: 42 % (ref 20–50)
HGB BLD-MCNC: 14.7 GM/DL (ref 14–18)
IMM GRANULOCYTES # BLD AUTO: 0.02 K/UL (ref 0–0.04)
IMM GRANULOCYTES NFR BLD AUTO: 0.3 % (ref 0–0.5)
LDLC SERPL CALC-MCNC: 66.4 MG/DL (ref 63–159)
LYMPHOCYTES # BLD AUTO: 1.2 K/UL (ref 1–4.8)
MCH RBC QN AUTO: 28.8 PG (ref 27–31)
MCHC RBC AUTO-ENTMCNC: 31.8 G/DL (ref 32–36)
MCV RBC AUTO: 91 FL (ref 82–98)
NONHDLC SERPL-MCNC: 83 MG/DL
NUCLEATED RBC (/100WBC) (OHS): 0 /100 WBC
PLATELET # BLD AUTO: 200 K/UL (ref 150–450)
PMV BLD AUTO: 12.3 FL (ref 9.2–12.9)
POTASSIUM SERPL-SCNC: 4.1 MMOL/L (ref 3.5–5.1)
PROT SERPL-MCNC: 6.7 GM/DL (ref 6–8.4)
PSA SERPL-MCNC: 9.56 NG/ML
RBC # BLD AUTO: 5.1 M/UL (ref 4.6–6.2)
RELATIVE EOSINOPHIL (OHS): 2.2 %
RELATIVE LYMPHOCYTE (OHS): 15.9 % (ref 18–48)
RELATIVE MONOCYTE (OHS): 8.2 % (ref 4–15)
RELATIVE NEUTROPHIL (OHS): 72.7 % (ref 38–73)
SODIUM SERPL-SCNC: 141 MMOL/L (ref 136–145)
TRIGL SERPL-MCNC: 83 MG/DL (ref 30–150)
WBC # BLD AUTO: 7.56 K/UL (ref 3.9–12.7)

## 2025-04-07 PROCEDURE — 36415 COLL VENOUS BLD VENIPUNCTURE: CPT | Mod: PN

## 2025-04-07 PROCEDURE — 84153 ASSAY OF PSA TOTAL: CPT

## 2025-04-07 PROCEDURE — 80061 LIPID PANEL: CPT

## 2025-04-07 PROCEDURE — 85025 COMPLETE CBC W/AUTO DIFF WBC: CPT

## 2025-04-07 PROCEDURE — 80053 COMPREHEN METABOLIC PANEL: CPT

## 2025-04-08 ENCOUNTER — RESULTS FOLLOW-UP (OUTPATIENT)
Dept: PRIMARY CARE CLINIC | Facility: CLINIC | Age: 75
End: 2025-04-08

## 2025-04-08 RX ORDER — OMEPRAZOLE 40 MG/1
40 CAPSULE, DELAYED RELEASE ORAL EVERY MORNING
Qty: 90 CAPSULE | Refills: 1 | Status: SHIPPED | OUTPATIENT
Start: 2025-04-08

## 2025-04-10 ENCOUNTER — TELEPHONE (OUTPATIENT)
Dept: NEUROLOGY | Facility: CLINIC | Age: 75
End: 2025-04-10
Payer: MEDICARE

## 2025-04-10 ENCOUNTER — OFFICE VISIT (OUTPATIENT)
Dept: PRIMARY CARE CLINIC | Facility: CLINIC | Age: 75
End: 2025-04-10
Payer: MEDICARE

## 2025-04-10 VITALS
BODY MASS INDEX: 24 KG/M2 | DIASTOLIC BLOOD PRESSURE: 64 MMHG | WEIGHT: 158.38 LBS | HEART RATE: 83 BPM | SYSTOLIC BLOOD PRESSURE: 116 MMHG | OXYGEN SATURATION: 99 % | HEIGHT: 68 IN

## 2025-04-10 DIAGNOSIS — R41.3 MEMORY LOSS: ICD-10-CM

## 2025-04-10 DIAGNOSIS — Z12.5 SPECIAL SCREENING, PROSTATE CANCER: ICD-10-CM

## 2025-04-10 DIAGNOSIS — R97.20 ELEVATED PROSTATE SPECIFIC ANTIGEN (PSA): ICD-10-CM

## 2025-04-10 DIAGNOSIS — E78.2 MIXED HYPERLIPIDEMIA: Chronic | ICD-10-CM

## 2025-04-10 DIAGNOSIS — I10 PRIMARY HYPERTENSION: Primary | Chronic | ICD-10-CM

## 2025-04-10 DIAGNOSIS — R41.3 MEMORY CHANGE: ICD-10-CM

## 2025-04-10 DIAGNOSIS — N41.9 PROSTATITIS, UNSPECIFIED PROSTATITIS TYPE: ICD-10-CM

## 2025-04-10 PROCEDURE — 99999 PR PBB SHADOW E&M-EST. PATIENT-LVL IV: CPT | Mod: PBBFAC,,, | Performed by: FAMILY MEDICINE

## 2025-04-10 PROCEDURE — 99214 OFFICE O/P EST MOD 30 MIN: CPT | Mod: PBBFAC,PN | Performed by: FAMILY MEDICINE

## 2025-04-10 PROCEDURE — 99214 OFFICE O/P EST MOD 30 MIN: CPT | Mod: S$PBB,,, | Performed by: FAMILY MEDICINE

## 2025-04-10 RX ORDER — DOXYCYCLINE 100 MG/1
100 CAPSULE ORAL 2 TIMES DAILY
Qty: 60 CAPSULE | Refills: 0 | Status: SHIPPED | OUTPATIENT
Start: 2025-04-10 | End: 2025-05-10

## 2025-04-10 NOTE — PROGRESS NOTES
Primary Care Provider Appointment   [facility]       Patient ID: Vince Chadwick III is a 75 y.o. male.    ASSESSMENT/PLAN by Problem List:    Assessment & Plan    IMPRESSION:  - PSA elevated, it was not intentional that the PSA was checked again so soon as he just saw Urology and had an MRI of the prostate that did not reveal any concerning lesions.  Suspect this acute elevation may be a chronic prostatitis as he is also having some mild obstructive symptoms beyond his baseline.  Will treat with doxycycline and re-evaluate in a few months.  If it does not improve then would request Urology re-evaluate    ELEVATED PSA:  - Explained to the patient that PSA can be elevated due to infection or trauma, not just cancer.  - Noted that the patient's PSA increased from 2.9 a few years ago to 5.8 last month, and then to 9.5 in the most recent test.  - Reviewed the patient's consultation with urology and MRI results, which did not reveal any worrisome prostate lesion.  - Suspect a possible low-grade infection causing the PSA increase.  - Prescribed doxycycline for 4 weeks to treat possible prostate infection.    COGNITIVE DECLINE:  Patient is concerned about memory loss.  Inquired about testing, imaging, etc..  He does have some anxiety and a history of attention deficit disorder which also could affect his condition.  I do recommend neurology consult for further evaluation    HYPERLIPIDEMIA:  - Continued atorvastatin 10 mg daily.  - Noted that the patient's lipid levels are stable and satisfactory with most recent LDL at 66.  - Evaluated cholesterol levels as good, with both good and bad cholesterol in satisfactory range.    DEPRESSION:  - Continued Lexapro.  - Evaluated that the patient is doing okay on Lexapro.    e        CODING, ORDERS, AND ADDITIONAL DOCUMENTATION RELATED TO THIS ENCOUNTER:  (note that the diagnoses and clinical summaries above were generated with the assistance  of [employer] software and represents my assessment and plan.  This software does not always generate the precise nor most specific diagnosis codes.  Therefore the specific diagnosis codes and orders for billing purposes are detailed below along with any additional documentation if needed)      1. Primary hypertension    2. Mixed hyperlipidemia    3. Prostatitis, unspecified prostatitis type  -     doxycycline (MONODOX) 100 MG capsule; Take 1 capsule (100 mg total) by mouth 2 (two) times daily.  Dispense: 60 capsule; Refill: 0    4. Special screening, prostate cancer  -     Prostate Specific Antigen, Diagnostic; Future; Expected date: 04/10/2025    5. Elevated prostate specific antigen (PSA)  -     Prostate Specific Antigen, Diagnostic; Future; Expected date: 04/10/2025    6. Memory loss    7. Memory change  -     Ambulatory referral/consult to Neurology; Future; Expected date: 04/17/2025           Follow Up:  Three-month      Subjective:       Follow-up  Associated symptoms include arthralgias. Pertinent negatives include no chest pain, headaches, joint swelling, neck pain, vomiting or weakness.       Patient is a/an 75 y.o.  male     For complete problem list, past medical history, surgical history, social history, etc., see appropriate section in the electronic medical record    History of Present Illness    CHIEF COMPLAINT:  Mr. Chadwick presents today for follow up of recent labs.    UROLOGIC SYMPTOMS:  His PSA has increased from 2.9 to 5.8 last month, with further elevation to 9.5 one month later. He reports urinary symptoms for the past week including difficulty initiating urination, hesitancy, and mild pain with urination. He continues Flomax twice daily.    NEUROLOGICAL:  He reports worsening memory over the last couple of months, experiencing forgetting conversations mid-discussion and difficulty remembering names. He denies any family history of Alzheimer's or dementia.    GASTROINTESTINAL:  He  reports two recent hospitalizations for recurrent diverticulitis.    LABS:  Cholesterol levels remain stable with LDL at 66. CBC is stable. Chemistry panel shows good kidney and liver function. Albumin levels have recovered to normal range.    CURRENT MEDICATIONS:  He takes atorvastatin 10 mg, omeprazole, Flomax twice daily, and Lexapro. He uses pill organizers to manage medication schedule.      ROS:  Gastrointestinal: -diarrhea  Neurological: +memory loss, +forgetfulness, +memory problems  Male Genitourinary: +difficulty urinating, +painful urination       Review of Systems   Constitutional:  Negative for activity change and unexpected weight change.   HENT:  Negative for hearing loss, rhinorrhea and trouble swallowing.    Eyes:  Negative for discharge and visual disturbance.   Respiratory:  Negative for chest tightness and wheezing.    Cardiovascular:  Negative for chest pain and palpitations.   Gastrointestinal:  Negative for blood in stool, constipation, diarrhea and vomiting.   Endocrine: Negative for polydipsia and polyuria.   Genitourinary:  Negative for difficulty urinating, hematuria and urgency.   Musculoskeletal:  Positive for arthralgias. Negative for joint swelling and neck pain.   Neurological:  Negative for weakness and headaches.   Psychiatric/Behavioral:  Negative for confusion and dysphoric mood.        Objective     Physical Exam  Constitutional:       General: He is not in acute distress.     Appearance: He is well-developed. He is not ill-appearing.   HENT:      Head: Normocephalic and atraumatic.   Eyes:      General: No scleral icterus.     Conjunctiva/sclera: Conjunctivae normal.   Pulmonary:      Effort: Pulmonary effort is normal. No respiratory distress.   Neurological:      General: No focal deficit present.      Mental Status: He is alert and oriented to person, place, and time.   Psychiatric:         Speech: Speech is tangential.         Behavior: Behavior normal.       Vitals:     "04/10/25 1033   BP: 116/64   Patient Position: Sitting   Pulse: 83   SpO2: 99%   Weight: 71.9 kg (158 lb 6.4 oz)   Height: 5' 7.5" (1.715 m)     Physical Exam                This note was generated with the assistance of ambient listening technology. Verbal consent was obtained by the patient and accompanying visitor(s) for the recording of patient appointment to facilitate this note. I attest to having reviewed and edited the generated note for accuracy, though some syntax or spelling errors may persist. Please contact the author of this note for any clarification.  Parts of the note were also generated with voice recognition software.  Occasionally this software will misinterpreted words or phrases    "

## 2025-04-11 ENCOUNTER — LAB VISIT (OUTPATIENT)
Dept: LAB | Facility: HOSPITAL | Age: 75
End: 2025-04-11
Attending: FAMILY MEDICINE
Payer: MEDICARE

## 2025-04-11 ENCOUNTER — TELEPHONE (OUTPATIENT)
Dept: NEUROLOGY | Facility: CLINIC | Age: 75
End: 2025-04-11
Payer: MEDICARE

## 2025-04-11 DIAGNOSIS — R97.20 ELEVATED PROSTATE SPECIFIC ANTIGEN (PSA): ICD-10-CM

## 2025-04-11 DIAGNOSIS — Z12.5 SPECIAL SCREENING, PROSTATE CANCER: ICD-10-CM

## 2025-04-11 LAB — PSA SERPL-MCNC: 12.71 NG/ML

## 2025-04-11 PROCEDURE — 36415 COLL VENOUS BLD VENIPUNCTURE: CPT | Mod: PN

## 2025-04-11 PROCEDURE — 84153 ASSAY OF PSA TOTAL: CPT

## 2025-04-11 NOTE — TELEPHONE ENCOUNTER
Spoke to the pt, appt scheduled on 8/4/25 at 1400 with Radha Goddard for memory loss.  Date, time and location discussed.

## 2025-04-14 ENCOUNTER — TELEPHONE (OUTPATIENT)
Dept: PRIMARY CARE CLINIC | Facility: CLINIC | Age: 75
End: 2025-04-14

## 2025-04-14 ENCOUNTER — PATIENT MESSAGE (OUTPATIENT)
Dept: PRIMARY CARE CLINIC | Facility: CLINIC | Age: 75
End: 2025-04-14
Payer: MEDICARE

## 2025-04-14 ENCOUNTER — RESULTS FOLLOW-UP (OUTPATIENT)
Dept: PRIMARY CARE CLINIC | Facility: CLINIC | Age: 75
End: 2025-04-14

## 2025-04-19 DIAGNOSIS — K58.0 IRRITABLE BOWEL SYNDROME WITH DIARRHEA: ICD-10-CM

## 2025-04-22 RX ORDER — DICYCLOMINE HYDROCHLORIDE 10 MG/1
CAPSULE ORAL
Qty: 120 CAPSULE | Refills: 1 | Status: SHIPPED | OUTPATIENT
Start: 2025-04-22

## 2025-05-02 RX ORDER — ESCITALOPRAM OXALATE 20 MG/1
20 TABLET ORAL
Qty: 90 TABLET | Refills: 0 | Status: SHIPPED | OUTPATIENT
Start: 2025-05-02

## 2025-05-20 DIAGNOSIS — K58.0 IRRITABLE BOWEL SYNDROME WITH DIARRHEA: ICD-10-CM

## 2025-05-20 RX ORDER — DICYCLOMINE HYDROCHLORIDE 10 MG/1
CAPSULE ORAL
Qty: 360 CAPSULE | Refills: 1 | Status: SHIPPED | OUTPATIENT
Start: 2025-05-20

## 2025-05-23 NOTE — TELEPHONE ENCOUNTER
Please see pt's MyChart message/concern and advise.      Spoke with pt, she noted that qty of 30 tablets was sent in. That is a 15 day supply. Writers note was wrong below. She would like a 30 day supply and changed to 1 gm tablet instead of taking 2 tablets on 0.5 mg. (Was okay per pcp).     Routing to covering provider as pcp is not in.

## 2025-05-29 DIAGNOSIS — N40.1 BENIGN NON-NODULAR PROSTATIC HYPERPLASIA WITH LOWER URINARY TRACT SYMPTOMS: ICD-10-CM

## 2025-05-29 RX ORDER — TAMSULOSIN HYDROCHLORIDE 0.4 MG/1
0.8 CAPSULE ORAL DAILY
Qty: 180 CAPSULE | Refills: 1 | Status: SHIPPED | OUTPATIENT
Start: 2025-05-29

## 2025-06-12 ENCOUNTER — TELEPHONE (OUTPATIENT)
Dept: NEUROLOGY | Facility: CLINIC | Age: 75
End: 2025-06-12
Payer: MEDICARE

## 2025-06-12 NOTE — TELEPHONE ENCOUNTER
Left message in regards to rescheduling the appt with Radha on 8/4/25.  Radha will be out of the office.

## 2025-06-16 ENCOUNTER — TELEPHONE (OUTPATIENT)
Dept: NEUROLOGY | Facility: CLINIC | Age: 75
End: 2025-06-16
Payer: MEDICARE

## 2025-06-18 ENCOUNTER — TELEPHONE (OUTPATIENT)
Dept: GASTROENTEROLOGY | Facility: CLINIC | Age: 75
End: 2025-06-18
Payer: MEDICARE

## 2025-06-19 ENCOUNTER — TELEPHONE (OUTPATIENT)
Dept: PRIMARY CARE CLINIC | Facility: CLINIC | Age: 75
End: 2025-06-19
Payer: MEDICARE

## 2025-06-19 NOTE — TELEPHONE ENCOUNTER
Treatment is typically either Augmentin which is amoxicillin plus clavulanic acid    Or    Cipro plus Flagyl.

## 2025-06-19 NOTE — TELEPHONE ENCOUNTER
Patient states he went to [facility] ER in [place] yesterday for diverticulitis. He was prescribed Flagyl, Cipro, and Amoxicillin. He wants to know if he is to take all 3 medications together. He said he is used to only taking 2 abx for his diverticulitis. He wants Dr. Burleson's opinion on this. He has also messaged Dr. Novak but has not heard back from his office.

## 2025-06-25 ENCOUNTER — OFFICE VISIT (OUTPATIENT)
Dept: PRIMARY CARE CLINIC | Facility: CLINIC | Age: 75
End: 2025-06-25
Payer: MEDICARE

## 2025-06-25 VITALS
DIASTOLIC BLOOD PRESSURE: 66 MMHG | HEART RATE: 83 BPM | OXYGEN SATURATION: 97 % | HEIGHT: 67 IN | WEIGHT: 159.06 LBS | SYSTOLIC BLOOD PRESSURE: 116 MMHG | BODY MASS INDEX: 24.97 KG/M2

## 2025-06-25 DIAGNOSIS — Z09 HOSPITAL DISCHARGE FOLLOW-UP: Primary | ICD-10-CM

## 2025-06-25 DIAGNOSIS — K57.92 DIVERTICULITIS: ICD-10-CM

## 2025-06-25 PROCEDURE — 99213 OFFICE O/P EST LOW 20 MIN: CPT | Mod: S$PBB,,, | Performed by: PHYSICIAN ASSISTANT

## 2025-06-25 PROCEDURE — 99999 PR PBB SHADOW E&M-EST. PATIENT-LVL III: CPT | Mod: PBBFAC,,, | Performed by: PHYSICIAN ASSISTANT

## 2025-06-25 PROCEDURE — 99213 OFFICE O/P EST LOW 20 MIN: CPT | Mod: PBBFAC,PN | Performed by: PHYSICIAN ASSISTANT

## 2025-06-25 NOTE — PROGRESS NOTES
"Subjective:      Patient ID: Vince Chadwick III is a 75 y.o. male.    Vitals:  height is 5' 7" (1.702 m) and weight is 72.1 kg (159 lb 1 oz). His blood pressure is 116/66 and his pulse is 83. His oxygen saturation is 97%.     Chief Complaint: Hospital Follow Up    75 year old male presents for follow up of ED visit for diverticulitis. Patient went to ED one week ago for symptom of LLQ pain consistent with other episodes. He took 1 week of Cipro and Flagyl. His symptoms have resolved but he did have 1 episode of LLQ pain after eating high fiber foods. Yesterday was first bowel movement. Had another this morning. No blood in stool. No fevers since ED visit.         Constitution: Negative for chills and fever.   Gastrointestinal:  Positive for abdominal pain. Negative for nausea and vomiting.      Objective:     Physical Exam   Constitutional: He does not appear ill. No distress.   HENT:   Head: Normocephalic and atraumatic.   Ears:   Right Ear: External ear normal.   Left Ear: External ear normal.   Eyes: Conjunctivae are normal. Right eye exhibits no discharge. Left eye exhibits no discharge. Extraocular movement intact   Cardiovascular: Normal rate.   Pulmonary/Chest: Effort normal. No respiratory distress.   Abdominal: Normal appearance.   Neurological: He is alert.   Skin: Skin is warm, dry and not pale. no jaundice  Psychiatric: His behavior is normal. Mood, judgment and thought content normal.   Nursing note and vitals reviewed.      Assessment:     1. Hospital discharge follow-up    2. Diverticulitis        Plan:       Hospital discharge follow-up    Diverticulitis    Discussed low residue diet when colon is inflamed. Would do this for the next week. Patient did not have CT scan, but has had other episodes of diverticulitis documented. Discussed to call clinic if any change over the next 2 days or return of vague pain. Also discussed when begins starting to feel episode come on in the future to adopt a low " residue diet immediately and call the clinic.    I spent a total of 25 minutes on the day of the visit.  This includes face to face time and non-face to face time preparing to see the patient (eg, review of tests), obtaining and/or reviewing separately obtained history, documenting clinical information in the electronic or other health record, independently interpreting results and communicating results to the patient/family/caregiver, or care coordinator.

## 2025-06-26 ENCOUNTER — OUTPATIENT CASE MANAGEMENT (OUTPATIENT)
Dept: ADMINISTRATIVE | Facility: OTHER | Age: 75
End: 2025-06-26
Payer: MEDICARE

## 2025-06-26 NOTE — PROGRESS NOTES
Outpatient Care Management  Initial Patient Assessment    Patient: Vince Chadwick III  MRN: [id number]  Date of Service: 06/26/2025  Completed by: Gracia Brown RN  Referral Date: 06/18/2025  Date of Eligibility: 6/19/2025  Program:   High Risk  Status: Ongoing  Effective Dates: 6/26/2025 - present  Responsible Staff: Gracia Brown RN        Reason for Visit   Patient presents with    OPCM Enrollment Call       Brief Summary:  Vince Chadwick III was referred by Dr. Dedrick Irving for lumbar radicular pain. Patient qualifies for program based on high risk score of 76.1.   Active problem list, medical, surgical and social history reviewed. Active comorbidities include gastritis, diverticulosis, insomnia, allergic rhinitis, GERD, MDD, arthritis, HTN, aortic calcification, cataracts, IBS, DDD in lumbar section, colon polyps, lumbar disc disease, diverticulitis, ADD/ADHD, and BPH. Areas of need identified by patient including exercise and understanding diet as it relates to diverticulosis.    Next steps:   Vince agreed to OPCM RN follow up on or around 7/3/25.  Follow up to see how Vince is doing with walking at least 5 minutes at a time, three times a week and if he was able to increase it any.  Vince agreed to OPCM RN follow up on or around 7/3/25.  Follow up on food diary/log and review with Vince/  Begin review of low residue diet and high fiber diet foods.    Disability Status  Is the patient alert and oriented (person, place, time, and situation)?: Alert and oriented x 4  Hearing Difficulty or Deaf: yes  Hearing Management: other (tinnitis)  Visual Difficulty or Blind: yes  Visual and Hearing Conclusion Statement: wears glasses  Difficulty Concentrating, Remembering or Making Decisions: yes  Concentration Management: ADHD/ADD and starting to have memory issues  Communication Difficulty: no  Eating/Swallowing Difficulty: no  Walking or Climbing Stairs Difficulty: yes  Walking or Climbing Stairs: ambulation  difficulty, requires equipment  Mobility Management: states lost weight and muscle mass  Dressing/Bathing Difficulty: no  Grooming: independent  Transferring (e.g., getting in and out of chairs): independent  Toileting : Independent  Continence : Continence - Not a problem  Difficulty Managing Errands Independently: no  Equipment Currently Used at Home: blood pressure machine; scale; walker, standard; other (see comments) (pulse ox)  ADL Conclusion Statement: Pt is independent in his ADLs  Change in Functional Status Since Onset of Current Illness/Injury: no        Spiritual Beliefs  Spiritual, Cultural Beliefs, Confucianism Practices, Values that Affect Care: no      Social History     Socioeconomic History    Marital status:    Occupational History    Occupation: retired [employer]   Tobacco Use    Smoking status: Never     Passive exposure: Never    Smokeless tobacco: Never   Substance and Sexual Activity    Alcohol use: No     Alcohol/week: 0.0 standard drinks of alcohol    Drug use: No    Sexual activity: Yes     Social Drivers of Health     Financial Resource Strain: Low Risk  (10/6/2024)    Overall Financial Resource Strain (CARDIA)     Difficulty of Paying Living Expenses: Not hard at all   Food Insecurity: No Food Insecurity (2/1/2025)    Received from [facility]    Hunger Vital Sign     Worried About Running Out of Food in the Last Year: Never true     Ran Out of Food in the Last Year: Never true   Transportation Needs: No Transportation Needs (2/1/2025)    Received from [facility]    PRAPARE - Transportation     Lack of Transportation (Medical): No     Lack of Transportation (Non-Medical): No   Physical Activity: Insufficiently Active (10/6/2024)    Exercise Vital Sign     Days of Exercise per Week: 2 days     Minutes of Exercise per Session: 30 min   Stress: Stress Concern Present (10/6/2024)    [employer] - Occupational Stress Questionnaire     Feeling of Stress :  Rather much   Housing Stability: Low Risk  (2/1/2025)    [facility]    Housing Stability Vital Sign     Unable to Pay for Housing in the Last Year: No     Number of Times Moved in the Last Year: 0     Homeless in the Last Year: No       Roles and Relationships  Primary Source of Support/Comfort: spouse  Name of Support/Comfort Primary Source: Irlanda      Advance Directives (For Healthcare)  Advance Directive  (If Adv Dir status is received, view document under Adv Dir in header or Chart Review Media tab): Patient has advance directive, copy not received.        Patient Reported Insurance  Verified current insurance plan:: Medicare; Blue Cross            6/26/2025     3:55 PM 1/7/2025    10:20 AM 5/2/2024    10:11 AM 2/12/2024    11:22 AM 2/12/2024    11:13 AM 8/4/2023     8:10 AM 7/28/2023     9:35 AM   Depression Patient Health Questionnaire   Over the last two weeks how often have you been bothered by little interest or pleasure in doing things Several days Not at all Nearly every day Not at all Not at all More than half the days Nearly every day   Over the last two weeks how often have you been bothered by feeling down, depressed or hopeless Several days Not at all Not at all Several days Not at all Several days Several days   PHQ-2 Total Score 2 0 3 1 0 3 4   Over the last two weeks how often have you been bothered by trouble falling or staying asleep, or sleeping too much   Not at all   More than half the days Not at all   Over the last two weeks how often have you been bothered by feeling tired or having little energy   More than half the days   More than half the days Several days   Over the last two weeks how often have you been bothered by a poor appetite or overeating   Not at all   Several days Not at all   Over the last two weeks how often have you been bothered by feeling bad about yourself - or that you are a failure or have let yourself or your family down   Nearly every day   Several  days Not at all   Over the last two weeks how often have you been bothered by trouble concentrating on things, such as reading the newspaper or watching television   Not at all   Several days Not at all   Over the last two weeks how often have you been bothered by moving or speaking so slowly that other people could have noticed. Or the opposite - being so fidgety or restless that you have been moving around a lot more than usual.   Not at all   Several days Not at all   Over the last two weeks how often have you been bothered by thoughts that you would be better off dead, or of hurting yourself   Not at all   Several days Not at all   If you checked off any problems, how difficult have these problems made it for you to do your work, take care of things at home or get along with other people?   Somewhat difficult   Very difficult    PHQ-9 Score   8   12 5   PHQ-9 Interpretation   Mild   Moderate Mild       Learning Assessment       06/26/2025 1610 [facility] (6/26/2025 - Present)   Created by Gracia Brown, RN - RN (Nurse) Status: Complete                 PRIMARY LEARNER     Primary Learner Name:  Vince Chadwick III SL - 06/26/2025 1610    Relationship:  Patient SL - 06/26/2025 1610    Does the primary learner have any barriers to learning?:  No Barriers SL - 06/26/2025 1610    What is the preferred language of the primary learner?:  English SL - 06/26/2025 1610    Is an  required?:  No SL - 06/26/2025 1610    How does the primary learner prefer to learn new concepts?:  Listening SL - 06/26/2025 1610    How often do you need to have someone help you read instructions, pamphlets, or written material from your doctor or pharmacy?:  Never SL - 06/26/2025 1610        CO-LEARNER #1     No question answered        CO-LEARNER #2     No question answered        SPECIAL TOPICS     No question answered        ANSWERED BY:     No question answered        Comments         Edit History       Kevin  Gracia, RN - RN (Nurse)   06/26/2025 [id number]

## 2025-06-26 NOTE — LETTER
Vince Chadwick  [address]  [place] [address]      Dear Vince,    Welcome to [facility]s Complex Care Management Program.  It was a pleasure talking with you today.  My name is Gracia Schroeder, and I look forward to being your Care Manager.  My goal is to help you function at the healthiest and highest level possible.  You can contact me directly at [phone].    As an [facility] patient, some of the services we may be able to provide include:     Development of an individualized care plan with a Registered Nurse   Connection with a   Connection with available resources and services    Coordinate communication among your care team members   Provide coaching and education   Help you understand your doctors treatment plan  Help you obtain information about your insurance coverage.     All services provided by [facility]s Complex Care Managers and other care team members are coordinated with and communicated to your primary care team.      As part of your enrollment, you will be receiving education materials and more information about these services in your [facility] account, by phone or through the mail.  If you do not wish to participate or receive information, please contact our office at [phone].      [facility] Patient Rights and Responsibilities available upon request.    Sincerely,        Gracia Schroeder, RN  [facility]   Outpatient RN Complex Care Manager

## 2025-07-01 ENCOUNTER — PATIENT MESSAGE (OUTPATIENT)
Dept: ADMINISTRATIVE | Facility: OTHER | Age: 75
End: 2025-07-01
Payer: MEDICARE

## 2025-07-01 ENCOUNTER — OUTPATIENT CASE MANAGEMENT (OUTPATIENT)
Dept: ADMINISTRATIVE | Facility: OTHER | Age: 75
End: 2025-07-01
Payer: MEDICARE

## 2025-07-01 NOTE — PROGRESS NOTES
7/11/2025  3rd and final attempt to complete Initial Assessment for OPCM, left message. Updated OPCM RN.    7/8/2025  2nd attempt to complete Initial Assessment for OPCM, left message. Collaborating with OPCM RN for successful patient contact.    7/1/2025  1st attempt to complete Initial Assessment for OPCM, left message. Attempt letter sent through portal. Collaborating with OPCM RN for successful patient contact.

## 2025-07-01 NOTE — LETTER
Vince Chadwick  [address] [address]      Dear Vince Chadwick,     I am writing from the Outpatient Care Management Department at [facility]. I received a referral from Gracia Schroeder to contact you regarding any needs you may have. I was unable to reach you by phone. Please contact me for assistance.     I can be reached at [phone] Monday through Friday from 8:00am to 4:30pm.      Additionally, [facility] also has a program with a nurse available 24/7 to answer questions or provide medical advice. [facility] can be reached at [phone].      Sincerely,        Mary Haile LCSW  Outpatient Care Management

## 2025-07-02 ENCOUNTER — OUTPATIENT CASE MANAGEMENT (OUTPATIENT)
Dept: ADMINISTRATIVE | Facility: OTHER | Age: 75
End: 2025-07-02
Payer: MEDICARE

## 2025-07-02 NOTE — PROGRESS NOTES
7/2/2025  1st attempt to complete Follow-Up  for Outpatient Care Management, left message.  Will send via [facility] -  unable to assess letter.

## 2025-07-02 NOTE — LETTER
Vince Chadwick  [address] [address]      Dear Vince Chadwick,     I am your nurse with [facility]s Outpatient Care Management Department. I was unsuccessful in reaching you today. At your earliest convenience, I would like to discuss your healthcare progress.      Please contact me at [phone] from 8:00AM to 4:30 PM on Monday thru Friday.     As you know, [facility] is a program offered to you through [facility] where a nurse is available 24/7 to answer questions or provide medical advice, their number is [phone].    Thanks,      Gracia Schroeder, RN  Outpatient Care Management

## 2025-07-05 DIAGNOSIS — I10 PRIMARY HYPERTENSION: Chronic | ICD-10-CM

## 2025-07-05 DIAGNOSIS — E78.2 MIXED HYPERLIPIDEMIA: Chronic | ICD-10-CM

## 2025-07-07 RX ORDER — BENAZEPRIL HYDROCHLORIDE 10 MG/1
10 TABLET ORAL
Qty: 90 TABLET | Refills: 1 | Status: SHIPPED | OUTPATIENT
Start: 2025-07-07

## 2025-07-07 RX ORDER — ATORVASTATIN CALCIUM 10 MG/1
10 TABLET, FILM COATED ORAL NIGHTLY
Qty: 90 TABLET | Refills: 1 | Status: SHIPPED | OUTPATIENT
Start: 2025-07-07

## 2025-07-09 ENCOUNTER — OUTPATIENT CASE MANAGEMENT (OUTPATIENT)
Dept: ADMINISTRATIVE | Facility: OTHER | Age: 75
End: 2025-07-09
Payer: MEDICARE

## 2025-07-09 NOTE — PROGRESS NOTES
Outpatient Care Management  Plan of Care Follow Up Visit    Patient: Vince Chadwick III  MRN: [id number]  Date of Service: 07/09/2025  Completed by: Gracia Schroeder RN  Referral Date: 06/18/2025    No chief complaint on file.      Brief Summary: OPCM RN followed up with Vince today for care plan review.    Next Steps:   Vince agreed to OPCM RN follow up on or around 7/16/25.  Follow up to see if Vince is doing [place] at least three times a week and how it is going.

## 2025-07-14 ENCOUNTER — OFFICE VISIT (OUTPATIENT)
Dept: PRIMARY CARE CLINIC | Facility: CLINIC | Age: 75
End: 2025-07-14
Payer: MEDICARE

## 2025-07-14 ENCOUNTER — PATIENT MESSAGE (OUTPATIENT)
Dept: PRIMARY CARE CLINIC | Facility: CLINIC | Age: 75
End: 2025-07-14

## 2025-07-14 VITALS
SYSTOLIC BLOOD PRESSURE: 124 MMHG | HEART RATE: 75 BPM | HEIGHT: 67 IN | DIASTOLIC BLOOD PRESSURE: 68 MMHG | OXYGEN SATURATION: 98 % | WEIGHT: 158.38 LBS | BODY MASS INDEX: 24.86 KG/M2 | SYSTOLIC BLOOD PRESSURE: 124 MMHG | WEIGHT: 158.38 LBS | HEIGHT: 67 IN | DIASTOLIC BLOOD PRESSURE: 68 MMHG | BODY MASS INDEX: 24.86 KG/M2 | HEART RATE: 75 BPM | OXYGEN SATURATION: 98 %

## 2025-07-14 DIAGNOSIS — F33.1 MODERATE EPISODE OF RECURRENT MAJOR DEPRESSIVE DISORDER: Chronic | ICD-10-CM

## 2025-07-14 DIAGNOSIS — I10 PRIMARY HYPERTENSION: Chronic | ICD-10-CM

## 2025-07-14 DIAGNOSIS — E78.2 MIXED HYPERLIPIDEMIA: Chronic | ICD-10-CM

## 2025-07-14 DIAGNOSIS — F33.1 MODERATE EPISODE OF RECURRENT MAJOR DEPRESSIVE DISORDER: Primary | Chronic | ICD-10-CM

## 2025-07-14 DIAGNOSIS — I25.10 CORONARY ARTERY CALCIFICATION: Chronic | ICD-10-CM

## 2025-07-14 DIAGNOSIS — I70.0 AORTIC CALCIFICATION: Chronic | ICD-10-CM

## 2025-07-14 DIAGNOSIS — Z00.00 ENCOUNTER FOR MEDICARE ANNUAL WELLNESS EXAM: ICD-10-CM

## 2025-07-14 DIAGNOSIS — F41.9 ANXIETY: Chronic | ICD-10-CM

## 2025-07-14 DIAGNOSIS — R97.20 ELEVATED PROSTATE SPECIFIC ANTIGEN (PSA): ICD-10-CM

## 2025-07-14 DIAGNOSIS — I10 PRIMARY HYPERTENSION: Primary | Chronic | ICD-10-CM

## 2025-07-14 PROCEDURE — 99213 OFFICE O/P EST LOW 20 MIN: CPT | Mod: PBBFAC,PN | Performed by: FAMILY MEDICINE

## 2025-07-14 PROCEDURE — G0439 PPPS, SUBSEQ VISIT: HCPCS | Mod: ,,, | Performed by: PHYSICIAN ASSISTANT

## 2025-07-14 PROCEDURE — 99999 PR PBB SHADOW E&M-EST. PATIENT-LVL V: CPT | Mod: PBBFAC,,, | Performed by: PHYSICIAN ASSISTANT

## 2025-07-14 PROCEDURE — 99215 OFFICE O/P EST HI 40 MIN: CPT | Mod: PBBFAC,27,PN | Performed by: PHYSICIAN ASSISTANT

## 2025-07-14 PROCEDURE — 99999 PR PBB SHADOW E&M-EST. PATIENT-LVL III: CPT | Mod: PBBFAC,,, | Performed by: FAMILY MEDICINE

## 2025-07-14 PROCEDURE — 99214 OFFICE O/P EST MOD 30 MIN: CPT | Mod: S$PBB,25,, | Performed by: FAMILY MEDICINE

## 2025-07-14 RX ORDER — PHENOL/SODIUM PHENOLATE
20 AEROSOL, SPRAY (ML) MUCOUS MEMBRANE DAILY
Qty: 90 EACH | Refills: 1 | Status: SHIPPED | OUTPATIENT
Start: 2025-07-14 | End: 2026-07-14

## 2025-07-14 NOTE — PROGRESS NOTES
"  Vince Chadwick presented for a follow-up Medicare AWV today. The following components were reviewed and updated:    Medical history  Family History  Social history  Allergies and Current Medications  Health Risk Assessment  Health Maintenance  Care Team    **See Completed Assessments for Annual Wellness visit with in the encounter summary    The following assessments were completed:  Depression Screening  Cognitive function Screening  Timed Get Up Test  Whisper Test        Opioid documentation:      Patient does not have a current opioid prescription.          Vitals:    07/14/25 1027   BP: 124/68   Patient Position: Sitting   Pulse: 75   SpO2: 98%   Weight: 71.9 kg (158 lb 6.4 oz)   Height: 5' 7" (1.702 m)     Body mass index is 24.81 kg/m².       Physical Exam  Vitals and nursing note reviewed.   Constitutional:       General: He is not in acute distress.     Appearance: Normal appearance. He is not ill-appearing.   HENT:      Head: Normocephalic and atraumatic.      Right Ear: External ear normal.      Left Ear: External ear normal.   Eyes:      General:         Right eye: No discharge.         Left eye: No discharge.      Extraocular Movements: Extraocular movements intact.      Conjunctiva/sclera: Conjunctivae normal.   Cardiovascular:      Rate and Rhythm: Normal rate.   Pulmonary:      Effort: Pulmonary effort is normal. No respiratory distress.   Skin:     General: Skin is warm and dry.      Coloration: Skin is not jaundiced or pale.   Neurological:      Mental Status: He is alert.   Psychiatric:         Mood and Affect: Mood normal.         Behavior: Behavior normal.         Thought Content: Thought content normal.         Judgment: Judgment normal.           Diagnoses and health risks identified today and associated recommendations/orders:  1. Encounter for Medicare annual wellness exam  AWV completed today  - Referral to Enhanced Annual Wellness Visit (eAWV) W+1    2. Moderate episode of recurrent major " depressive disorder  Patient doing well today  PHQ score 0 today  Taking Lexapro  Followed by PCP    3. Aortic calcification  As seen on multiple imaging studies  Asymptomatic  Takes Lipitor  Last lipid panel with LDL 66.4  Followed by PCP    4. Primary hypertension  Well controlled  Takes benazepril  Followed by PCP    Provided Vince with a 5-10 year written screening schedule and personal prevention plan. Recommendations were developed using the USPSTF age appropriate recommendations. Education, counseling, and referrals were provided as needed.  After Visit Summary printed and given to patient which includes a list of additional screenings\tests needed.    Follow up in about 1 year (around 7/14/2026).      HORACE Marshall  I offered to discuss advanced care planning, including how to pick a person who would make decisions for you if you were unable to make them for yourself, called a health care power of , and what kind of decisions you might make such as use of life sustaining treatments such as ventilators and tube feeding when faced with a life limiting illness recorded on a living will that they will need to know. (How you want to be cared for as you near the end of your natural life)     X Patient is interested in learning more about how to make advanced directives.  I provided them paperwork and offered to discuss this with them.

## 2025-07-14 NOTE — PROGRESS NOTES
Primary Care Provider Appointment   [facility] [place] [facility]       Patient ID: Vince Chadwick III is a 75 y.o. male.    ASSESSMENT/PLAN by Problem List:    Assessment & Plan    IMPRESSION:  - HTN stable and well-controlled on current regimen.  - HLD mild with associated aortic and coronary calcification; LDL at target (<70).  - Depression and anxiety symptoms improved on current medication.  - Elevated PSA:  Consulted with Urology.  MRI did not show any concerning lesions, it did show BPH.  Urology recommended resuming annual PSAs.  I entered an order anticipating this would be checked in one year however it was scheduled within a matter of weeks and was higher than the previous PSA.  While it was not intended to be checked so soon, it was higher but I think this is related to BPH.  With a recent unremarkable prostate MRI, no additional intervention would be indicated at this time  - GERD: Weak esophageal sphincter. Decreased omeprazole from 40 mg to 20 mg daily, retain 40 mg tablets in case of need to increase dose. Explained rationale for gradual reduction rather than abrupt discontinuation.  - IBS symptoms improved with current management approach.    HYPERTENSION:  - Blood pressure today was very good and stable at home.  - Hypertension is well controlled with current medication regimen.  - Continue Benazepril 10 mg daily.  - Encouraged continuation of healthy lifestyle measures for HTN management.    CORONARY ARTERY DISEASE:  - Monitored aortic and coronary calcification seen on imaging.  - Vince reports no angina pectoris.  - Continue atorvastatin 10 mg daily.    HYPERLIPIDEMIA:  - LDL level was last below 70, within target range at 66, which is acceptable.  - Continue atorvastatin 10 mg daily for HLD.    DEPRESSION AND ANXIETY:  - Vince reports feeling better with current management.  - Confirmed compliance with Lexapro 20 mg daily for depression and anxiety.  - Discussed the  potential for dose reduction in the future, but agreed to continue current dosage for now.    GASTROESOPHAGEAL REFLUX DISEASE:  - Vince has weak esophageal sphincter causing acid reflux.  - Discussed concerns about long-term omeprazole use and desire for dose reduction.  - Reduced omeprazole to 20 mg and will monitor symptoms.  - Clarified misconceptions about omeprazole and dementia risk.    IRRITABLE BOWEL SYNDROME:  Patient remains on Bentyl which helps but also has added a fiber supplement which seems to be helping.    ELEVATED PSA:  - Vince had elevated PSA, which led to urologist consultation and MRI in February.  - MRI showed no need for biopsy or worrisome findings.  - Enlarged prostate is likely the reason for elevated PSA.  - Will follow urology recommendation for annual PSA testing with no immediate need for repeat MRI or biopsy.  - Follow up after the first of the year or at next annual visit to consider repeating PSA test.    EXERCISE RECOMMENDATIONS:  - Discussed importance of balanced exercise routine with 75-80% cardio (e.g., walking, cycling, swimming) and 20-25% weight training for overall health and muscle maintenance.           ASSOCIATED ORDERS:  1. Primary hypertension  -     Comprehensive Metabolic Panel; Future; Expected date: 07/14/2025  -     CBC Auto Differential; Future; Expected date: 07/14/2025    2. Mixed hyperlipidemia  -     Lipid Panel; Future; Expected date: 07/14/2025    3. Aortic calcification    4. Coronary artery calcification    5. Moderate episode of recurrent major depressive disorder    6. Anxiety    7. Elevated prostate specific antigen (PSA)    Other orders  -     omeprazole 20 mg TbEC; Take 20 mg by mouth once daily.  Dispense: 90 each; Refill: 1           Follow Up:  3 months    Subjective:       Follow-up  Pertinent negatives include no arthralgias, chest pain, headaches, joint swelling, neck pain, vomiting or weakness.       Patient is a/an 75 y.o.  male     For  "complete problem list, past medical history, surgical history, social history, etc., see appropriate section in the electronic medical record    History of Present Illness    CHIEF COMPLAINT:  Vince presents today for follow up    HYPERTENSION:  He reports stable hypertension with good blood pressure control on Benazepril 10 mg daily. He maintains a healthy lifestyle to support blood pressure management. Home blood pressure readings have been consistent and within target range.    HYPERLIPIDEMIA:  He has mild hyperlipidemia with associated aortic and coronary calcification on imaging. He continues atorvastatin 10 mg daily with lifestyle modifications. LDL was 66, within target range of <70.    DEPRESSION AND ANXIETY:  He reports improvement in depression and anxiety symptoms. He describes a recent spiritual experience at the [facility] where he engaged in prayer, which he feels has positively impacted his mental health. He is "feeling better" and appears more optimistic about his current mental state.    PROSTATE CONCERNS:  He has a history of elevated PSA in February, followed by a normal prostate MRI that did not suggest cancer. He experiences occasional urinary symptoms characterized by difficulty initiating urination and post-void residual urine, which have improved over time. He denies burning with urination or significant urinary strain. His urologist recommended annual PSA screening, with next screening anticipated in April.    WEIGHT MANAGEMENT:  He reports unintentional weight loss from approximately 190 lbs to 158 lbs, which he characterizes as primarily muscle mass loss. He acknowledges healthcare provider's concern about the weight reduction and appears aware of the significant change in his body composition.    GERD:  He reports having a weak esophageal sphincter resulting in acid reflux. He takes Bentyl 1 dose before meals to manage GI symptoms. He has been consuming fiber and protein cookies to improve " bowel regularity and reports reduction in diarrhea. He takes a daily probiotic supplement and notes improvement in symptoms since starting protein cookies and probiotic, with stool becoming more solid. He denies current issues with diarrhea or significant GI distress.    LABS / TEST RESULTS:  Lipid panel showed LDL cholesterol at 66, within acceptable range. CBC was normal with no evidence of anemia. Chemistry panel showed normal kidney and liver function. Protein levels have improved from previous low albumin levels.      ROS:  Gastrointestinal: -diarrhea, +constipation  Male Genitourinary: +difficulty urinating       Review of Systems   Constitutional:  Negative for activity change and unexpected weight change.   HENT:  Negative for hearing loss, rhinorrhea and trouble swallowing.    Eyes:  Negative for discharge and visual disturbance.   Respiratory:  Negative for chest tightness and wheezing.    Cardiovascular:  Negative for chest pain and palpitations.   Gastrointestinal:  Negative for blood in stool, constipation, diarrhea and vomiting.   Endocrine: Negative for polydipsia and polyuria.   Genitourinary:  Positive for difficulty urinating and urgency. Negative for hematuria.   Musculoskeletal:  Negative for arthralgias, joint swelling and neck pain.   Neurological:  Negative for weakness and headaches.   Psychiatric/Behavioral:  Negative for confusion and dysphoric mood.        Objective     Physical Exam  Constitutional:       General: He is not in acute distress.     Appearance: He is well-developed. He is not ill-appearing.   HENT:      Head: Normocephalic and atraumatic.   Eyes:      General: No scleral icterus.     Conjunctiva/sclera: Conjunctivae normal.   Pulmonary:      Effort: Pulmonary effort is normal. No respiratory distress.   Neurological:      General: No focal deficit present.      Mental Status: He is alert and oriented to person, place, and time.   Psychiatric:         Behavior: Behavior  "normal.       Vitals:    07/14/25 1056   BP: 124/68   Patient Position: Sitting   Pulse: 75   SpO2: 98%   Weight: 71.9 kg (158 lb 6.4 oz)   Height: 5' 7" (1.702 m)     Physical Exam                    This note was generated with the assistance of ambient listening technology. Verbal consent was obtained by the patient and accompanying visitor(s) for the recording of patient appointment to facilitate this note. I attest to having reviewed and edited the generated note for accuracy, though some syntax or spelling errors may persist. Please contact the author of this note for any clarification.  Parts of the note were also generated with voice recognition software.  Occasionally this software will misinterpreted words or phrases    "

## 2025-07-14 NOTE — PATIENT INSTRUCTIONS
Counseling and Referral of Other Preventative  (Italic type indicates deductible and co-insurance are waived)    Patient Name: Vince Chadwick  Today's Date: 7/14/2025    Health Maintenance       Date Due Completion Date    Aspirin/Antiplatelet Therapy Never done ---    Shingles Vaccine (1 of 2) Never done ---    COVID-19 Vaccine (4 - 2024-25 season) 09/01/2024 12/7/2021    RSV Vaccine (Age 60+ and Pregnant patients) (1 - 1-dose 75+ series) Never done ---    Influenza Vaccine (1) 09/01/2025 10/7/2024    TETANUS VACCINE 09/22/2025 9/22/2015    Lipid Panel 04/07/2026 4/7/2025    High Dose Statin 07/14/2026 7/14/2025    Colorectal Cancer Screening 05/13/2027 5/13/2020    Override on 6/2/2011: Done (legay documents, repeat in 8 years)        No orders of the defined types were placed in this encounter.      The following information is provided to all patients.  This information is to help you find resources for any of the problems found today that may be affecting your health:                  Living healthy guide: www.Count includes the [facility].[place].gov      Understanding Diabetes: www.diabetes.org      Eating healthy: www.cdc.gov/healthyweight      CDC home safety checklist: www.cdc.gov/steadi/patient.html      Agency on Aging: www.goea.[place].[facility]      Alcoholics anonymous (AA): www.aa.org      Physical Activity: www.brittanie.nih.gov/nc2qvnv      Tobacco use: www.quitwithusla.org

## 2025-07-17 ENCOUNTER — OUTPATIENT CASE MANAGEMENT (OUTPATIENT)
Dept: ADMINISTRATIVE | Facility: OTHER | Age: 75
End: 2025-07-17
Payer: MEDICARE

## 2025-07-17 NOTE — LETTER
Vince Chadwick  [address] [address]      Dear Vince Chadwick,     I am your nurse with [facility]. I was unsuccessful in reaching you today. At your earliest convenience, I would like to discuss your healthcare progress.      Please contact me at [phone] from 8:00AM to 4:30 PM on Monday thru Friday.     As you know, [facility] is a program offered to you through [facility] where a nurse is available 24/7 to answer questions or provide medical advice, their number is [phone].    Thanks,      Gracia Schroeder, RN  Outpatient Care Management

## 2025-07-17 NOTE — PROGRESS NOTES
7/17/2025  1st attempt to complete Follow-Up  for Outpatient Care Management, left message.  Will send via [employer] -  unable to assess letter.

## 2025-07-21 DIAGNOSIS — R11.0 NAUSEA: ICD-10-CM

## 2025-07-22 ENCOUNTER — OUTPATIENT CASE MANAGEMENT (OUTPATIENT)
Dept: ADMINISTRATIVE | Facility: OTHER | Age: 75
End: 2025-07-22
Payer: MEDICARE

## 2025-07-22 RX ORDER — ONDANSETRON 4 MG/1
4 TABLET, ORALLY DISINTEGRATING ORAL 3 TIMES DAILY PRN
Qty: 45 TABLET | Refills: 1 | OUTPATIENT
Start: 2025-07-22

## 2025-07-22 NOTE — LETTER
Vince Chadwick  [address] [facility] [address] [facility] [address]      Dear Vince Chadwick,     I am your nurse with [facility]. I was unsuccessful in reaching you today. At your earliest convenience, I would like to discuss your healthcare progress.      Please contact me at [phone] from 8:00AM to 4:30 PM on Monday thru Friday.     As you know, [facility] is a program offered to you through [facility] where a nurse is available 24/7 to answer questions or provide medical advice, their number is [phone].    Thanks,      Gracia Schroeder, RN  Outpatient Care Management

## 2025-07-22 NOTE — PROGRESS NOTES
7/22/2025  2nd attempt to complete Follow-Up  for Outpatient Care Management, left message.  Will send via [employer] -  unable to assess letter.

## 2025-07-23 ENCOUNTER — TELEPHONE (OUTPATIENT)
Dept: NEUROLOGY | Facility: CLINIC | Age: 75
End: 2025-07-23
Payer: MEDICARE

## 2025-07-23 DIAGNOSIS — R12 HEARTBURN: ICD-10-CM

## 2025-07-23 DIAGNOSIS — R10.13 EPIGASTRIC PAIN: ICD-10-CM

## 2025-07-23 RX ORDER — FAMOTIDINE 20 MG/1
20 TABLET, FILM COATED ORAL 2 TIMES DAILY
Qty: 180 TABLET | Refills: 3 | Status: SHIPPED | OUTPATIENT
Start: 2025-07-23

## 2025-07-29 ENCOUNTER — OUTPATIENT CASE MANAGEMENT (OUTPATIENT)
Dept: ADMINISTRATIVE | Facility: OTHER | Age: 75
End: 2025-07-29
Payer: MEDICARE

## 2025-08-04 RX ORDER — ESCITALOPRAM OXALATE 20 MG/1
20 TABLET ORAL
Qty: 90 TABLET | Refills: 1 | Status: SHIPPED | OUTPATIENT
Start: 2025-08-04

## 2025-08-05 ENCOUNTER — OUTPATIENT CASE MANAGEMENT (OUTPATIENT)
Dept: ADMINISTRATIVE | Facility: OTHER | Age: 75
End: 2025-08-05
Payer: MEDICARE

## 2025-08-05 NOTE — LETTER
Vince Chadwick  [address] [facility] [address] [facility] [address]      Dear Vince Chadwick,     I am your nurse with [facility]. I was unsuccessful in reaching you today. At your earliest convenience, I would like to discuss your healthcare progress.      Please contact me at [phone] from 8:00AM to 4:30 PM on Monday thru Friday.     As you know, [facility] is a program offered to you through [facility] where a nurse is available 24/7 to answer questions or provide medical advice, their number is [phone].    Thanks,      Gracia Schroeder, RN  Outpatient Care Management

## 2025-08-11 ENCOUNTER — OFFICE VISIT (OUTPATIENT)
Dept: NEUROLOGY | Facility: CLINIC | Age: 75
End: 2025-08-11
Payer: MEDICARE

## 2025-08-11 ENCOUNTER — LAB VISIT (OUTPATIENT)
Dept: LAB | Facility: HOSPITAL | Age: 75
End: 2025-08-11
Attending: NURSE PRACTITIONER
Payer: MEDICARE

## 2025-08-11 VITALS
HEART RATE: 83 BPM | DIASTOLIC BLOOD PRESSURE: 71 MMHG | SYSTOLIC BLOOD PRESSURE: 101 MMHG | WEIGHT: 157.5 LBS | BODY MASS INDEX: 24.67 KG/M2

## 2025-08-11 DIAGNOSIS — R41.3 MEMORY CHANGE: ICD-10-CM

## 2025-08-11 DIAGNOSIS — R41.3 MEMORY LOSS: ICD-10-CM

## 2025-08-11 DIAGNOSIS — R41.3 OTHER AMNESIA: Primary | ICD-10-CM

## 2025-08-11 DIAGNOSIS — Z11.3 ENCOUNTER FOR SCREENING FOR INFECTIONS WITH A PREDOMINANTLY SEXUAL MODE OF TRANSMISSION: ICD-10-CM

## 2025-08-11 DIAGNOSIS — E55.9 VITAMIN D DEFICIENCY, UNSPECIFIED: ICD-10-CM

## 2025-08-11 PROBLEM — F33.9 RECURRENT MAJOR DEPRESSIVE DISORDER: Status: RESOLVED | Noted: 2023-07-18 | Resolved: 2025-08-11

## 2025-08-11 LAB
25(OH)D3+25(OH)D2 SERPL-MCNC: 14 NG/ML (ref 30–96)
CREAT SERPL-MCNC: 1 MG/DL (ref 0.5–1.4)
FOLATE SERPL-MCNC: 8 NG/ML (ref 4–24)
GFR SERPLBLD CREATININE-BSD FMLA CKD-EPI: >60 ML/MIN/1.73/M2
HIV 1+2 AB+HIV1 P24 AG SERPL QL IA: NORMAL
T PALLIDUM IGG+IGM SER QL: NORMAL
TSH SERPL-ACNC: 1.86 UIU/ML (ref 0.4–4)
VIT B12 SERPL-MCNC: 286 PG/ML (ref 210–950)

## 2025-08-11 PROCEDURE — 82565 ASSAY OF CREATININE: CPT

## 2025-08-11 PROCEDURE — 84425 ASSAY OF VITAMIN B-1: CPT

## 2025-08-11 PROCEDURE — 86593 SYPHILIS TEST NON-TREP QUANT: CPT

## 2025-08-11 PROCEDURE — 99214 OFFICE O/P EST MOD 30 MIN: CPT | Mod: PBBFAC,PO | Performed by: NURSE PRACTITIONER

## 2025-08-11 PROCEDURE — 87389 HIV-1 AG W/HIV-1&-2 AB AG IA: CPT

## 2025-08-11 PROCEDURE — 83921 ORGANIC ACID SINGLE QUANT: CPT

## 2025-08-11 PROCEDURE — 82607 VITAMIN B-12: CPT

## 2025-08-11 PROCEDURE — 82746 ASSAY OF FOLIC ACID SERUM: CPT

## 2025-08-11 PROCEDURE — 36415 COLL VENOUS BLD VENIPUNCTURE: CPT | Mod: PO

## 2025-08-11 PROCEDURE — 82306 VITAMIN D 25 HYDROXY: CPT

## 2025-08-11 PROCEDURE — 84443 ASSAY THYROID STIM HORMONE: CPT

## 2025-08-11 PROCEDURE — 99999 PR PBB SHADOW E&M-EST. PATIENT-LVL IV: CPT | Mod: PBBFAC,,, | Performed by: NURSE PRACTITIONER

## 2025-08-12 ENCOUNTER — RESULTS FOLLOW-UP (OUTPATIENT)
Dept: PRIMARY CARE CLINIC | Facility: CLINIC | Age: 75
End: 2025-08-12
Payer: MEDICARE

## 2025-08-12 ENCOUNTER — OUTPATIENT CASE MANAGEMENT (OUTPATIENT)
Dept: ADMINISTRATIVE | Facility: OTHER | Age: 75
End: 2025-08-12
Payer: MEDICARE

## 2025-08-12 ENCOUNTER — TELEPHONE (OUTPATIENT)
Dept: PRIMARY CARE CLINIC | Facility: CLINIC | Age: 75
End: 2025-08-12
Payer: MEDICARE

## 2025-08-15 LAB
W METHYLMALONIC ACID: 0.95 UMOL/L
W VITAMIN B1: 58 UG/L

## 2025-08-25 ENCOUNTER — OUTPATIENT CASE MANAGEMENT (OUTPATIENT)
Dept: ADMINISTRATIVE | Facility: OTHER | Age: 75
End: 2025-08-25
Payer: MEDICARE

## 2025-08-25 ENCOUNTER — TELEPHONE (OUTPATIENT)
Dept: NEUROLOGY | Facility: CLINIC | Age: 75
End: 2025-08-25
Payer: MEDICARE

## 2025-09-02 ENCOUNTER — OUTPATIENT CASE MANAGEMENT (OUTPATIENT)
Dept: ADMINISTRATIVE | Facility: OTHER | Age: 75
End: 2025-09-02
Payer: MEDICARE

## (undated) DEVICE — GLOVE SURGICAL LATEX SZ 7

## (undated) DEVICE — APPLICATOR CHLORAPREP CLR 10.5

## (undated) DEVICE — TRAY NERVE BLOCK

## (undated) DEVICE — TOWEL OR DISP STRL BLUE 4/PK

## (undated) DEVICE — MARKER SKIN STND TIP BLUE BARR

## (undated) DEVICE — SKIN MARKER DEVON 160

## (undated) DEVICE — NDL TUOHY EPIDURAL 20G X 3.5

## (undated) DEVICE — SYR GLASS 5CC LUER LOK